# Patient Record
Sex: FEMALE | Race: BLACK OR AFRICAN AMERICAN | Employment: FULL TIME | URBAN - METROPOLITAN AREA
[De-identification: names, ages, dates, MRNs, and addresses within clinical notes are randomized per-mention and may not be internally consistent; named-entity substitution may affect disease eponyms.]

---

## 2017-03-14 ENCOUNTER — DOCUMENTATION ONLY (OUTPATIENT)
Dept: BEHAVIORAL/MENTAL HEALTH CLINIC | Age: 27
End: 2017-03-14

## 2017-08-12 ENCOUNTER — HOSPITAL ENCOUNTER (EMERGENCY)
Age: 27
Discharge: HOME | End: 2017-08-12
Attending: INTERNAL MEDICINE | Admitting: INTERNAL MEDICINE
Payer: MEDICAID

## 2017-08-12 ENCOUNTER — APPOINTMENT (OUTPATIENT)
Dept: GENERAL RADIOLOGY | Age: 27
End: 2017-08-12
Attending: INTERNAL MEDICINE
Payer: MEDICAID

## 2017-08-12 VITALS
BODY MASS INDEX: 43.19 KG/M2 | WEIGHT: 220 LBS | HEART RATE: 105 BPM | SYSTOLIC BLOOD PRESSURE: 203 MMHG | RESPIRATION RATE: 18 BRPM | TEMPERATURE: 97.8 F | HEIGHT: 60 IN | DIASTOLIC BLOOD PRESSURE: 110 MMHG | OXYGEN SATURATION: 99 %

## 2017-08-12 DIAGNOSIS — S99.922A FOOT INJURY, LEFT, INITIAL ENCOUNTER: Primary | ICD-10-CM

## 2017-08-12 DIAGNOSIS — I10 ESSENTIAL HYPERTENSION: ICD-10-CM

## 2017-08-12 PROCEDURE — 99284 EMERGENCY DEPT VISIT MOD MDM: CPT

## 2017-08-12 PROCEDURE — 74011250637 HC RX REV CODE- 250/637: Performed by: INTERNAL MEDICINE

## 2017-08-12 PROCEDURE — 73630 X-RAY EXAM OF FOOT: CPT

## 2017-08-12 RX ORDER — TRAMADOL HYDROCHLORIDE 50 MG/1
50 TABLET ORAL
Qty: 8 TAB | Refills: 0 | Status: SHIPPED | OUTPATIENT
Start: 2017-08-12 | End: 2017-12-04

## 2017-08-12 RX ORDER — TRAMADOL HYDROCHLORIDE 50 MG/1
50 TABLET ORAL
Status: COMPLETED | OUTPATIENT
Start: 2017-08-12 | End: 2017-08-12

## 2017-08-12 RX ORDER — HYDROCHLOROTHIAZIDE 25 MG/1
25 TABLET ORAL DAILY
Qty: 10 TAB | Refills: 0 | Status: SHIPPED | OUTPATIENT
Start: 2017-08-12 | End: 2017-08-22

## 2017-08-12 RX ORDER — AMLODIPINE BESYLATE 10 MG/1
10 TABLET ORAL DAILY
Qty: 10 TAB | Refills: 0 | Status: SHIPPED | OUTPATIENT
Start: 2017-08-12 | End: 2017-08-22

## 2017-08-12 RX ADMIN — TRAMADOL HYDROCHLORIDE 50 MG: 50 TABLET, COATED ORAL at 02:49

## 2017-08-12 NOTE — ED NOTES
Patient given copy of dc instructions and three script(s). Patient verbalized understanding of instructions and script (s). Patient given a current medication reconciliation form and verbalized understanding of their medications. Patient verbalized understanding of the importance of discussing medications with  his or her physician or clinic when they follow up. Patient alert and oriented and in no acute distress. Pt verbalizes pain scale of 5 out of 10. Patient discharged home via wheelchair.

## 2017-08-12 NOTE — LETTER
[facility] EMERGENCY DEPT 
[address] [facility] [address] [id number] [phone] Work/School Note Date: 8/12/2017 To Whom It May concern: 
 
Kayla Gallardo was seen and treated today in the emergency room by the following provider(s): 
Attending Provider: Erick Campa MD. Kayla Gallardo may return to work on 08/14/2017. Sincerely, Kristina Magana RN

## 2017-08-12 NOTE — DISCHARGE INSTRUCTIONS

## 2017-08-12 NOTE — ED PROVIDER NOTES
HPI Comments: Samson Schaefer is a 32 y.o. female, pmhx significant for asthma, and anxiety, who presents ambulatory to the ED c/o sharp left lower leg pain with left knee pain x a couple of days. Pt states that she sustained injury to her leg and knee by falling down some stairs a few days ago. She reports that her pain is exacerbated with weight bearing, but she is able to ambulate independently. Pt denies hitting her head or LOC in the fall. Pt denies taking any analgesics for pain PTA. Pt denies nausea, vomiting, upper leg pain, fever, chills, and abdominal pain. PCP: Abhijit Carl MD    PSHx: Significant for cholecystectomy, orthopaedic (left foot)  Social Hx: - tobacco (former), - EtOH, - Illicit Drugs    There are no other complaints, changes, or physical findings at this time. The history is provided by the patient. No  was used. Past Medical History:   Diagnosis Date    Asthma     Asthma     has not had any problems no inhaler    Psychiatric disorder     ANXIETY    Vaginal delivery        Past Surgical History:   Procedure Laterality Date    HX ORTHOPAEDIC      Broke l foot as child    HX OTHER SURGICAL      left foot on third toe     HX OTHER SURGICAL      Laparoscopic cholecystectomy.  HX OTHER SURGICAL  04/19/2016    Incision and drainage of pilonidal abscess; Dr. Sue Berger. Family History:   Problem Relation Age of Onset    Hypertension Maternal Grandmother        Social History     Social History    Marital status: SINGLE     Spouse name: N/A    Number of children: N/A    Years of education: N/A     Occupational History    Not on file.      Social History Main Topics    Smoking status: Former Smoker     Packs/day: 0.25     Quit date: 3/1/2011    Smokeless tobacco: Never Used    Alcohol use No    Drug use: No    Sexual activity: Yes     Partners: Male     Birth control/ protection: Pill     Other Topics Concern    Not on file Social History Narrative         ALLERGIES: Aleve [naproxen sodium]; Bactrim [sulfamethoprim ds]; Darvocet a500 [propoxyphene n-acetaminophen]; Lortab [hydrocodone-acetaminophen]; Motrin [ibuprofen]; and Naprosyn [naproxen]    Review of Systems   Constitutional: Negative. Negative for activity change, appetite change, chills, diaphoresis, fever and unexpected weight change. HENT: Negative for congestion, hearing loss, rhinorrhea, sinus pressure, sneezing, sore throat and trouble swallowing. Eyes: Negative for pain, redness, itching and visual disturbance. Respiratory: Negative for cough, shortness of breath and wheezing. Cardiovascular: Negative for chest pain, palpitations and leg swelling. Gastrointestinal: Negative for abdominal pain, constipation, diarrhea, nausea and vomiting. Genitourinary: Negative for dysuria. Musculoskeletal: Positive for arthralgias and myalgias. Negative for gait problem. Skin: Negative for color change, pallor, rash and wound. Neurological: Negative for tremors, weakness, light-headedness, numbness and headaches. All other systems reviewed and are negative. Vitals:    08/12/17 0125   BP: (!) 203/110   Pulse: (!) 105   Resp: 18   Temp: 97.8 °F (36.6 °C)   SpO2: 99%   Weight: 99.8 kg (220 lb)   Height: 5' (1.524 m)            Physical Exam   Constitutional: She is oriented to person, place, and time. She appears well-developed and well-nourished. No distress. 32 y.o.  female in NAD  Communicates appropriately and in full sentences   HENT:   Head: Normocephalic and atraumatic. Right Ear: External ear normal.   Left Ear: External ear normal.   Mouth/Throat: Oropharynx is clear and moist. No oropharyngeal exudate. Eyes: Conjunctivae are normal. Pupils are equal, round, and reactive to light. Right eye exhibits no discharge. Left eye exhibits no discharge. Neck: Normal range of motion. Neck supple. No tracheal deviation present. Cardiovascular: Normal rate, regular rhythm, normal heart sounds and intact distal pulses. Pulmonary/Chest: Effort normal and breath sounds normal. No stridor. No respiratory distress. She has no wheezes. Abdominal: Soft. Bowel sounds are normal. She exhibits no distension. There is no tenderness. Musculoskeletal: Normal range of motion. She exhibits tenderness (tenderness to left foot). She exhibits no edema or deformity. Lymphadenopathy:     She has no cervical adenopathy. Neurological: She is alert and oriented to person, place, and time. No cranial nerve deficit. Coordination normal.   Skin: Skin is warm and dry. No rash noted. She is not diaphoretic. No erythema. No pallor. Psychiatric: She has a normal mood and affect. Nursing note and vitals reviewed. MDM  Number of Diagnoses or Management Options  Diagnosis management comments:   DDx: sprain, strain, contusion, fracture       Amount and/or Complexity of Data Reviewed  Tests in the radiology section of CPT®: ordered and reviewed  Review and summarize past medical records: yes    Patient Progress  Patient progress: stable    ED Course       Procedures    Progress Note:  2:54 AM  Discussed workup results/findings, and counseled pt regarding her diagnosis. Pt has been encouraged to follow-up as instructed. All questions have been answered, and pt conveyed understanding. Written by Luma Carbajal ED Scribjoe, as dictated by Ganesh Tejada MD.      IMAGING RESULTS:  XR FOOT LT MIN 3 V   Final Result     EXAM:  XR FOOT LT MIN 3 V     INDICATION:   fell. Foot pain     COMPARISON:  None.     FINDINGS:  Three views of the left foot demonstrate no fracture or other acute  osseous or articular abnormality. The soft tissues are within normal limits.     IMPRESSION  IMPRESSION:  No acute abnormality. MEDICATIONS GIVEN:  Medications   traMADol (ULTRAM) tablet 50 mg (50 mg Oral Given 8/12/17 0249)       IMPRESSION:  1.  Foot injury, left, initial encounter    2. Essential hypertension        PLAN:  1. Discharge home. Current Discharge Medication List      START taking these medications    Details   traMADol (ULTRAM) 50 mg tablet Take 1 Tab by mouth every eight (8) hours as needed for Pain for up to 8 doses. Max Daily Amount: 150 mg.  Qty: 8 Tab, Refills: 0      amLODIPine (NORVASC) 10 mg tablet Take 1 Tab by mouth daily for 10 doses. Qty: 10 Tab, Refills: 0         CONTINUE these medications which have CHANGED    Details   hydroCHLOROthiazide (HYDRODIURIL) 25 mg tablet Take 1 Tab by mouth daily for 10 days. Qty: 10 Tab, Refills: 0           2. Follow-up Information     Follow up With Details Comments Judith Vigil MD   [address] [address]  [phone]      Leena Bella DPM In 2 days follow up with podiatrist [address] [facility]  [phone]          3. Return to ED if worse       DISCHARGE NOTE:  2:54 AM  Patient's results have been reviewed with them. Patient and/or family have verbally conveyed their understanding and agreement of the patient's signs, symptoms, diagnosis, treatment and prognosis and additionally agree to follow up as recommended or return to the Emergency Room should their condition change prior to follow-up. Discharge instructions have also been provided to the patient with some educational information regarding their diagnosis as well a list of reasons why they would want to return to the ER prior to their follow-up appointment should their condition change. This note is prepared by Teresa Savage, acting as Scribe for MD Marcos Flynn MD: The scribe's documentation has been prepared under my direction and personally reviewed by me in its entirety. I confirm that the note above accurately reflects all work, treatment, procedures, and medical decision making performed by me.

## 2017-11-16 ENCOUNTER — OFFICE VISIT (OUTPATIENT)
Dept: SURGERY | Age: 27
End: 2017-11-16

## 2017-11-16 VITALS
HEIGHT: 60 IN | SYSTOLIC BLOOD PRESSURE: 156 MMHG | BODY MASS INDEX: 50.85 KG/M2 | HEART RATE: 87 BPM | OXYGEN SATURATION: 99 % | WEIGHT: 259 LBS | DIASTOLIC BLOOD PRESSURE: 90 MMHG | RESPIRATION RATE: 16 BRPM | TEMPERATURE: 98.7 F

## 2017-11-16 DIAGNOSIS — L72.3 SEBACEOUS CYST: Primary | ICD-10-CM

## 2017-11-16 PROBLEM — I10 ESSENTIAL HYPERTENSION: Status: ACTIVE | Noted: 2017-11-16

## 2017-11-16 RX ORDER — LOSARTAN POTASSIUM AND HYDROCHLOROTHIAZIDE 12.5; 5 MG/1; MG/1
TABLET ORAL
COMMUNITY
Start: 2017-11-06 | End: 2018-12-29

## 2017-11-16 NOTE — PROGRESS NOTES
1. Have you been to the ER, urgent care clinic since your last visit? Hospitalized since your last visit? No    2. Have you seen or consulted any other health care providers outside of the [address] [place] [address] since your last visit? Include any pap smears or colon screening.  No

## 2017-11-16 NOTE — PROGRESS NOTES
HISTORY OF PRESENT ILLNESS  Gardenia Maya is a 32 y.o. female who presents for evaluation of a subcutaneous mass in her right inframammary fold. HPI Comments: Ms. Maggie Hutchins tells me that she has had a subcutaneous mass in her right inframammary fold for approx. 1 year now. The mass is becoming larger and more bothersome to her. No associated drainage. She has otherwise been in her usual state of health. Past Medical History:  No date: Asthma  No date: Asthma      Comment: has not had any problems no inhaler  11/16/2017: Class 3 obesity in adult  11/16/2017: Essential hypertension  No date: Psychiatric disorder      Comment: ANXIETY  No date: Vaginal delivery    Past Surgical History:  No date: HX ORTHOPAEDIC      Comment: Broke l foot as child  No date: HX OTHER SURGICAL      Comment: left foot on third toe   No date: HX OTHER SURGICAL      Comment: Laparoscopic cholecystectomy. 04/19/2016: HX OTHER SURGICAL      Comment: Incision and drainage of pilonidal abscess;                Dr. Raina Ferraro. Review of patient's family history indicates:    Hypertension                   Maternal Grandmother      Social History: Employment - [employer]. Tobacco - Denies. EtOH - Denies. Review of systems negative except as noted. Review of Systems   Musculoskeletal: Positive for back pain and myalgias. Psychiatric/Behavioral: Positive for depression. Physical Exam   Constitutional: No distress. Obese female. HENT:   Head: Normocephalic and atraumatic. Eyes: No scleral icterus. Neck: Neck supple. Cardiovascular: Normal rate and regular rhythm. Pulmonary/Chest: Effort normal and breath sounds normal.       In the right inframammary fold, there is an approx. 2cm x 1cm, well circumscribed, freely movable subcutaneous mass. No associated drainage. Clinically, this is c/w a sebaceous cyst.   Abdominal: Soft. She exhibits no distension. There is no tenderness. There is no rebound and no guarding.

## 2017-11-21 RX ORDER — BUPIVACAINE HYDROCHLORIDE 2.5 MG/ML
30 INJECTION, SOLUTION EPIDURAL; INFILTRATION; INTRACAUDAL ONCE
Status: CANCELLED | OUTPATIENT
Start: 2017-11-21 | End: 2017-11-21

## 2017-12-04 ENCOUNTER — HOSPITAL ENCOUNTER (OUTPATIENT)
Dept: PREADMISSION TESTING | Age: 27
Discharge: HOME | End: 2017-12-04
Payer: MEDICAID

## 2017-12-04 VITALS
WEIGHT: 255.95 LBS | HEIGHT: 60 IN | BODY MASS INDEX: 50.25 KG/M2 | RESPIRATION RATE: 16 BRPM | TEMPERATURE: 97.9 F | DIASTOLIC BLOOD PRESSURE: 123 MMHG | SYSTOLIC BLOOD PRESSURE: 185 MMHG | HEART RATE: 100 BPM

## 2017-12-04 LAB
ANION GAP SERPL CALC-SCNC: 8 MMOL/L (ref 5–15)
BASOPHILS # BLD: 0 K/UL (ref 0–0.1)
BASOPHILS NFR BLD: 0 % (ref 0–1)
BUN SERPL-MCNC: 11 MG/DL (ref 6–20)
BUN/CREAT SERPL: 13 (ref 12–20)
CALCIUM SERPL-MCNC: 8.9 MG/DL (ref 8.5–10.1)
CHLORIDE SERPL-SCNC: 104 MMOL/L (ref 97–108)
CO2 SERPL-SCNC: 31 MMOL/L (ref 21–32)
CREAT SERPL-MCNC: 0.82 MG/DL (ref 0.55–1.02)
EOSINOPHIL # BLD: 0.1 K/UL (ref 0–0.4)
EOSINOPHIL NFR BLD: 2 % (ref 0–7)
ERYTHROCYTE [DISTWIDTH] IN BLOOD BY AUTOMATED COUNT: 17.4 % (ref 11.5–14.5)
GLUCOSE SERPL-MCNC: 98 MG/DL (ref 65–100)
HCT VFR BLD AUTO: 38.7 % (ref 35–47)
HGB BLD-MCNC: 12.2 G/DL (ref 11.5–16)
LYMPHOCYTES # BLD: 2.6 K/UL (ref 0.8–3.5)
LYMPHOCYTES NFR BLD: 33 % (ref 12–49)
MCH RBC QN AUTO: 23.3 PG (ref 26–34)
MCHC RBC AUTO-ENTMCNC: 31.5 G/DL (ref 30–36.5)
MCV RBC AUTO: 74 FL (ref 80–99)
MONOCYTES # BLD: 0.5 K/UL (ref 0–1)
MONOCYTES NFR BLD: 6 % (ref 5–13)
NEUTS SEG # BLD: 4.7 K/UL (ref 1.8–8)
NEUTS SEG NFR BLD: 59 % (ref 32–75)
PLATELET # BLD AUTO: 340 K/UL (ref 150–400)
POTASSIUM SERPL-SCNC: 3.8 MMOL/L (ref 3.5–5.1)
RBC # BLD AUTO: 5.23 M/UL (ref 3.8–5.2)
SODIUM SERPL-SCNC: 143 MMOL/L (ref 136–145)
WBC # BLD AUTO: 7.9 K/UL (ref 3.6–11)

## 2017-12-04 PROCEDURE — 80048 BASIC METABOLIC PNL TOTAL CA: CPT | Performed by: SURGERY

## 2017-12-04 PROCEDURE — 36415 COLL VENOUS BLD VENIPUNCTURE: CPT | Performed by: SURGERY

## 2017-12-04 PROCEDURE — 85025 COMPLETE CBC W/AUTO DIFF WBC: CPT | Performed by: SURGERY

## 2017-12-04 NOTE — PERIOP NOTES
[address] 83  Preoperative Instructions      Surgery Date 12/21/2017              Time of Arrival  8:00    1. On the day of your surgery, please report to the [facility] Entrance. If arriving prior to 7 AM please enter through the Emergency Room Entrance. 2. You must have a responsible adult to drive you to the hospital, stay at the hospital during your surgery and drive you home. You should have someone stay with you for the first 24 hours after your surgery. You should not drive a car for 24 hours following surgery. 3. Do not have anything to eat or drink ( including water, gum, mints, coffee, juice) after midnight . This may not apply to medications prescribed by your physician. Please note special instructions, if applicable. If you are currently taking Plavix, Coumadin, or other blood-thinning agents, contact your surgeon for instructions. 4. We recommend you do not drink any alcoholic beverages for 24 hours before and after your surgery. 5. Have a list of all current medications, including vitamins, herbal supplements and any other over the counter medications. Stop Asprin and non-steroidal anti-inflammatory drugs (I.e. Advil, Aleve) as directed by your surgeon's office. Stop all vitamins and herbal supplements seven days prior to your surgery. 6. Wear comfortable clothes. Wear glasses instead of contacts. Do not bring any money or jewelry. Do not wear make-up, particularly mascara, the morning of your surgery. Do not wear nail polish, particularly if you are having foot /hand surgery. Wear your hair loose or down, no ponytails, buns, patti pins or clips. All body piercings must be removed. Please shower before surgery with an antibacterial soap (Safeguard/Dial) and use a clean towel. Do not apply any lotions, powders, cologne, perfume or deodorants afterward.     Do not shave the area around your surgical incision for at least two to three days prior to your surgery. If you wear glasses, contacts, dentures and/or hearing aids, they will be removed prior to surgery. 7. You should understand that if you do not follow these instructions your surgery may be cancelled. If your physical condition changes (I.e. fever, cold or flu) please contact your surgeon as soon as possible. 8. It is important that you be on time. If a situation occurs where you may be late, please call [phone]    9. If you are feeling sick before surgery, call your surgeon. He or she will tell you what to do. If you are sick on the day of your surgery please call [phone].     10. If you have any questions and or problems, please call [phone] (Pre-admission Testing). 11. Your surgery time may be subject to change. You will receive a phone call the evening before your surgery if your time changes. Special Instructions: take blood pressure medication the morning of surgery with a sip of water. MEDICATIONS TO TAKE THE MORNING OF SURGERY WITH A SIP OF WATER:   I understand a pre-operative phone call will be made to verify my surgery time.   In the event that I am not available, I give permission for a message to be left on my answering service and/or with another person?              ___________________      ___________________  _________  (Signature of Patient)          (Witness)                              (Date and Time)

## 2017-12-04 NOTE — PERIOP NOTES
Patient educated on high blood pressure. Blood pressure retaken after PAT. 153/107. Patient instructed to see her Primary DrMamie For high blood pressure.

## 2017-12-21 ENCOUNTER — ANESTHESIA EVENT (OUTPATIENT)
Dept: SURGERY | Age: 27
End: 2017-12-21
Payer: MEDICAID

## 2017-12-21 ENCOUNTER — HOSPITAL ENCOUNTER (OUTPATIENT)
Age: 27
Setting detail: OUTPATIENT SURGERY
Discharge: HOME | End: 2017-12-21
Attending: SURGERY | Admitting: SURGERY
Payer: MEDICAID

## 2017-12-21 ENCOUNTER — ANESTHESIA (OUTPATIENT)
Dept: SURGERY | Age: 27
End: 2017-12-21
Payer: MEDICAID

## 2017-12-21 VITALS
SYSTOLIC BLOOD PRESSURE: 152 MMHG | TEMPERATURE: 97.8 F | BODY MASS INDEX: 50.25 KG/M2 | OXYGEN SATURATION: 96 % | HEIGHT: 60 IN | RESPIRATION RATE: 16 BRPM | DIASTOLIC BLOOD PRESSURE: 98 MMHG | WEIGHT: 255.95 LBS | HEART RATE: 88 BPM

## 2017-12-21 DIAGNOSIS — L72.3 SEBACEOUS CYST: Primary | ICD-10-CM

## 2017-12-21 LAB — HCG UR QL: NEGATIVE

## 2017-12-21 PROCEDURE — 77030010507 HC ADH SKN DERMBND J&J -B: Performed by: SURGERY

## 2017-12-21 PROCEDURE — 76010000138 HC OR TIME 0.5 TO 1 HR: Performed by: SURGERY

## 2017-12-21 PROCEDURE — 77030031139 HC SUT VCRL2 J&J -A: Performed by: SURGERY

## 2017-12-21 PROCEDURE — 76060000032 HC ANESTHESIA 0.5 TO 1 HR: Performed by: SURGERY

## 2017-12-21 PROCEDURE — 77030018836 HC SOL IRR NACL ICUM -A: Performed by: SURGERY

## 2017-12-21 PROCEDURE — 77030011640 HC PAD GRND REM COVD -A: Performed by: SURGERY

## 2017-12-21 PROCEDURE — 81025 URINE PREGNANCY TEST: CPT

## 2017-12-21 PROCEDURE — 76210000063 HC OR PH I REC FIRST 0.5 HR: Performed by: SURGERY

## 2017-12-21 PROCEDURE — 74011000250 HC RX REV CODE- 250

## 2017-12-21 PROCEDURE — 74011250636 HC RX REV CODE- 250/636

## 2017-12-21 PROCEDURE — 88304 TISSUE EXAM BY PATHOLOGIST: CPT | Performed by: SURGERY

## 2017-12-21 PROCEDURE — 74011250636 HC RX REV CODE- 250/636: Performed by: ANESTHESIOLOGY

## 2017-12-21 PROCEDURE — 74011000250 HC RX REV CODE- 250: Performed by: SURGERY

## 2017-12-21 PROCEDURE — 76210000020 HC REC RM PH II FIRST 0.5 HR: Performed by: SURGERY

## 2017-12-21 PROCEDURE — 77030002933 HC SUT MCRYL J&J -A: Performed by: SURGERY

## 2017-12-21 RX ORDER — CEFAZOLIN SODIUM IN 0.9 % NACL 2 G/100 ML
PLASTIC BAG, INJECTION (ML) INTRAVENOUS
Status: DISCONTINUED
Start: 2017-12-21 | End: 2017-12-21 | Stop reason: HOSPADM

## 2017-12-21 RX ORDER — SODIUM CHLORIDE 0.9 % (FLUSH) 0.9 %
5-10 SYRINGE (ML) INJECTION AS NEEDED
Status: DISCONTINUED | OUTPATIENT
Start: 2017-12-21 | End: 2017-12-21 | Stop reason: HOSPADM

## 2017-12-21 RX ORDER — SODIUM CHLORIDE, SODIUM LACTATE, POTASSIUM CHLORIDE, CALCIUM CHLORIDE 600; 310; 30; 20 MG/100ML; MG/100ML; MG/100ML; MG/100ML
50 INJECTION, SOLUTION INTRAVENOUS CONTINUOUS
Status: DISCONTINUED | OUTPATIENT
Start: 2017-12-21 | End: 2017-12-21 | Stop reason: HOSPADM

## 2017-12-21 RX ORDER — BUPIVACAINE HYDROCHLORIDE 2.5 MG/ML
30 INJECTION, SOLUTION EPIDURAL; INFILTRATION; INTRACAUDAL ONCE
Status: COMPLETED | OUTPATIENT
Start: 2017-12-21 | End: 2017-12-21

## 2017-12-21 RX ORDER — PROPOFOL 10 MG/ML
INJECTION, EMULSION INTRAVENOUS AS NEEDED
Status: DISCONTINUED | OUTPATIENT
Start: 2017-12-21 | End: 2017-12-21 | Stop reason: HOSPADM

## 2017-12-21 RX ORDER — HYDROMORPHONE HYDROCHLORIDE 1 MG/ML
0.5 INJECTION, SOLUTION INTRAMUSCULAR; INTRAVENOUS; SUBCUTANEOUS
Status: DISCONTINUED | OUTPATIENT
Start: 2017-12-21 | End: 2017-12-21 | Stop reason: HOSPADM

## 2017-12-21 RX ORDER — LIDOCAINE HYDROCHLORIDE 20 MG/ML
INJECTION, SOLUTION INFILTRATION; PERINEURAL AS NEEDED
Status: DISCONTINUED | OUTPATIENT
Start: 2017-12-21 | End: 2017-12-21 | Stop reason: HOSPADM

## 2017-12-21 RX ORDER — CEFAZOLIN SODIUM 1 G/3ML
INJECTION, POWDER, FOR SOLUTION INTRAMUSCULAR; INTRAVENOUS
Status: DISCONTINUED
Start: 2017-12-21 | End: 2017-12-21 | Stop reason: HOSPADM

## 2017-12-21 RX ORDER — FENTANYL CITRATE 50 UG/ML
INJECTION, SOLUTION INTRAMUSCULAR; INTRAVENOUS AS NEEDED
Status: DISCONTINUED | OUTPATIENT
Start: 2017-12-21 | End: 2017-12-21 | Stop reason: HOSPADM

## 2017-12-21 RX ORDER — HYDROCODONE BITARTRATE AND ACETAMINOPHEN 5; 325 MG/1; MG/1
1 TABLET ORAL
Qty: 4 TAB | Refills: 0 | Status: SHIPPED | OUTPATIENT
Start: 2017-12-21 | End: 2018-02-15

## 2017-12-21 RX ORDER — FENTANYL CITRATE 50 UG/ML
25 INJECTION, SOLUTION INTRAMUSCULAR; INTRAVENOUS
Status: DISCONTINUED | OUTPATIENT
Start: 2017-12-21 | End: 2017-12-21 | Stop reason: HOSPADM

## 2017-12-21 RX ORDER — SODIUM CHLORIDE 900 MG/100ML
INJECTION INTRAVENOUS
Status: DISCONTINUED
Start: 2017-12-21 | End: 2017-12-21 | Stop reason: HOSPADM

## 2017-12-21 RX ORDER — CEFAZOLIN SODIUM IN 0.9 % NACL 2 G/100 ML
2 PLASTIC BAG, INJECTION (ML) INTRAVENOUS
Status: DISCONTINUED | OUTPATIENT
Start: 2017-12-21 | End: 2017-12-21 | Stop reason: HOSPADM

## 2017-12-21 RX ORDER — LIDOCAINE HYDROCHLORIDE 10 MG/ML
0.1 INJECTION, SOLUTION EPIDURAL; INFILTRATION; INTRACAUDAL; PERINEURAL AS NEEDED
Status: DISCONTINUED | OUTPATIENT
Start: 2017-12-21 | End: 2017-12-21 | Stop reason: HOSPADM

## 2017-12-21 RX ORDER — SODIUM CHLORIDE 9 MG/ML
50 INJECTION, SOLUTION INTRAVENOUS CONTINUOUS
Status: DISCONTINUED | OUTPATIENT
Start: 2017-12-21 | End: 2017-12-21 | Stop reason: HOSPADM

## 2017-12-21 RX ORDER — MIDAZOLAM HYDROCHLORIDE 1 MG/ML
INJECTION, SOLUTION INTRAMUSCULAR; INTRAVENOUS AS NEEDED
Status: DISCONTINUED | OUTPATIENT
Start: 2017-12-21 | End: 2017-12-21 | Stop reason: HOSPADM

## 2017-12-21 RX ORDER — SODIUM CHLORIDE 0.9 % (FLUSH) 0.9 %
5-10 SYRINGE (ML) INJECTION EVERY 8 HOURS
Status: DISCONTINUED | OUTPATIENT
Start: 2017-12-21 | End: 2017-12-21 | Stop reason: HOSPADM

## 2017-12-21 RX ADMIN — PROPOFOL 10 MG: 10 INJECTION, EMULSION INTRAVENOUS at 11:56

## 2017-12-21 RX ADMIN — PROPOFOL 10 MG: 10 INJECTION, EMULSION INTRAVENOUS at 11:50

## 2017-12-21 RX ADMIN — PROPOFOL 40 MG: 10 INJECTION, EMULSION INTRAVENOUS at 11:47

## 2017-12-21 RX ADMIN — PROPOFOL 10 MG: 10 INJECTION, EMULSION INTRAVENOUS at 11:55

## 2017-12-21 RX ADMIN — PROPOFOL 10 MG: 10 INJECTION, EMULSION INTRAVENOUS at 12:00

## 2017-12-21 RX ADMIN — PROPOFOL 10 MG: 10 INJECTION, EMULSION INTRAVENOUS at 11:53

## 2017-12-21 RX ADMIN — PROPOFOL 10 MG: 10 INJECTION, EMULSION INTRAVENOUS at 11:48

## 2017-12-21 RX ADMIN — LIDOCAINE HYDROCHLORIDE 60 MG: 20 INJECTION, SOLUTION INFILTRATION; PERINEURAL at 11:47

## 2017-12-21 RX ADMIN — PROPOFOL 10 MG: 10 INJECTION, EMULSION INTRAVENOUS at 11:58

## 2017-12-21 RX ADMIN — MIDAZOLAM HYDROCHLORIDE 1 MG: 1 INJECTION, SOLUTION INTRAMUSCULAR; INTRAVENOUS at 11:45

## 2017-12-21 RX ADMIN — PROPOFOL 10 MG: 10 INJECTION, EMULSION INTRAVENOUS at 12:01

## 2017-12-21 RX ADMIN — PROPOFOL 10 MG: 10 INJECTION, EMULSION INTRAVENOUS at 11:52

## 2017-12-21 RX ADMIN — PROPOFOL 10 MG: 10 INJECTION, EMULSION INTRAVENOUS at 11:54

## 2017-12-21 RX ADMIN — SODIUM CHLORIDE, POTASSIUM CHLORIDE, SODIUM LACTATE AND CALCIUM CHLORIDE: 600; 310; 30; 20 INJECTION, SOLUTION INTRAVENOUS at 10:50

## 2017-12-21 RX ADMIN — PROPOFOL 10 MG: 10 INJECTION, EMULSION INTRAVENOUS at 11:51

## 2017-12-21 RX ADMIN — MIDAZOLAM HYDROCHLORIDE 2 MG: 1 INJECTION, SOLUTION INTRAMUSCULAR; INTRAVENOUS at 11:25

## 2017-12-21 RX ADMIN — PROPOFOL 10 MG: 10 INJECTION, EMULSION INTRAVENOUS at 11:57

## 2017-12-21 RX ADMIN — PROPOFOL 10 MG: 10 INJECTION, EMULSION INTRAVENOUS at 11:49

## 2017-12-21 RX ADMIN — FENTANYL CITRATE 50 MCG: 50 INJECTION, SOLUTION INTRAMUSCULAR; INTRAVENOUS at 11:45

## 2017-12-21 RX ADMIN — PROPOFOL 10 MG: 10 INJECTION, EMULSION INTRAVENOUS at 11:59

## 2017-12-21 NOTE — ANESTHESIA PREPROCEDURE EVALUATION
Anesthetic History   No history of anesthetic complications            Review of Systems / Medical History  Patient summary reviewed and pertinent labs reviewed    Pulmonary            Asthma        Neuro/Psych         Psychiatric history     Cardiovascular    Hypertension              Exercise tolerance: >4 METS     GI/Hepatic/Renal  Within defined limits              Endo/Other        Morbid obesity     Other Findings              Physical Exam    Airway  Mallampati: III  TM Distance: 4 - 6 cm  Neck ROM: normal range of motion   Mouth opening: Normal     Cardiovascular    Rhythm: regular  Rate: normal         Dental    Dentition: Upper dentition intact and Lower dentition intact     Pulmonary  Breath sounds clear to auscultation               Abdominal  GI exam deferred       Other Findings            Anesthetic Plan    ASA: 3  Anesthesia type: MAC            Anesthetic plan and risks discussed with: Patient

## 2017-12-21 NOTE — ANESTHESIA POSTPROCEDURE EVALUATION
Post-Anesthesia Evaluation and Assessment    Patient: Eyal Funk MRN: [id number]  SSN: [id number]    YOB: 1990  Age: 32 y.o. Sex: female       Cardiovascular Function/Vital Signs  Visit Vitals    BP (!) 152/98    Pulse 88    Temp 36.6 °C (97.8 °F)    Resp 16    Ht 5' (1.524 m)    Wt 116.1 kg (255 lb 15.3 oz)    SpO2 96%    BMI 49.99 kg/m2       Patient is status post MAC anesthesia for Procedure(s):  EXCISION OF SEBACEOUS CYST RIGHT INFRAMAMMARY FOLD. Nausea/Vomiting: None    Postoperative hydration reviewed and adequate. Pain:  Pain Scale 1: Numeric (0 - 10) (12/21/17 1225)  Pain Intensity 1: 0 (12/21/17 1225)   Managed    Neurological Status:   Neuro (WDL): Within Defined Limits (12/21/17 1229)  Neuro  Neurologic State: Alert; Appropriate for age (12/21/17 1229)  LUE Motor Response: Purposeful (12/21/17 1229)  LLE Motor Response: Purposeful (12/21/17 1229)  RUE Motor Response: Purposeful (12/21/17 1229)  RLE Motor Response: Purposeful (12/21/17 1229)   At baseline    Mental Status and Level of Consciousness: Arousable    Pulmonary Status:   O2 Device: Room air (12/21/17 1229)   Adequate oxygenation and airway patent    Complications related to anesthesia: None    Post-anesthesia assessment completed.  No concerns    Signed By: Marissa Monroy MD     December 21, 2017

## 2017-12-21 NOTE — DISCHARGE INSTRUCTIONS
Patient Discharge Instructions    Michi Murcia / [id number] : 1990    Admitted 2017 Discharged: 2017       · It is important that you take the medication exactly as they are prescribed. · Keep your medication in the bottles provided by the pharmacist and keep a list of the medication names, dosages, and times to be taken in your wallet. · Do not take other medications without consulting your doctor. What to do at Home    Recommended diet: Regular. Recommended activity: No Restrictions. No Driving While Taking [address]. May Take Shower or [employer]. If you experience any of the following symptoms Fevers, Chills, Nausea, Vomitting, Redness or Drainage at Surgical Site(s) or Any Other Questions or Concerns Please Call -  [phone]. Follow-up with Dr. Phyllis Diehl in 10-14 days. Information obtained by :  I understand that if any problems occur once I am at home I am to contact my physician. I understand and acknowledge receipt of the instructions indicated above.                                                                                                                                            Physician's or R.N.'s Signature                                                                  Date/Time                                                                                                                                              Patient or Representative Signature                                                          Date/Time

## 2017-12-21 NOTE — PERIOP NOTES
Pt educated on discharge instructions and follow up care by Dr. Marcene Primrose and RN staff. Pt also educated on high blood pressure. Pt states that she just started taking BP meds again and felt that is what was causing her high readings. Pt to start taking meds on a regular basis but educated to follow up with PCP. Pt given opportunity to ask questions and given written materials. Pt verbalized understanding.  To be discharged with cousin Brian Eliana

## 2017-12-21 NOTE — H&P
No c/o today. Afebrile VSS  No intake or output data in the 24 hours ending 12/21/17 1047   Exam: Cor: RRR. Lungs: Bilateral breath sounds. Clear to auscultation. Abd: Soft. Non distended. Non tender. Skin: No change in right inframammary fold sebaceous cyst.   Labs:   Recent Results (from the past 12 hour(s))   HCG URINE, QL. - POC    Collection Time: 12/21/17  9:31 AM   Result Value Ref Range    Pregnancy test,urine (POC) NEGATIVE  NEG     To OR for excision of sebaceous cyst.   Discussed procedure with Ms. Sloan including risks of bleeding, infection, recurrence. She understands and wishes to proceed. Consent on chart. Operative site marked.

## 2017-12-21 NOTE — PERIOP NOTES
Handoff Report from Operating Room to PACU    Report received from Dr. Chele Kenyon and Nydia Mendes RN regarding [place]. Surgeon(s):  Benita Gates MD  And Procedure(s) (LRB):  EXCISION OF SEBACEOUS CYST RIGHT INFRAMAMMARY FOLD (Right)  confirmed   with allergies and dressings discussed. Anesthesia type, drugs, patient history, complications, estimated blood loss, vital signs, intake and output, and last pain medication, lines and temperature were reviewed.

## 2017-12-21 NOTE — BRIEF OP NOTE
BRIEF OPERATIVE NOTE    Date of Procedure: 12/21/2017   Preoperative Diagnosis:  Sebaceous Cyst Right Inframammary Fold. Postoperative Diagnosis:  Same. Procedure(s):   Excise Sebaceous Cyst Right Inframammary Fold. Surgeon(s) and Role:   Amanda Rhodes MD - Primary  Surgical Staff:  Circ-1: Alisha Cervantes  Scrub Tech-1: Ester Baca  Event Time In   Incision Start 1147   Incision Close 1204     Anesthesia: MAC   Estimated Blood Loss: Minimal.  Specimens:   ID Type Source Tests Collected by Time Destination   1 : Sebaceous Cyst Right Chest Preservative Chest  Amanda Rhodes MD 12/21/2017 1153 Pathology      Findings: Approx. 1cm x 1cm sebaceous cyst.   Complications: None.   Implants: * No implants in log *

## 2017-12-21 NOTE — IP AVS SNAPSHOT
Kenny Barragan 
 
 
 [address] Patient: Benita Mena MRN: [id number] TRH:2/60/1720 My Medications TAKE these medications as instructed Instructions Each Dose to Equal  
 Morning Noon Evening Bedtime  
 busPIRone 7.5 mg tablet Commonly known as:  BUSPAR Your last dose was: Your next dose is: Take 1 Tab by mouth three (3) times daily (with meals). 7.5 mg  
    
   
   
   
  
 HYDROcodone-acetaminophen 5-325 mg per tablet Commonly known as:  March Castles Your last dose was: Your next dose is: Take 1 Tab by mouth every four (4) hours as needed for Pain. Max Daily Amount: 6 Tabs. 1 Tab  
    
   
   
   
  
 losartan-hydroCHLOROthiazide 50-12.5 mg per tablet Commonly known as:  HYZAAR Your last dose was: Your next dose is:    
   
   
      
   
   
   
  
 Soft Lens Rinse-Store Solution Soln Commonly known as:  SALINE SOLUTION Your last dose was: Your next dose is:    
   
   
 Use as needed for wound care. Where to Get Your Medications Information on where to get these meds will be given to you by the nurse or doctor. ! Ask your nurse or doctor about these medications HYDROcodone-acetaminophen 5-325 mg per tablet

## 2017-12-21 NOTE — IP AVS SNAPSHOT
Tonye Elton 
 
 
 [address] Patient: Kathryn Church MRN: [id number] About your hospitalization You were admitted on:  December 21, 2017 You last received care in the:  [facility] PACU/[facility] You were discharged on:  December 21, 2017 Why you were hospitalized Your primary diagnosis was:  Not on File Things You Need To Do (next 8 weeks) Follow up with Mega Khan MD  
  
Phone:  [phone] Where:  [address], [address], [address] [place] [address] Drive Thursday Jan 04, 2018 POST OP with Miller Mitchell MD at  2:00 PM  
Where:  [address] [facility] ([facility]) Discharge Orders None A check tito indicates which time of day the medication should be taken. My Medications TAKE these medications as instructed Instructions Each Dose to Equal  
 Morning Noon Evening Bedtime  
 busPIRone 7.5 mg tablet Commonly known as:  BUSPAR Your last dose was: Your next dose is: Take 1 Tab by mouth three (3) times daily (with meals). 7.5 mg  
    
   
   
   
  
 HYDROcodone-acetaminophen 5-325 mg per tablet Commonly known as:  Fabiola Edwin Your last dose was: Your next dose is: Take 1 Tab by mouth every four (4) hours as needed for Pain. Max Daily Amount: 6 Tabs. 1 Tab  
    
   
   
   
  
 losartan-hydroCHLOROthiazide 50-12.5 mg per tablet Commonly known as:  HYZAAR Your last dose was: Your next dose is:    
   
   
      
   
   
   
  
 Soft Lens Rinse-Store Solution Soln Commonly known as:  SALINE SOLUTION Your last dose was: Your next dose is:    
   
   
 Use as needed for wound care. Where to Get Your Medications Information on where to get these meds will be given to you by the nurse or doctor. ! Ask your nurse or doctor about these medications HYDROcodone-acetaminophen 5-325 mg per tablet Discharge Instructions Patient Discharge Instructions Phan Marin / [id number] : 1990 Admitted 2017 Discharged: 2017 · It is important that you take the medication exactly as they are prescribed. · Keep your medication in the bottles provided by the pharmacist and keep a list of the medication names, dosages, and times to be taken in your wallet. · Do not take other medications without consulting your doctor. What to do at [facility] Recommended diet: Regular. Recommended activity: No Restrictions. No Driving While Taking [address] May Take Shower or Eakly Roxo. If you experience any of the following symptoms Fevers, Chills, Nausea, Vomitting, Redness or Drainage at Surgical Site(s) or Any Other Questions or Concerns Please Call -  [phone]. Follow-up with Dr. Halima Jansen in 10-14 days. Information obtained by : 
I understand that if any problems occur once I am at home I am to contact my physician. I understand and acknowledge receipt of the instructions indicated above. Physician's or R.N.'s Signature                                                                  Date/Time Patient or Representative Signature                                                          Date/Time Introducing [facility]! Nell Ospina introduces Eveo patient portal. Now you can access parts of your medical record, email your doctor's office, and request medication refills online. 1. In your internet browser, go to [email or web address] 2. Click on the First Time User? Click Here link in the Sign In box. You will see the New Member Sign Up page. 3. Enter your Fantex Access Code exactly as it appears below. You will not need to use this code after youve completed the sign-up process. If you do not sign up before the expiration date, you must request a new code. · Fantex Access Code: [email or web address] Expires: 3/21/2018 12:23 PM 
 
4. Enter the last four digits of your Social Security Number (xxxx) and Date of Birth (mm/dd/yyyy) as indicated and click Submit. You will be taken to the next sign-up page. 5. Create a Fantex ID. This will be your Fantex login ID and cannot be changed, so think of one that is secure and easy to remember. 6. Create a Fantex password. You can change your password at any time. 7. Enter your Password Reset Question and Answer. This can be used at a later time if you forget your password. 8. Enter your e-mail address. You will receive e-mail notification when new information is available in [address] 9. Click Sign Up. You can now view and download portions of your medical record. 10. Click the Download Summary menu link to download a portable copy of your medical information. If you have questions, please visit the Frequently Asked Questions section of the Fantex website. Remember, Fantex is NOT to be used for urgent needs. For medical emergencies, dial 911. Now available from your iPhone and Android! Providers Seen During Your Hospitalization Provider Specialty Primary office phone Maggie Thorpe MD General Surgery [phone] Your Primary Care Physician (PCP) Primary Care Physician Office Phone Office Fax Samaria Murcia [phone] You are allergic to the following Allergen Reactions Aleve (Naproxen Sodium) Hives Bactrim (Sulfamethoprim Ds) Other (comments) Abdominal pain. Darvocet A500 (Propoxyphene N-Acetaminophen) Hives Lortab (Hydrocodone-Acetaminophen) Nausea and Vomiting Motrin (Ibuprofen) Hives Naprosyn (Naproxen) Hives Recent Documentation Height Weight BMI OB Status Smoking Status 1.524 m 116.1 kg 49.99 kg/m2 Having regular periods Former Smoker Emergency Contacts Name Discharge Info Relation Home Work Mobile Via GoPlaceIt CAREGIVER [3] Other Relative [6] [phone] Patient Belongings The following personal items are in your possession at time of discharge: 
  Dental Appliances: None  Visual Aid: None      Home Medications: None   Jewelry: None  Clothing: At bedside    Other Valuables: None Please provide this summary of care documentation to your next provider. Signatures-by signing, you are acknowledging that this After Visit Summary has been reviewed with you and you have received a copy. Patient Signature:  ____________________________________________________________ Date:  ____________________________________________________________  
  
Jim Bealve Provider Signature:  ____________________________________________________________ Date:  ____________________________________________________________

## 2017-12-21 NOTE — OP NOTES
[facility]  OPERATIVE REPORT    Carmen Conteh  MR#: [id number]  : 1990  ACCOUNT #: [de-identified]   DATE OF SERVICE: 2017    SURGEON: Ritchie Biggs MD    PREOPERATIVE DIAGNOSIS:  Sebaceous cyst, right inframammary fold. POSTOPERATIVE DIAGNOSIS: Sebaceous cyst, right inframammary fold. PROCEDURE  PERFORMED: Excise sebaceous cyst, right inframammary fold. ANESTHESIA:  Local with sedation. ESTIMATED BLOOD LOSS:  Minimal; crystalloid 700 mL. SPECIMEN REMOVED:  Sebaceous cyst of the right inframammary fold to Pathology. COMPLICATIONS: None     DRAINS:  None    INDICATIONS FOR SURGERY:  The patient is a 15-year-old female with a subcutaneous mass in the right inframammary fold. Clinically, this is consistent with a sebaceous cyst.    The patient was brought to the operating room at this time for excision of the sebaceous cyst.  The risks of the procedure including but not limited to bleeding, infection, recurrence were discussed in detail with the patient. The patient understood and wished to proceed. PROCEDURE IN DETAIL:  After consent was obtained, the patient was brought to the operating room where she was placed in supine position on the operating room table. Following the induction of an adequate level of intravenous sedation, compression devices were placed on both lower extremities. The right side of the abdomen and inframammary fold were prepped with ChloraPrep and draped as a sterile field. Local anesthetic was infiltrated and an incision over the sebaceous cyst was opened sharply. Subcutaneous bleeders were carefully cauterized. Incision was carried down to the sebaceous cyst, which was readily identified, dissected free circumferentially and excised. The specimen, which measured approximately 1 cm x 1 cm, was passed off the field and submitted for histopathologic evaluation. The wound was inspected and several bleeders cauterized.   The wound was irrigated copiously with saline, inspected and found to be hemostatic. The surgical incision was closed with running 3-0 Vicryl suture followed by 4-0 Monocryl subcuticular suture to the skin. Additional local anesthetic was infiltrated and the surgical incision was dressed with Dermabond. The patient was transferred from the operating room table to the stretcher and brought to recovery in stable condition having tolerated the procedure well. At the conclusion of the procedure, all sponge counts, instrument counts and needle counts reported as correct x2.       Marcelo Carr MD       [facility] /   D: 12/21/2017 12:19     T: 12/21/2017 14:46  JOB #: [id number]  CC: Elliot Spain MD

## 2018-02-01 ENCOUNTER — OFFICE VISIT (OUTPATIENT)
Dept: SURGERY | Age: 28
End: 2018-02-01

## 2018-02-01 DIAGNOSIS — L73.2 HIDRADENITIS: Primary | ICD-10-CM

## 2018-02-01 NOTE — MR AVS SNAPSHOT
[address] [facility] [address] [address] 
[phone] Patient: Padmini Kimbrough MRN: [id number] QHV:[id number] Visit Information Date & Time Provider Department Dept. Phone Encounter #  
 2/1/2018  2:30 PM [facility] [address] [facility] [place] [id number] Your Appointments 3/1/2018  1:10 PM  
POST OP with Elias Mcnamara MD  
[address] [facility] ([facility]) Appt Note: 02/15/18: DC: EXCISE HIDRADENITIS LEFT AXILLA, APPLY ACELLULAR XENOGRAFT,   PO  
 [address] [facility] [address] [address]  
[place] Upcoming Health Maintenance Date Due DTaP/Tdap/Td series (1 - Tdap) 2/12/2011 PAP AKA CERVICAL CYTOLOGY 2/12/2011 Influenza Age 5 to Adult 8/1/2017 Allergies as of 2/1/2018  Review Complete On: 2/1/2018 By: Elias Mcnamara MD  
  
 Severity Noted Reaction Type Reactions Aleve [Naproxen Sodium]  07/27/2011    Hives Bactrim [Sulfamethoprim Ds]  08/22/2016    Other (comments) Abdominal pain. Darvocet A500 [Propoxyphene N-acetaminophen]  05/28/2011    Hives Lortab [Hydrocodone-acetaminophen]  05/28/2011    Nausea and Vomiting Motrin [Ibuprofen]  05/28/2011    Hives Naprosyn [Naproxen]  05/28/2011    Hives Current Immunizations  Never Reviewed No immunizations on file. Not reviewed this visit You Were Diagnosed With   
  
 Codes Comments Hidradenitis    -  Primary ICD-10-CM: L73.2 ICD-9-CM: 705.83 Vitals OB Status Smoking Status Having regular periods Former Smoker Your Updated Medication List  
  
   
This list is accurate as of: 2/1/18  4:13 PM.  Always use your most recent med list.  
  
  
  
  
 busPIRone 7.5 mg tablet Commonly known as:  BUSPAR Take 1 Tab by mouth three (3) times daily (with meals). HYDROcodone-acetaminophen 5-325 mg per tablet Commonly known as:  Radonna Cass Take 1 Tab by mouth every four (4) hours as needed for Pain. Max Daily Amount: 6 Tabs. losartan-hydroCHLOROthiazide 50-12.5 mg per tablet Commonly known as:  HYZAAR Soft Lens Rinse-Store Solution Soln Commonly known as:  SALINE SOLUTION Use as needed for wound care. To-Do List   
 02/02/2018 11:00 AM  
  Appointment with [facility] ROOM 01 at [address] [place] [address] ([phone]) Introducing [facility]! Jay Kenney introduces [employer] patient portal. Now you can access parts of your medical record, email your doctor's office, and request medication refills online. 1. In your internet browser, go to [email or web address] 2. Click on the First Time User? Click Here link in the Sign In box. You will see the New Member Sign Up page. 3. Enter your Alt[employer] Access Code exactly as it appears below. You will not need to use this code after youve completed the sign-up process. If you do not sign up before the expiration date, you must request a new code. · [employer] Access Code: [email or web address] Expires: 3/21/2018 12:23 PM 
 
4. Enter the last four digits of your Social Security Number (xxxx) and Date of Birth (mm/dd/yyyy) as indicated and click Submit. You will be taken to the next sign-up page. 5. Create a CRAM [employer]widet ID. This will be your [employer] login ID and cannot be changed, so think of one that is secure and easy to remember. 6. Create a [employer] password. You can change your password at any time. 7. Enter your Password Reset Question and Answer. This can be used at a later time if you forget your password. 8. Enter your e-mail address. You will receive e-mail notification when new information is available in [address] 9. Click Sign Up. You can now view and download portions of your medical record. 10. Click the Download Summary menu link to download a portable copy of your medical information. If you have questions, please visit the Frequently Asked Questions section of the Carma website. Remember, Carma is NOT to be used for urgent needs. For medical emergencies, dial 911. Now available from your iPhone and Android! Please provide this summary of care documentation to your next provider. Your primary care clinician is listed as Masoud Zeng. If you have any questions after today's visit, please call [phone].

## 2018-02-01 NOTE — LETTER
2/1/2018 3:58 PM 
 
Ms. Kailey Monsalve [address] [facility] [address] [address] [address] [place] [address], Peggy Barry, Selvin Zamora Phone: [phone] or [phone] Dear MsMamie Arnaud Ferraro, Your Surgical Schedule is as follows: 
 
              Pre-Admission Testing:       Date: 02/02/2018             Time: 11:00 am 
 
You must meet with the Nurses at the [address] [place] [address] your Surgery Date for LABS, MEDICATION REVIEW and PAPERWORK. PLEASE CHECK INTO THE ADMITTING DEPARTMENT FOR TESTING. The Information Desk at the [address] [place] [address] can direct you to the Admitting Department. This appointment will take approximately 1 1/2 hours to complete. Please bring a list of all current medications with you to include your dosage, and how often you take the medication. _____________________________________________________________________________ You will check into the ADMITTING DEPARTMENT of [facility] on your Surgery Date. SURGERY DATE: 02/15/2018                     ARRIVAL TIME: 6:30 am              
 
                                          YOUR SURGERY WILL BEGIN AT 8:45 am. 
 
               IMPORTANT: DO NOT EAT OR DRINK ANYTHING AFTER 12:00 MIDNIGHT THE                                  NIGHT BEFORE YOUR SCHEDULED SURGERY DATE  
_____________________________________________________________________________ Reminder: FIRST POST-OP VISIT ON: Thursday,  03/01/2018 at 1:10 pm with Dr. Evangelina Bautista [facility] OFFICE, [address] Sincerely, Trevor Bush Surgical Scheduler

## 2018-02-01 NOTE — PROGRESS NOTES
HISTORY OF PRESENT ILLNESS  Juan Ramon Holm is a 32 y.o. female who returns for a wound check. HPI Comments: Ms. Neo Kraft is s/p excision of a right inframammary fold sebaceous cyst on 12/21/2017. Doing well until recently when she noted drainage from the incision. The drainage was clear and neither purulent nor foul smelling. No redness at surgical site. She is also concerned about recurrent left axillary abscesses. Past Medical History:  No date: Asthma  No date: Asthma      Comment: has not had any problems no inhaler  11/16/2017: Class 3 obesity in adult  11/16/2017: Essential hypertension  2/1/2018: Hidradenitis  No date: Psychiatric disorder      Comment: ANXIETY  No date: Vaginal delivery    Past Surgical History:  No date: HX APPENDECTOMY      Comment: 2017  No date: HX ORTHOPAEDIC      Comment: Broke l foot as child  No date: HX OTHER SURGICAL      Comment: left foot on third toe   No date: HX OTHER SURGICAL      Comment: Laparoscopic cholecystectomy. 04/19/2016: HX OTHER SURGICAL      Comment: Incision and drainage of pilonidal abscess;                Dr. Hilda Amaral. Review of patient's family history indicates:    Hypertension                   Maternal Grandmother      Smoking status: Former Smoker                                                              Packs/day: 0.25      Years: 0.00         Quit date: 3/1/2011  Smokeless status: Never Used                      Alcohol use: No              Review of systems negative except as noted. Post OP Follow Up   The history is provided by the patient. Review of Systems   Constitutional: Negative for chills and fever. Gastrointestinal: Negative for nausea and vomiting. Musculoskeletal:        Pain in left axilla. Physical Exam   Constitutional: No distress. Obese female. HENT:   Head: Normocephalic and atraumatic. Cardiovascular: Normal rate and regular rhythm.     Pulmonary/Chest: Effort normal and breath sounds normal. Abdominal: Soft. She exhibits no distension. There is no tenderness. There is no rebound and no guarding. Musculoskeletal: Normal range of motion. Skin:   RUQ abdominal wall incision is clean and well healed. No induration, cellulitis or purulent drainage. In the left axilla, there are changes which are c/w hidradenitis. Vitals reviewed. ASSESSMENT and PLAN  I reviewed the operative findings and pathology with Ms. Chayo Naylor today and reassured her that she is doing well thus far and that there is no evidence of a post operative wound infection. No need for abx therapy at this time. In view of the findings on H and P, Ms. Chayo Naylor should benefit from excision of the left axillary hidradenitis and application of acellular xenograft. Discussed procedure with her including risks of bleeding, infection, recurrent disease. Also discussed the role of the acellular xenograft. She understands and wishes to proceed. I have tentatively scheduled Ms. Chayo Naylor for surgery on February 15, 2018 at [facility] and will see her back in the office postoperatively. She is agreeable to this plan of action and is most certainly free to contact the office should any questions or concerns arise.          CC: Mono Collier MD

## 2018-02-02 RX ORDER — BUPIVACAINE HYDROCHLORIDE 2.5 MG/ML
30 INJECTION, SOLUTION EPIDURAL; INFILTRATION; INTRACAUDAL ONCE
Status: CANCELLED | OUTPATIENT
Start: 2018-02-02 | End: 2018-02-02

## 2018-02-05 ENCOUNTER — HOSPITAL ENCOUNTER (OUTPATIENT)
Dept: PREADMISSION TESTING | Age: 28
Discharge: HOME | End: 2018-02-05

## 2018-02-05 NOTE — PERIOP NOTES
Paulina 83  Preoperative Instructions      Surgery Date 2/15/18              Time of Arrival  0700    1. On the day of your surgery, please report to the [facility] Entrance. If arriving prior to 7 AM please enter through the Emergency Room Entrance. 2. You must have a responsible adult to drive you to the hospital, stay at the hospital during your surgery and drive you home. You should have someone stay with you for the first 24 hours after your surgery. You should not drive a car for 24 hours following surgery. 3. Do not have anything to eat or drink ( including water, gum, mints, coffee, juice) after midnight none. This may not apply to medications prescribed by your physician. Please note special instructions, if applicable. If you are currently taking Plavix, Coumadin, or other blood-thinning agents, contact your surgeon for instructions. 4. We recommend you do not drink any alcoholic beverages for 24 hours before and after your surgery. 5. Have a list of all current medications, including vitamins, herbal supplements and any other over the counter medications. Stop Asprin and non-steroidal anti-inflammatory drugs (I.e. Advil, Aleve) as directed by your surgeon's office. Stop all vitamins and herbal supplements seven days prior to your surgery. 6. Wear comfortable clothes. Wear glasses instead of contacts. Do not bring any money or jewelry. Do not wear make-up, particularly mascara, the morning of your surgery. Do not wear nail polish, particularly if you are having foot /hand surgery. Wear your hair loose or down, no ponytails, buns, patti pins or clips. All body piercings must be removed. Please shower before surgery with an antibacterial soap (Safeguard/Dial) and use a clean towel. Do not apply any lotions, powders, cologne, perfume or deodorants afterward.     Do not shave the area around your surgical incision for at least two to three days prior to your surgery. If you wear glasses, contacts, dentures and/or hearing aids, they will be removed prior to surgery. 7. You should understand that if you do not follow these instructions your surgery may be cancelled. If your physical condition changes (I.e. fever, cold or flu) please contact your surgeon as soon as possible. 8. It is important that you be on time. If a situation occurs where you may be late, please call [phone]    9. If you are feeling sick before surgery, call your surgeon. He or she will tell you what to do. If you are sick on the day of your surgery please call [phone].     10. If you have any questions and or problems, please call [phone] (Pre-admission Testing). 11. Your surgery time may be subject to change. You will receive a phone call the evening before your surgery if your time changes. Special Instructions: wear comfortable clothes    MEDICATIONS TO TAKE THE MORNING OF SURGERY WITH A SIP OF WATER: hyzaar     I understand a pre-operative phone call will be made to verify my surgery time. In the event that I am not available, I give permission for a message to be left on my answering service and/or with another person? yes           ___________________      ___________________  _________  (Signature of Patient)          (Witness)                              (Date and Time)                  Paulina 83  Preoperative Instructions      Surgery Date 02/15/2018              Time of Arrival  0700    1. On the day of your surgery, please report to the [facility] Entrance. If arriving prior to 7 AM please enter through the Emergency Room Entrance. 2. You must have a responsible adult to drive you to the hospital, stay at the hospital during your surgery and drive you home. You should have someone stay with you for the first 24 hours after your surgery. You should not drive a car for 24 hours following surgery.     3. Do not have anything to eat or drink ( including water, gum, mints, coffee, juice) after midnight . This may not apply to medications prescribed by your physician. Please note special instructions, if applicable. If you are currently taking Plavix, Coumadin, or other blood-thinning agents, contact your surgeon for instructions. 4. We recommend you do not drink any alcoholic beverages for 24 hours before and after your surgery. 5. Have a list of all current medications, including vitamins, herbal supplements and any other over the counter medications. Stop Asprin and non-steroidal anti-inflammatory drugs (I.e. Advil, Aleve) as directed by your surgeon's office. Stop all vitamins and herbal supplements seven days prior to your surgery. 6. Wear comfortable clothes. Wear glasses instead of contacts. Do not bring any money or jewelry. Do not wear make-up, particularly mascara, the morning of your surgery. Do not wear nail polish, particularly if you are having foot /hand surgery. Wear your hair loose or down, no ponytails, buns, patti pins or clips. All body piercings must be removed. Please shower before surgery with an antibacterial soap (Safeguard/Dial) and use a clean towel. Do not apply any lotions, powders, cologne, perfume or deodorants afterward. Do not shave the area around your surgical incision for at least two to three days prior to your surgery. If you wear glasses, contacts, dentures and/or hearing aids, they will be removed prior to surgery. 7. You should understand that if you do not follow these instructions your surgery may be cancelled. If your physical condition changes (I.e. fever, cold or flu) please contact your surgeon as soon as possible. 8. It is important that you be on time. If a situation occurs where you may be late, please call [phone]    9. If you are feeling sick before surgery, call your surgeon. He or she will tell you what to do.  If you are sick on the day of your surgery please call [phone].     10. If you have any questions and or problems, please call [phone] (Pre-admission Testing). 11. Your surgery time may be subject to change. You will receive a phone call the evening before your surgery if your time changes. Special Instructions: wear comfortable clothes    MEDICATIONS TO TAKE THE MORNING OF SURGERY WITH A SIP OF WATER: hyzaar     I understand a pre-operative phone call will be made to verify my surgery time. In the event that I am not available, I give permission for a message to be left on my answering service and/or with another person?    yes           ___________________      ___________________  _________  (Signature of Patient)          (Witness)                              (Date and Time)

## 2018-02-12 ENCOUNTER — TELEPHONE (OUTPATIENT)
Dept: SURGERY | Age: 28
End: 2018-02-12

## 2018-02-12 DIAGNOSIS — L73.2 HIDRADENITIS: Primary | ICD-10-CM

## 2018-02-12 NOTE — TELEPHONE ENCOUNTER
Patient scheduled Excise hidradentitis left axilla - the acell component was denied by insurance. .    Pt would like to know what the aftercare on her procedure is if left as open excison area - will she have home care to come by or is she responsible for cleaning/packing/maintaining on her own. Please call patient to advise       Thanks!

## 2018-02-13 NOTE — TELEPHONE ENCOUNTER
Patient identified with two patient identifiers. Patient informed wound will be open and need daily wound care. Area will need to be cover with wound gel, 4X4 and tape. Patient states she will not be able to perform wound care independently and she does not have anyone who can do it for her at home. Patient informed order will be sent to home health if not approved she will have to be set up for [facility].

## 2018-02-14 ENCOUNTER — TELEPHONE (OUTPATIENT)
Dept: SURGERY | Age: 28
End: 2018-02-14

## 2018-02-14 ENCOUNTER — ANESTHESIA EVENT (OUTPATIENT)
Dept: SURGERY | Age: 28
End: 2018-02-14
Payer: MEDICAID

## 2018-02-15 ENCOUNTER — TELEPHONE (OUTPATIENT)
Dept: SURGERY | Age: 28
End: 2018-02-15

## 2018-02-15 ENCOUNTER — HOSPITAL ENCOUNTER (OUTPATIENT)
Age: 28
Setting detail: OUTPATIENT SURGERY
Discharge: HOME | End: 2018-02-15
Attending: SURGERY | Admitting: SURGERY
Payer: MEDICAID

## 2018-02-15 ENCOUNTER — ANESTHESIA (OUTPATIENT)
Dept: SURGERY | Age: 28
End: 2018-02-15
Payer: MEDICAID

## 2018-02-15 VITALS
HEIGHT: 60 IN | DIASTOLIC BLOOD PRESSURE: 82 MMHG | WEIGHT: 255 LBS | BODY MASS INDEX: 50.06 KG/M2 | TEMPERATURE: 97.8 F | OXYGEN SATURATION: 100 % | SYSTOLIC BLOOD PRESSURE: 138 MMHG | HEART RATE: 84 BPM | RESPIRATION RATE: 16 BRPM

## 2018-02-15 DIAGNOSIS — L73.2 HIDRADENITIS: Primary | ICD-10-CM

## 2018-02-15 PROCEDURE — 76210000020 HC REC RM PH II FIRST 0.5 HR: Performed by: SURGERY

## 2018-02-15 PROCEDURE — 74011250636 HC RX REV CODE- 250/636: Performed by: ANESTHESIOLOGY

## 2018-02-15 PROCEDURE — 74011250636 HC RX REV CODE- 250/636

## 2018-02-15 PROCEDURE — 74011000250 HC RX REV CODE- 250: Performed by: SURGERY

## 2018-02-15 PROCEDURE — 74011250636 HC RX REV CODE- 250/636: Performed by: SURGERY

## 2018-02-15 PROCEDURE — 77030011640 HC PAD GRND REM COVD -A: Performed by: SURGERY

## 2018-02-15 PROCEDURE — 76010000149 HC OR TIME 1 TO 1.5 HR: Performed by: SURGERY

## 2018-02-15 PROCEDURE — 77030018836 HC SOL IRR NACL ICUM -A: Performed by: SURGERY

## 2018-02-15 PROCEDURE — 77030002916 HC SUT ETHLN J&J -A: Performed by: SURGERY

## 2018-02-15 PROCEDURE — 76210000063 HC OR PH I REC FIRST 0.5 HR: Performed by: SURGERY

## 2018-02-15 PROCEDURE — 77030020143 HC AIRWY LARYN INTUB CGAS -A: Performed by: ANESTHESIOLOGY

## 2018-02-15 PROCEDURE — 77030031139 HC SUT VCRL2 J&J -A: Performed by: SURGERY

## 2018-02-15 PROCEDURE — 88305 TISSUE EXAM BY PATHOLOGIST: CPT | Performed by: SURGERY

## 2018-02-15 PROCEDURE — 76060000033 HC ANESTHESIA 1 TO 1.5 HR: Performed by: SURGERY

## 2018-02-15 RX ORDER — DEXAMETHASONE SODIUM PHOSPHATE 4 MG/ML
INJECTION, SOLUTION INTRA-ARTICULAR; INTRALESIONAL; INTRAMUSCULAR; INTRAVENOUS; SOFT TISSUE AS NEEDED
Status: DISCONTINUED | OUTPATIENT
Start: 2018-02-15 | End: 2018-02-15 | Stop reason: HOSPADM

## 2018-02-15 RX ORDER — ONDANSETRON 2 MG/ML
INJECTION INTRAMUSCULAR; INTRAVENOUS AS NEEDED
Status: DISCONTINUED | OUTPATIENT
Start: 2018-02-15 | End: 2018-02-15 | Stop reason: HOSPADM

## 2018-02-15 RX ORDER — MIDAZOLAM HYDROCHLORIDE 1 MG/ML
2 INJECTION, SOLUTION INTRAMUSCULAR; INTRAVENOUS
Status: DISCONTINUED | OUTPATIENT
Start: 2018-02-15 | End: 2018-02-15 | Stop reason: HOSPADM

## 2018-02-15 RX ORDER — OXYCODONE AND ACETAMINOPHEN 5; 325 MG/1; MG/1
1 TABLET ORAL
Qty: 10 TAB | Refills: 0 | Status: SHIPPED | OUTPATIENT
Start: 2018-02-15 | End: 2018-04-17

## 2018-02-15 RX ORDER — FENTANYL CITRATE 50 UG/ML
INJECTION, SOLUTION INTRAMUSCULAR; INTRAVENOUS AS NEEDED
Status: DISCONTINUED | OUTPATIENT
Start: 2018-02-15 | End: 2018-02-15 | Stop reason: HOSPADM

## 2018-02-15 RX ORDER — MIDAZOLAM HYDROCHLORIDE 1 MG/ML
INJECTION, SOLUTION INTRAMUSCULAR; INTRAVENOUS AS NEEDED
Status: DISCONTINUED | OUTPATIENT
Start: 2018-02-15 | End: 2018-02-15 | Stop reason: HOSPADM

## 2018-02-15 RX ORDER — HYDROMORPHONE HYDROCHLORIDE 1 MG/ML
.25-1 INJECTION, SOLUTION INTRAMUSCULAR; INTRAVENOUS; SUBCUTANEOUS
Status: DISCONTINUED | OUTPATIENT
Start: 2018-02-15 | End: 2018-02-15 | Stop reason: HOSPADM

## 2018-02-15 RX ORDER — PROPOFOL 10 MG/ML
INJECTION, EMULSION INTRAVENOUS AS NEEDED
Status: DISCONTINUED | OUTPATIENT
Start: 2018-02-15 | End: 2018-02-15 | Stop reason: HOSPADM

## 2018-02-15 RX ORDER — SODIUM CHLORIDE, SODIUM LACTATE, POTASSIUM CHLORIDE, CALCIUM CHLORIDE 600; 310; 30; 20 MG/100ML; MG/100ML; MG/100ML; MG/100ML
125 INJECTION, SOLUTION INTRAVENOUS CONTINUOUS
Status: DISCONTINUED | OUTPATIENT
Start: 2018-02-15 | End: 2018-02-15 | Stop reason: HOSPADM

## 2018-02-15 RX ORDER — CEFAZOLIN SODIUM IN 0.9 % NACL 2 G/100 ML
PLASTIC BAG, INJECTION (ML) INTRAVENOUS
Status: COMPLETED
Start: 2018-02-15 | End: 2018-02-15

## 2018-02-15 RX ORDER — CEFAZOLIN SODIUM IN 0.9 % NACL 2 G/100 ML
2 PLASTIC BAG, INJECTION (ML) INTRAVENOUS
Status: COMPLETED | OUTPATIENT
Start: 2018-02-15 | End: 2018-02-15

## 2018-02-15 RX ORDER — BUPIVACAINE HYDROCHLORIDE 2.5 MG/ML
30 INJECTION, SOLUTION EPIDURAL; INFILTRATION; INTRACAUDAL ONCE
Status: COMPLETED | OUTPATIENT
Start: 2018-02-15 | End: 2018-02-15

## 2018-02-15 RX ORDER — DIPHENHYDRAMINE HYDROCHLORIDE 50 MG/ML
12.5 INJECTION, SOLUTION INTRAMUSCULAR; INTRAVENOUS AS NEEDED
Status: DISCONTINUED | OUTPATIENT
Start: 2018-02-15 | End: 2018-02-15 | Stop reason: HOSPADM

## 2018-02-15 RX ADMIN — PROPOFOL 200 MG: 10 INJECTION, EMULSION INTRAVENOUS at 08:28

## 2018-02-15 RX ADMIN — MIDAZOLAM HYDROCHLORIDE 2 MG: 1 INJECTION, SOLUTION INTRAMUSCULAR; INTRAVENOUS at 08:25

## 2018-02-15 RX ADMIN — FENTANYL CITRATE 50 MCG: 50 INJECTION, SOLUTION INTRAMUSCULAR; INTRAVENOUS at 08:25

## 2018-02-15 RX ADMIN — FENTANYL CITRATE 50 MCG: 50 INJECTION, SOLUTION INTRAMUSCULAR; INTRAVENOUS at 08:56

## 2018-02-15 RX ADMIN — CEFAZOLIN 2 G: 10 INJECTION, POWDER, FOR SOLUTION INTRAVENOUS; PARENTERAL at 08:38

## 2018-02-15 RX ADMIN — FENTANYL CITRATE 50 MCG: 50 INJECTION, SOLUTION INTRAMUSCULAR; INTRAVENOUS at 08:30

## 2018-02-15 RX ADMIN — ONDANSETRON 4 MG: 2 INJECTION INTRAMUSCULAR; INTRAVENOUS at 09:19

## 2018-02-15 RX ADMIN — SODIUM CHLORIDE, POTASSIUM CHLORIDE, SODIUM LACTATE AND CALCIUM CHLORIDE: 600; 310; 30; 20 INJECTION, SOLUTION INTRAVENOUS at 08:25

## 2018-02-15 RX ADMIN — DEXAMETHASONE SODIUM PHOSPHATE 4 MG: 4 INJECTION, SOLUTION INTRA-ARTICULAR; INTRALESIONAL; INTRAMUSCULAR; INTRAVENOUS; SOFT TISSUE at 09:00

## 2018-02-15 RX ADMIN — FENTANYL CITRATE 50 MCG: 50 INJECTION, SOLUTION INTRAMUSCULAR; INTRAVENOUS at 09:13

## 2018-02-15 NOTE — IP AVS SNAPSHOT
[facility] 
 
 
 [address] Patient: Jesenia Gary MRN: [id number] BKD:[id number] About your hospitalization You were admitted on:  February 15, 2018 You last received care in the:  [address] [facility] [address] You were discharged on:  February 15, 2018 Why you were hospitalized Your primary diagnosis was:  Not on File Follow-up Information Follow up With Details Comments Contact Info Yobani Malin MD   [id number] . [place] [address] [place] [address] 
[phone] Your Scheduled Appointments Thursday March 01, 2018  1:10 PM EST  
POST OP with Dolores Leo MD  
[address] [facility] ([address]) [facility] [address] [phone]  
[phone] Discharge Orders None A check tito indicates which time of day the medication should be taken. My Medications START taking these medications Instructions Each Dose to Equal  
 Morning Noon Evening Bedtime  
 oxyCODONE-acetaminophen 5-325 mg per tablet Commonly known as:  PERCOCET Your last dose was: Your next dose is: Take 1 Tab by mouth every four (4) hours as needed for Pain. Max Daily Amount: 6 Tabs. 1 Tab CONTINUE taking these medications Instructions Each Dose to Equal  
 Morning Noon Evening Bedtime  
 busPIRone 7.5 mg tablet Commonly known as:  BUSPAR Your last dose was: Your next dose is: Take 1 Tab by mouth three (3) times daily (with meals). 7.5 mg  
    
   
   
   
  
 losartan-hydroCHLOROthiazide 50-12.5 mg per tablet Commonly known as:  HYZAAR Your last dose was: Your next dose is:    
   
   
      
   
   
   
  
 Soft Lens Rinse-Store Solution Soln Commonly known as:  SALINE SOLUTION Your last dose was: Your next dose is: Use as needed for wound care. STOP taking these medications HYDROcodone-acetaminophen 5-325 mg per tablet Commonly known as:  Venu Lama Where to Get Your Medications Information on where to get these meds will be given to you by the nurse or doctor. ! Ask your nurse or doctor about these medications  
  oxyCODONE-acetaminophen 5-325 mg per tablet Discharge Instructions Patient Discharge Instructions Charlette Muñoz / [id number] : 1990 Admitted 2/15/2018 Discharged: 2/15/2018 · It is important that you take the medication exactly as they are prescribed. · Keep your medication in the bottles provided by the pharmacist and keep a list of the medication names, dosages, and times to be taken in your wallet. · Do not take other medications without consulting your doctor. What to do at [facility] Recommended diet: Regular. Recommended activity: No Restrictions. No Driving While Taking Percocet 5/325. May Take Shower or Marcelina Day and get open wound wet - Starting on 2018. Wound Care per Visiting Nurses. If you experience any of the following symptoms Fevers, Chills, Nausea, Vomitting, Redness or Drainage at Surgical Site(s) or Any Other Questions or Concerns Please Call -  [phone]. Follow-up with Dr. Jerzy Rosales in 10-14 days. Information obtained by : 
I understand that if any problems occur once I am at home I am to contact my physician. I understand and acknowledge receipt of the instructions indicated above. Physician's or R.N.'s Signature                                                                  Date/Time Patient or Representative Signature                                                          Date/Time Introducing [facility]! [employer] introduces Conne[employer]ient patient portal. Now you can access parts of your medical record, email your doctor's office, and request medication refills online. 1. In your internet browser, go to [email or web address] 2. Click on the First Time User? Click Here link in the Sign In box. You will see the New Member Sign Up page. 3. Enter your Connexient Access Code exactly as it appears below. You will not need to use this code after youve completed the sign-up process. If you do not sign up before the expiration date, you must request a new code. · Connexient Access Code: [email or web address] Expires: 3/21/2018 12:23 PM 
 
4. Enter the last four digits of your Social Security Number (xxxx) and Date of Birth (mm/dd/yyyy) as indicated and click Submit. You will be taken to the next sign-up page. 5. Create a Connexient ID. This will be your Connexient login ID and cannot be changed, so think of one that is secure and easy to remember. 6. Create a Connexient password. You can change your password at any time. 7. Enter your Password Reset Question and Answer. This can be used at a later time if you forget your password. 8. Enter your e-mail address. You will receive e-mail notification when new information is available in [address] 9. Click Sign Up. You can now view and download portions of your medical record. 10. Click the Download Summary menu link to download a portable copy of your medical information. If you have questions, please visit the Frequently Asked Questions section of the Connexient website. Remember, Connexient is NOT to be used for urgent needs. For medical emergencies, dial 911. Now available from your iPhone and Android! Providers Seen During Your Hospitalization Provider Specialty Primary office phone Alexandre Diez MD General Surgery [phone] Your Primary Care Physician (PCP) Primary Care Physician Office Phone Office Fax Sissy Melendez [phone] You are allergic to the following Allergen Reactions Aleve (Naproxen Sodium) Hives Bactrim (Sulfamethoprim Ds) Other (comments) Abdominal pain. Darvocet A500 (Propoxyphene N-Acetaminophen) Hives Lortab (Hydrocodone-Acetaminophen) Nausea and Vomiting Motrin (Ibuprofen) Hives Naprosyn (Naproxen) Hives Recent Documentation Height Weight BMI OB Status Smoking Status 1.524 m 115.7 kg 49.8 kg/m2 Having regular periods Former Smoker Emergency Contacts Name Discharge Info Relation Home Work Mobile Via [employer] CAREGIVER [3] Other Relative [6] [phone] Debora Johnson DISCHARGE CAREGIVER [3] Other Relative [6] [phone] Patient Belongings The following personal items are in your possession at time of discharge: 
  Dental Appliances: None  Visual Aid: None      Home Medications: None   Jewelry: None  Clothing:  (street clothes)    Other Valuables: Cell Phone Please provide this summary of care documentation to your next provider. Signatures-by signing, you are acknowledging that this After Visit Summary has been reviewed with you and you have received a copy. Patient Signature:  ____________________________________________________________ Date:  ____________________________________________________________  
  
Anabela Mealing Provider Signature:  ____________________________________________________________ Date:  ____________________________________________________________

## 2018-02-15 NOTE — TELEPHONE ENCOUNTER
Spoke with [address] with [facility] they will not be able to send a nurse out until Monday

## 2018-02-15 NOTE — BRIEF OP NOTE
BRIEF OPERATIVE NOTE    Date of Procedure: 2/15/2018   Preoperative Diagnosis:  Hidradenitis Left Axilla. Postoperative Diagnosis:  Same. Procedure(s):   Excise Hidradenitis Left Axilla. Surgeon(s) and Role:   Cecily Harris MD - Primary  Surgical Staff:  Circ-1: Brie Myers  Scrub Tech-1: Pablo Jasmine  Event Time In   Incision Start 8697   Incision Close 7669     Anesthesia: General   Estimated Blood Loss: Approx. 20cc. Specimens:   ID Type Source Tests Collected by Time Destination   1 : Left Axillary Hidradenitis Preservative Axilla  Cecily Harris MD 2/15/2018 1438 Pathology      Findings: Hidradenitis left axilla. Excised area - approx. 10cm x 3cm. Complications: None.   Implants: * No implants in log *

## 2018-02-15 NOTE — IP AVS SNAPSHOT
[facility] 
 
 
 [address] Patient: Juan Manuel Rayo MRN: [id number] IHC:6/96/0081 A check tito indicates which time of day the medication should be taken. My Medications START taking these medications Instructions Each Dose to Equal  
 Morning Noon Evening Bedtime  
 oxyCODONE-acetaminophen 5-325 mg per tablet Commonly known as:  PERCOCET Your last dose was: Your next dose is: Take 1 Tab by mouth every four (4) hours as needed for Pain. Max Daily Amount: 6 Tabs. 1 Tab CONTINUE taking these medications Instructions Each Dose to Equal  
 Morning Noon Evening Bedtime  
 busPIRone 7.5 mg tablet Commonly known as:  BUSPAR Your last dose was: Your next dose is: Take 1 Tab by mouth three (3) times daily (with meals). 7.5 mg  
    
   
   
   
  
 losartan-hydroCHLOROthiazide 50-12.5 mg per tablet Commonly known as:  HYZAAR Your last dose was: Your next dose is:    
   
   
      
   
   
   
  
 Soft Lens Rinse-Store Solution Soln Commonly known as:  SALINE SOLUTION Your last dose was: Your next dose is:    
   
   
 Use as needed for wound care. STOP taking these medications HYDROcodone-acetaminophen 5-325 mg per tablet Commonly known as:  Marzena Garcia Where to Get Your Medications Information on where to get these meds will be given to you by the nurse or doctor. ! Ask your nurse or doctor about these medications  
  oxyCODONE-acetaminophen 5-325 mg per tablet

## 2018-02-15 NOTE — ANESTHESIA POSTPROCEDURE EVALUATION
Post-Anesthesia Evaluation and Assessment    Patient: Serafin Hsieh MRN: [id number]  SSN: [id number]    YOB: 1990  Age: 29 y.o. Sex: female       Cardiovascular Function/Vital Signs  Visit Vitals    /88 (BP 1 Location: Right arm, BP Patient Position: At rest)    Pulse 82    Temp 36.6 °C (97.8 °F)    Resp 16    Ht 5' (1.524 m)    Wt 115.7 kg (255 lb)    SpO2 100%    BMI 49.8 kg/m2       Patient is status post general anesthesia for Procedure(s):  EXCISION LEFT AXILLARY HIDRADENITIS. Nausea/Vomiting: None    Postoperative hydration reviewed and adequate. Pain:  Pain Scale 1: Numeric (0 - 10) (02/15/18 1000)  Pain Intensity 1: 0 (02/15/18 1000)   Managed    Neurological Status:   Neuro (WDL): Exceptions to WDL (02/15/18 0940)  Neuro  Neurologic State: Alert; Appropriate for age;Drowsy; Eyes open to stimulus (02/15/18 0940)  LUE Motor Response: Purposeful (02/15/18 0940)  LLE Motor Response: Purposeful (02/15/18 0940)  RUE Motor Response: Purposeful (02/15/18 0940)  RLE Motor Response: Purposeful (02/15/18 0940)   At baseline    Mental Status and Level of Consciousness: Arousable    Pulmonary Status:   O2 Device: Room air (02/15/18 1000)   Adequate oxygenation and airway patent    Complications related to anesthesia: None    Post-anesthesia assessment completed.  No concerns    Signed By: Ines Esparza MD     February 15, 2018

## 2018-02-15 NOTE — PERIOP NOTES
Patient and grandmother educated on discharge instructions and follow up care by Dr. Swathi Penaloza and RN staff. Pt given written material and opportunity to ask questions.  Verbalized understanding

## 2018-02-15 NOTE — OP NOTES
[facility]  OPERATIVE REPORT    Richard Diaz  MR#: [id number]  : 1990  ACCOUNT #: [de-identified]   DATE OF SERVICE: 02/15/2018    PREOPERATIVE DIAGNOSIS:  Hidradenitis, left axilla. POSTOPERATIVE DIAGNOSIS:  Hidradenitis, left axilla. PROCEDURE PERFORMED:  Excise hidradenitis, left axilla. SURGEON:  Melissa March. Luigi Javier MD     ANESTHESIA:  General via laryngeal mask airway. ESTIMATED BLOOD LOSS:  Approximately 20 mL. CRYSTALLOIDS:  600 mL. SPECIMENS REMOVED:  Hidradenitis of the left axilla to Pathology. DRAINS:  None. COMPLICATIONS:  None. INDICATIONS FOR SURGERY:  The patient is a 70-year-old female with hidradenitis of the left axilla. The patient is brought to the operating room at this time for excision of the hidradenitis. The risks of the procedure including, but not limited to, infection, bleeding, and recurrent disease were discussed in detail with the patient. Ms. Caleb Hanna understood and wished to proceed. PROCEDURE IN DETAIL:  After consent was obtained, the patient was brought to the operating room where she was placed in the supine position on the operating room table. Following the induction of an adequate level of general anesthesia via laryngeal mask airway, compression devices were placed on both lower extremities. The left arm was extended on an arm board and the left axilla prepped with Betadine and draped as a sterile field. Local anesthetic was infiltrated and an incision encompassing the hidradenitis was opened sharply. Subcutaneous bleeders were carefully cauterized. The skin and subcutaneous tissues were excised, passed off the field, and submitted for histopathologic evaluation. Chronic inflammatory tissue at the base of the wound was excised sharply or curetted. The wound was irrigated copiously with saline, inspected, and found to be hemostatic.   The superior most and inferior most aspects of the wound were closed with two interrupted 3-0 nylon vertical mattress sutures. Additional local anesthetic was infiltrated and the wound packed with some saline-soaked Janak. Dry dressings were applied. The patient was awakened from her general anesthetic and the laryngeal mask airway removed. She was transferred from the operating table to the stretcher and brought to the recovery room in stable condition having tolerated the procedure well. At the conclusion of the procedure, all sponge counts, instrument counts, and needle counts reported as correct x2.       Rohan Lopez MD       [facility] [place]  D: 02/15/2018 09:36     T: 02/15/2018 11:35  JOB #: [id number]  CC: Mikie Shelley MD  CC: Cabrera Agustin MD

## 2018-02-15 NOTE — ANESTHESIA PREPROCEDURE EVALUATION
Anesthetic History   No history of anesthetic complications            Review of Systems / Medical History  Patient summary reviewed, nursing notes reviewed and pertinent labs reviewed    Pulmonary  Within defined limits          Asthma        Neuro/Psych   Within defined limits           Cardiovascular  Within defined limits  Hypertension              Exercise tolerance: >4 METS     GI/Hepatic/Renal  Within defined limits              Endo/Other  Within defined limits      Morbid obesity     Other Findings   Comments: hydradenitis           Physical Exam    Airway  Mallampati: II    Neck ROM: normal range of motion   Mouth opening: Normal     Cardiovascular  Regular rate and rhythm,  S1 and S2 normal,  no murmur, click, rub, or gallop  Rhythm: regular  Rate: normal         Dental  No notable dental hx       Pulmonary  Breath sounds clear to auscultation               Abdominal  GI exam deferred       Other Findings            Anesthetic Plan    ASA: 3  Anesthesia type: general          Induction: Intravenous  Anesthetic plan and risks discussed with: Patient

## 2018-02-15 NOTE — PERIOP NOTES
Handoff Report from Operating Room to PACU    Report received from Dr. Angel Cartagena and Javon Antunez RN regarding Carmella Kendall. Surgeon(s):  Chari Godinez MD  And Procedure(s) (LRB):  EXCISION LEFT AXILLARY HIDRADENITIS (Left)  confirmed   with allergies and dressings discussed. Anesthesia type, drugs, patient history, complications, estimated blood loss, vital signs, intake and output, and last pain medication, lines and temperature were reviewed.

## 2018-02-15 NOTE — TELEPHONE ENCOUNTER
Spoke with Donnell Beard with at home care they are unable to start new wound care visits for patient due to staffing.
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
## 2018-02-15 NOTE — DISCHARGE INSTRUCTIONS
Patient Discharge Instructions    Suzanne Hernandez / [id number] : 1990    Admitted 2/15/2018 Discharged: 2/15/2018       · It is important that you take the medication exactly as they are prescribed. · Keep your medication in the bottles provided by the pharmacist and keep a list of the medication names, dosages, and times to be taken in your wallet. · Do not take other medications without consulting your doctor. What to do at Home    Recommended diet: Regular. Recommended activity: No Restrictions. No Driving While Taking Percocet . May Take Shower or Ceres Roxo and get open wound wet - Starting on 2018. Wound Care per Visiting Nurses. If you experience any of the following symptoms Fevers, Chills, Nausea, Vomitting, Redness or Drainage at Surgical Site(s) or Any Other Questions or Concerns Please Call -  [phone]. Follow-up with Dr. Pat Marr in 10-14 days. Information obtained by :  I understand that if any problems occur once I am at home I am to contact my physician. I understand and acknowledge receipt of the instructions indicated above.                                                                                                                                            Physician's or R.N.'s Signature                                                                  Date/Time                                                                                                                                              Patient or Representative Signature                                                          Date/Time

## 2018-02-15 NOTE — TELEPHONE ENCOUNTER
Discussed with Dr. Michael Odell patient informed home health is not available at this time to start daily wound care tomorrow as ordered. Patient states she does not have any one at home who can change dressing and [facility] is to far. Patient willing to come in to [address] [place] [address] daily for dressing changes if she can be seen at [facility]. Patient informed we will place order and fax. She will receive call to schedule. Patient expressed understanding will call if any other questions or concerns. Order faxed confirmation received.

## 2018-02-16 ENCOUNTER — HOSPITAL ENCOUNTER (OUTPATIENT)
Dept: INFUSION THERAPY | Age: 28
Discharge: HOME | End: 2018-02-16
Payer: MEDICAID

## 2018-02-16 VITALS
RESPIRATION RATE: 20 BRPM | SYSTOLIC BLOOD PRESSURE: 168 MMHG | TEMPERATURE: 98.4 F | DIASTOLIC BLOOD PRESSURE: 108 MMHG | HEART RATE: 92 BPM

## 2018-02-16 PROCEDURE — 97602 WOUND(S) CARE NON-SELECTIVE: CPT

## 2018-02-16 NOTE — PROGRESS NOTES
[facility] Progress Note    Date: 2018    Name: Nirav Frazier    MRN: [id number]         : 1990      1410 Pt arrived ambulatory and in no distress for wound care. Assessment complete. Patient states she will not be able to make it to the infusion center over the weekend due to work schedule. Patient instructed on how to change the dressing at home, supplies provided. Signs and symptoms of infection explained to patient. Visit Vitals    BP (!) 168/108 (BP 1 Location: Right arm, BP Patient Position: Sitting)    Pulse 92    Temp 98.4 °F (36.9 °C)    Resp 20    Breastfeeding No      18 1410   WOUND   Dressing Status Saturated;Removed; Changed per order   Dressing Type 4 x 4;Packing;Special tape (comment)   Incision site well approximated? Yes   Non-Pressure Injury  Partial thickness (epider/derm)   Tissue Type Red   Drainage Amount  Moderate   Drainage Color Serosanguinous   Wound Odor None   Periwound Skin Condition Erythema, blanchable; Intact   Cleansing and Cleansing Agents  Normal saline   Dressing Type Applied Packing;4 x 4;ABD pad;Special tape (comment)   Procedure Tolerated Well       1435 Discharged home ambulatory and in no distress, accompanied by self. Next appointment 3/19/18, patient aware.         Madelaine Mendez RN  2018

## 2018-02-16 NOTE — PROGRESS NOTES
Problem: Infection - Risk of, Central Venous Catheter-Associated Bloodstream Infection  Goal: *Absence of infection signs and symptoms  Outcome: Progressing Towards Goal  Patient started in the infusion center for daily wound care of the left axillary. Patient states she will not be able to make it to the infusion center over the weekend due to work schedule. Patient instructed on how to change the dressing at home, supplies provided. Signs and symptoms of infection explained to patient.

## 2018-02-17 ENCOUNTER — HOSPITAL ENCOUNTER (OUTPATIENT)
Dept: INFUSION THERAPY | Age: 28
End: 2018-02-17
Payer: MEDICAID

## 2018-02-20 ENCOUNTER — TELEPHONE (OUTPATIENT)
Dept: SURGERY | Age: 28
End: 2018-02-20

## 2018-02-21 ENCOUNTER — HOSPITAL ENCOUNTER (OUTPATIENT)
Dept: INFUSION THERAPY | Age: 28
Discharge: HOME | End: 2018-02-21
Payer: MEDICAID

## 2018-02-21 VITALS
HEART RATE: 105 BPM | OXYGEN SATURATION: 98 % | DIASTOLIC BLOOD PRESSURE: 112 MMHG | TEMPERATURE: 98.2 F | SYSTOLIC BLOOD PRESSURE: 194 MMHG | RESPIRATION RATE: 20 BRPM

## 2018-02-21 PROCEDURE — C1768 GRAFT, VASCULAR: HCPCS

## 2018-02-21 PROCEDURE — 97602 WOUND(S) CARE NON-SELECTIVE: CPT

## 2018-02-21 NOTE — TELEPHONE ENCOUNTER
Patient identified with two patient identifiers. Patient 5 days post excision of hidradenitis left axilla requesting refill on pain medication. Patient informed office visit is needed. She has no C/O fever, incisional drainage, night sweats, or swelling. Patient is getting wound care with [facility]. Patient transferred to [facility] to schedule follow up.

## 2018-02-21 NOTE — PROGRESS NOTES
[facility] Progress Note    Date: 2018    Name: Abraham Pereyra    MRN: [id number]         : 1990      1515 Pt arrived ambulatory and in no distress for wound care. Assessment complete. Patient states her grandmother has been doing her wound care at home. Patient states she ran out of pain meds and is in a lot of pain, she notified referring MDs office and has a appointment to see him tomorrow. See flowsheet for wound care details. BP elevated this visit. Patient has not taken her BP meds today, however she did take them after seeing her current BP, nurse observed patient taking meds. Visit Vitals    BP (!) 194/112    Pulse (!) 105    Temp 98.2 °F (36.8 °C)    Resp 20    SpO2 98%       1545 Discharged home ambulatory and in no distress, accompanied by self. Next appointment 3/23/18, patient aware.         Gianluca Lane RN  2018

## 2018-02-22 ENCOUNTER — OFFICE VISIT (OUTPATIENT)
Dept: SURGERY | Age: 28
End: 2018-02-22

## 2018-02-22 VITALS
RESPIRATION RATE: 16 BRPM | DIASTOLIC BLOOD PRESSURE: 84 MMHG | OXYGEN SATURATION: 98 % | HEIGHT: 60 IN | HEART RATE: 88 BPM | WEIGHT: 255 LBS | SYSTOLIC BLOOD PRESSURE: 126 MMHG | TEMPERATURE: 98.8 F | BODY MASS INDEX: 50.06 KG/M2

## 2018-02-22 DIAGNOSIS — L73.2 HIDRADENITIS: Primary | ICD-10-CM

## 2018-02-22 NOTE — PROGRESS NOTES
HISTORY OF PRESENT ILLNESS  Karen Adrian is a 29 y.o. female who returns for a wound check. HPI Comments: Ms. Timmy Parra is s/p excision of left axillary hidradenitis on 2/15/2018. She reports pain in her left axilla. Wound care at [facility] going well. Review of systems negative except as noted. Post OP Follow Up   The history is provided by the patient. Wound Check   The history is provided by the patient. Review of Systems   Constitutional: Negative for chills and fever. Gastrointestinal: Negative for nausea and vomiting. Musculoskeletal:        Pain in left axilla. Physical Exam   Constitutional: No distress. Obese female. Appears uncomfortable. Musculoskeletal: Normal range of motion. Neurological: She is alert. Skin:   Wound in left axilla is clean and granulating. Vitals reviewed. ASSESSMENT and PLAN  Wound redressed with gel, 4x4's and tape. I reviewed the operative findings and pathology with Ms. Timmy Parra today and reassured her that she is doing well thus far and that the wound is healing nicely. Will change daily wound care to gel, 4x4's and tape. Tylenol or Motrin for pain. Also told Ms. Timmy Parra that she can get the open wound wet. Will see in one more week or earlier if need be.     CC: Owen May MD

## 2018-02-22 NOTE — MR AVS SNAPSHOT
[address] [place] [address] [address] [facility] [address] 
[phone] Patient: Mainor Dove MRN: [id number] [id number] Visit Information Date & Time Provider Department Dept. Phone Encounter #  
 2/22/2018  3:10 PM Lola Mcallister MD [facility] [address] [facility] [id number] Follow-up Instructions Return in about 1 week (around 3/1/2018). Follow-up and Disposition History Your Appointments 3/1/2018  1:10 PM  
POST OP with Lola Mcallister MD  
[address] [place] [address] ([facility]) Appt Note: 02/15/18: DC: EXCISE HIDRADENITIS LEFT AXILLA, APPLY ACELLULAR XENOGRAFT,   PO  
 [address] [place] [address] [facility] [address]  
[address] [address] [place] [address]  
  
    
 3/1/2018  2:00 PM  
POST OP 10 MIN with Lola Mcallister MD  
[address] [place] [address] ([facility]) Appt Note: EP/1 week wound check/$0Cp/$0PB/02/15/18: DC: EXCISE HIDRADENITIS LEFT AXILLA, APPLY ACELLULAR XENOGRAFT  
 [address] [facility] [address] [address]  
[place] Upcoming Health Maintenance Date Due DTaP/Tdap/Td series (1 - Tdap) 2/12/2011 PAP AKA CERVICAL CYTOLOGY 2/12/2011 Influenza Age 5 to Adult 8/1/2017 Allergies as of 2/22/2018  Review Complete On: 2/22/2018 By: Lola Mcallister MD  
  
 Severity Noted Reaction Type Reactions Aleve [Naproxen Sodium]  07/27/2011    Hives Bactrim [Sulfamethoprim Ds]  08/22/2016    Other (comments) Abdominal pain. Darvocet A500 [Propoxyphene N-acetaminophen]  05/28/2011    Hives Lortab [Hydrocodone-acetaminophen]  05/28/2011    Nausea and Vomiting Motrin [Ibuprofen]  05/28/2011    Hives Naprosyn [Naproxen]  05/28/2011    Hives Current Immunizations  Reviewed on 2/21/2018 No immunizations on file. Not reviewed this visit You Were Diagnosed With   
  
 Codes Comments Hidradenitis    -  Primary ICD-10-CM: L73.2 ICD-9-CM: 705.83 Vitals BP Pulse Temp Resp Height(growth percentile) Weight(growth percentile) 126/84 (BP 1 Location: Right arm, BP Patient Position: Sitting) 88 98.8 °F (37.1 °C) (Oral) 16 5' (1.524 m) 255 lb (115.7 kg) LMP SpO2 BMI OB Status Smoking Status 01/25/2018 98% 49.8 kg/m2 Having regular periods Former Smoker BMI and BSA Data Body Mass Index Body Surface Area  
 49.8 kg/m 2 2.21 m 2 Your Updated Medication List  
  
   
This list is accurate as of 2/22/18  3:56 PM.  Always use your most recent med list.  
  
  
  
  
 busPIRone 7.5 mg tablet Commonly known as:  BUSPAR Take 1 Tab by mouth three (3) times daily (with meals). losartan-hydroCHLOROthiazide 50-12.5 mg per tablet Commonly known as:  HYZAAR  
  
 oxyCODONE-acetaminophen 5-325 mg per tablet Commonly known as:  PERCOCET Take 1 Tab by mouth every four (4) hours as needed for Pain. Max Daily Amount: 6 Tabs. Soft Lens Rinse-Store Solution Soln Commonly known as:  SALINE SOLUTION Use as needed for wound care. We Performed the Following REFERRAL TO WOUND CARE [DNP202 Custom] Comments:  
 Please evaluate patient for daily wound care to start 2/23/18 Order change Remove dressing daily Apply wound gel to wound Cover with 4X4 and tape Patient will follow up in office in 1 week Follow-up Instructions Return in about 1 week (around 3/1/2018). To-Do List   
 02/23/2018  3:00 PM  
  Appointment with [address] at [facility] ([phone]) Referral Information Referral ID Referred By Referred To  
  
 [id number] Bennie Caballero [address] Phone: [phone] Visits Status Start Date End Date 1 New Request 2/22/18 2/22/19 If your referral has a status of pending review or denied, additional information will be sent to support the outcome of this decision. Introducing [facility]! Leilani Diaz introduces [employer] patient portal. Now you can access parts of your medical record, email your doctor's office, and request medication refills online. 1. In your internet browser, go to [email or web address] 2. Click on the First Time User? Click Here link in the Sign In box. You will see the New Member Sign Up page. 3. Enter your [employer] Access Code exactly as it appears below. You will not need to use this code after youve completed the sign-up process. If you do not sign up before the expiration date, you must request a new code. · [employer] Access Code: [email or web address] Expires: 3/21/2018 12:23 PM 
 
4. Enter the last four digits of your Social Security Number (xxxx) and Date of Birth (mm/dd/yyyy) as indicated and click Submit. You will be taken to the next sign-up page. 5. Create a [employer] ID. This will be your [employer] login ID and cannot be changed, so think of one that is secure and easy to remember. 6. Create a [employer] password. You can change your password at any time. 7. Enter your Password Reset Question and Answer. This can be used at a later time if you forget your password. 8. Enter your e-mail address. You will receive e-mail notification when new information is available in [address] 9. Click Sign Up. You can now view and download portions of your medical record. 10. Click the Download Summary menu link to download a portable copy of your medical information. If you have questions, please visit the Frequently Asked Questions section of the [employer] website. Remember, [employer] is NOT to be used for urgent needs. For medical emergencies, dial 911. Now available from your iPhone and Android! Please provide this summary of care documentation to your next provider. Your primary care clinician is listed as Bibi Fletcher. If you have any questions after today's visit, please call [phone].

## 2018-02-22 NOTE — PROGRESS NOTES
1. Have you been to the ER, urgent care clinic since your last visit? Hospitalized since your last visit? No    2. Have you seen or consulted any other health care providers outside of the [address] [place] [address] since your last visit? Include any pap smears or colon screening.  No

## 2018-02-23 ENCOUNTER — HOSPITAL ENCOUNTER (OUTPATIENT)
Dept: INFUSION THERAPY | Age: 28
Discharge: HOME | End: 2018-02-23
Payer: MEDICAID

## 2018-02-23 ENCOUNTER — TELEPHONE (OUTPATIENT)
Dept: SURGERY | Age: 28
End: 2018-02-23

## 2018-02-23 NOTE — TELEPHONE ENCOUNTER
Patient identified with two patient identifiers. Patient informed order sent to [facility] I did speak with them and they have received faxed but with it being Friday evening order may not be processed until Monday. Patient instructed in this case she can use over the counter ky gel or generic brand. If she is unable to get this due to finances she can go back to saline soaked guaze. Patient expressed understanding will call if any other questions or concerns.

## 2018-02-23 NOTE — TELEPHONE ENCOUNTER
Spoke with Inez Vizcarra nurse with [facility] patient will do wound care independently and will need wound gel for home use. They do not carry wound gel in [facility] patient's would need to have their own prescription/supply when the come in. Informed her that I will fax order to [facility] [employer] [facility] now to have them contact patient regarding having supplies delivered to her home hopefully by tomorrow. Order faxed confirmation received.

## 2018-02-26 ENCOUNTER — TELEPHONE (OUTPATIENT)
Dept: SURGERY | Age: 28
End: 2018-02-26

## 2018-02-26 ENCOUNTER — HOSPITAL ENCOUNTER (OUTPATIENT)
Dept: INFUSION THERAPY | Age: 28
Discharge: HOME | End: 2018-02-26
Payer: MEDICAID

## 2018-02-26 ENCOUNTER — APPOINTMENT (OUTPATIENT)
Dept: INFUSION THERAPY | Age: 28
End: 2018-02-26
Payer: MEDICAID

## 2018-02-26 VITALS
TEMPERATURE: 98.3 F | OXYGEN SATURATION: 98 % | SYSTOLIC BLOOD PRESSURE: 161 MMHG | DIASTOLIC BLOOD PRESSURE: 69 MMHG | HEART RATE: 95 BPM | RESPIRATION RATE: 18 BRPM

## 2018-02-26 PROCEDURE — 97602 WOUND(S) CARE NON-SELECTIVE: CPT

## 2018-02-26 NOTE — TELEPHONE ENCOUNTER
Patient identified with two patient identifiers. Patient informed I spoke with [employer] and her supplies have been proceed and she is due for fedex delivery by tomorrow to her home address on file. Patient expressed understanding will call if any other questions or concerns.

## 2018-02-26 NOTE — PROGRESS NOTES
[facility] Progress Note    Date: 2018    Name: Eric Hair    MRN: [id number]         : 1990      1525 Pt arrived ambulatory and in no distress for wound care. Assessment complete. Wound care complete as ordered. See flowsheet for wound care details. Visit Vitals    /69 (BP 1 Location: Right arm, BP Patient Position: Sitting)    Pulse 95    Temp 98.3 °F (36.8 °C)    Resp 18    SpO2 98%       1535 Discharged home ambulatory and in no distress, accompanied by self. Next appointment 3/24/18, patient aware.         Travon Calderon RN  2018

## 2018-02-27 ENCOUNTER — HOSPITAL ENCOUNTER (OUTPATIENT)
Dept: INFUSION THERAPY | Age: 28
Discharge: HOME | End: 2018-02-27
Payer: MEDICAID

## 2018-02-27 ENCOUNTER — TELEPHONE (OUTPATIENT)
Dept: SURGERY | Age: 28
End: 2018-02-27

## 2018-02-27 VITALS
RESPIRATION RATE: 18 BRPM | HEART RATE: 101 BPM | SYSTOLIC BLOOD PRESSURE: 161 MMHG | DIASTOLIC BLOOD PRESSURE: 93 MMHG | TEMPERATURE: 98 F

## 2018-02-27 PROCEDURE — 97602 WOUND(S) CARE NON-SELECTIVE: CPT

## 2018-03-01 ENCOUNTER — OFFICE VISIT (OUTPATIENT)
Dept: SURGERY | Age: 28
End: 2018-03-01

## 2018-03-01 VITALS
BODY MASS INDEX: 50.06 KG/M2 | DIASTOLIC BLOOD PRESSURE: 90 MMHG | HEIGHT: 60 IN | SYSTOLIC BLOOD PRESSURE: 140 MMHG | OXYGEN SATURATION: 98 % | TEMPERATURE: 98.1 F | WEIGHT: 255 LBS | RESPIRATION RATE: 16 BRPM | HEART RATE: 115 BPM

## 2018-03-01 DIAGNOSIS — L73.2 HIDRADENITIS: Primary | ICD-10-CM

## 2018-03-01 PROBLEM — E66.01 OBESITY, MORBID (HCC): Status: ACTIVE | Noted: 2018-03-01

## 2018-03-01 NOTE — PROGRESS NOTES
1. Have you been to the ER, urgent care clinic since your last visit? Hospitalized since your last visit? No    2. Have you seen or consulted any other health care providers outside of the [address] [place] [address] since your last visit? Include any pap smears or colon screening.  Yes NO

## 2018-03-01 NOTE — MR AVS SNAPSHOT
[address] [facility] [address] 
[phone] Patient: Robin Durham MRN: [id number] DIV:[id number] Visit Information Date & Time Provider Department Dept. Phone Encounter #  
 3/1/2018  1:10 PM Ryan Curry MD [facility] [id number] [facility] [id number] Upcoming Health Maintenance Date Due DTaP/Tdap/Td series (1 - Tdap) 2/12/2011 PAP AKA CERVICAL CYTOLOGY 2/12/2011 Influenza Age 5 to Adult 8/1/2017 Allergies as of 3/1/2018  Review Complete On: 3/1/2018 By: Miguel Lee LPN Severity Noted Reaction Type Reactions Aleve [Naproxen Sodium]  07/27/2011    Hives Bactrim [Sulfamethoprim Ds]  08/22/2016    Other (comments) Abdominal pain. Darvocet A500 [Propoxyphene N-acetaminophen]  05/28/2011    Hives Lortab [Hydrocodone-acetaminophen]  05/28/2011    Nausea and Vomiting Motrin [Ibuprofen]  05/28/2011    Hives Naprosyn [Naproxen]  05/28/2011    Hives Current Immunizations  Reviewed on 2/27/2018 No immunizations on file. Not reviewed this visit Vitals BP Pulse Temp Resp Height(growth percentile) Weight(growth percentile) 140/90 (BP 1 Location: Right arm, BP Patient Position: Sitting) (!) 115 98.1 °F (36.7 °C) (Oral) 16 5' (1.524 m) 255 lb (115.7 kg) SpO2 BMI OB Status Smoking Status 98% 49.8 kg/m2 Having regular periods Former Smoker BMI and BSA Data Body Mass Index Body Surface Area  
 49.8 kg/m 2 2.21 m 2 Your Updated Medication List  
  
   
This list is accurate as of 3/1/18  1:41 PM.  Always use your most recent med list.  
  
  
  
  
 busPIRone 7.5 mg tablet Commonly known as:  BUSPAR Take 1 Tab by mouth three (3) times daily (with meals). losartan-hydroCHLOROthiazide 50-12.5 mg per tablet Commonly known as:  HYZAAR  
  
 oxyCODONE-acetaminophen 5-325 mg per tablet Commonly known as:  PERCOCET  
 Take 1 Tab by mouth every four (4) hours as needed for Pain. Max Daily Amount: 6 Tabs. Soft Lens Rinse-Store Solution Soln Commonly known as:  SALINE SOLUTION Use as needed for wound care. To-Do List   
 03/02/2018 9:30 AM  
  Appointment with [address] at [facility] ([phone]) Introducing [facility]! Charlette Pena introduces [employer] patient portal. Now you can access parts of your medical record, email your doctor's office, and request medication refills online. 1. In your internet browser, go to [email or web address] 2. Click on the First Time User? Click Here link in the Sign In box. You will see the New Member Sign Up page. 3. Enter your [employer] Access Code exactly as it appears below. You will not need to use this code after youve completed the sign-up process. If you do not sign up before the expiration date, you must request a new code. · [employer] Access Code: [email or web address] Expires: 3/21/2018 12:23 PM 
 
4. Enter the last four digits of your Social Security Number (xxxx) and Date of Birth (mm/dd/yyyy) as indicated and click Submit. You will be taken to the next sign-up page. 5. Create a [employer] ID. This will be your [employer] login ID and cannot be changed, so think of one that is secure and easy to remember. 6. Create a [employer] password. You can change your password at any time. 7. Enter your Password Reset Question and Answer. This can be used at a later time if you forget your password. 8. Enter your e-mail address. You will receive e-mail notification when new information is available in [address] 9. Click Sign Up. You can now view and download portions of your medical record. 10. Click the Download Summary menu link to download a portable copy of your medical information. If you have questions, please visit the Frequently Asked Questions section of the [employer] website.  Remember, [employer] is NOT to be used for urgent needs. For medical emergencies, dial 911. Now available from your iPhone and Android! Please provide this summary of care documentation to your next provider. Your primary care clinician is listed as Nina Velez. If you have any questions after today's visit, please call [phone].

## 2018-03-01 NOTE — PROGRESS NOTES
HISTORY OF PRESENT ILLNESS  Sandy Membreno is a 29 y.o. female who returns for a wound check. HPI Comments: Ms. Rolanda Cain is s/p excision of left axillary hidradenitis on 2/15/2018. Doing well since her last visit. No complaints today. She is receiving wound care at [facility] and this is going well. Review of systems negative except as noted. Wound Check   The history is provided by the patient. Review of Systems   Constitutional: Negative for chills and fever. Gastrointestinal: Negative for nausea and vomiting. Musculoskeletal:        Denies pain at surgical site. Physical Exam   Constitutional: No distress. Obese female. Musculoskeletal: Normal range of motion. Neurological: She is alert. Skin:   Wound in left axilla is clean and granulating. Vitals reviewed. ASSESSMENT and PLAN  Skin sutures removed without incident. Wound redressed with gel, 4x4's and tape. Reassured Ms. Rolanda Cain that she is doing well and that the wound is healing nicely. Continue wound care at [facility]. Will see in 2 more weeks or earlier if need be.     CC: Darvin Farrar MD

## 2018-04-17 ENCOUNTER — HOSPITAL ENCOUNTER (OUTPATIENT)
Age: 28
Discharge: HOME HEALTH | End: 2018-04-17
Attending: COLON & RECTAL SURGERY | Admitting: COLON & RECTAL SURGERY
Payer: MEDICAID

## 2018-04-17 ENCOUNTER — ANESTHESIA (OUTPATIENT)
Dept: SURGERY | Age: 28
End: 2018-04-17
Payer: MEDICAID

## 2018-04-17 ENCOUNTER — ANESTHESIA EVENT (OUTPATIENT)
Dept: SURGERY | Age: 28
End: 2018-04-17
Payer: MEDICAID

## 2018-04-17 VITALS
DIASTOLIC BLOOD PRESSURE: 88 MMHG | WEIGHT: 220 LBS | RESPIRATION RATE: 17 BRPM | BODY MASS INDEX: 43.19 KG/M2 | HEART RATE: 96 BPM | HEIGHT: 60 IN | OXYGEN SATURATION: 96 % | TEMPERATURE: 98.7 F | SYSTOLIC BLOOD PRESSURE: 156 MMHG

## 2018-04-17 DIAGNOSIS — G89.18 ACUTE POST-OPERATIVE PAIN: Primary | ICD-10-CM

## 2018-04-17 LAB — HCG UR QL: NEGATIVE

## 2018-04-17 PROCEDURE — 77030008684 HC TU ET CUF COVD -B: Performed by: NURSE ANESTHETIST, CERTIFIED REGISTERED

## 2018-04-17 PROCEDURE — 87205 SMEAR GRAM STAIN: CPT | Performed by: COLON & RECTAL SURGERY

## 2018-04-17 PROCEDURE — 74011250636 HC RX REV CODE- 250/636

## 2018-04-17 PROCEDURE — 74011250636 HC RX REV CODE- 250/636: Performed by: COLON & RECTAL SURGERY

## 2018-04-17 PROCEDURE — 77030018836 HC SOL IRR NACL ICUM -A: Performed by: COLON & RECTAL SURGERY

## 2018-04-17 PROCEDURE — 76210000020 HC REC RM PH II FIRST 0.5 HR: Performed by: COLON & RECTAL SURGERY

## 2018-04-17 PROCEDURE — 74011000250 HC RX REV CODE- 250: Performed by: COLON & RECTAL SURGERY

## 2018-04-17 PROCEDURE — 76210000016 HC OR PH I REC 1 TO 1.5 HR: Performed by: COLON & RECTAL SURGERY

## 2018-04-17 PROCEDURE — 77030032490 HC SLV COMPR SCD KNE COVD -B: Performed by: COLON & RECTAL SURGERY

## 2018-04-17 PROCEDURE — 81025 URINE PREGNANCY TEST: CPT

## 2018-04-17 PROCEDURE — 77030020782 HC GWN BAIR PAWS FLX 3M -B

## 2018-04-17 PROCEDURE — 74011250636 HC RX REV CODE- 250/636: Performed by: ANESTHESIOLOGY

## 2018-04-17 PROCEDURE — 87075 CULTR BACTERIA EXCEPT BLOOD: CPT | Performed by: COLON & RECTAL SURGERY

## 2018-04-17 PROCEDURE — 77030011841 HC SUT PLN COVD -A: Performed by: COLON & RECTAL SURGERY

## 2018-04-17 PROCEDURE — 76060000032 HC ANESTHESIA 0.5 TO 1 HR: Performed by: COLON & RECTAL SURGERY

## 2018-04-17 PROCEDURE — 77030026438 HC STYL ET INTUB CARD -A: Performed by: NURSE ANESTHETIST, CERTIFIED REGISTERED

## 2018-04-17 PROCEDURE — 74011000250 HC RX REV CODE- 250

## 2018-04-17 PROCEDURE — 77030011640 HC PAD GRND REM COVD -A: Performed by: COLON & RECTAL SURGERY

## 2018-04-17 PROCEDURE — 76010000138 HC OR TIME 0.5 TO 1 HR: Performed by: COLON & RECTAL SURGERY

## 2018-04-17 PROCEDURE — 77030018836 HC SOL IRR NACL ICUM -A

## 2018-04-17 RX ORDER — OXYCODONE HYDROCHLORIDE 5 MG/1
5-10 TABLET ORAL
Qty: 40 TAB | Refills: 0 | Status: ON HOLD | OUTPATIENT
Start: 2018-04-17 | End: 2018-06-14

## 2018-04-17 RX ORDER — ROPIVACAINE HYDROCHLORIDE 5 MG/ML
30 INJECTION, SOLUTION EPIDURAL; INFILTRATION; PERINEURAL ONCE
Status: DISCONTINUED | OUTPATIENT
Start: 2018-04-17 | End: 2018-04-17 | Stop reason: HOSPADM

## 2018-04-17 RX ORDER — FENTANYL CITRATE 50 UG/ML
50 INJECTION, SOLUTION INTRAMUSCULAR; INTRAVENOUS AS NEEDED
Status: DISCONTINUED | OUTPATIENT
Start: 2018-04-17 | End: 2018-04-17 | Stop reason: HOSPADM

## 2018-04-17 RX ORDER — MIDAZOLAM HYDROCHLORIDE 1 MG/ML
0.5 INJECTION, SOLUTION INTRAMUSCULAR; INTRAVENOUS
Status: DISCONTINUED | OUTPATIENT
Start: 2018-04-17 | End: 2018-04-18 | Stop reason: HOSPADM

## 2018-04-17 RX ORDER — SODIUM CHLORIDE 9 MG/ML
25 INJECTION, SOLUTION INTRAVENOUS CONTINUOUS
Status: DISCONTINUED | OUTPATIENT
Start: 2018-04-17 | End: 2018-04-18 | Stop reason: HOSPADM

## 2018-04-17 RX ORDER — SODIUM CHLORIDE 9 MG/ML
25 INJECTION, SOLUTION INTRAVENOUS CONTINUOUS
Status: DISCONTINUED | OUTPATIENT
Start: 2018-04-17 | End: 2018-04-17 | Stop reason: HOSPADM

## 2018-04-17 RX ORDER — DEXAMETHASONE SODIUM PHOSPHATE 4 MG/ML
INJECTION, SOLUTION INTRA-ARTICULAR; INTRALESIONAL; INTRAMUSCULAR; INTRAVENOUS; SOFT TISSUE AS NEEDED
Status: DISCONTINUED | OUTPATIENT
Start: 2018-04-17 | End: 2018-04-17 | Stop reason: HOSPADM

## 2018-04-17 RX ORDER — MIDAZOLAM HYDROCHLORIDE 1 MG/ML
1 INJECTION, SOLUTION INTRAMUSCULAR; INTRAVENOUS AS NEEDED
Status: DISCONTINUED | OUTPATIENT
Start: 2018-04-17 | End: 2018-04-17 | Stop reason: HOSPADM

## 2018-04-17 RX ORDER — LEVOFLOXACIN 5 MG/ML
500 INJECTION, SOLUTION INTRAVENOUS
Status: COMPLETED | OUTPATIENT
Start: 2018-04-17 | End: 2018-04-17

## 2018-04-17 RX ORDER — SODIUM CHLORIDE 0.9 % (FLUSH) 0.9 %
5-10 SYRINGE (ML) INJECTION EVERY 8 HOURS
Status: DISCONTINUED | OUTPATIENT
Start: 2018-04-17 | End: 2018-04-17 | Stop reason: HOSPADM

## 2018-04-17 RX ORDER — BUPIVACAINE HYDROCHLORIDE AND EPINEPHRINE 2.5; 5 MG/ML; UG/ML
30 INJECTION, SOLUTION EPIDURAL; INFILTRATION; INTRACAUDAL; PERINEURAL ONCE
Status: COMPLETED | OUTPATIENT
Start: 2018-04-17 | End: 2018-04-17

## 2018-04-17 RX ORDER — AMOXICILLIN AND CLAVULANATE POTASSIUM 562.5; 437.5; 62.5 MG/1; MG/1; MG/1
1 TABLET, FILM COATED, EXTENDED RELEASE ORAL 2 TIMES DAILY
Qty: 14 TAB | Refills: 0 | Status: SHIPPED | OUTPATIENT
Start: 2018-04-17 | End: 2018-04-24

## 2018-04-17 RX ORDER — SODIUM CHLORIDE 0.9 % (FLUSH) 0.9 %
5-10 SYRINGE (ML) INJECTION AS NEEDED
Status: DISCONTINUED | OUTPATIENT
Start: 2018-04-17 | End: 2018-04-18 | Stop reason: HOSPADM

## 2018-04-17 RX ORDER — SODIUM CHLORIDE 0.9 % (FLUSH) 0.9 %
5-10 SYRINGE (ML) INJECTION EVERY 8 HOURS
Status: DISCONTINUED | OUTPATIENT
Start: 2018-04-17 | End: 2018-04-18 | Stop reason: HOSPADM

## 2018-04-17 RX ORDER — LIDOCAINE HYDROCHLORIDE 10 MG/ML
0.1 INJECTION, SOLUTION EPIDURAL; INFILTRATION; INTRACAUDAL; PERINEURAL AS NEEDED
Status: DISCONTINUED | OUTPATIENT
Start: 2018-04-17 | End: 2018-04-17 | Stop reason: HOSPADM

## 2018-04-17 RX ORDER — MIDAZOLAM HYDROCHLORIDE 1 MG/ML
INJECTION, SOLUTION INTRAMUSCULAR; INTRAVENOUS AS NEEDED
Status: DISCONTINUED | OUTPATIENT
Start: 2018-04-17 | End: 2018-04-17 | Stop reason: HOSPADM

## 2018-04-17 RX ORDER — PROPOFOL 10 MG/ML
INJECTION, EMULSION INTRAVENOUS AS NEEDED
Status: DISCONTINUED | OUTPATIENT
Start: 2018-04-17 | End: 2018-04-17 | Stop reason: HOSPADM

## 2018-04-17 RX ORDER — SODIUM CHLORIDE, SODIUM LACTATE, POTASSIUM CHLORIDE, CALCIUM CHLORIDE 600; 310; 30; 20 MG/100ML; MG/100ML; MG/100ML; MG/100ML
125 INJECTION, SOLUTION INTRAVENOUS CONTINUOUS
Status: DISCONTINUED | OUTPATIENT
Start: 2018-04-17 | End: 2018-04-17 | Stop reason: HOSPADM

## 2018-04-17 RX ORDER — ONDANSETRON 2 MG/ML
4 INJECTION INTRAMUSCULAR; INTRAVENOUS AS NEEDED
Status: DISCONTINUED | OUTPATIENT
Start: 2018-04-17 | End: 2018-04-18 | Stop reason: HOSPADM

## 2018-04-17 RX ORDER — SUCCINYLCHOLINE CHLORIDE 20 MG/ML
INJECTION INTRAMUSCULAR; INTRAVENOUS AS NEEDED
Status: DISCONTINUED | OUTPATIENT
Start: 2018-04-17 | End: 2018-04-17 | Stop reason: HOSPADM

## 2018-04-17 RX ORDER — MORPHINE SULFATE 10 MG/ML
2 INJECTION, SOLUTION INTRAMUSCULAR; INTRAVENOUS
Status: DISCONTINUED | OUTPATIENT
Start: 2018-04-17 | End: 2018-04-18 | Stop reason: HOSPADM

## 2018-04-17 RX ORDER — SODIUM CHLORIDE, SODIUM LACTATE, POTASSIUM CHLORIDE, CALCIUM CHLORIDE 600; 310; 30; 20 MG/100ML; MG/100ML; MG/100ML; MG/100ML
75 INJECTION, SOLUTION INTRAVENOUS CONTINUOUS
Status: DISCONTINUED | OUTPATIENT
Start: 2018-04-17 | End: 2018-04-18 | Stop reason: HOSPADM

## 2018-04-17 RX ORDER — ONDANSETRON 2 MG/ML
INJECTION INTRAMUSCULAR; INTRAVENOUS AS NEEDED
Status: DISCONTINUED | OUTPATIENT
Start: 2018-04-17 | End: 2018-04-17 | Stop reason: HOSPADM

## 2018-04-17 RX ORDER — FENTANYL CITRATE 50 UG/ML
INJECTION, SOLUTION INTRAMUSCULAR; INTRAVENOUS AS NEEDED
Status: DISCONTINUED | OUTPATIENT
Start: 2018-04-17 | End: 2018-04-17 | Stop reason: HOSPADM

## 2018-04-17 RX ORDER — SODIUM CHLORIDE 0.9 % (FLUSH) 0.9 %
5-10 SYRINGE (ML) INJECTION AS NEEDED
Status: DISCONTINUED | OUTPATIENT
Start: 2018-04-17 | End: 2018-04-17 | Stop reason: HOSPADM

## 2018-04-17 RX ORDER — LIDOCAINE HYDROCHLORIDE 20 MG/ML
INJECTION, SOLUTION EPIDURAL; INFILTRATION; INTRACAUDAL; PERINEURAL AS NEEDED
Status: DISCONTINUED | OUTPATIENT
Start: 2018-04-17 | End: 2018-04-17 | Stop reason: HOSPADM

## 2018-04-17 RX ORDER — FENTANYL CITRATE 50 UG/ML
25 INJECTION, SOLUTION INTRAMUSCULAR; INTRAVENOUS
Status: COMPLETED | OUTPATIENT
Start: 2018-04-17 | End: 2018-04-17

## 2018-04-17 RX ORDER — MORPHINE SULFATE 4 MG/ML
INJECTION, SOLUTION INTRAMUSCULAR; INTRAVENOUS
Status: COMPLETED
Start: 2018-04-17 | End: 2018-04-17

## 2018-04-17 RX ORDER — DIPHENHYDRAMINE HYDROCHLORIDE 50 MG/ML
12.5 INJECTION, SOLUTION INTRAMUSCULAR; INTRAVENOUS AS NEEDED
Status: DISCONTINUED | OUTPATIENT
Start: 2018-04-17 | End: 2018-04-18 | Stop reason: HOSPADM

## 2018-04-17 RX ADMIN — LIDOCAINE HYDROCHLORIDE 80 MG: 20 INJECTION, SOLUTION EPIDURAL; INFILTRATION; INTRACAUDAL; PERINEURAL at 18:34

## 2018-04-17 RX ADMIN — PROPOFOL 50 MG: 10 INJECTION, EMULSION INTRAVENOUS at 18:41

## 2018-04-17 RX ADMIN — FENTANYL CITRATE 25 MCG: 50 INJECTION, SOLUTION INTRAMUSCULAR; INTRAVENOUS at 20:00

## 2018-04-17 RX ADMIN — MIDAZOLAM HYDROCHLORIDE 2 MG: 1 INJECTION, SOLUTION INTRAMUSCULAR; INTRAVENOUS at 18:25

## 2018-04-17 RX ADMIN — MORPHINE SULFATE 2 MG: 4 INJECTION, SOLUTION INTRAMUSCULAR; INTRAVENOUS at 19:50

## 2018-04-17 RX ADMIN — ONDANSETRON 4 MG: 2 INJECTION INTRAMUSCULAR; INTRAVENOUS at 20:34

## 2018-04-17 RX ADMIN — FENTANYL CITRATE 50 MCG: 50 INJECTION, SOLUTION INTRAMUSCULAR; INTRAVENOUS at 18:48

## 2018-04-17 RX ADMIN — FENTANYL CITRATE 50 MCG: 50 INJECTION, SOLUTION INTRAMUSCULAR; INTRAVENOUS at 18:34

## 2018-04-17 RX ADMIN — PROPOFOL 150 MG: 10 INJECTION, EMULSION INTRAVENOUS at 18:34

## 2018-04-17 RX ADMIN — DEXAMETHASONE SODIUM PHOSPHATE 4 MG: 4 INJECTION, SOLUTION INTRA-ARTICULAR; INTRALESIONAL; INTRAMUSCULAR; INTRAVENOUS; SOFT TISSUE at 18:48

## 2018-04-17 RX ADMIN — FENTANYL CITRATE 25 MCG: 50 INJECTION, SOLUTION INTRAMUSCULAR; INTRAVENOUS at 20:27

## 2018-04-17 RX ADMIN — MORPHINE SULFATE 2 MG: 4 INJECTION, SOLUTION INTRAMUSCULAR; INTRAVENOUS at 20:00

## 2018-04-17 RX ADMIN — SUCCINYLCHOLINE CHLORIDE 120 MG: 20 INJECTION INTRAMUSCULAR; INTRAVENOUS at 18:34

## 2018-04-17 RX ADMIN — FENTANYL CITRATE 25 MCG: 50 INJECTION, SOLUTION INTRAMUSCULAR; INTRAVENOUS at 19:50

## 2018-04-17 RX ADMIN — LEVOFLOXACIN 500 MG: 5 INJECTION, SOLUTION INTRAVENOUS at 18:48

## 2018-04-17 RX ADMIN — FENTANYL CITRATE 25 MCG: 50 INJECTION, SOLUTION INTRAMUSCULAR; INTRAVENOUS at 20:15

## 2018-04-17 RX ADMIN — ONDANSETRON 4 MG: 2 INJECTION INTRAMUSCULAR; INTRAVENOUS at 18:48

## 2018-04-17 RX ADMIN — SODIUM CHLORIDE, SODIUM LACTATE, POTASSIUM CHLORIDE, AND CALCIUM CHLORIDE 125 ML/HR: 600; 310; 30; 20 INJECTION, SOLUTION INTRAVENOUS at 17:31

## 2018-04-17 NOTE — ANESTHESIA PREPROCEDURE EVALUATION
Anesthetic History   No history of anesthetic complications            Review of Systems / Medical History  Patient summary reviewed, nursing notes reviewed and pertinent labs reviewed    Pulmonary            Asthma : well controlled       Neuro/Psych   Within defined limits           Cardiovascular    Hypertension: well controlled                   GI/Hepatic/Renal  Within defined limits              Endo/Other  Within defined limits           Other Findings              Physical Exam    Airway  Mallampati: II  TM Distance: > 6 cm  Neck ROM: normal range of motion   Mouth opening: Normal     Cardiovascular  Regular rate and rhythm,  S1 and S2 normal,  no murmur, click, rub, or gallop             Dental  No notable dental hx       Pulmonary  Breath sounds clear to auscultation               Abdominal  GI exam deferred       Other Findings            Anesthetic Plan    ASA: 2  Anesthesia type: general          Induction: Intravenous  Anesthetic plan and risks discussed with: Patient

## 2018-04-17 NOTE — ROUTINE PROCESS
Patient: Jose Armando  MRN: [id number]  SSN: [id number]   YOB: 1990  Age: 29 y.o. Sex: female     Patient is status post Procedure(s):  I & D PILONIDAL ABCESS  . Surgeon(s) and Role:     * Nomi Segura MD - Primary    Local/Dose/Irrigation:  .25% MARCAINE WITH EPI                    Peripheral IV 04/17/18 Right Antecubital (Active)   Site Assessment Clean, dry, & intact 4/17/2018  5:30 PM   Phlebitis Assessment 0 4/17/2018  5:30 PM   Infiltration Assessment 0 4/17/2018  5:30 PM   Dressing Status Clean, dry, & intact; New 4/17/2018  5:30 PM   Dressing Type Tape;Transparent 4/17/2018  5:30 PM   Hub Color/Line Status Pink 4/17/2018  5:30 PM                           Dressing/Packing:  Wound Buttocks-DRESSING TYPE: 4 x 4;ABD pad;Special tape (comment) (PLAIN PACKING) (04/17/18 1915)  Splint/Cast:  ]    Other:

## 2018-04-17 NOTE — PERIOP NOTES
TOM Dowell [facility] [phone] PLEASE CALL AN HOUR BEFORE DISCHARGE

## 2018-04-17 NOTE — IP AVS SNAPSHOT
[address] [facility] [address] 
[phone] Patient: Genie Reece MRN: [id number] [facility] [place]: About your hospitalization You were admitted on:  2018 You last received care in the:  [facility] PACU You were discharged on:  2018 Why you were hospitalized Your primary diagnosis was:  Not on File Follow-up Information Follow up With Details Comments Contact Info Carter Reyez MD   [address] [place] [address] [address] 
[phone] Discharge Orders None A check tito indicates which time of day the medication should be taken. My Medications START taking these medications Instructions Each Dose to Equal  
 Morning Noon Evening Bedtime  
 amoxicillin-clavulanate 1,000-62.5 mg per tablet Commonly known as:  AUGMENTIN Your last dose was: Your next dose is: Take 1 Tab by mouth two (2) times a day for 7 days. 1 Tab  
    
   
   
   
  
 oxyCODONE IR 5 mg immediate release tablet Commonly known as:  Santo Bers Your last dose was: Your next dose is: Take 1-2 Tabs by mouth every four (4) hours as needed for Pain. Max Daily Amount: 60 mg.  
 5-10 mg CONTINUE taking these medications Instructions Each Dose to Equal  
 Morning Noon Evening Bedtime  
 busPIRone 7.5 mg tablet Commonly known as:  BUSPAR Your last dose was: Your next dose is: Take 1 Tab by mouth three (3) times daily (with meals). 7.5 mg  
    
   
   
   
  
 losartan-hydroCHLOROthiazide 50-12.5 mg per tablet Commonly known as:  HYZAAR Your last dose was: Your next dose is:    
   
   
      
   
   
   
  
 Soft Lens Rinse-Store Solution Soln Commonly known as:  SALINE SOLUTION Your last dose was: Your next dose is:    
   
   
 Use as needed for wound care. STOP taking these medications   
 oxyCODONE-acetaminophen 5-325 mg per tablet Commonly known as:  PERCOCET Where to Get Your Medications Information on where to get these meds will be given to you by the nurse or doctor. ! Ask your nurse or doctor about these medications  
  amoxicillin-clavulanate 1,000-62.5 mg per tablet  
 oxyCODONE IR 5 mg immediate release tablet Soft Lens Rinse-Store Solution Soln Opioid Education Prescription Opioids: What You Need to Know: 
 
Prescription opioids can be used to help relieve moderate-to-severe pain and are often prescribed following a surgery or injury, or for certain health conditions. These medications can be an important part of treatment but also come with serious risks. Opioids are strong pain medicines. Examples include hydrocodone, oxycodone, fentanyl, and morphine. Heroin is an example of an illegal opioid. It is important to work with your health care provider to make sure you are getting the safest, most effective care. WHAT ARE THE RISKS AND SIDE EFFECTS OF OPIOID USE? Prescription opioids carry serious risks of addiction and overdose, especially with prolonged use. An opioid overdose, often marked by slow breathing, can cause sudden death. The use of prescription opioids can have a number of side effects as well, even when taken as directed. · Tolerance-meaning you might need to take more of a medication for the same pain relief · Physical dependence-meaning you have symptoms of withdrawal when the medication is stopped. Withdrawal symptoms can include nausea, sweating, chills, diarrhea, stomach cramps, and muscle aches. Withdrawal can last up to several weeks, depending on which drug you took and how long you took it. · Increased sensitivity to pain · Constipation · Nausea, vomiting, and dry mouth · Sleepiness and dizziness · Confusion · Depression · Low levels of testosterone that can result in lower sex drive, energy, and strength · Itching and sweating RISKS ARE GREATER WITH:      
· History of drug misuse, substance use disorder, or overdose · Mental health conditions (such as depression or anxiety) · Sleep apnea · Older age (72 years or older) · Pregnancy Avoid alcohol while taking prescription opioids. Also, unless specifically advised by your health care provider, medications to avoid include: · Benzodiazepines (such as Xanax or Valium) · Muscle relaxants (such as Soma or Flexeril) · Hypnotics (such as Ambien or Lunesta) · Other prescription opioids KNOW YOUR OPTIONS Talk to your health care provider about ways to manage your pain that don't involve prescription opioids. Some of these options may actually work better and have fewer risks and side effects. Options may include: 
· Pain relievers such as acetaminophen, ibuprofen, and naproxen · Some medications that are also used for depression or seizures · Physical therapy and exercise · Counseling to help patients learn how to cope better with triggers of pain and stress. · Application of heat or cold compress · Massage therapy · Relaxation techniques Be Informed Make sure you know the name of your medication, how much and how often to take it, and its potential risks & side effects. IF YOU ARE PRESCRIBED OPIOIDS FOR PAIN: 
· Never take opioids in greater amounts or more often than prescribed. Remember the goal is not to be pain-free but to manage your pain at a tolerable level. · Follow up with your primary care provider to: · Work together to create a plan on how to manage your pain. · Talk about ways to help manage your pain that don't involve prescription opioids. · Talk about any and all concerns and side effects. · Help prevent misuse and abuse. · Never sell or share prescription opioids · Help prevent misuse and abuse. · Store prescription opioids in a secure place and out of reach of others (this may include visitors, children, friends, and family). · Safely dispose of unused/unwanted prescription opioids: Find your community drug take-back program or your pharmacy mail-back program, or flush them down the toilet, following guidance from the Food and Drug Administration (www.fda.gov/Drugs/ResourcesForYou). · Visit www.cdc.gov/drugoverdose to learn about the risks of opioid abuse and overdose. · If you believe you may be struggling with addiction, tell your health care provider and ask for guidance or call [employer] at 0-024-383-QGXZ. Discharge Instructions Post-Operative Instructions 1. Diet:  Resume your usual diet and drink plenty of fluids. 2. Medications: Take pain medication (oxycodone) and the antibiotic (Augmentin) as prescribed. Do not drive, drink alcohol, or operate machinery while taking prescription pain medication. Take docusate (stool softener) twice per day as directed while taking the oxycodone. You may take up to 1000 mg of Tylenol every 6 hours to help you use less of the oxycodone. You may take ibuprofen as directed in addition to or instead of the prescription medication if there has been no significant bleeding for at least two days. Do not take pain medication on an empty stomach. Do not take aspirin for 10 days following the procedure. 3. Activity:  Do not engage in any heavy lifting or other strenuous activity until you have been seen for follow-up. You may walk as much as you want, and you may climb stairs. Frequent walking will help prevent constipation. 4. Hygiene and Wound Care:  Remove the external dressing tomorrow morning. Moisten the packing in each of the wounds with saline, then remove it.   One piece is approximately 13 feet long and the other is approcimately 2 feet long. The wounds should then be gently re-packed with saline-moistened gauze and covered with dry dressings. It is not necessary to insert as much gauze as was used during the operation, but as you know the most important thing is to try to keep the skin wounds form closing until the cavity underneath has healed in from the bottom and the sides. If the skin wounds close too soon, there will be a greater chance of recurrent problems. The packing change procedure should be performed twice per day. I have contacted Ej Mcgee to try to arrange for some assistance with wound care, but you will need friends or family to do most of it. You may shower with the wounds covered and then change the dressings aferwards, but you should not bathe or otherwise submerge the area. 5. Constipation:  If more than one day passes without a bowel movement, take 1 Tablespoon of Milk of Magnesia. Repeat in 6 hours if you have no results. If you still have not moved your bowels, take two to four 5 mg bisacodyl tablets. If you still have not moved your bowels by the day after taking the bisacodyl, call my office. 6. Bleeding:  A small amount of bright red bleeding can be expected for the next several days. If bleeding appears be continuous, call my office. 7. Other Problems:  If you experience intolerable pain, fever (temperature of 101 or more), or inability to urinate, call my office. 8. Questions: If you have any questions about your post-operative condition or care, do not hesitate to call my office. 9. Work:  You may return to work in one week if feeling able. 10. Follow-up:  Please return to my office at 9:30 AM on Tuesday 5/1/2018 for a brief check-up. If you need to change the appointment, please call [phone] on a weekday between 9:00 AM and noon. For anything other than scheduling, please call [phone]. Jana Mcmullen M.D. 
[phone] Introducing [facility] HEALTH SERVICES! Sariah Tobar introduces [employer] patient portal. Now you can access parts of your medical record, email your doctor's office, and request medication refills online. 1. In your internet browser, go to [email or web address] 2. Click on the First Time User? Click Here link in the Sign In box. You will see the New Member Sign Up page. 3. Enter your [employer] Access Code exactly as it appears below. You will not need to use this code after youve completed the sign-up process. If you do not sign up before the expiration date, you must request a new code. · [employer] Access Code: [email or web address] Expires: 7/16/2018  8:05 PM 
 
4. Enter the last four digits of your Social Security Number (xxxx) and Date of Birth (mm/dd/yyyy) as indicated and click Submit. You will be taken to the next sign-up page. 5. Create a [employer] ID. This will be your [employer] login ID and cannot be changed, so think of one that is secure and easy to remember. 6. Create a [employer] password. You can change your password at any time. 7. Enter your Password Reset Question and Answer. This can be used at a later time if you forget your password. 8. Enter your e-mail address. You will receive e-mail notification when new information is available in [address] 9. Click Sign Up. You can now view and download portions of your medical record. 10. Click the Download Summary menu link to download a portable copy of your medical information. If you have questions, please visit the Frequently Asked Questions section of the [employer] website. Remember, [employer] is NOT to be used for urgent needs. For medical emergencies, dial 911. Now available from your iPhone and Android! Introducing Oskar Burt As a Sariah Tobar patient, I wanted to make you aware of our electronic visit tool called Oskar Burt. [facility]ariah Tobar 24/7 allows you to connect within minutes with a medical provider 24 hours a day, seven days a week via a mobile device or tablet or logging into a secure website from your computer. You can access Naiku from anywhere in the [place]. A virtual visit might be right for you when you have a simple condition and feel like you just dont want to get out of bed, or cant get away from work for an appointment, when your regular [employer] provider is not available (evenings, weekends or holidays), or when youre out of town and need minor care. Electronic visits cost only $49 and if the [employer] 24/7 provider determines a prescription is needed to treat your condition, one can be electronically transmitted to a nearby pharmacy*. Please take a moment to enroll today if you have not already done so. The enrollment process is free and takes just a few minutes. To enroll, please download the [employer] 24/7 richard to your tablet or phone, or visit [email or web address] to enroll on your computer. And, as an [address] [place] [address] patient with a [employer] account, the results of your visits will be scanned into your electronic medical record and your primary care provider will be able to view the scanned results. We urge you to continue to see your regular [employer] provider for your ongoing medical care. And while your primary care provider may not be the one available when you seek a Oskar [employer] virtual visit, the peace of mind you get from getting a real diagnosis real time can be priceless. For more information on [employer], view our Frequently Asked Questions (FAQs) at [email or web address]. Sincerely, 
 
Irene Reyna MD 
Chief Medical Officer [employer] *:  certain medications cannot be prescribed via [employer] Unresulted Labs-Please follow up with your PCP about these lab tests Order Current Status CULTURE, ANAEROBIC In process CULTURE, WOUND W GRAM STAIN In process Providers Seen During Your Hospitalization Provider Specialty Primary office phone Ayleen Cabrera MD Colon and Rectal Surgery [phone] Your Primary Care Physician (PCP) Primary Care Physician Office Phone Office Fax Osvaldo Kenyon [phone] You are allergic to the following Allergen Reactions Aleve (Naproxen Sodium) Hives Bactrim (Sulfamethoprim Ds) Other (comments) Abdominal pain. Darvocet A500 (Propoxyphene N-Acetaminophen) Hives Lortab (Hydrocodone-Acetaminophen) Nausea and Vomiting Motrin (Ibuprofen) Hives Naprosyn (Naproxen) Hives Recent Documentation Height Weight BMI OB Status Smoking Status 1.524 m 99.8 kg 42.97 kg/m2 Having regular periods Former Smoker Emergency Contacts Name Discharge Info Relation Home Work Mobile [employer] CAREGIVER [3] Other Relative [6] [phone] Patient Belongings The following personal items are in your possession at time of discharge: 
  Dental Appliances: None         Home Medications: None   Jewelry: None  Clothing: Other (comment) (CLOTHING, SHOES & PURSE TO PACU)    Other Valuables: Purse (PURSE IN CLOTHING BAG TO PACU; PERSONAL BELONGINGS TO SECURITY) Discharge Instructions Attachments/References MEFS - OXYCODONE, RAPID RELEASE (ETH-OXYDOSE, OXY IR, ROXICODONE) - (BY MOUTH) (ENGLISH) MEFS - AMOXICILLIN/CLAVULANATE POTASSIUM (AUGMENTIN, AUGMENTIN ES-600, AUGMENTIN XR) - (BY MOUTH) (ENGLISH) Patient Handouts Oxycodone, Rapid Release (ETH-Oxydose, Oxy IR, Roxicodone) - (By mouth) Why this medicine is used:  
Treats moderate to severe pain. This medicine is a narcotic pain reliever. Contact a nurse or doctor right away if you have: 
· Fast or slow heart beat, shallow breathing, blue lips, skin or fingernails · Anxiety, restlessness, fever, sweating, muscle spasms, twitching, seeing or hearing things that are not there · Extreme weakness, shallow breathing, slow heartbeat · Severe confusion, lightheadedness, dizziness, fainting · Sweating or cold, clammy skin, seizures · Severe constipation, stomach pain, nausea, vomiting Common side effects: · Mild constipation · Sleepiness, tiredness © 2017 [facility] Information is for End User's use only and may not be sold, redistributed or otherwise used for commercial purposes. Amoxicillin/Clavulanate Potassium (Augmentin, Augmentin ES-600, Augmentin XR) - (By mouth) Why this medicine is used:  
Treats infections. This medicine is a penicillin antibiotic. Contact a nurse or doctor right away if you have: · Blistering, peeling, red skin rash · Dark urine or pale stools, nausea, vomiting, loss of appetite, stomach pain, yellow eyes or skin · Severe diarrhea, especially if bloody or ongoing Common side effects: 
· Diarrhea, nausea, vomiting · Diaper rash · Tooth discoloration (in children) © 2017 [facility] Information is for End User's use only and may not be sold, redistributed or otherwise used for commercial purposes. Please provide this summary of care documentation to your next provider. Signatures-by signing, you are acknowledging that this After Visit Summary has been reviewed with you and you have received a copy. Patient Signature:  ____________________________________________________________ Date:  ____________________________________________________________  
  
Haylee Penaloza Provider Signature:  ____________________________________________________________ Date:  ____________________________________________________________
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
## 2018-04-17 NOTE — BRIEF OP NOTE
BRIEF OPERATIVE NOTE    Date of Procedure:  4/17/2018  Preoperative Diagnosis:  Pilonidal abscess. Postoperative Diagnosis:   Pilonidal abscess. Procedure: Incision and drainage of pilonidal abscess  Surgeon:  Adam Felder MD  Assistant:  PAVAN Pina  Anesthesia:  General enodtracheal  Estimated Blood Loss:    Crystalloid:  700 mL    Specimens:   ID Type Source Tests Collected by Time Destination   1 : CULTURE PILONIDAL ABCESS Wound Abscess CULTURE, WOUND W GRAM STAIN, CULTURE, ANAEROBIC Adam Felder MD 4/17/2018 1900 Microbiology     Tubes and Drains:  None. Findings: Deep pilonidal abscess extending bilaterally. Single slightly enlarged post-sacral midline pore. Complications:  None apparent. Implants:  None.

## 2018-04-17 NOTE — ANESTHESIA POSTPROCEDURE EVALUATION
Post-Anesthesia Evaluation and Assessment    Patient: Edvin Lemos MRN: [id number]  SSN: [id number]    YOB: 1990  Age: 29 y.o. Sex: female       Cardiovascular Function/Vital Signs  Visit Vitals    /87    Pulse 100    Temp 37.1 °C (98.7 °F)    Resp (!) 7    Ht 5' (1.524 m)    Wt 99.8 kg (220 lb)    SpO2 100%    BMI 42.97 kg/m2       Patient is status post general anesthesia for Procedure(s):  I & D PILONIDAL ABCESS  . Nausea/Vomiting: None    Postoperative hydration reviewed and adequate. Pain:  Pain Scale 1: Numeric (0 - 10) (04/17/18 1922)  Pain Intensity 1: 0 (04/17/18 1922)   Managed    Neurological Status:   Neuro (WDL): Within Defined Limits (04/17/18 1922)  Neuro  LUE Motor Response: Purposeful (04/17/18 1922)  LLE Motor Response: Purposeful (04/17/18 1922)  RUE Motor Response: Purposeful (04/17/18 1922)  RLE Motor Response: Purposeful (04/17/18 1922)   At baseline    Mental Status and Level of Consciousness: Arousable    Pulmonary Status:   O2 Device: CO2 nasal cannula (04/17/18 1925)   Adequate oxygenation and airway patent    Complications related to anesthesia: None    Post-anesthesia assessment completed.  No concerns    Signed By: Eloise Moscoso MD     April 17, 2018

## 2018-04-17 NOTE — DISCHARGE INSTRUCTIONS
Post-Operative Instructions      1. Diet:  Resume your usual diet and drink plenty of fluids. 2. Medications: Take pain medication (oxycodone) and the antibiotic (Augmentin) as prescribed. Do not drive, drink alcohol, or operate machinery while taking prescription pain medication. Take docusate (stool softener) twice per day as directed while taking the oxycodone. You may take up to 1000 mg of Tylenol every 6 hours to help you use less of the oxycodone. You may take ibuprofen as directed in addition to or instead of the prescription medication if there has been no significant bleeding for at least two days. Do not take pain medication on an empty stomach. Do not take aspirin for 10 days following the procedure. 3. Activity:  Do not engage in any heavy lifting or other strenuous activity until you have been seen for follow-up. You may walk as much as you want, and you may climb stairs. Frequent walking will help prevent constipation. 4. Hygiene and Wound Care:  Remove the external dressing tomorrow morning. Moisten the packing in each of the wounds with saline, then remove it. One piece is approximately 13 feet long and the other is approcimately 2 feet long. The wounds should then be gently re-packed with saline-moistened gauze and covered with dry dressings. It is not necessary to insert as much gauze as was used during the operation, but as you know the most important thing is to try to keep the skin wounds form closing until the cavity underneath has healed in from the bottom and the sides. If the skin wounds close too soon, there will be a greater chance of recurrent problems. The packing change procedure should be performed twice per day. I have contacted [facility] [place] [facility] to try to arrange for some assistance with wound care, but you will need friends or family to do most of it.   You may shower with the wounds covered and then change the dressings aferwards, but you should not bathe or otherwise submerge the area. 5. Constipation:  If more than one day passes without a bowel movement, take 1 Tablespoon of Milk of Magnesia. Repeat in 6 hours if you have no results. If you still have not moved your bowels, take two to four 5 mg bisacodyl tablets. If you still have not moved your bowels by the day after taking the bisacodyl, call my office. 6. Bleeding:  A small amount of bright red bleeding can be expected for the next several days. If bleeding appears be continuous, call my office. 7. Other Problems:  If you experience intolerable pain, fever (temperature of 101 or more), or inability to urinate, call my office. 8. Questions: If you have any questions about your post-operative condition or care, do not hesitate to call my office. 9. Work:  You may return to work in one week if feeling able. 10. Follow-up:  Please return to my office at 9:30 AM on Tuesday 5/1/2018 for a brief check-up. If you need to change the appointment, please call [phone] on a weekday between 9:00 AM and noon. For anything other than scheduling, please call [phone].               Danish Huitron M.D.  [phone]

## 2018-04-18 NOTE — OP NOTES
[address] [place] [address] REPORT    Rica Moss  MR#: [id number]  : 1990  ACCOUNT #: [de-identified]   DATE OF SERVICE: 2018    PREOPERATIVE DIAGNOSIS:  Pilonidal abscess. POSTOPERATIVE DIAGNOSIS:  Pilonidal abscess. PROCEDURE PERFORMED:  Incision and drainage of pilonidal abscess. SURGEON:  Jana Mcmullen MD    ASSISTANT:  PAVAN Nogueira     ANESTHESIA:  General endotracheal.    SPECIMENS REMOVED:  Pus for Gram stain and culture. TUBES AND DRAINS:  None. ESTIMATED BLOOD LOSS:  Less than 25 mL. FLUIDS:  Crystalloid 700 mL. COMPLICATIONS:  None apparent. IMPLANTS:  None. INDICATION FOR PROCEDURE:  The patient is a 26-year-old female with a history of pilonidal infections and hidradenitis, who experienced the acute onset of post-sacral pain 2-3 days ago. The pain has been worsening, and examination in the office on 2018 revealed bilateral post-sacral tenderness without erythema, drainage, a visible open wound, or definite induration or fluctuance. The diagnosis of a recurrent pilonidal abscess was made, and the patient was added on for surgery to be performed that same day. Between when she was seen in the office and when she was supposed to arrive for the operation, she ate; so the operation was postponed until 2018. The risks of the procedure had been reviewed and she had agreed to proceed. OPERATIVE FINDINGS:  There was a deep pilonidal abscess extending bilaterally. There was a single slightly enlarged post-sacral midline pore from which 1 or 2 miniscule drops of purulent drainage could be expressed with firm compression. DESCRIPTION OF PROCEDURE:  After informed consent was obtained, the patient was taken to the operating room, where standard monitoring devices were attached and adequate general endotracheal anesthesia was induced.   She was then placed in the prone jackknife position on the operating table, with all pressure points padded appropriately and with the lower extremities fitted with pneumatic compression stockings. The pos-tsacral area and buttocks were cleared of hair using clippers. The operative field was then prepped and draped in the usual sterile fashion. Five hundred miligrams of Levaquin were administered parenterally. 0.25% bupivacaine with epinephrine was infiltrated subcutaneously in the areas of the scarring from the previous operations. These were located in the left and right paramedian post-sacral areas. These also corresponded to the areas of greatest induration and tenderness. Each of these wounds was incised sharply with a scalpel. The skin and scar were thick, and the abscess was deep. A tonsil was used to penetrate into the abscess cavity. Electrocautery was used to remove an ellipsoid piece of skin and scar tissue from each side. Specimens were collected for Gram stain and culture. The pus and blood were evacuated using suction. Electrocautery was used to control bleeding and to destroy some of the lining of the cavity. No retained hair could be identified. The cavity was copiously irrigated with saline. Both wounds communicated with the midline cavity. With hemostasis achieved and the wounds thoroughly irrigated, additional 0.25% bupivacaine with epinephrine was infiltrated to provide some postoperative analgesia. Then, the wounds were packed. The left-sided wound was packed with approximately 13 feet of half inch plain Nu Gauze. The right-sided wound was packed with approximately 2 feet of that same material.  An external dressing consisting of 4 x 4's and an ABD was put in place. There were no apparent complications, and the patient appeared to have tolerated the procedure well. At its conclusion, she was extubated and transported in stable condition to the recovery room.         Samaria Martinez MD       PG / DN  D: 04/18/2018 08:35     T: 04/18/2018 12:24  JOB #: [id number]  CC: Ranjit Colon MD

## 2018-04-19 LAB
BACTERIA SPEC CULT: ABNORMAL
GRAM STN SPEC: ABNORMAL
GRAM STN SPEC: ABNORMAL
SERVICE CMNT-IMP: ABNORMAL
SERVICE CMNT-IMP: ABNORMAL

## 2018-06-14 ENCOUNTER — HOSPITAL ENCOUNTER (OUTPATIENT)
Age: 28
Setting detail: OUTPATIENT SURGERY
Discharge: HOME | End: 2018-06-14
Attending: COLON & RECTAL SURGERY | Admitting: COLON & RECTAL SURGERY
Payer: MEDICAID

## 2018-06-14 ENCOUNTER — ANESTHESIA (OUTPATIENT)
Dept: SURGERY | Age: 28
End: 2018-06-14
Payer: MEDICAID

## 2018-06-14 ENCOUNTER — ANESTHESIA EVENT (OUTPATIENT)
Dept: SURGERY | Age: 28
End: 2018-06-14
Payer: MEDICAID

## 2018-06-14 VITALS
WEIGHT: 253 LBS | TEMPERATURE: 98.8 F | OXYGEN SATURATION: 95 % | DIASTOLIC BLOOD PRESSURE: 70 MMHG | HEIGHT: 60 IN | BODY MASS INDEX: 49.67 KG/M2 | RESPIRATION RATE: 19 BRPM | SYSTOLIC BLOOD PRESSURE: 125 MMHG | HEART RATE: 88 BPM

## 2018-06-14 DIAGNOSIS — G89.18 ACUTE POST-OPERATIVE PAIN: ICD-10-CM

## 2018-06-14 PROCEDURE — 87185 SC STD ENZYME DETCJ PER NZM: CPT | Performed by: COLON & RECTAL SURGERY

## 2018-06-14 PROCEDURE — 77030018836 HC SOL IRR NACL ICUM -A

## 2018-06-14 PROCEDURE — 77030019908 HC STETH ESOPH SIMS -A: Performed by: ANESTHESIOLOGY

## 2018-06-14 PROCEDURE — 74011000250 HC RX REV CODE- 250: Performed by: COLON & RECTAL SURGERY

## 2018-06-14 PROCEDURE — 76210000016 HC OR PH I REC 1 TO 1.5 HR: Performed by: COLON & RECTAL SURGERY

## 2018-06-14 PROCEDURE — 74011250636 HC RX REV CODE- 250/636

## 2018-06-14 PROCEDURE — 87077 CULTURE AEROBIC IDENTIFY: CPT | Performed by: COLON & RECTAL SURGERY

## 2018-06-14 PROCEDURE — 87186 SC STD MICRODIL/AGAR DIL: CPT | Performed by: COLON & RECTAL SURGERY

## 2018-06-14 PROCEDURE — 87075 CULTR BACTERIA EXCEPT BLOOD: CPT | Performed by: COLON & RECTAL SURGERY

## 2018-06-14 PROCEDURE — 87205 SMEAR GRAM STAIN: CPT | Performed by: COLON & RECTAL SURGERY

## 2018-06-14 PROCEDURE — 77030026438 HC STYL ET INTUB CARD -A: Performed by: ANESTHESIOLOGY

## 2018-06-14 PROCEDURE — 74011250636 HC RX REV CODE- 250/636: Performed by: ANESTHESIOLOGY

## 2018-06-14 PROCEDURE — 76210000021 HC REC RM PH II 0.5 TO 1 HR: Performed by: COLON & RECTAL SURGERY

## 2018-06-14 PROCEDURE — 77030011640 HC PAD GRND REM COVD -A: Performed by: COLON & RECTAL SURGERY

## 2018-06-14 PROCEDURE — 74011250636 HC RX REV CODE- 250/636: Performed by: COLON & RECTAL SURGERY

## 2018-06-14 PROCEDURE — 77030008684 HC TU ET CUF COVD -B: Performed by: ANESTHESIOLOGY

## 2018-06-14 PROCEDURE — 76060000033 HC ANESTHESIA 1 TO 1.5 HR: Performed by: COLON & RECTAL SURGERY

## 2018-06-14 PROCEDURE — 77030020782 HC GWN BAIR PAWS FLX 3M -B

## 2018-06-14 PROCEDURE — 76010000138 HC OR TIME 0.5 TO 1 HR: Performed by: COLON & RECTAL SURGERY

## 2018-06-14 PROCEDURE — 74011000250 HC RX REV CODE- 250

## 2018-06-14 RX ORDER — AMOXICILLIN AND CLAVULANATE POTASSIUM 875; 125 MG/1; MG/1
1 TABLET, FILM COATED ORAL 2 TIMES DAILY
Qty: 14 TAB | Refills: 0 | Status: SHIPPED | OUTPATIENT
Start: 2018-06-14 | End: 2018-06-21

## 2018-06-14 RX ORDER — SODIUM CHLORIDE 0.9 % (FLUSH) 0.9 %
5-10 SYRINGE (ML) INJECTION EVERY 8 HOURS
Status: DISCONTINUED | OUTPATIENT
Start: 2018-06-14 | End: 2018-06-14 | Stop reason: HOSPADM

## 2018-06-14 RX ORDER — SODIUM CHLORIDE 0.9 % (FLUSH) 0.9 %
5-10 SYRINGE (ML) INJECTION AS NEEDED
Status: DISCONTINUED | OUTPATIENT
Start: 2018-06-14 | End: 2018-06-14 | Stop reason: HOSPADM

## 2018-06-14 RX ORDER — ONDANSETRON 2 MG/ML
INJECTION INTRAMUSCULAR; INTRAVENOUS AS NEEDED
Status: DISCONTINUED | OUTPATIENT
Start: 2018-06-14 | End: 2018-06-14 | Stop reason: HOSPADM

## 2018-06-14 RX ORDER — MIDAZOLAM HYDROCHLORIDE 1 MG/ML
0.5 INJECTION, SOLUTION INTRAMUSCULAR; INTRAVENOUS
Status: DISCONTINUED | OUTPATIENT
Start: 2018-06-14 | End: 2018-06-14 | Stop reason: HOSPADM

## 2018-06-14 RX ORDER — GLYCOPYRROLATE 0.2 MG/ML
INJECTION INTRAMUSCULAR; INTRAVENOUS AS NEEDED
Status: DISCONTINUED | OUTPATIENT
Start: 2018-06-14 | End: 2018-06-14 | Stop reason: HOSPADM

## 2018-06-14 RX ORDER — OXYCODONE HYDROCHLORIDE 5 MG/1
5-10 TABLET ORAL
Qty: 50 TAB | Refills: 0 | OUTPATIENT
Start: 2018-06-14 | End: 2018-06-14

## 2018-06-14 RX ORDER — FENTANYL CITRATE 50 UG/ML
25 INJECTION, SOLUTION INTRAMUSCULAR; INTRAVENOUS
Status: DISCONTINUED | OUTPATIENT
Start: 2018-06-14 | End: 2018-06-14 | Stop reason: HOSPADM

## 2018-06-14 RX ORDER — LIDOCAINE HYDROCHLORIDE 10 MG/ML
0.1 INJECTION, SOLUTION EPIDURAL; INFILTRATION; INTRACAUDAL; PERINEURAL AS NEEDED
Status: DISCONTINUED | OUTPATIENT
Start: 2018-06-14 | End: 2018-06-14 | Stop reason: HOSPADM

## 2018-06-14 RX ORDER — MIDAZOLAM HYDROCHLORIDE 1 MG/ML
INJECTION, SOLUTION INTRAMUSCULAR; INTRAVENOUS AS NEEDED
Status: DISCONTINUED | OUTPATIENT
Start: 2018-06-14 | End: 2018-06-14 | Stop reason: HOSPADM

## 2018-06-14 RX ORDER — LIDOCAINE HYDROCHLORIDE 20 MG/ML
INJECTION, SOLUTION EPIDURAL; INFILTRATION; INTRACAUDAL; PERINEURAL AS NEEDED
Status: DISCONTINUED | OUTPATIENT
Start: 2018-06-14 | End: 2018-06-14 | Stop reason: HOSPADM

## 2018-06-14 RX ORDER — AMOXICILLIN AND CLAVULANATE POTASSIUM 875; 125 MG/1; MG/1
1 TABLET, FILM COATED ORAL 2 TIMES DAILY
Qty: 14 TAB | Refills: 0 | OUTPATIENT
Start: 2018-06-14 | End: 2018-06-14

## 2018-06-14 RX ORDER — DIPHENHYDRAMINE HYDROCHLORIDE 50 MG/ML
12.5 INJECTION, SOLUTION INTRAMUSCULAR; INTRAVENOUS AS NEEDED
Status: DISCONTINUED | OUTPATIENT
Start: 2018-06-14 | End: 2018-06-14 | Stop reason: HOSPADM

## 2018-06-14 RX ORDER — FENTANYL CITRATE 50 UG/ML
INJECTION, SOLUTION INTRAMUSCULAR; INTRAVENOUS AS NEEDED
Status: DISCONTINUED | OUTPATIENT
Start: 2018-06-14 | End: 2018-06-14 | Stop reason: HOSPADM

## 2018-06-14 RX ORDER — ONDANSETRON 2 MG/ML
4 INJECTION INTRAMUSCULAR; INTRAVENOUS AS NEEDED
Status: DISCONTINUED | OUTPATIENT
Start: 2018-06-14 | End: 2018-06-14 | Stop reason: HOSPADM

## 2018-06-14 RX ORDER — MIDAZOLAM HYDROCHLORIDE 1 MG/ML
1 INJECTION, SOLUTION INTRAMUSCULAR; INTRAVENOUS AS NEEDED
Status: DISCONTINUED | OUTPATIENT
Start: 2018-06-14 | End: 2018-06-14 | Stop reason: HOSPADM

## 2018-06-14 RX ORDER — BUPIVACAINE HYDROCHLORIDE AND EPINEPHRINE 5; 5 MG/ML; UG/ML
INJECTION, SOLUTION EPIDURAL; INTRACAUDAL; PERINEURAL AS NEEDED
Status: DISCONTINUED | OUTPATIENT
Start: 2018-06-14 | End: 2018-06-14 | Stop reason: HOSPADM

## 2018-06-14 RX ORDER — OXYCODONE HYDROCHLORIDE 5 MG/1
5 TABLET ORAL AS NEEDED
Status: DISCONTINUED | OUTPATIENT
Start: 2018-06-14 | End: 2018-06-14 | Stop reason: HOSPADM

## 2018-06-14 RX ORDER — SUCCINYLCHOLINE CHLORIDE 20 MG/ML
INJECTION INTRAMUSCULAR; INTRAVENOUS AS NEEDED
Status: DISCONTINUED | OUTPATIENT
Start: 2018-06-14 | End: 2018-06-14 | Stop reason: HOSPADM

## 2018-06-14 RX ORDER — MORPHINE SULFATE 10 MG/ML
2 INJECTION, SOLUTION INTRAMUSCULAR; INTRAVENOUS
Status: DISCONTINUED | OUTPATIENT
Start: 2018-06-14 | End: 2018-06-14 | Stop reason: HOSPADM

## 2018-06-14 RX ORDER — FENTANYL CITRATE 50 UG/ML
50 INJECTION, SOLUTION INTRAMUSCULAR; INTRAVENOUS AS NEEDED
Status: DISCONTINUED | OUTPATIENT
Start: 2018-06-14 | End: 2018-06-14 | Stop reason: HOSPADM

## 2018-06-14 RX ORDER — SODIUM CHLORIDE, SODIUM LACTATE, POTASSIUM CHLORIDE, CALCIUM CHLORIDE 600; 310; 30; 20 MG/100ML; MG/100ML; MG/100ML; MG/100ML
25 INJECTION, SOLUTION INTRAVENOUS CONTINUOUS
Status: DISCONTINUED | OUTPATIENT
Start: 2018-06-14 | End: 2018-06-14 | Stop reason: HOSPADM

## 2018-06-14 RX ORDER — SODIUM CHLORIDE 9 MG/ML
25 INJECTION, SOLUTION INTRAVENOUS CONTINUOUS
Status: DISCONTINUED | OUTPATIENT
Start: 2018-06-14 | End: 2018-06-14 | Stop reason: HOSPADM

## 2018-06-14 RX ORDER — PHENYLEPHRINE HCL IN 0.9% NACL 0.4MG/10ML
SYRINGE (ML) INTRAVENOUS AS NEEDED
Status: DISCONTINUED | OUTPATIENT
Start: 2018-06-14 | End: 2018-06-14 | Stop reason: HOSPADM

## 2018-06-14 RX ORDER — PROPOFOL 10 MG/ML
INJECTION, EMULSION INTRAVENOUS AS NEEDED
Status: DISCONTINUED | OUTPATIENT
Start: 2018-06-14 | End: 2018-06-14 | Stop reason: HOSPADM

## 2018-06-14 RX ORDER — OXYCODONE HYDROCHLORIDE 5 MG/1
5-10 TABLET ORAL
Qty: 50 TAB | Refills: 0 | Status: ON HOLD | OUTPATIENT
Start: 2018-06-14 | End: 2019-01-04

## 2018-06-14 RX ORDER — LEVOFLOXACIN 5 MG/ML
500 INJECTION, SOLUTION INTRAVENOUS ONCE
Status: COMPLETED | OUTPATIENT
Start: 2018-06-14 | End: 2018-06-14

## 2018-06-14 RX ORDER — BUPIVACAINE HYDROCHLORIDE AND EPINEPHRINE 2.5; 5 MG/ML; UG/ML
30 INJECTION, SOLUTION EPIDURAL; INFILTRATION; INTRACAUDAL; PERINEURAL ONCE
Status: CANCELLED | OUTPATIENT
Start: 2018-06-14 | End: 2018-06-14

## 2018-06-14 RX ORDER — SODIUM CHLORIDE, SODIUM LACTATE, POTASSIUM CHLORIDE, CALCIUM CHLORIDE 600; 310; 30; 20 MG/100ML; MG/100ML; MG/100ML; MG/100ML
125 INJECTION, SOLUTION INTRAVENOUS CONTINUOUS
Status: DISCONTINUED | OUTPATIENT
Start: 2018-06-14 | End: 2018-06-14 | Stop reason: HOSPADM

## 2018-06-14 RX ORDER — LEVOFLOXACIN 500 MG/1
500 TABLET, FILM COATED ORAL DAILY
Qty: 7 TAB | Refills: 0 | OUTPATIENT
Start: 2018-06-14 | End: 2018-06-14

## 2018-06-14 RX ORDER — ROCURONIUM BROMIDE 10 MG/ML
INJECTION, SOLUTION INTRAVENOUS AS NEEDED
Status: DISCONTINUED | OUTPATIENT
Start: 2018-06-14 | End: 2018-06-14 | Stop reason: HOSPADM

## 2018-06-14 RX ADMIN — MIDAZOLAM HYDROCHLORIDE 2 MG: 1 INJECTION, SOLUTION INTRAMUSCULAR; INTRAVENOUS at 17:57

## 2018-06-14 RX ADMIN — GLYCOPYRROLATE 0.1 MG: 0.2 INJECTION INTRAMUSCULAR; INTRAVENOUS at 18:17

## 2018-06-14 RX ADMIN — SUCCINYLCHOLINE CHLORIDE 160 MG: 20 INJECTION INTRAMUSCULAR; INTRAVENOUS at 17:59

## 2018-06-14 RX ADMIN — Medication 120 MCG: at 18:09

## 2018-06-14 RX ADMIN — LIDOCAINE HYDROCHLORIDE 60 MG: 20 INJECTION, SOLUTION EPIDURAL; INFILTRATION; INTRACAUDAL; PERINEURAL at 17:59

## 2018-06-14 RX ADMIN — ONDANSETRON 4 MG: 2 INJECTION INTRAMUSCULAR; INTRAVENOUS at 18:14

## 2018-06-14 RX ADMIN — Medication 120 MCG: at 18:29

## 2018-06-14 RX ADMIN — Medication 80 MCG: at 18:34

## 2018-06-14 RX ADMIN — Medication 80 MCG: at 18:32

## 2018-06-14 RX ADMIN — FENTANYL CITRATE 50 MCG: 50 INJECTION, SOLUTION INTRAMUSCULAR; INTRAVENOUS at 17:59

## 2018-06-14 RX ADMIN — LEVOFLOXACIN 500 MG: 5 INJECTION, SOLUTION INTRAVENOUS at 17:55

## 2018-06-14 RX ADMIN — ONDANSETRON 4 MG: 2 INJECTION INTRAMUSCULAR; INTRAVENOUS at 20:02

## 2018-06-14 RX ADMIN — Medication 120 MCG: at 18:13

## 2018-06-14 RX ADMIN — Medication 120 MCG: at 18:17

## 2018-06-14 RX ADMIN — ROCURONIUM BROMIDE 10 MG: 10 INJECTION, SOLUTION INTRAVENOUS at 17:59

## 2018-06-14 RX ADMIN — MIDAZOLAM HYDROCHLORIDE 3 MG: 1 INJECTION, SOLUTION INTRAMUSCULAR; INTRAVENOUS at 17:49

## 2018-06-14 RX ADMIN — Medication 80 MCG: at 18:37

## 2018-06-14 RX ADMIN — PROPOFOL 200 MG: 10 INJECTION, EMULSION INTRAVENOUS at 17:59

## 2018-06-14 RX ADMIN — FENTANYL CITRATE 50 MCG: 50 INJECTION, SOLUTION INTRAMUSCULAR; INTRAVENOUS at 17:49

## 2018-06-14 RX ADMIN — SODIUM CHLORIDE, POTASSIUM CHLORIDE, SODIUM LACTATE AND CALCIUM CHLORIDE: 600; 310; 30; 20 INJECTION, SOLUTION INTRAVENOUS at 17:49

## 2018-06-14 RX ADMIN — Medication 120 MCG: at 18:22

## 2018-06-14 NOTE — ROUTINE PROCESS
Patient: Gatito Garnett MRN: [id number]  SSN: [id number]   YOB: 1990  Age: 29 y.o. Sex: female     Patient is status post Procedure(s):  INCISION AND DRAINAGE BUTTOCKS. Surgeon(s) and Role:     * Catherine Little MD - Primary    Local/Dose/Irrigation: MARCAINE 0.5% WITH EPI-- 30 ML                  Peripheral IV 18 Left Antecubital (Active)   Site Assessment Clean, dry, & intact 2018  5:14 PM   Phlebitis Assessment 0 2018  5:14 PM   Infiltration Assessment 0 2018  5:14 PM   Dressing Status Clean, dry, & intact; New 2018  5:14 PM   Dressing Type Tape;Transparent 2018  5:14 PM   Hub Color/Line Status Pink 2018  5:14 PM            Airway - Endotracheal Tube 18 Oral (Active)                   Dressing/Packin/2 GAUZE PACKING, 4X4, ABD, SILK TAPE  Splint/Cast:  ]    Other:  SCDS IN PLACE

## 2018-06-14 NOTE — IP AVS SNAPSHOT
[address] [facility] [address] 
[phone] Patient: Eligio Ireland MRN: [id number] YDO:[birth year]24/[birth year] About your hospitalization You were admitted on:  June 14, 2018 You last received care in the:  [facility] PACU You were discharged on:  June 14, 2018 Why you were hospitalized Your primary diagnosis was:  Not on File Follow-up Information Follow up With Details Comments Contact Info Nir Villanueva MD   [address] 
[phone] Lima Fuentes MD Follow up in 2 week(s)  [address] [facility] [address] 
[phone] Discharge Orders None A check tito indicates which time of day the medication should be taken. My Medications START taking these medications Instructions Each Dose to Equal  
 Morning Noon Evening Bedtime  
 amoxicillin-clavulanate 875-125 mg per tablet Commonly known as:  AUGMENTIN Your last dose was: Your next dose is: Take 1 Tab by mouth two (2) times a day for 7 days. 1 Tab CONTINUE taking these medications Instructions Each Dose to Equal  
 Morning Noon Evening Bedtime  
 busPIRone 7.5 mg tablet Commonly known as:  BUSPAR Your last dose was: Your next dose is: Take 1 Tab by mouth three (3) times daily (with meals). 7.5 mg  
    
   
   
   
  
 losartan-hydroCHLOROthiazide 50-12.5 mg per tablet Commonly known as:  HYZAAR Your last dose was: Your next dose is:    
   
   
      
   
   
   
  
 oxyCODONE IR 5 mg immediate release tablet Commonly known as:  Kathie Balk Your last dose was: Your next dose is: Take 1-2 Tabs by mouth every four (4) hours as needed for Pain. Max Daily Amount: 60 mg.  
 5-10 mg POTASSIUM CHLORIDE PO Your last dose was: Your next dose is: Take 50 mEq by mouth. 50 mEq Soft Lens Rinse-Store Solution Soln Commonly known as:  SALINE SOLUTION Your last dose was: Your next dose is:    
   
   
 Use as needed for wound care. Where to Get Your Medications Information on where to get these meds will be given to you by the nurse or doctor. ! Ask your nurse or doctor about these medications  
  amoxicillin-clavulanate 875-125 mg per tablet  
 oxyCODONE IR 5 mg immediate release tablet Opioid Education Prescription Opioids: What You Need to Know: 
 
  
  
1. Diet:  Resume your usual diet and drink plenty of fluids. 2. Medications: Take pain medication (oxycodone) and the antibiotic (Augmentin) as prescribed. Do not drive, drink alcohol, or operate machinery while taking prescription pain medication. Take docusate (stool softener) twice per day as directed while taking the oxycodone. You may take up to 1000 mg of Tylenol every 6 hours to help you use less of the oxycodone. You may take ibuprofen as directed in addition to or instead of the prescription medication if there has been no significant bleeding for at least two days. Do not take pain medication on an empty stomach. Do not take aspirin for 10 days following the procedure. 3. Activity:  Do not engage in any heavy lifting or other strenuous activity until you have been seen for follow-up. You may walk as much as you want, and you may climb stairs. Frequent walking will help prevent constipation. 1.   
4. Hygiene and Wound Care:  Remove the external dressing tomorrow morning. Moisten the packing in each of the wounds with saline, then remove it. One piece is approximately 13 feet long and the other is approcimately 2 feet long. The wounds should then be gently re-packed with saline-moistened gauze and covered with dry dressings. It is not necessary to insert as much gauze as was used during the operation, but as you know the most important thing is to try to keep the skin wounds form closing until the cavity underneath has healed in from the bottom and the sides. If the skin wounds close too soon, there will be a greater chance of recurrent problems. The packing change procedure should be performed twice per day. You may shower with the wounds covered and then change the dressings aferwards, but you should not bathe or otherwise submerge the area. 5. Constipation:  If more than one day passes without a bowel movement, take 1 Tablespoon of Milk of Magnesia. Repeat in 6 hours if you have no results. If you still have not moved your bowels, take two to four 5 mg bisacodyl tablets. If you still have not moved your bowels by the day after taking the bisacodyl, call my office. 6. Bleeding:  A small amount of bright red bleeding can be expected for the next several days. If bleeding appears be continuous, call my office. 7. Other Problems:  If you experience intolerable pain, fever (temperature of 101 or more), or inability to urinate, call my office. 
  
8. Questions: If you have any questions about your post-operative condition or care, do not hesitate to call my office. 9. Work:  You may return to work in one week if feeling able. 10. Follow-up:  Please call [phone] tomorrow between 9:00 AM and 11:00 AM to schedule an appointment for approximately 2 weeks from now. For anything other than scheduling, please call [phone]. 
Mervat Rodriguez M.D. 
[phone] 
 
______________________________________________________________________ Anesthesia Discharge Instructions After general anesthesia or intervenous sedation, for 24 hours or while taking prescription Narcotics: · Limit your activities · Do not drive or operate hazardous machinery · If you have not urinated within 8 hours after discharge, please contact your surgeon on call. · Do not make important personal or business decisions · Do not drink alcoholic beverages Report the following to your surgeon: 
· Excessive pain, swelling, redness or odor of or around the surgical area · Temperature over 100.5 degrees · Nausea and vomiting lasting longer than 4 hours or if unable to take medication · Any signs of decreased circulation or nerve impairment to extremity:  Change in color, persistent numbness, tingling, coldness or increased pain. · Any questions Introducing \[facility] [place] [facility]\"" & HEALTH SERVICES! [address] introduces [employer] patient portal. Now you can access parts of your medical record, email your doctor's office, and request medication refills online. 1. In your internet browser, go to [email or web address] 2. Click on the First Time User? Click Here link in the Sign In box. You will see the New Member Sign Up page. 3. Enter your [employer] Access Code exactly as it appears below. You will not need to use this code after youve completed the sign-up process. If you do not sign up before the expiration date, you must request a new code. · [employer] Access Code: [email or web address] Expires: 7/16/2018  8:05 PM 
 
4. Enter the last four digits of your Social Security Number (xxxx) and Date of Birth (mm/dd/yyyy) as indicated and click Submit. You will be taken to the next sign-up page. 5. Create a [facility] ID. This will be your [employer] login ID and cannot be changed, so think of one that is secure and easy to remember. 6. Create a [employer] password. You can change your password at any time. 7. Enter your Password Reset Question and Answer.  This can be used at a later time if you forget your password. 8. Enter your e-mail address. You will receive e-mail notification when new information is available in [address] 9. Click Sign Up. You can now view and download portions of your medical record. 10. Click the Download Summary menu link to download a portable copy of your medical information. If you have questions, please visit the Frequently Asked Questions section of the [employer] website. Remember, THEMA is NOT to be used for urgent needs. For medical emergencies, dial 911. Now available from your iPhone and Android! Introducing Oskar Burt As a [employer] patient, I wanted to make you aware of our electronic visit tool called Oskar Burt. [employer] 24/7 allows you to connect within minutes with a medical provider 24 hours a day, seven days a week via a mobile device or tablet or logging into a secure website from your computer. You can access Oskar Burt from anywhere in the [place]. A virtual visit might be right for you when you have a simple condition and feel like you just dont want to get out of bed, or cant get away from work for an appointment, when your regular [employer] provider is not available (evenings, weekends or holidays), or when youre out of town and need minor care. Electronic visits cost only $49 and if the [employer] 24/7 provider determines a prescription is needed to treat your condition, one can be electronically transmitted to a nearby pharmacy*. Please take a moment to enroll today if you have not already done so. The enrollment process is free and takes just a few minutes. To enroll, please download the [employer] 24/7 richard to your tablet or phone, or visit [email or web address] to enroll on your computer.    
And, as an [address] [place] [address] patient with a [employer] account, the results of your visits will be scanned into your electronic medical record and your primary care provider will be able to view the scanned results. We urge you to continue to see your regular [facility] provider for your ongoing medical care. And while your primary care provider may not be the one available when you seek a Oskar Beltránfin virtual visit, the peace of mind you get from getting a real diagnosis real time can be priceless. For more information on [employer], view our Frequently Asked Questions (FAQs) at [email or web address]. Sincerely, 
 
Ana Chisholm MD 
Chief Medical Officer Kalyn Fitzgerald *:  certain medications cannot be prescribed via [employer] Unresulted tests-please follow up with your PCP on these results Procedure/Test Authorizing Provider MD MICHAEL Garcia, MD Allegra Rivera MD CULTURE, Usama Collins MD  
  
Providers Seen During Your Hospitalization Provider Specialty Primary office phone Danish Huitron MD Colon and Rectal Surgery [phone] Your Primary Care Physician (PCP) Primary Care Physician Office Phone Office Fax Kathyabailey Claude [phone] You are allergic to the following Allergen Reactions Aleve (Naproxen Sodium) Hives Bactrim (Sulfamethoprim Ds) Other (comments) Abdominal pain. Darvocet A500 (Propoxyphene N-Acetaminophen) Hives Lortab (Hydrocodone-Acetaminophen) Nausea and Vomiting Motrin (Ibuprofen) Hives Naprosyn (Naproxen) Hives Recent Documentation Height Weight BMI OB Status Smoking Status 1.524 m 114.8 kg 49.41 kg/m2 Having regular periods Former Smoker Emergency Contacts Name Discharge Info Relation Home Work Mobile Via [employer] [3] Other Relative [6] [phone] Patient Belongings The following personal items are in your possession at time of discharge: 
  Dental Appliances: None         Home Medications: None   Jewelry: Bracelet, Earrings, Ring (TO SECURITY)  Clothing: Other (comment) (CLOTHING & SHOES TO PACU)    Other Valuables: Cell Phone (TO SECURITY) Discharge Instructions Attachments/References MEFS - AMOXICILLIN/CLAVULANATE POTASSIUM (AUGMENTIN, AUGMENTIN ES-600, AUGMENTIN XR) - (BY MOUTH) (ENGLISH) MEFS - OXYCODONE, RAPID RELEASE (ETH-OXYDOSE, OXY IR, ROXICODONE) - (BY MOUTH) (ENGLISH) Patient Handouts Amoxicillin/Clavulanate Potassium (Augmentin, Augmentin ES-600, Augmentin XR) - (By mouth) Why this medicine is used:  
Treats infections. This medicine is a penicillin antibiotic. Contact a nurse or doctor right away if you have: · Blistering, peeling, red skin rash · Dark urine or pale stools, nausea, vomiting, loss of appetite, stomach pain, yellow eyes or skin · Severe diarrhea, especially if bloody or ongoing Common side effects: 
· Diarrhea, nausea, vomiting · Diaper rash · Tooth discoloration (in children) © 2017 [facility] Information is for End User's use only and may not be sold, redistributed or otherwise used for commercial purposes. Oxycodone, Rapid Release (ETH-Oxydose, Oxy IR, Roxicodone) - (By mouth) Why this medicine is used:  
Treats moderate to severe pain. This medicine is a narcotic pain reliever. Contact a nurse or doctor right away if you have: 
· Fast or slow heart beat, shallow breathing, blue lips, skin or fingernails · Anxiety, restlessness, fever, sweating, muscle spasms, twitching, seeing or hearing things that are not there · Extreme weakness, shallow breathing, slow heartbeat · Severe confusion, lightheadedness, dizziness, fainting · Sweating or cold, clammy skin, seizures · Severe constipation, stomach pain, nausea, vomiting Common side effects: · Mild constipation · Sleepiness, tiredness © 2017 [facility] Information is for End User's use only and may not be sold, redistributed or otherwise used for commercial purposes. Please provide this summary of care documentation to your next provider. Signatures-by signing, you are acknowledging that this After Visit Summary has been reviewed with you and you have received a copy. Patient Signature:  ____________________________________________________________ Date:  ____________________________________________________________  
  
Haylee Piety Provider Signature:  ____________________________________________________________ Date:  ____________________________________________________________
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
## 2018-06-14 NOTE — ANESTHESIA PREPROCEDURE EVALUATION
Anesthetic History   No history of anesthetic complications            Review of Systems / Medical History  Patient summary reviewed, nursing notes reviewed and pertinent labs reviewed    Pulmonary            Asthma : well controlled       Neuro/Psych   Within defined limits           Cardiovascular    Hypertension: well controlled              Exercise tolerance: >4 METS     GI/Hepatic/Renal  Within defined limits              Endo/Other        Morbid obesity     Other Findings              Physical Exam    Airway  Mallampati: II  TM Distance: > 6 cm  Neck ROM: normal range of motion   Mouth opening: Normal     Cardiovascular  Regular rate and rhythm,  S1 and S2 normal,  no murmur, click, rub, or gallop             Dental  No notable dental hx       Pulmonary  Breath sounds clear to auscultation               Abdominal  GI exam deferred       Other Findings            Anesthetic Plan    ASA: 3  Anesthesia type: general          Induction: Intravenous  Anesthetic plan and risks discussed with: Patient

## 2018-06-14 NOTE — BRIEF OP NOTE
BRIEF OPERATIVE NOTE    Date of Procedure:  6/14/2018   Preoperative Diagnosis:  Pilonidal abscess. Postoperative Diagnosis:  Pilonidal abscess. Procedure: Incision and drainage of pilonidal abscess. Surgeon:  Raffi Belcher MD  Assistant:  None. Anesthesia:  General endotracheal.  Estimated Blood Loss:  < 25 mL  Crystalloid:  500 mL    Specimens:   ID Type Source Tests Collected by Time Destination   1 : Pilondial cyst Tissue Abscess CULTURE, ANAEROBIC, CULTURE, TISSUE W GRAM STAIN Raffi Belcher MD 6/14/2018 1823 Microbiology   2 : PILONIDOL CYST Tissue Abscess CULTURE, ANAEROBIC, CULTURE, TISSUE W GRAM STAIN Raffi Belcher MD 6/14/2018 1833 Microbiology       Tubes and Drains:  None. Findings:  Bilaterally extensive pilonidal abscess. Complications:  None apparent. Implants:  None.

## 2018-06-14 NOTE — DISCHARGE INSTRUCTIONS
Post-Operative Instructions        1. Diet:  Resume your usual diet and drink plenty of fluids. 2. Medications: Take pain medication (oxycodone) and the antibiotic (Augmentin) as prescribed. Do not drive, drink alcohol, or operate machinery while taking prescription pain medication. Take docusate (stool softener) twice per day as directed while taking the oxycodone. You may take up to 1000 mg of Tylenol every 6 hours to help you use less of the oxycodone. You may take ibuprofen as directed in addition to or instead of the prescription medication if there has been no significant bleeding for at least two days. Do not take pain medication on an empty stomach. Do not take aspirin for 10 days following the procedure. 3. Activity:  Do not engage in any heavy lifting or other strenuous activity until you have been seen for follow-up. You may walk as much as you want, and you may climb stairs. Frequent walking will help prevent constipation. 1.    4. Hygiene and Wound Care:  Remove the external dressing tomorrow morning. Moisten the packing in each of the wounds with saline, then remove it. One piece is approximately 13 feet long and the other is approcimately 2 feet long. The wounds should then be gently re-packed with saline-moistened gauze and covered with dry dressings. It is not necessary to insert as much gauze as was used during the operation, but as you know the most important thing is to try to keep the skin wounds form closing until the cavity underneath has healed in from the bottom and the sides. If the skin wounds close too soon, there will be a greater chance of recurrent problems. The packing change procedure should be performed twice per day. You may shower with the wounds covered and then change the dressings aferwards, but you should not bathe or otherwise submerge the area.     5. Constipation:  If more than one day passes without a bowel movement, take 1 Tablespoon of Milk of Magnesia. Repeat in 6 hours if you have no results. If you still have not moved your bowels, take two to four 5 mg bisacodyl tablets. If you still have not moved your bowels by the day after taking the bisacodyl, call my office. 6. Bleeding:  A small amount of bright red bleeding can be expected for the next several days. If bleeding appears be continuous, call my office. 7. Other Problems:  If you experience intolerable pain, fever (temperature of 101 or more), or inability to urinate, call my office.     8. Questions: If you have any questions about your post-operative condition or care, do not hesitate to call my office. 9. Work:  You may return to work in one week if feeling able. 10. Follow-up:  Please call [phone] tomorrow between 9:00 AM and 11:00 AM to schedule an appointment for approximately 2 weeks from now. For anything other than scheduling, please call [phone].  Ovidio Mclaughiln M.D.  [phone]    ______________________________________________________________________    Anesthesia Discharge Instructions    After general anesthesia or intervenous sedation, for 24 hours or while taking prescription Narcotics:  · Limit your activities  · Do not drive or operate hazardous machinery  · If you have not urinated within 8 hours after discharge, please contact your surgeon on call. · Do not make important personal or business decisions  · Do not drink alcoholic beverages    Report the following to your surgeon:  · Excessive pain, swelling, redness or odor of or around the surgical area  · Temperature over 100.5 degrees  · Nausea and vomiting lasting longer than 4 hours or if unable to take medication  · Any signs of decreased circulation or nerve impairment to extremity:  Change in color, persistent numbness, tingling, coldness or increased pain.   · Any questions

## 2018-06-15 NOTE — ANESTHESIA POSTPROCEDURE EVALUATION
Post-Anesthesia Evaluation and Assessment    Patient: Adelaide Melgar MRN: [id number]  SSN: [id number]    YOB: 1990  Age: 29 y.o. Sex: female       Cardiovascular Function/Vital Signs  Visit Vitals    /70    Pulse 88    Temp 37.1 °C (98.8 °F)    Resp 19    Ht 5' (1.524 m)    Wt 114.8 kg (253 lb)    SpO2 95%    BMI 49.41 kg/m2       Patient is status post general anesthesia for Procedure(s):  INCISION AND DRAINAGE BUTTOCKS. Nausea/Vomiting: None    Postoperative hydration reviewed and adequate. Pain:  Pain Scale 1: Numeric (0 - 10) (06/14/18 1935)  Pain Intensity 1: 0 (06/14/18 1935)   Managed    Neurological Status:   Neuro (WDL): Within Defined Limits (06/14/18 1935)  Neuro  Neurologic State: Appropriate for age; Alert (06/14/18 1935)  Orientation Level: Oriented X4 (06/14/18 1935)  Cognition: Follows commands; Appropriate decision making; Appropriate for age attention/concentration; Appropriate safety awareness (06/14/18 1935)  LUE Motor Response: Purposeful;Spontaneous  (06/14/18 1935)  LLE Motor Response: Purposeful;Spontaneous  (06/14/18 1935)  RUE Motor Response: Purposeful;Spontaneous  (06/14/18 1935)  RLE Motor Response: Purposeful;Spontaneous  (06/14/18 1935)   At baseline    Mental Status and Level of Consciousness: Arousable    Pulmonary Status:   O2 Device: Room air (06/14/18 1935)   Adequate oxygenation and airway patent    Complications related to anesthesia: None    Post-anesthesia assessment completed.  No concerns    Signed By: René Morales MD     June 14, 2018

## 2018-06-16 NOTE — OP NOTES
[address] [place] [address] REPORT    Rica Moss  MR#: [id number]  : 1990  ACCOUNT #: [de-identified]     DATE OF SERVICE: 2018    PREOPERATIVE DIAGNOSIS:  Pilonidal abscess. POSTOPERATIVE DIAGNOSIS:  Pilonidal abscess. PROCEDURE:  Incision and drainage of pilonidal abscess. SURGEON:  Jana Mcmullen MD    ASSISTANT:  None. ANESTHESIA:  General endotracheal.    SPECIMENS:  Pus for Gram stain and culture (2 specimens). TUBES AND DRAINS:  None. ESTIMATED BLOOD LOSS:  Less than 25 mL. CRYSTALLOID:  500 mL. COMPLICATIONS:  None. IMPLANTS:  None. INDICATION FOR PROCEDURE:  The patient is a 27-year-old female with a history of recurrent pilonidal abscesses. Most recently, she underwent incision and drainage of one of these on 2018. She presented to my office on short notice reporting progressively worsening postsacral pain and tenderness that had been going on for two or three days. She had not had any definite fever or chills, and there had not been any drainage or bleeding. Examination of the postsacral area revealed the scars from the previous operations in both the left and right paramedian positions. Associated with those scars, there was tenderness and some induration without erythema or definite fluctuance. Enlarged posterior midline postsacral pores were visible, but there was no drainage. The patient appeared to have a recurrence of her pilonidal abscess with extension bilaterally. Incision and drainage was indicated, and the patient was well aware of the risks and agreed to proceed. OPERATIVE FINDINGS:  There was a bilaterally extensive pilonidal abscess. DESCRIPTION OF PROCEDURE:  After informed consent was obtained, the patient was taken to the operating room where standard monitoring devices were attached, and adequate general endotracheal anesthesia was induced.   She was then placed in the prone jackknife position on the operating table with all pressure points padded appropriately and with the lower extremities fitted with pneumatic compression stockings. The buttock retraction was not required. Hair was removed from the operative field using clippers and adhesive tape. The postsacral area and buttocks were prepped and draped in the usual sterile fashion. Five hundred milligrams of Levaquin were administered parenterally. Beginning on the patient's right side, in the area of one in the right paramedian scars, which had also been the area of greatest tenderness, a skin incision was made using a scalpel. The skin here was quite thick, and once it was penetrated then it was possible to use a tonsil clamp to penetrate into the pilonidal cavity. Some pus came forth and a specimen was collected for Gram stain and culture. The cavity was probed and evacuated with the suction catheter. The incision was then enlarged by removing a portion of the skin to create an ellipsoid shape. The removal of this skin was performed using a combination of sharp technique and electrocautery. The same thing was performed on the left side in almost a mirror image. Once again, this was at the location of a scar from a previous operation. Once again, the cavity was evacuated using suction and both cavities, left and right were copiously irrigated with saline. As the areas were inspected further, it was determined that there was still a pocket of retained pus that seemed to be in the midline. This was penetrated and a large quantity of pus poured forth. Specimens were once again collected for Gram stain and culture. The area was copiously irrigated with saline. Electrocautery was applied sparingly to achieve hemostasis. Bupivacaine 0.5% with epinephrine was infiltrated to provide some postoperative analgesia. The left and right-sided wounds were each packed separately with half inch plain Nu Gauze.   The one on the right required approximately 13 feet of the gauze. The one on the left required 15 feet. An external dressing of gauze and an ABD was put in place. There were no apparent complications, and the patient appeared to have tolerated the procedure well. At the conclusion, she was extubated and transported in stable condition to the recovery room.         Luma Dover MD       PG / [place]  D: 06/15/2018 17:33     T: 06/16/2018 02:35  JOB #: [id number]  CC: Alyse Barboza MD

## 2018-06-18 LAB
BACTERIA SPEC CULT: ABNORMAL
GRAM STN SPEC: ABNORMAL
SERVICE CMNT-IMP: ABNORMAL

## 2018-06-19 LAB
BACTERIA SPEC CULT: ABNORMAL
BACTERIA SPEC CULT: NORMAL
GRAM STN SPEC: ABNORMAL
GRAM STN SPEC: ABNORMAL
SERVICE CMNT-IMP: ABNORMAL
SERVICE CMNT-IMP: ABNORMAL
SERVICE CMNT-IMP: NORMAL

## 2018-11-11 ENCOUNTER — HOSPITAL ENCOUNTER (EMERGENCY)
Age: 28
Discharge: HOME | End: 2018-11-11
Attending: EMERGENCY MEDICINE
Payer: MEDICAID

## 2018-11-11 VITALS
WEIGHT: 265.43 LBS | HEIGHT: 61 IN | TEMPERATURE: 98.6 F | OXYGEN SATURATION: 98 % | RESPIRATION RATE: 16 BRPM | SYSTOLIC BLOOD PRESSURE: 169 MMHG | DIASTOLIC BLOOD PRESSURE: 119 MMHG | BODY MASS INDEX: 50.11 KG/M2 | HEART RATE: 98 BPM

## 2018-11-11 DIAGNOSIS — L02.31 CUTANEOUS ABSCESS OF BUTTOCK: Primary | ICD-10-CM

## 2018-11-11 PROCEDURE — 74011250637 HC RX REV CODE- 250/637: Performed by: EMERGENCY MEDICINE

## 2018-11-11 PROCEDURE — 99283 EMERGENCY DEPT VISIT LOW MDM: CPT

## 2018-11-11 RX ORDER — CLINDAMYCIN HYDROCHLORIDE 150 MG/1
600 CAPSULE ORAL
Status: COMPLETED | OUTPATIENT
Start: 2018-11-11 | End: 2018-11-11

## 2018-11-11 RX ORDER — CLINDAMYCIN HYDROCHLORIDE 300 MG/1
300 CAPSULE ORAL 4 TIMES DAILY
Qty: 28 CAP | Refills: 0 | Status: SHIPPED | OUTPATIENT
Start: 2018-11-11 | End: 2018-11-18

## 2018-11-11 RX ORDER — TRAMADOL HYDROCHLORIDE 50 MG/1
50 TABLET ORAL
Qty: 20 TAB | Refills: 0 | Status: ON HOLD | OUTPATIENT
Start: 2018-11-11 | End: 2019-01-04

## 2018-11-11 RX ADMIN — CLINDAMYCIN HYDROCHLORIDE 600 MG: 150 CAPSULE ORAL at 08:43

## 2018-11-11 NOTE — ED NOTES
Pt left ambulatory with discharge instructions and prescriptions out to the waiting room to wait on her ride.
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
## 2018-11-11 NOTE — DISCHARGE INSTRUCTIONS
Skin Abscess: Care Instructions  Your Care Instructions    A skin abscess is a bacterial infection that forms a pocket of pus. A boil is a kind of skin abscess. The doctor may have cut an opening in the abscess so that the pus can drain out. You may have gauze in the cut so that the abscess will stay open and keep draining. You may need antibiotics. You will need to follow up with your doctor to make sure the infection has gone away. The doctor has checked you carefully, but problems can develop later. If you notice any problems or new symptoms, get medical treatment right away. Follow-up care is a key part of your treatment and safety. Be sure to make and go to all appointments, and call your doctor if you are having problems. It's also a good idea to know your test results and keep a list of the medicines you take. How can you care for yourself at home? · Apply warm and dry compresses, a heating pad set on low, or a hot water bottle 3 or 4 times a day for pain. Keep a cloth between the heat source and your skin. · If your doctor prescribed antibiotics, take them as directed. Do not stop taking them just because you feel better. You need to take the full course of antibiotics. · Take pain medicines exactly as directed. ? If the doctor gave you a prescription medicine for pain, take it as prescribed. ? If you are not taking a prescription pain medicine, ask your doctor if you can take an over-the-counter medicine. · Keep your bandage clean and dry. Change the bandage whenever it gets wet or dirty, or at least one time a day. · If the abscess was packed with gauze:  ? Keep follow-up appointments to have the gauze changed or removed. If the doctor instructed you to remove the gauze, follow the instructions you were given for how to remove it. ? After the gauze is removed, soak the area in warm water for 15 to 20 minutes 2 times a day, until the wound closes. When should you call for help?   Call your doctor now or seek immediate medical care if:    · You have signs of worsening infection, such as:  ? Increased pain, swelling, warmth, or redness. ? Red streaks leading from the infected skin. ? Pus draining from the wound. ? A fever.    Watch closely for changes in your health, and be sure to contact your doctor if:    · You do not get better as expected. Where can you learn more? Go to http://randi-robert.info/. Enter A992 in the search box to learn more about \"Skin Abscess: Care Instructions. \"  Current as of: April 18, 2018  Content Version: 11.8  © 4343-0943 [employer]. Care instructions adapted under license by [employer] (which disclaims liability or warranty for this information). If you have questions about a medical condition or this instruction, always ask your healthcare professional. [address] any warranty or liability for your use of this information.

## 2018-11-11 NOTE — LETTER
NOTIFICATION RETURN TO WORK / SCHOOL 
 
11/11/2018 8:36 AM 
 
Ms. Doron Joyner [id number] [place] [phone] To Whom It May Concern: 
 
Doron Joyner is currently under the care of [place] [id number], [facility] DEP. She will return to work/school on: 11/14/18 If there are questions or concerns please have the patient contact our office. Sincerely, Vipul Montgomery NP

## 2018-11-11 NOTE — ED TRIAGE NOTES
Pt states that she has a rectal abscess which she has had drained on 5 different occasions Pt states that she currently has a foul smelling drainage.

## 2018-12-29 ENCOUNTER — HOSPITAL ENCOUNTER (EMERGENCY)
Age: 28
Discharge: HOME | End: 2018-12-29
Attending: EMERGENCY MEDICINE
Payer: MEDICAID

## 2018-12-29 VITALS
SYSTOLIC BLOOD PRESSURE: 163 MMHG | DIASTOLIC BLOOD PRESSURE: 107 MMHG | WEIGHT: 225 LBS | RESPIRATION RATE: 18 BRPM | TEMPERATURE: 98.8 F | HEART RATE: 89 BPM | HEIGHT: 61 IN | BODY MASS INDEX: 42.48 KG/M2 | OXYGEN SATURATION: 97 %

## 2018-12-29 DIAGNOSIS — N92.6 MISSED MENSES: Primary | ICD-10-CM

## 2018-12-29 DIAGNOSIS — I10 ESSENTIAL HYPERTENSION: ICD-10-CM

## 2018-12-29 LAB — HCG UR QL: NEGATIVE

## 2018-12-29 PROCEDURE — 74011250637 HC RX REV CODE- 250/637: Performed by: PHYSICIAN ASSISTANT

## 2018-12-29 PROCEDURE — 99283 EMERGENCY DEPT VISIT LOW MDM: CPT

## 2018-12-29 PROCEDURE — 81025 URINE PREGNANCY TEST: CPT

## 2018-12-29 RX ORDER — LOSARTAN POTASSIUM AND HYDROCHLOROTHIAZIDE 12.5; 5 MG/1; MG/1
1 TABLET ORAL DAILY
Qty: 10 TAB | Refills: 0 | Status: SHIPPED | OUTPATIENT
Start: 2018-12-29 | End: 2019-01-08

## 2018-12-29 RX ADMIN — HYDROCHLOROTHIAZIDE: 25 TABLET ORAL at 13:17

## 2018-12-29 NOTE — ED NOTES
Patient educated on discharge instructions and 1 prescriptions . Patient verbalized understanding of education. Patient given discharge instructions and 1 prescriptions. Patient ambulated out of ED with family. No acute distress noted.

## 2018-12-29 NOTE — DISCHARGE INSTRUCTIONS
DASH Diet: Care Instructions  Your Care Instructions    The DASH diet is an eating plan that can help lower your blood pressure. DASH stands for Dietary Approaches to Stop Hypertension. Hypertension is high blood pressure. The DASH diet focuses on eating foods that are high in calcium, potassium, and magnesium. These nutrients can lower blood pressure. The foods that are highest in these nutrients are fruits, vegetables, low-fat dairy products, nuts, seeds, and legumes. But taking calcium, potassium, and magnesium supplements instead of eating foods that are high in those nutrients does not have the same effect. The DASH diet also includes whole grains, fish, and poultry. The DASH diet is one of several lifestyle changes your doctor may recommend to lower your high blood pressure. Your doctor may also want you to decrease the amount of sodium in your diet. Lowering sodium while following the DASH diet can lower blood pressure even further than just the DASH diet alone. Follow-up care is a key part of your treatment and safety. Be sure to make and go to all appointments, and call your doctor if you are having problems. It's also a good idea to know your test results and keep a list of the medicines you take. How can you care for yourself at home? Following the DASH diet  · Eat 4 to 5 servings of fruit each day. A serving is 1 medium-sized piece of fruit, ½ cup chopped or canned fruit, 1/4 cup dried fruit, or 4 ounces (½ cup) of fruit juice. Choose fruit more often than fruit juice. · Eat 4 to 5 servings of vegetables each day. A serving is 1 cup of lettuce or raw leafy vegetables, ½ cup of chopped or cooked vegetables, or 4 ounces (½ cup) of vegetable juice. Choose vegetables more often than vegetable juice. · Get 2 to 3 servings of low-fat and fat-free dairy each day. A serving is 8 ounces of milk, 1 cup of yogurt, or 1 ½ ounces of cheese. · Eat 6 to 8 servings of grains each day.  A serving is 1 slice of bread, 1 ounce of dry cereal, or ½ cup of cooked rice, pasta, or cooked cereal. Try to choose whole-grain products as much as possible. · Limit lean meat, poultry, and fish to 2 servings each day. A serving is 3 ounces, about the size of a deck of cards. · Eat 4 to 5 servings of nuts, seeds, and legumes (cooked dried beans, lentils, and split peas) each week. A serving is 1/3 cup of nuts, 2 tablespoons of seeds, or ½ cup of cooked beans or peas. · Limit fats and oils to 2 to 3 servings each day. A serving is 1 teaspoon of vegetable oil or 2 tablespoons of salad dressing. · Limit sweets and added sugars to 5 servings or less a week. A serving is 1 tablespoon jelly or jam, ½ cup sorbet, or 1 cup of lemonade. · Eat less than 2,300 milligrams (mg) of sodium a day. If you limit your sodium to 1,500 mg a day, you can lower your blood pressure even more. Tips for success  · Start small. Do not try to make dramatic changes to your diet all at once. You might feel that you are missing out on your favorite foods and then be more likely to not follow the plan. Make small changes, and stick with them. Once those changes become habit, add a few more changes. · Try some of the following:  ? Make it a goal to eat a fruit or vegetable at every meal and at snacks. This will make it easy to get the recommended amount of fruits and vegetables each day. ? Try yogurt topped with fruit and nuts for a snack or healthy dessert. ? Add lettuce, tomato, cucumber, and onion to sandwiches. ? Combine a ready-made pizza crust with low-fat mozzarella cheese and lots of vegetable toppings. Try using tomatoes, squash, spinach, broccoli, carrots, cauliflower, and onions. ? Have a variety of cut-up vegetables with a low-fat dip as an appetizer instead of chips and dip. ? Sprinkle sunflower seeds or chopped almonds over salads. Or try adding chopped walnuts or almonds to cooked vegetables.   ? Try some vegetarian meals using beans and peas. Add garbanzo or kidney beans to salads. Make burritos and tacos with mashed oconnor beans or black beans. Where can you learn more? Go to http://randi-robert.info/. Enter F738 in the search box to learn more about \"DASH Diet: Care Instructions. \"  Current as of: December 6, 2017  Content Version: 11.8  © 0293-3586 [employer]. Care instructions adapted under license by [employer] (which disclaims liability or warranty for this information). If you have questions about a medical condition or this instruction, always ask your healthcare professional. [address] any warranty or liability for your use of this information.

## 2018-12-29 NOTE — ED TRIAGE NOTES
Pt requesting pregnancy test: per pt she missed her period two weeks ago and took a negative test at home

## 2018-12-29 NOTE — ED NOTES
Emergency Department Nursing Plan of Care The Nursing Plan of Care is developed from the Nursing assessment and Emergency Department Attending provider initial evaluation. The plan of care may be reviewed in the ED Provider note. The Plan of Care was developed with the following considerations:  
Patient / Family readiness to learn indicated by:verbalized understanding and successful return demonstration Persons(s) to be included in education: patient Barriers to Learning/Limitations:No 
 
Signed 15[address] Padmini Collier RN   
12/29/2018   1:00 PM

## 2018-12-29 NOTE — ED PROVIDER NOTES
EMERGENCY DEPARTMENT HISTORY AND PHYSICAL EXAM 
 
 
Date: 12/29/2018 Patient Name: Shu Cline History of Presenting Illness Chief Complaint Patient presents with  Pregnancy Test  
 
 
History Provided By: Patient HPI: Shu Cline, 29 y.o. female with PMHx significant for asthma, htn, anxiety, hidradenitis suppurativa presents ambulatory to the ED with cc of missed menses. LMP:11/2. Took two preg test at home, one \"inconclusive\" and the second negative. She wanted to confirm. Denies hx irregular cycles. Gyn appt on 1/28. Denies abd pain, N/V, change in vaginal discharge. Pt also reports she has stopped taking BP meds because she no longer wanted to take medication. Denies current CP, SOB, dizziness, headache. There are no other complaints, changes, or physical findings at this time. PCP: Joleen Merchant MD 
 
No current facility-administered medications on file prior to encounter. Current Outpatient Medications on File Prior to Encounter Medication Sig Dispense Refill  traMADol (ULTRAM) 50 mg tablet Take 1 Tab by mouth every six (6) hours as needed for Pain. Max Daily Amount: 200 mg. 20 Tab 0  
 POTASSIUM CHLORIDE PO Take 50 mEq by mouth.  oxyCODONE IR (ROXICODONE) 5 mg immediate release tablet Take 1-2 Tabs by mouth every four (4) hours as needed for Pain. Max Daily Amount: 60 mg. 50 Tab 0  Soft Lens Rinse-Store Solution (SALINE SOLUTION) soln Use as needed for wound care. 1 Bottle as needed  busPIRone (BUSPAR) 7.5 mg tablet Take 1 Tab by mouth three (3) times daily (with meals). 90 Tab 2 Past History Past Medical History: 
Past Medical History:  
Diagnosis Date  Asthma  Asthma   
 has not had any problems no inhaler  Class 3 obesity in adult 11/16/2017  Essential hypertension 11/16/2017  H/O pilonidal cyst   
 Hidradenitis 2/1/2018  Psychiatric disorder ANXIETY  Vaginal delivery Past Surgical History: Past Surgical History:  
Procedure Laterality Date  HX APPENDECTOMY    HX ORTHOPAEDIC Broke l foot as child  HX OTHER SURGICAL    
 left foot on third toe  HX OTHER SURGICAL Laparoscopic cholecystectomy.  HX OTHER SURGICAL  2016 Incision and drainage of pilonidal abscess; Dr. Marissa Delvalle.  HX OTHER SURGICAL  2016 Incision, drainage, and excision of pilonidal cyst; Dr. Marissa Delvalle.  HX OTHER SURGICAL  2018 Incision and drainage of pilonidal abscess; Dr. Marissa Delvalle.  HX OTHER SURGICAL  02/15/2018 Excision of left axilla hidradenitis; Dr. César Tapia.  HX OTHER SURGICAL  2017 Excision of sebaceous cyst, right inframammary fold; Dr. César Tapia. Family History: 
Family History Problem Relation Age of Onset  Hypertension Maternal Grandmother Social History: 
Social History Tobacco Use  Smoking status: Former Smoker Packs/day: 0.25 Last attempt to quit: 3/1/2011 Years since quittin.8  Smokeless tobacco: Never Used Substance Use Topics  Alcohol use: No  
 Drug use: No  
 
 
Allergies: Allergies Allergen Reactions  Aleve [Naproxen Sodium] Hives  Bactrim [Sulfamethoprim Ds] Other (comments) Abdominal pain.  Darvocet A500 [Propoxyphene N-Acetaminophen] Hives  Lortab [Hydrocodone-Acetaminophen] Nausea and Vomiting  Motrin [Ibuprofen] Hives  Naprosyn [Naproxen] Hives Review of Systems Review of Systems Constitutional: Negative for chills and fever. Respiratory: Negative for shortness of breath. Cardiovascular: Negative for chest pain. Gastrointestinal: Negative for abdominal pain, nausea and vomiting. Genitourinary: Negative for flank pain, pelvic pain, vaginal bleeding and vaginal discharge. Irregular menses Musculoskeletal: Negative for back pain and myalgias. Skin: Negative for color change, pallor, rash and wound. Neurological: Negative for dizziness, weakness and light-headedness. All other systems reviewed and are negative. Physical Exam  
Physical Exam  
Constitutional: She is oriented to person, place, and time. She appears well-developed and well-nourished. No distress. HENT:  
Head: Normocephalic and atraumatic. Eyes: Conjunctivae are normal.  
Cardiovascular: Normal rate, regular rhythm and normal heart sounds. Pulmonary/Chest: Effort normal and breath sounds normal. No respiratory distress. Abdominal: Soft. Bowel sounds are normal. She exhibits no distension. Musculoskeletal: Normal range of motion. Neurological: She is alert and oriented to person, place, and time. Skin: Skin is warm. No rash noted. Psychiatric: She has a normal mood and affect. Her behavior is normal.  
Nursing note and vitals reviewed. Diagnostic Study Results Labs - No results found for this or any previous visit (from the past 12 hour(s)). Radiologic Studies - No orders to display CT Results  (Last 48 hours) None CXR Results  (Last 48 hours) None Medical Decision Making I am the first provider for this patient. I reviewed the vital signs, available nursing notes, past medical history, past surgical history, family history and social history. Vital Signs-Reviewed the patient's vital signs. Patient Vitals for the past 12 hrs: 
 Temp Pulse Resp BP SpO2  
12/29/18 1258 98.8 °F (37.1 °C) 89 18 (!) 163/107 97 % Records Reviewed: Nursing Notes and Old Medical Records Provider Notes (Medical Decision Making): DDx: Pregnancy, Irregular menses, PCOS, Essential htn ED Course:  
Initial assessment performed. The patients presenting problems have been discussed, and they are in agreement with the care plan formulated and outlined with them. I have encouraged them to ask questions as they arise throughout their visit.  
 
  
 
Disposition: 
1:14 PM 
 Discussed lab  results with pt along with dx and treatment plan. Discussed importance of PCP and gyn follow up. All questions answered. Pt voiced they understood. Return if sx worsen. PLAN: 
1. Current Discharge Medication List  
  
CONTINUE these medications which have CHANGED Details  
losartan-hydroCHLOROthiazide (HYZAAR) 50-12.5 mg per tablet Take 1 Tab by mouth daily for 10 days. Qty: 10 Tab, Refills: 0  
  
  
 
2. Follow-up Information Follow up With Specialties Details Why Contact Info Keep appointment with GYN Schedule an appointment as soon as possible for a visit in 1 day Return to ED if worse Diagnosis Clinical Impression: 1. Missed menses 2. Essential hypertension

## 2019-01-04 ENCOUNTER — ANESTHESIA (OUTPATIENT)
Dept: SURGERY | Age: 29
End: 2019-01-04
Payer: COMMERCIAL

## 2019-01-04 ENCOUNTER — HOSPITAL ENCOUNTER (OUTPATIENT)
Age: 29
Setting detail: OUTPATIENT SURGERY
Discharge: HOME | End: 2019-01-04
Attending: COLON & RECTAL SURGERY | Admitting: COLON & RECTAL SURGERY
Payer: COMMERCIAL

## 2019-01-04 ENCOUNTER — ANESTHESIA EVENT (OUTPATIENT)
Dept: SURGERY | Age: 29
End: 2019-01-04
Payer: COMMERCIAL

## 2019-01-04 VITALS
WEIGHT: 225 LBS | SYSTOLIC BLOOD PRESSURE: 158 MMHG | HEART RATE: 103 BPM | TEMPERATURE: 98.5 F | DIASTOLIC BLOOD PRESSURE: 94 MMHG | RESPIRATION RATE: 22 BRPM | HEIGHT: 61 IN | OXYGEN SATURATION: 98 % | BODY MASS INDEX: 42.48 KG/M2

## 2019-01-04 DIAGNOSIS — L05.01 PILONIDAL ABSCESS: ICD-10-CM

## 2019-01-04 DIAGNOSIS — G89.18 ACUTE POST-OPERATIVE PAIN: Primary | ICD-10-CM

## 2019-01-04 LAB — HCG UR QL: NEGATIVE

## 2019-01-04 PROCEDURE — 77030026438 HC STYL ET INTUB CARD -A: Performed by: ANESTHESIOLOGY

## 2019-01-04 PROCEDURE — 76210000016 HC OR PH I REC 1 TO 1.5 HR: Performed by: COLON & RECTAL SURGERY

## 2019-01-04 PROCEDURE — 77030002888 HC SUT CHRMC J&J -A: Performed by: COLON & RECTAL SURGERY

## 2019-01-04 PROCEDURE — 74011250636 HC RX REV CODE- 250/636: Performed by: COLON & RECTAL SURGERY

## 2019-01-04 PROCEDURE — 74011250637 HC RX REV CODE- 250/637: Performed by: COLON & RECTAL SURGERY

## 2019-01-04 PROCEDURE — 74011250636 HC RX REV CODE- 250/636

## 2019-01-04 PROCEDURE — 77030018836 HC SOL IRR NACL ICUM -A: Performed by: COLON & RECTAL SURGERY

## 2019-01-04 PROCEDURE — 77030011640 HC PAD GRND REM COVD -A: Performed by: COLON & RECTAL SURGERY

## 2019-01-04 PROCEDURE — 74011000250 HC RX REV CODE- 250: Performed by: COLON & RECTAL SURGERY

## 2019-01-04 PROCEDURE — 77030018846 HC SOL IRR STRL H20 ICUM -A: Performed by: COLON & RECTAL SURGERY

## 2019-01-04 PROCEDURE — 77030013079 HC BLNKT BAIR HGGR 3M -A: Performed by: ANESTHESIOLOGY

## 2019-01-04 PROCEDURE — 87205 SMEAR GRAM STAIN: CPT

## 2019-01-04 PROCEDURE — 77030008684 HC TU ET CUF COVD -B: Performed by: ANESTHESIOLOGY

## 2019-01-04 PROCEDURE — 51798 US URINE CAPACITY MEASURE: CPT

## 2019-01-04 PROCEDURE — 76060000032 HC ANESTHESIA 0.5 TO 1 HR: Performed by: COLON & RECTAL SURGERY

## 2019-01-04 PROCEDURE — 76210000021 HC REC RM PH II 0.5 TO 1 HR: Performed by: COLON & RECTAL SURGERY

## 2019-01-04 PROCEDURE — 81025 URINE PREGNANCY TEST: CPT

## 2019-01-04 PROCEDURE — 74011000250 HC RX REV CODE- 250

## 2019-01-04 PROCEDURE — 77030032490 HC SLV COMPR SCD KNE COVD -B: Performed by: COLON & RECTAL SURGERY

## 2019-01-04 PROCEDURE — 87185 SC STD ENZYME DETCJ PER NZM: CPT

## 2019-01-04 PROCEDURE — 87075 CULTR BACTERIA EXCEPT BLOOD: CPT

## 2019-01-04 PROCEDURE — 77030002916 HC SUT ETHLN J&J -A: Performed by: COLON & RECTAL SURGERY

## 2019-01-04 PROCEDURE — 76010000138 HC OR TIME 0.5 TO 1 HR: Performed by: COLON & RECTAL SURGERY

## 2019-01-04 PROCEDURE — 77030031139 HC SUT VCRL2 J&J -A: Performed by: COLON & RECTAL SURGERY

## 2019-01-04 PROCEDURE — 74011250636 HC RX REV CODE- 250/636: Performed by: ANESTHESIOLOGY

## 2019-01-04 RX ORDER — SODIUM CHLORIDE 0.9 % (FLUSH) 0.9 %
5-10 SYRINGE (ML) INJECTION EVERY 8 HOURS
Status: DISCONTINUED | OUTPATIENT
Start: 2019-01-04 | End: 2019-01-04 | Stop reason: HOSPADM

## 2019-01-04 RX ORDER — SODIUM CHLORIDE 0.9 % (FLUSH) 0.9 %
5-10 SYRINGE (ML) INJECTION AS NEEDED
Status: DISCONTINUED | OUTPATIENT
Start: 2019-01-04 | End: 2019-01-04 | Stop reason: HOSPADM

## 2019-01-04 RX ORDER — SUCCINYLCHOLINE CHLORIDE 20 MG/ML
INJECTION INTRAMUSCULAR; INTRAVENOUS AS NEEDED
Status: DISCONTINUED | OUTPATIENT
Start: 2019-01-04 | End: 2019-01-04 | Stop reason: HOSPADM

## 2019-01-04 RX ORDER — MORPHINE SULFATE 10 MG/ML
2 INJECTION, SOLUTION INTRAMUSCULAR; INTRAVENOUS
Status: DISCONTINUED | OUTPATIENT
Start: 2019-01-04 | End: 2019-01-04 | Stop reason: HOSPADM

## 2019-01-04 RX ORDER — ROCURONIUM BROMIDE 10 MG/ML
INJECTION, SOLUTION INTRAVENOUS AS NEEDED
Status: DISCONTINUED | OUTPATIENT
Start: 2019-01-04 | End: 2019-01-04 | Stop reason: HOSPADM

## 2019-01-04 RX ORDER — OXYCODONE HYDROCHLORIDE 5 MG/1
5 TABLET ORAL ONCE
Status: COMPLETED | OUTPATIENT
Start: 2019-01-04 | End: 2019-01-04

## 2019-01-04 RX ORDER — BUPIVACAINE HYDROCHLORIDE AND EPINEPHRINE 5; 5 MG/ML; UG/ML
INJECTION, SOLUTION EPIDURAL; INTRACAUDAL; PERINEURAL AS NEEDED
Status: DISCONTINUED | OUTPATIENT
Start: 2019-01-04 | End: 2019-01-04 | Stop reason: HOSPADM

## 2019-01-04 RX ORDER — FENTANYL CITRATE 50 UG/ML
25 INJECTION, SOLUTION INTRAMUSCULAR; INTRAVENOUS
Status: COMPLETED | OUTPATIENT
Start: 2019-01-04 | End: 2019-01-04

## 2019-01-04 RX ORDER — MIDAZOLAM HYDROCHLORIDE 1 MG/ML
INJECTION, SOLUTION INTRAMUSCULAR; INTRAVENOUS AS NEEDED
Status: DISCONTINUED | OUTPATIENT
Start: 2019-01-04 | End: 2019-01-04 | Stop reason: HOSPADM

## 2019-01-04 RX ORDER — MIDAZOLAM HYDROCHLORIDE 1 MG/ML
1 INJECTION, SOLUTION INTRAMUSCULAR; INTRAVENOUS AS NEEDED
Status: DISCONTINUED | OUTPATIENT
Start: 2019-01-04 | End: 2019-01-04 | Stop reason: HOSPADM

## 2019-01-04 RX ORDER — SODIUM CHLORIDE, SODIUM LACTATE, POTASSIUM CHLORIDE, CALCIUM CHLORIDE 600; 310; 30; 20 MG/100ML; MG/100ML; MG/100ML; MG/100ML
100 INJECTION, SOLUTION INTRAVENOUS CONTINUOUS
Status: DISCONTINUED | OUTPATIENT
Start: 2019-01-04 | End: 2019-01-04 | Stop reason: HOSPADM

## 2019-01-04 RX ORDER — SODIUM CHLORIDE, SODIUM LACTATE, POTASSIUM CHLORIDE, CALCIUM CHLORIDE 600; 310; 30; 20 MG/100ML; MG/100ML; MG/100ML; MG/100ML
INJECTION, SOLUTION INTRAVENOUS
Status: DISCONTINUED | OUTPATIENT
Start: 2019-01-04 | End: 2019-01-04 | Stop reason: HOSPADM

## 2019-01-04 RX ORDER — SODIUM CHLORIDE 0.9 % (FLUSH) 0.9 %
5-10 SYRINGE (ML) INJECTION EVERY 8 HOURS
Status: CANCELLED | OUTPATIENT
Start: 2019-01-04

## 2019-01-04 RX ORDER — SODIUM CHLORIDE 9 MG/ML
25 INJECTION, SOLUTION INTRAVENOUS CONTINUOUS
Status: DISCONTINUED | OUTPATIENT
Start: 2019-01-04 | End: 2019-01-04 | Stop reason: HOSPADM

## 2019-01-04 RX ORDER — MIDAZOLAM HYDROCHLORIDE 1 MG/ML
0.5 INJECTION, SOLUTION INTRAMUSCULAR; INTRAVENOUS
Status: DISCONTINUED | OUTPATIENT
Start: 2019-01-04 | End: 2019-01-04 | Stop reason: HOSPADM

## 2019-01-04 RX ORDER — SODIUM CHLORIDE 0.9 % (FLUSH) 0.9 %
5-10 SYRINGE (ML) INJECTION AS NEEDED
Status: CANCELLED | OUTPATIENT
Start: 2019-01-04

## 2019-01-04 RX ORDER — BUPIVACAINE HYDROCHLORIDE AND EPINEPHRINE 2.5; 5 MG/ML; UG/ML
30 INJECTION, SOLUTION EPIDURAL; INFILTRATION; INTRACAUDAL; PERINEURAL ONCE
Status: CANCELLED | OUTPATIENT
Start: 2019-01-04 | End: 2019-01-04

## 2019-01-04 RX ORDER — LEVOFLOXACIN 5 MG/ML
500 INJECTION, SOLUTION INTRAVENOUS ONCE
Status: COMPLETED | OUTPATIENT
Start: 2019-01-04 | End: 2019-01-04

## 2019-01-04 RX ORDER — DEXAMETHASONE SODIUM PHOSPHATE 4 MG/ML
INJECTION, SOLUTION INTRA-ARTICULAR; INTRALESIONAL; INTRAMUSCULAR; INTRAVENOUS; SOFT TISSUE AS NEEDED
Status: DISCONTINUED | OUTPATIENT
Start: 2019-01-04 | End: 2019-01-04 | Stop reason: HOSPADM

## 2019-01-04 RX ORDER — LEVOFLOXACIN 250 MG/1
500 TABLET ORAL DAILY
Qty: 7 TAB | Refills: 0 | Status: SHIPPED | OUTPATIENT
Start: 2019-01-05 | End: 2019-07-19

## 2019-01-04 RX ORDER — LIDOCAINE HYDROCHLORIDE 20 MG/ML
INJECTION, SOLUTION EPIDURAL; INFILTRATION; INTRACAUDAL; PERINEURAL AS NEEDED
Status: DISCONTINUED | OUTPATIENT
Start: 2019-01-04 | End: 2019-01-04 | Stop reason: HOSPADM

## 2019-01-04 RX ORDER — ROPIVACAINE HYDROCHLORIDE 5 MG/ML
30 INJECTION, SOLUTION EPIDURAL; INFILTRATION; PERINEURAL AS NEEDED
Status: DISCONTINUED | OUTPATIENT
Start: 2019-01-04 | End: 2019-01-04 | Stop reason: HOSPADM

## 2019-01-04 RX ORDER — ONDANSETRON 2 MG/ML
INJECTION INTRAMUSCULAR; INTRAVENOUS AS NEEDED
Status: DISCONTINUED | OUTPATIENT
Start: 2019-01-04 | End: 2019-01-04 | Stop reason: HOSPADM

## 2019-01-04 RX ORDER — METRONIDAZOLE 500 MG/1
500 TABLET ORAL 3 TIMES DAILY
Qty: 24 TAB | Refills: 0 | Status: SHIPPED | OUTPATIENT
Start: 2019-01-04 | End: 2019-01-12

## 2019-01-04 RX ORDER — ACETAMINOPHEN 10 MG/ML
INJECTION, SOLUTION INTRAVENOUS AS NEEDED
Status: DISCONTINUED | OUTPATIENT
Start: 2019-01-04 | End: 2019-01-04 | Stop reason: HOSPADM

## 2019-01-04 RX ORDER — ONDANSETRON 2 MG/ML
4 INJECTION INTRAMUSCULAR; INTRAVENOUS AS NEEDED
Status: DISCONTINUED | OUTPATIENT
Start: 2019-01-04 | End: 2019-01-04 | Stop reason: HOSPADM

## 2019-01-04 RX ORDER — OXYCODONE HYDROCHLORIDE 5 MG/1
5-10 TABLET ORAL
Qty: 40 TAB | Refills: 0 | Status: SHIPPED | OUTPATIENT
Start: 2019-01-04 | End: 2019-07-19

## 2019-01-04 RX ORDER — FENTANYL CITRATE 50 UG/ML
50 INJECTION, SOLUTION INTRAMUSCULAR; INTRAVENOUS AS NEEDED
Status: DISCONTINUED | OUTPATIENT
Start: 2019-01-04 | End: 2019-01-04 | Stop reason: HOSPADM

## 2019-01-04 RX ORDER — KETAMINE HYDROCHLORIDE 10 MG/ML
INJECTION, SOLUTION INTRAMUSCULAR; INTRAVENOUS AS NEEDED
Status: DISCONTINUED | OUTPATIENT
Start: 2019-01-04 | End: 2019-01-04 | Stop reason: HOSPADM

## 2019-01-04 RX ORDER — LIDOCAINE HYDROCHLORIDE 10 MG/ML
0.1 INJECTION, SOLUTION EPIDURAL; INFILTRATION; INTRACAUDAL; PERINEURAL AS NEEDED
Status: DISCONTINUED | OUTPATIENT
Start: 2019-01-04 | End: 2019-01-04 | Stop reason: HOSPADM

## 2019-01-04 RX ORDER — FENTANYL CITRATE 50 UG/ML
INJECTION, SOLUTION INTRAMUSCULAR; INTRAVENOUS AS NEEDED
Status: DISCONTINUED | OUTPATIENT
Start: 2019-01-04 | End: 2019-01-04 | Stop reason: HOSPADM

## 2019-01-04 RX ORDER — PROPOFOL 10 MG/ML
INJECTION, EMULSION INTRAVENOUS AS NEEDED
Status: DISCONTINUED | OUTPATIENT
Start: 2019-01-04 | End: 2019-01-04 | Stop reason: HOSPADM

## 2019-01-04 RX ADMIN — MIDAZOLAM HYDROCHLORIDE 2 MG: 1 INJECTION, SOLUTION INTRAMUSCULAR; INTRAVENOUS at 17:45

## 2019-01-04 RX ADMIN — LIDOCAINE HYDROCHLORIDE 100 MG: 20 INJECTION, SOLUTION EPIDURAL; INFILTRATION; INTRACAUDAL; PERINEURAL at 17:55

## 2019-01-04 RX ADMIN — PROPOFOL 50 MG: 10 INJECTION, EMULSION INTRAVENOUS at 18:12

## 2019-01-04 RX ADMIN — SODIUM CHLORIDE, SODIUM LACTATE, POTASSIUM CHLORIDE, CALCIUM CHLORIDE: 600; 310; 30; 20 INJECTION, SOLUTION INTRAVENOUS at 17:45

## 2019-01-04 RX ADMIN — FENTANYL CITRATE 25 MCG: 50 INJECTION INTRAMUSCULAR; INTRAVENOUS at 18:55

## 2019-01-04 RX ADMIN — KETAMINE HYDROCHLORIDE 30 MG: 10 INJECTION, SOLUTION INTRAMUSCULAR; INTRAVENOUS at 17:55

## 2019-01-04 RX ADMIN — FENTANYL CITRATE 25 MCG: 50 INJECTION INTRAMUSCULAR; INTRAVENOUS at 19:00

## 2019-01-04 RX ADMIN — ROCURONIUM BROMIDE 5 MG: 10 INJECTION, SOLUTION INTRAVENOUS at 17:55

## 2019-01-04 RX ADMIN — PROPOFOL 50 MG: 10 INJECTION, EMULSION INTRAVENOUS at 18:11

## 2019-01-04 RX ADMIN — LEVOFLOXACIN 500 MG: 5 INJECTION, SOLUTION INTRAVENOUS at 18:02

## 2019-01-04 RX ADMIN — DEXAMETHASONE SODIUM PHOSPHATE 10 MG: 4 INJECTION, SOLUTION INTRA-ARTICULAR; INTRALESIONAL; INTRAMUSCULAR; INTRAVENOUS; SOFT TISSUE at 18:15

## 2019-01-04 RX ADMIN — SODIUM CHLORIDE, SODIUM LACTATE, POTASSIUM CHLORIDE, AND CALCIUM CHLORIDE 100 ML/HR: 600; 310; 30; 20 INJECTION, SOLUTION INTRAVENOUS at 17:29

## 2019-01-04 RX ADMIN — FENTANYL CITRATE 25 MCG: 50 INJECTION INTRAMUSCULAR; INTRAVENOUS at 19:20

## 2019-01-04 RX ADMIN — OXYCODONE HYDROCHLORIDE 5 MG: 5 TABLET ORAL at 19:21

## 2019-01-04 RX ADMIN — ONDANSETRON 4 MG: 2 INJECTION INTRAMUSCULAR; INTRAVENOUS at 18:15

## 2019-01-04 RX ADMIN — FENTANYL CITRATE 100 MCG: 50 INJECTION, SOLUTION INTRAMUSCULAR; INTRAVENOUS at 17:55

## 2019-01-04 RX ADMIN — FENTANYL CITRATE 25 MCG: 50 INJECTION INTRAMUSCULAR; INTRAVENOUS at 19:15

## 2019-01-04 RX ADMIN — PROPOFOL 200 MG: 10 INJECTION, EMULSION INTRAVENOUS at 17:55

## 2019-01-04 RX ADMIN — KETAMINE HYDROCHLORIDE 20 MG: 10 INJECTION, SOLUTION INTRAMUSCULAR; INTRAVENOUS at 17:57

## 2019-01-04 RX ADMIN — SUCCINYLCHOLINE CHLORIDE 180 MG: 20 INJECTION INTRAMUSCULAR; INTRAVENOUS at 17:55

## 2019-01-04 RX ADMIN — ACETAMINOPHEN 1000 MG: 10 INJECTION, SOLUTION INTRAVENOUS at 18:12

## 2019-01-04 NOTE — H&P
History and Physical (outpatient) Patient: Marija Flores 29 y.o. female Referring Physician:  No ref. provider found Chief Complaint:  Recurrent pilonidal abscess. History of Present Illness: The patient is a 75-year-old female with a history of multiple pilonidal and other soft tissue abscesses. Her most recent surgical procedure was the incision and drainage of a recurrent pilonidal abscess that I performed at [facility] on 6/14/2018. Today, she reports that for the last one to two days she has been having increasing pain. She has also experienced malaise and she is certain that she has a recurrence of the abscess. Upon presentation in my office earlier today, she was found to have a low-grade fever (temperature 99.6°F) and the physical examination revealed an obvious pilonidal abscess. History: 
Past Medical History:  
Diagnosis Date  Asthma  Asthma   
 has not had any problems no inhaler  Class 3 obesity in adult 11/16/2017  Essential hypertension 11/16/2017  H/O pilonidal cyst   
 Hidradenitis 2/1/2018  Psychiatric disorder ANXIETY  Vaginal delivery Past Surgical History:  
Procedure Laterality Date  HX APPENDECTOMY  2017  HX ORTHOPAEDIC Broke l foot as child  HX OTHER SURGICAL    
 left foot on third toe  HX OTHER SURGICAL Laparoscopic cholecystectomy.  HX OTHER SURGICAL  04/19/2016 Incision and drainage of pilonidal abscess; Dr. Dereck Osgood.  HX OTHER SURGICAL  08/22/2016 Incision, drainage, and excision of pilonidal cyst; Dr. Dereck Osgood.  HX OTHER SURGICAL  04/17/2018 Incision and drainage of pilonidal abscess; Dr. Dereck Osgood.  HX OTHER SURGICAL  02/15/2018 Excision of left axilla hidradenitis; Dr. Asiya Garza.  HX OTHER SURGICAL  12/21/2017 Excision of sebaceous cyst, right inframammary fold; Dr. Asiya Garza.  HX OTHER SURGICAL  06/14/2018 Incision and drainage of pilonidal abscess; Dr. Jeremi Mansfield. Family History Problem Relation Age of Onset  Hypertension Maternal Grandmother Social History Socioeconomic History  Marital status: SINGLE Spouse name: Not on file  Number of children: Not on file  Years of education: Not on file  Highest education level: Not on file Social Needs  Financial resource strain: Not on file  Food insecurity - worry: Not on file  Food insecurity - inability: Not on file  Transportation needs - medical: Not on file  Transportation needs - non-medical: Not on file Occupational History  Not on file Tobacco Use  Smoking status: Former Smoker Packs/day: 0.25 Last attempt to quit: 3/1/2011 Years since quittin.8  Smokeless tobacco: Never Used Substance and Sexual Activity  Alcohol use: No  
 Drug use: No  
 Sexual activity: Yes  
  Partners: Male Birth control/protection: Pill Other Topics Concern  Not on file Social History Narrative  Not on file Allergies: Allergies Allergen Reactions  Aleve [Naproxen Sodium] Hives  Bactrim [Sulfamethoprim Ds] Other (comments) Abdominal pain.  Darvocet A500 [Propoxyphene N-Acetaminophen] Hives  Lortab [Hydrocodone-Acetaminophen] Nausea and Vomiting  Motrin [Ibuprofen] Hives  Naprosyn [Naproxen] Hives Current Medications: 
Prior to Admission Medications Prescriptions Last Dose Informant Patient Reported? Taking? POTASSIUM CHLORIDE PO   Yes No  
Sig: Take 50 mEq by mouth. Soft Lens Rinse-Store Solution (SALINE SOLUTION) soln   No No  
Sig: Use as needed for wound care. busPIRone (BUSPAR) 7.5 mg tablet   No No  
Sig: Take 1 Tab by mouth three (3) times daily (with meals). losartan-hydroCHLOROthiazide (HYZAAR) 50-12.5 mg per tablet   No No  
Sig: Take 1 Tab by mouth daily for 10 days.   
oxyCODONE IR (ROXICODONE) 5 mg immediate release tablet   No No  
 Sig: Take 1-2 Tabs by mouth every four (4) hours as needed for Pain. Max Daily Amount: 60 mg.  
traMADol (ULTRAM) 50 mg tablet   No No  
Sig: Take 1 Tab by mouth every six (6) hours as needed for Pain. Max Daily Amount: 200 mg. Facility-Administered Medications: None Current Facility-Administered Medications Medication Dose Route Frequency  lactated Ringers infusion  100 mL/hr IntraVENous CONTINUOUS  
 0.9% sodium chloride infusion  25 mL/hr IntraVENous CONTINUOUS  
 sodium chloride (NS) flush 5-10 mL  5-10 mL IntraVENous Q8H  
 sodium chloride (NS) flush 5-10 mL  5-10 mL IntraVENous PRN  
 lidocaine (PF) (XYLOCAINE) 10 mg/mL (1 %) injection 0.1 mL  0.1 mL SubCUTAneous PRN  
 fentaNYL citrate (PF) injection 50 mcg  50 mcg IntraVENous PRN  
 midazolam (VERSED) injection 1 mg  1 mg IntraVENous PRN  
 midazolam (VERSED) injection 1 mg  1 mg IntraVENous PRN  
 ropivacaine (PF) (NAROPIN) 5 mg/mL (0.5 %) injection 150 mg  30 mL Intra artICUlar PRN  
 sodium chloride (NS) flush 5-10 mL  5-10 mL IntraVENous Q8H  
 sodium chloride (NS) flush 5-10 mL  5-10 mL IntraVENous PRN Physical Exam: 
There were no vitals taken for this visit. GENERAL:  Uncomfortable. LUNGS:  Clear to auscultation bilaterally. HEART:  Regular rate and rhythm with no murmurs, gallops, or rubs. Post-sacral area:  Bilateral scars. Induration, fluctuance and tenderness in the left paramedian area. Induration and tenderness without fluctuance in the right paramedian area. NEUROLOGIC: Alert and oriented. No gross deficits. Alerts:   
Hospital Problems  Date Reviewed: 6/14/2018 None Laboratory:    No results for input(s): HGB, HCT, WBC, PLT, INR, BUN, CREA, K, CRCLT, HGBEXT, HCTEXT, PLTEXT in the last 72 hours. No lab exists for component: PTT, PT, INREXT Assessment and Plan: The patient has another pilonidal abscess.   Urgent incision and drainage is indicated. She understands the risks and she has agreed to proceed.

## 2019-01-04 NOTE — ANESTHESIA PREPROCEDURE EVALUATION
Anesthetic History No history of anesthetic complications Review of Systems / Medical History Patient summary reviewed, nursing notes reviewed and pertinent labs reviewed Pulmonary Within defined limits Asthma Neuro/Psych Within defined limits Psychiatric history Cardiovascular Within defined limits Hypertension Exercise tolerance: >4 METS 
  
GI/Hepatic/Renal 
Within defined limits Endo/Other Within defined limits Obesity Other Findings Physical Exam 
 
Airway Mallampati: II 
TM Distance: > 6 cm Neck ROM: normal range of motion Mouth opening: Normal 
 
 Cardiovascular Regular rate and rhythm,  S1 and S2 normal,  no murmur, click, rub, or gallop Dental 
No notable dental hx Pulmonary Breath sounds clear to auscultation Abdominal 
GI exam deferred Other Findings Anesthetic Plan ASA: 2 Anesthesia type: general 
 
 
 
 
Induction: Intravenous Anesthetic plan and risks discussed with: Patient

## 2019-01-04 NOTE — BRIEF OP NOTE
BRIEF OPERATIVE NOTE Date of Procedure:  1/4/2019 Preoperative Diagnosis:  Recurrent pilonidal abscess. Postoperative Diagnosis:  Recurrent pilonidal abscess. Procedure: Incision and drainage of pilonidal abscess. Surgeon:  Alyse Rainey MD 
Assistant:  Rosemarie Oconnor SA Anesthesia:  Generalendotracheal 
Estimated Blood Loss:   < 20 mL Crystalloid:  700 mL Specimens:  
ID Type Source Tests Collected by Time Destination Pus Wound Pilonidal Cyst CULTURE, WOUND W GRAM STAIN, CULTURE, ANAEROBIC AND AEROBIC Nikita Lee MD 1/4/2019 1805 Microbiology Tubes and Drains:  None. Findings:  Large pilonidal abscess extending bilaterally. Complications:  None apparent. Implants:  None.

## 2019-01-05 NOTE — DISCHARGE INSTRUCTIONS
YOU HAD A PAIN PILL (OXYCODONE) AT 7:21PM      Post-Operative Instructions        1. Diet:  Resume your usual diet and drink plenty of fluids. 2. Medications: Take pain medication (oxycodone) and the antibiotics (levofloxacin and metronidazole) as prescribed. Do not drive, drink alcohol, or operate machinery while taking prescription pain medication. Take docusate (stool softener) twice per day as directed while taking the oxycodone. You may take up to 1000 mg of Tylenol every 6 hours to help you use less of the oxycodone. You may take ibuprofen as directed in addition to or instead of the prescription medication if there has been no significant bleeding for at least two days. Do not take pain medication on an empty stomach. Do not take aspirin for 10 days following the procedure. 3. Activity:  Do not engage in any heavy lifting or other strenuous activity until you have been seen for follow-up. You may walk as much as you want, and you may climb stairs. Frequent walking will help prevent constipation.     4. Hygiene and Wound Care:  Remove the external dressing tomorrow morning. Moisten the packing with saline, then remove it. It is approximately 1.5 feet long. The wound should then be gently re-packed with saline-moistened gauze and covered with a dry dressing. It is not necessary to insert as much gauze as was used during the operation, but as you know the most important thing is to try to keep the skin wound from closing until the cavity underneath has healed in from the bottom and the sides. If the skin wound closes too soon, there will be a greater chance of recurrent problems. The packing change procedure should be performed twice per day. You may shower with the wound covered and then change the dressing afterwards, but you should not bathe or otherwise submerge the area. 5. Constipation:  If more than one day passes without a bowel movement, take 1 Tablespoon of Milk of Magnesia. Repeat in 6 hours if you have no results. If you still have not moved your bowels, take two to four 5 mg bisacodyl tablets. If you still have not moved your bowels by the day after taking the bisacodyl, call my office. 6. Bleeding:  A small amount of bright red bleeding can be expected for the next several days. If bleeding appears be continuous, call my office. 7. Other Problems:  If you experience intolerable pain, fever (temperature of 101 or more), or inability to urinate, call my office.     8. Questions: If you have any questions about your post-operative condition or care, do not hesitate to call my office. 9. Work:  You may return to work in one week if feeling able. 10. Follow-up:  Please return to my office at 11:00 AM on Friday 1/18/2019. If you need to change the appointment, please call [phone] on a weekday tomorrow between 9:00 AM and noon. For anything other than scheduling, please call [phone].  Lalit Feliciano M.D.  [phone]    ______________________________________________________________________    Anesthesia Discharge Instructions    After general anesthesia or intervenous sedation, for 24 hours or while taking prescription Narcotics:  · Limit your activities  · Do not drive or operate hazardous machinery  · If you have not urinated within 8 hours after discharge, please contact your surgeon on call. · Do not make important personal or business decisions  · Do not drink alcoholic beverages    Report the following to your surgeon:  · Excessive pain, swelling, redness or odor of or around the surgical area  · Temperature over 100.5 degrees  · Nausea and vomiting lasting longer than 4 hours or if unable to take medication  · Any signs of decreased circulation or nerve impairment to extremity:  Change in color, persistent numbness, tingling, coldness or increased pain.   · Any questions                                                        1/4/2019      RE: Jovi Foster      To Whom it May Concern: This is to certify that Jovi Foster may return to work Friday, January 11      Please feel free to contact my office if you have any questions or concerns. Thank you for your assistance in this matter.     Sincerely,    Keshav Gordillo MD.  [phone]    Joaquin Drew RN

## 2019-01-05 NOTE — ANESTHESIA POSTPROCEDURE EVALUATION
Procedure(s): 
INCISION AND DRAINAGE OF PILONIDAL ABSCESS. Anesthesia Post Evaluation Patient location during evaluation: PACU Note status: Adequate. Level of consciousness: responsive to verbal stimuli and sleepy but conscious Pain management: satisfactory to patient Airway patency: patent Anesthetic complications: no 
Cardiovascular status: acceptable Respiratory status: acceptable Hydration status: acceptable Comments: +Post-Anesthesia Evaluation and Assessment Patient: Sincere Templeton MRN: [id number]  SSN: [id number] YOB: 1990  Age: 29 y.o. Sex: female I have evaluated the patient and the patient is stable and ready to be discharged from PACU . Cardiovascular Function/Vital Signs BP (!) 145/95   Pulse 98   Temp 36.9 °C (98.5 °F)   Resp 17   Ht 5' 1\" (1.549 m)   Wt 102.1 kg (225 lb)   SpO2 98%   BMI 42.51 kg/m² Patient is status post Procedure(s): 
INCISION AND DRAINAGE OF PILONIDAL ABSCESS. Nausea/Vomiting: Controlled. Postoperative hydration reviewed and adequate. Pain: 
Pain Scale 1: Numeric (0 - 10) (01/04/19 1926) Pain Intensity 1: 5 (01/04/19 1926) Managed. Neurological Status:  
Neuro (WDL): Within Defined Limits (01/04/19 1900) At baseline. Mental Status and Level of Consciousness: Arousable. Pulmonary Status:  
O2 Device: Room air (01/04/19 1930) Adequate oxygenation and airway patent. Complications related to anesthesia: None Post-anesthesia assessment completed. No concerns. Signed By: Daiana Clark MD  
 1/4/2019 Visit Vitals BP (!) 145/95 Pulse 98 Temp 36.9 °C (98.5 °F) Resp 17 Ht 5' 1\" (1.549 m) Wt 102.1 kg (225 lb) SpO2 98% BMI 42.51 kg/m²

## 2019-01-05 NOTE — OP NOTES
[address] [place] [address] REPORT    Car Morgan  MR#: [id number]  : 1990  ACCOUNT #: [de-identified]     DATE OF SERVICE: 2019    PREOPERATIVE DIAGNOSIS:  Recurrent pilonidal abscess. POSTOPERATIVE DIAGNOSIS:  Recurrent pilonidal abscess. PROCEDURE PERFORMED:  Incision and drainage of pilonidal abscess. SURGEON:  Aquilino Collins MD    ASSISTANT:  Lucas Patton SA    ANESTHESIA:  General endotracheal.    SPECIMENS REMOVED:  Pus for Gram stain and culture. TUBES AND DRAINS:  None. ESTIMATED BLOOD LOSS:  Less than 20 mL. CRYSTALLOID:  700 mL. COMPLICATIONS:  None apparent. IMPLANTS:  None. INDICATION FOR PROCEDURE:  The patient is a 63-year-old female with a history of multiple pilonidal and other soft tissue abscesses. Her most recent surgical procedure was the incision and drainage of a recurrent pilonidal abscess that I performed on 2018. She presented to my office on short notice on 2019 with a history of 1-2 days of increasing pain in the postsacral area. She had a low-grade fever (temperature 99.6 degrees Fahrenheit), and the physical examination revealed an obvious pilonidal abscess. The need for urgent incision and drainage was discussed and understood. The risks were also understood. OPERATIVE FINDINGS:  There was a large pilonidal abscess that was most prominent on the left side of the midline but that also extended across to the right side. The area of greatest fluctuance was in the scar in the left paramedian postsacral skin. There was no visible opening or drainage. There was some associated cellulitis. DESCRIPTION OF PROCEDURE:  After informed consent was obtained, the patient was taken to the operating room where standard monitoring devices were attached and adequate general endotracheal anesthesia was induced.   She was then placed in the prone position on the operating table with all pressure points padded appropriately and with the lower extremities fitted with pneumatic compression stockings. The operative field was cleared of hair using clippers then prepped and draped in usual sterile fashion. Five hundred milligrams of Levaquin were administered parenterally. The skin and subcutaneous tissue around the area of greatest fluctuance in the region of the left paramedian scar was infiltrated with 0.5% bupivacaine with epinephrine. A #15 scalpel was then used to incise the skin in an ellipsoid fashion unroofing the abscess. A large quantity of pus poured forth, and specimens were collected for Gram stain and culture. The skin was removed and the cavity was probed. The suction was used to evacuate all identifiable pus. The probe extended across the midline to the right side without difficulty. The cavity was copiously irrigated with saline, and the irrigant was repeatedly evacuated. Electrocautery was applied selectively to improve hemostasis. Additional 0.5% bupivacaine with epinephrine was infiltrated to provide some postoperative analgesia. The wound was packed with approximately 12.5 feet of half-inch plain Nu Gauze and then covered with 4 x 4's and an ABD. There were no apparent complications, and the patient appeared to have tolerated the procedure well. At its conclusion, she was extubated and transported in stable condition to the recovery room.           Sunny Saab MD       PG / PK  D: 01/05/2019 11:46     T: 01/05/2019 12:15  JOB #: [id number]  CC: Sunny Saab MD  CC: Alice Reardon MD

## 2019-01-06 LAB
BACTERIA SPEC CULT: ABNORMAL
GRAM STN SPEC: ABNORMAL
SERVICE CMNT-IMP: ABNORMAL
SERVICE CMNT-IMP: ABNORMAL

## 2019-01-27 ENCOUNTER — HOSPITAL ENCOUNTER (EMERGENCY)
Age: 29
Discharge: LEFT BEFORE DISCHARGE | End: 2019-01-27
Attending: EMERGENCY MEDICINE
Payer: MEDICAID

## 2019-01-27 VITALS — OXYGEN SATURATION: 100 % | HEART RATE: 110 BPM

## 2019-01-27 PROCEDURE — 75810000275 HC EMERGENCY DEPT VISIT NO LEVEL OF CARE

## 2019-01-29 NOTE — ED PROVIDER NOTES
HPI  
 
Past Medical History:  
Diagnosis Date  Asthma  Asthma   
 has not had any problems no inhaler  Class 3 obesity in adult 2017  Essential hypertension 2017  H/O pilonidal cyst   
 Hidradenitis 2018  Psychiatric disorder ANXIETY  Vaginal delivery Past Surgical History:  
Procedure Laterality Date  HX APPENDECTOMY  2017  HX ORTHOPAEDIC Broke l foot as child  HX OTHER SURGICAL    
 left foot on third toe  HX OTHER SURGICAL Laparoscopic cholecystectomy.  HX OTHER SURGICAL  2016 Incision and drainage of pilonidal abscess; Dr. Jeevan Bennett.  HX OTHER SURGICAL  2016 Incision, drainage, and excision of pilonidal cyst; Dr. Jeevan Bennett.  HX OTHER SURGICAL  2018 Incision and drainage of pilonidal abscess; Dr. Jeevan Bennett.  HX OTHER SURGICAL  02/15/2018 Excision of left axilla hidradenitis; Dr. Padma Carney.  HX OTHER SURGICAL  2017 Excision of sebaceous cyst, right inframammary fold; Dr. Padma Carney.  HX OTHER SURGICAL  2018 Incision and drainage of pilonidal abscess; Dr. Jeevan Bennett. Family History:  
Problem Relation Age of Onset  Hypertension Maternal Grandmother Social History Socioeconomic History  Marital status: SINGLE Spouse name: Not on file  Number of children: Not on file  Years of education: Not on file  Highest education level: Not on file Social Needs  Financial resource strain: Not on file  Food insecurity - worry: Not on file  Food insecurity - inability: Not on file  Transportation needs - medical: Not on file  Transportation needs - non-medical: Not on file Occupational History  Not on file Tobacco Use  Smoking status: Former Smoker Packs/day: 0.25 Last attempt to quit: 3/1/2011 Years since quittin.9  Smokeless tobacco: Never Used Substance and Sexual Activity  Alcohol use:  No  
  Drug use: No  
 Sexual activity: Yes  
  Partners: Male Birth control/protection: Pill Other Topics Concern  Not on file Social History Narrative  Not on file ALLERGIES: Aleve [naproxen sodium]; Bactrim [sulfamethoprim ds]; Darvocet a500 [propoxyphene n-acetaminophen]; Lortab [hydrocodone-acetaminophen]; Motrin [ibuprofen]; and Naprosyn [naproxen] Review of Systems Vitals:  
 01/27/19 1533 Pulse: (!) 110 SpO2: 100% Physical Exam  
 
MDM Procedures

## 2019-07-19 ENCOUNTER — OFFICE VISIT (OUTPATIENT)
Dept: INTERNAL MEDICINE CLINIC | Age: 29
End: 2019-07-19

## 2019-07-19 VITALS
RESPIRATION RATE: 18 BRPM | HEIGHT: 61 IN | BODY MASS INDEX: 49.29 KG/M2 | DIASTOLIC BLOOD PRESSURE: 122 MMHG | HEART RATE: 88 BPM | OXYGEN SATURATION: 99 % | WEIGHT: 261.1 LBS | TEMPERATURE: 98.5 F | SYSTOLIC BLOOD PRESSURE: 191 MMHG

## 2019-07-19 DIAGNOSIS — E55.9 VITAMIN D DEFICIENCY: ICD-10-CM

## 2019-07-19 DIAGNOSIS — R63.5 WEIGHT GAIN: ICD-10-CM

## 2019-07-19 DIAGNOSIS — I10 ACCELERATED HYPERTENSION: Primary | ICD-10-CM

## 2019-07-19 DIAGNOSIS — Z11.3 SCREENING EXAMINATION FOR STD (SEXUALLY TRANSMITTED DISEASE): ICD-10-CM

## 2019-07-19 DIAGNOSIS — Z11.1 ENCOUNTER FOR PPD TEST: ICD-10-CM

## 2019-07-19 RX ORDER — AMLODIPINE BESYLATE 10 MG/1
10 TABLET ORAL DAILY
Qty: 90 TAB | Refills: 1 | Status: SHIPPED | OUTPATIENT
Start: 2019-07-19 | End: 2020-06-18 | Stop reason: SDUPTHER

## 2019-07-19 RX ORDER — CHLORTHALIDONE 25 MG/1
25 TABLET ORAL DAILY
Qty: 90 TAB | Refills: 1 | Status: SHIPPED | OUTPATIENT
Start: 2019-07-19 | End: 2020-06-18 | Stop reason: SDUPTHER

## 2019-07-19 NOTE — PATIENT INSTRUCTIONS
IF YOU HAVE ANY SEVERE HEADACHES, SHORTNESS OF BREATH, CHEST PAIN/PRESSURE, BLURRED VISION, DIZZINESS, CHANGES IN YOUR SPEECH = CALL 911    YOUR GOAL BLOOD PRESSURE IS LESS THAN 140/90. DASH Diet: Care Instructions  Your Care Instructions    The DASH diet is an eating plan that can help lower your blood pressure. DASH stands for Dietary Approaches to Stop Hypertension. Hypertension is high blood pressure. The DASH diet focuses on eating foods that are high in calcium, potassium, and magnesium. These nutrients can lower blood pressure. The foods that are highest in these nutrients are fruits, vegetables, low-fat dairy products, nuts, seeds, and legumes. But taking calcium, potassium, and magnesium supplements instead of eating foods that are high in those nutrients does not have the same effect. The DASH diet also includes whole grains, fish, and poultry. The DASH diet is one of several lifestyle changes your doctor may recommend to lower your high blood pressure. Your doctor may also want you to decrease the amount of sodium in your diet. Lowering sodium while following the DASH diet can lower blood pressure even further than just the DASH diet alone. Follow-up care is a key part of your treatment and safety. Be sure to make and go to all appointments, and call your doctor if you are having problems. It's also a good idea to know your test results and keep a list of the medicines you take. How can you care for yourself at home? Following the DASH diet  · Eat 4 to 5 servings of fruit each day. A serving is 1 medium-sized piece of fruit, ½ cup chopped or canned fruit, 1/4 cup dried fruit, or 4 ounces (½ cup) of fruit juice. Choose fruit more often than fruit juice. · Eat 4 to 5 servings of vegetables each day. A serving is 1 cup of lettuce or raw leafy vegetables, ½ cup of chopped or cooked vegetables, or 4 ounces (½ cup) of vegetable juice. Choose vegetables more often than vegetable juice.   · Get 2 to 3 servings of low-fat and fat-free dairy each day. A serving is 8 ounces of milk, 1 cup of yogurt, or 1 ½ ounces of cheese. · Eat 6 to 8 servings of grains each day. A serving is 1 slice of bread, 1 ounce of dry cereal, or ½ cup of cooked rice, pasta, or cooked cereal. Try to choose whole-grain products as much as possible. · Limit lean meat, poultry, and fish to 2 servings each day. A serving is 3 ounces, about the size of a deck of cards. · Eat 4 to 5 servings of nuts, seeds, and legumes (cooked dried beans, lentils, and split peas) each week. A serving is 1/3 cup of nuts, 2 tablespoons of seeds, or ½ cup of cooked beans or peas. · Limit fats and oils to 2 to 3 servings each day. A serving is 1 teaspoon of vegetable oil or 2 tablespoons of salad dressing. · Limit sweets and added sugars to 5 servings or less a week. A serving is 1 tablespoon jelly or jam, ½ cup sorbet, or 1 cup of lemonade. · Eat less than 2,300 milligrams (mg) of sodium a day. If you limit your sodium to 1,500 mg a day, you can lower your blood pressure even more. Tips for success  · Start small. Do not try to make dramatic changes to your diet all at once. You might feel that you are missing out on your favorite foods and then be more likely to not follow the plan. Make small changes, and stick with them. Once those changes become habit, add a few more changes. · Try some of the following:  ? Make it a goal to eat a fruit or vegetable at every meal and at snacks. This will make it easy to get the recommended amount of fruits and vegetables each day. ? Try yogurt topped with fruit and nuts for a snack or healthy dessert. ? Add lettuce, tomato, cucumber, and onion to sandwiches. ? Combine a ready-made pizza crust with low-fat mozzarella cheese and lots of vegetable toppings. Try using tomatoes, squash, spinach, broccoli, carrots, cauliflower, and onions. ?  Have a variety of cut-up vegetables with a low-fat dip as an appetizer instead of chips and dip. ? Sprinkle sunflower seeds or chopped almonds over salads. Or try adding chopped walnuts or almonds to cooked vegetables. ? Try some vegetarian meals using beans and peas. Add garbanzo or kidney beans to salads. Make burritos and tacos with mashed oconnor beans or black beans. Where can you learn more? Go to http://randi-robert.info/. Enter L972 in the search box to learn more about \"DASH Diet: Care Instructions. \"  Current as of: July 22, 2018  Content Version: 11.9  © 4469-3376 [employer]. Care instructions adapted under license by [employer] (which disclaims liability or warranty for this information). If you have questions about a medical condition or this instruction, always ask your healthcare professional. [address] any warranty or liability for your use of this information.

## 2019-07-19 NOTE — PROGRESS NOTES
Subjective: (As above and below)     Chief Complaint   Patient presents with    Other     PPD placement    Elevated Blood Pressure     Katherine Severance is a 34y.o. year old female who presents to [facility]. She is 30 min late for appt, understands that she will need to return for full physical.    BP is extremely elevated. She reports history of HTN dx around age 32. Was on hctz and amlodipine, self stopped partially because \"they weren't working\" but her PCP also moved. She did not have any a/e w/ the meds before. .. She denies any chest pain, pressure, exertional symptoms, dizziness, blurred vision or changes in loc. 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 She requests ppd placement       BP Readings from Last 3 Encounters:   19 (!) 191/122   19 (!) 158/94   18 (!) 163/107         Reviewed PmHx, RxHx, FmHx, SocHx, AllgHx and updated in chart.   Family History   Problem Relation Age of Onset    Hypertension Maternal Grandmother        Past Medical History:   Diagnosis Date    Asthma     Asthma     has not had any problems no inhaler    Class 3 obesity in adult 2017    Essential hypertension 2017    H/O pilonidal cyst     Hidradenitis 2018    Psychiatric disorder     ANXIETY    Vaginal delivery       Social History     Socioeconomic History    Marital status: SINGLE     Spouse name: Not on file    Number of children: Not on file    Years of education: Not on file    Highest education level: Not on file   Tobacco Use    Smoking status: Former Smoker     Packs/day: 0.25     Types: Cigars     Last attempt to quit: 3/1/2011     Years since quittin.3    Smokeless tobacco: Never Used   Substance and Sexual Activity    Alcohol use: No    Drug use: No    Sexual activity: Yes     Partners: Male     Birth control/protection: Pill          Current Outpatient Medications   Medication Sig    chlorthalidone (HYGROTEN) 25 mg tablet Take 1 Tab by mouth daily. FOR BLOOD PRESSURE    amLODIPine (NORVASC) 10 mg tablet Take 1 Tab by mouth daily. FOR BLOOD PRESSURE     No current facility-administered medications for this visit. Review of Systems:   Constitutional:    Negative for fever and chills, negative diaphoresis. HEENT:              Negative for neck pain and stiffness. Eyes:                  Negative for visual disturbance, itching, redness or discharge. Respiratory:        Negative for cough and shortness of breath. Cardiovascular:  Negative for chest pain and palpitations. Gastrointestinal: Negative for nausea, vomiting, abdominal pain, diarrhea or constipation. Genitourinary:     Negative for dysuria and frequency. Musculoskeletal: Negative for falls, tenderness and swelling. Skin:                    Negative for rash, masses or lesions. Neurological:       Negative for dizzyness, seizure, loss of consciousness, weakness and numbness.      Objective:     Vitals:    07/19/19 1039 07/19/19 1105 07/19/19 1107   BP: (!) 199/119 (!) 191/111 (!) 191/122   Pulse: 92 90 88   Resp: 18     Temp: 98.5 °F (36.9 °C)     TempSrc: Oral     SpO2: 99%     Weight: 261 lb 1.6 oz (118.4 kg)     Height: 5' 1\" (1.549 m)         Results for orders placed or performed during the hospital encounter of 01/04/19   CULTURE, WOUND W GRAM STAIN   Result Value Ref Range    Special Requests: PILONIDAL PUS     GRAM STAIN FEW WBCS SEEN      GRAM STAIN MODERATE GRAM NEGATIVE RODS      GRAM STAIN FEW GRAM POSITIVE COCCI IN PAIRS      GRAM STAIN FEW GRAM POSITIVE COCCI IN CLUSTERS      Culture result: HEAVY ANAEROBIC DIPHTHEROIDS (A)      Culture result: (A)       HEAVY ANAEROBIC GRAM NEGATIVE RODS BETA LACTAMASE POSITIVE   CULTURE, ANAEROBIC   Result Value Ref Range    Special Requests: PILONIDAL PUS     Culture result: HEAVY ANAEROBIC DIPHTHEROIDS (A)      Culture result: (A)       HEAVY ANAEROBIC GRAM NEGATIVE RODS BETA LACTAMASE POSITIVE HCG URINE, QL. - POC   Result Value Ref Range    Pregnancy test,urine (POC) NEGATIVE  NEG           Physical Examination: General appearance - alert, well appearing, and in no distress and overweight  Chest - clear to auscultation, no wheezes, rales or rhonchi, symmetric air entry  Heart - normal rate, regular rhythm, normal S1, S2, no murmurs, rubs, clicks or gallops  Extremities - no pedal edema noted      Assessment/ Plan:     Follow-up and Dispositions    · Return in about 2 days (around 7/21/2019) for physical.       start BP meds ASAP  ED if any acute s/s  Low sodium diet  Discussed risks of BP this high including stroke/death    1. Accelerated hypertension    - METABOLIC PANEL, COMPREHENSIVE  - CBC W/O DIFF  - LIPID PANEL  - TSH 3RD GENERATION  - chlorthalidone (HYGROTEN) 25 mg tablet; Take 1 Tab by mouth daily. FOR BLOOD PRESSURE  Dispense: 80 Tab; Refill: 1  - amLODIPine (NORVASC) 10 mg tablet; Take 1 Tab by mouth daily. FOR BLOOD PRESSURE  Dispense: 80 Tab; Refill: 1    2. Weight gain    - TSH 3RD GENERATION    3. Screening examination for STD (sexually transmitted disease)    - HIV 1/2 AG/AB, 4TH GENERATION,W RFLX CONFIRM  - T PALLIDUM AB    4. Vitamin D deficiency    - VITAMIN D, 25 HYDROXY; Future    5. Encounter for PPD test    - AMB POC TUBERCULOSIS, INTRADERMAL (SKIN TEST)        I have discussed the diagnosis with the patient and the intended plan as seen in the above orders. The patient has received an after-visit summary and questions were answered concerning future plans. Pt conveyed understanding of plan. Medication Side Effects and Warnings were discussed with patient: yes  Patient Labs were reviewed: yes  Patient Past Records were reviewed:  yes    Tony Huang.  Ruperto Nevarez NP

## 2019-07-19 NOTE — PROGRESS NOTES
Chief Complaint   Patient presents with    Other     PPD placement    Elevated Blood Pressure     1. Have you been to the ER, urgent care clinic since your last visit? Hospitalized since your last visit? No    2. Have you seen or consulted any other health care providers outside of the [address] [place] [address] since your last visit? Include any pap smears or colon screening.  No       Pt denies pain at this time      3 most recent PHQ Screens 7/19/2019   PHQ Not Done -   Little interest or pleasure in doing things Several days   Feeling down, depressed, irritable, or hopeless Several days   Total Score PHQ 2 2 TB test pended - please approve

## 2019-07-21 LAB
25(OH)D3+25(OH)D2 SERPL-MCNC: 17.4 NG/ML (ref 30–100)
ALBUMIN SERPL-MCNC: 4.4 G/DL (ref 3.5–5.5)
ALBUMIN/GLOB SERPL: 1.5 {RATIO} (ref 1.2–2.2)
ALP SERPL-CCNC: 89 IU/L (ref 39–117)
ALT SERPL-CCNC: 22 IU/L (ref 0–32)
AST SERPL-CCNC: 17 IU/L (ref 0–40)
BILIRUB SERPL-MCNC: 0.3 MG/DL (ref 0–1.2)
BUN SERPL-MCNC: 13 MG/DL (ref 6–20)
BUN/CREAT SERPL: 15 (ref 9–23)
CALCIUM SERPL-MCNC: 9.4 MG/DL (ref 8.7–10.2)
CHLORIDE SERPL-SCNC: 104 MMOL/L (ref 96–106)
CHOLEST SERPL-MCNC: 148 MG/DL (ref 100–199)
CO2 SERPL-SCNC: 22 MMOL/L (ref 20–29)
CREAT SERPL-MCNC: 0.88 MG/DL (ref 0.57–1)
ERYTHROCYTE [DISTWIDTH] IN BLOOD BY AUTOMATED COUNT: 16.6 % (ref 12.3–15.4)
GLOBULIN SER CALC-MCNC: 2.9 G/DL (ref 1.5–4.5)
GLUCOSE SERPL-MCNC: 113 MG/DL (ref 65–99)
HCT VFR BLD AUTO: 36.8 % (ref 34–46.6)
HDLC SERPL-MCNC: 41 MG/DL
HGB BLD-MCNC: 11 G/DL (ref 11.1–15.9)
HIV 1+2 AB+HIV1 P24 AG SERPL QL IA: NON REACTIVE
INTERPRETATION, 910389: NORMAL
LDLC SERPL CALC-MCNC: 79 MG/DL (ref 0–99)
MCH RBC QN AUTO: 23.3 PG (ref 26.6–33)
MCHC RBC AUTO-ENTMCNC: 29.9 G/DL (ref 31.5–35.7)
MCV RBC AUTO: 78 FL (ref 79–97)
PLATELET # BLD AUTO: 317 X10E3/UL (ref 150–450)
POTASSIUM SERPL-SCNC: 3.7 MMOL/L (ref 3.5–5.2)
PROT SERPL-MCNC: 7.3 G/DL (ref 6–8.5)
RBC # BLD AUTO: 4.72 X10E6/UL (ref 3.77–5.28)
SODIUM SERPL-SCNC: 143 MMOL/L (ref 134–144)
T PALLIDUM AB SER QL IA: NEGATIVE
TRIGL SERPL-MCNC: 139 MG/DL (ref 0–149)
TSH SERPL DL<=0.005 MIU/L-ACNC: 0.69 UIU/ML (ref 0.45–4.5)
VLDLC SERPL CALC-MCNC: 28 MG/DL (ref 5–40)
WBC # BLD AUTO: 9.5 X10E3/UL (ref 3.4–10.8)

## 2019-07-22 LAB
MM INDURATION POC: 0 MM (ref 0–5)
PPD POC: NEGATIVE NEGATIVE

## 2019-07-22 NOTE — PROGRESS NOTES
PPD Reading Note  PPD read and results entered in [address]. Result: 0 mm induration. Interpretation: Negative  If test not read within 48-72 hours of initial placement, patient advised to repeat in other arm 1-3 weeks after this test.  Allergic reaction: NO    NO reaction noted.  Pt denies SOB, Swelling, or tenderness to injection site

## 2019-07-25 ENCOUNTER — OFFICE VISIT (OUTPATIENT)
Dept: INTERNAL MEDICINE CLINIC | Age: 29
End: 2019-07-25

## 2019-07-25 VITALS
HEIGHT: 61 IN | TEMPERATURE: 97.9 F | SYSTOLIC BLOOD PRESSURE: 139 MMHG | RESPIRATION RATE: 20 BRPM | HEART RATE: 90 BPM | DIASTOLIC BLOOD PRESSURE: 91 MMHG | BODY MASS INDEX: 47.78 KG/M2 | OXYGEN SATURATION: 98 % | WEIGHT: 253.1 LBS

## 2019-07-25 DIAGNOSIS — Z00.00 WELL WOMAN EXAM (NO GYNECOLOGICAL EXAM): Primary | ICD-10-CM

## 2019-07-25 DIAGNOSIS — I10 ESSENTIAL HYPERTENSION: ICD-10-CM

## 2019-07-25 DIAGNOSIS — F41.9 ANXIETY AND DEPRESSION: ICD-10-CM

## 2019-07-25 DIAGNOSIS — R29.818 SUSPECTED SLEEP APNEA: ICD-10-CM

## 2019-07-25 DIAGNOSIS — E66.01 OBESITY, MORBID (HCC): ICD-10-CM

## 2019-07-25 DIAGNOSIS — F39 MOOD DISORDER (HCC): ICD-10-CM

## 2019-07-25 DIAGNOSIS — F32.A ANXIETY AND DEPRESSION: ICD-10-CM

## 2019-07-25 RX ORDER — ESCITALOPRAM OXALATE 10 MG/1
10 TABLET ORAL DAILY
Qty: 45 TAB | Refills: 0 | Status: SHIPPED | OUTPATIENT
Start: 2019-07-25 | End: 2019-10-29 | Stop reason: SDUPTHER

## 2019-07-25 NOTE — PROGRESS NOTES
Subjective:   34 y.o. female for Well Woman Check. Patient Active Problem List   Diagnosis Code    Sebaceous cyst L72.3    Class 3 obesity in adult [id number]    Essential hypertension I10    Hidradenitis L73.2    Obesity, morbid ([facility] [address] E66.01     Patient Active Problem List    Diagnosis Date Noted    Obesity, morbid ([facility] [address] 03/01/2018    Hidradenitis 02/01/2018    Sebaceous cyst 11/16/2017    Class 3 obesity in adult 11/16/2017    Essential hypertension 11/16/2017     Current Outpatient Medications   Medication Sig Dispense Refill    escitalopram oxalate (LEXAPRO) 10 mg tablet Take 1 Tab by mouth daily. Take 1/2 tab daily x 1 week then increase to 1 tab daily 45 Tab 0    chlorthalidone (HYGROTEN) 25 mg tablet Take 1 Tab by mouth daily. FOR BLOOD PRESSURE 90 Tab 1    amLODIPine (NORVASC) 10 mg tablet Take 1 Tab by mouth daily. FOR BLOOD PRESSURE 90 Tab 1     Allergies   Allergen Reactions    Aleve [Naproxen Sodium] Hives    Bactrim [Sulfamethoprim Ds] Other (comments)     Abdominal pain.  Darvocet A500 [Propoxyphene N-Acetaminophen] Hives    Lortab [Hydrocodone-Acetaminophen] Nausea and Vomiting    Motrin [Ibuprofen] Hives    Naprosyn [Naproxen] Hives     Past Medical History:   Diagnosis Date    Asthma     Asthma     has not had any problems no inhaler    Class 3 obesity in adult 11/16/2017    Essential hypertension 11/16/2017    H/O pilonidal cyst     Hidradenitis 2/1/2018    Psychiatric disorder     ANXIETY    Vaginal delivery      Past Surgical History:   Procedure Laterality Date    HX APPENDECTOMY  2017    HX ORTHOPAEDIC      Broke l foot as child    HX OTHER SURGICAL      left foot on third toe     HX OTHER SURGICAL      Laparoscopic cholecystectomy.  HX OTHER SURGICAL  04/19/2016    Incision and drainage of pilonidal abscess; Dr. Willie Brandt.  HX OTHER SURGICAL  08/22/2016    Incision, drainage, and excision of pilonidal cyst; Dr. Willie Brandt.     HX OTHER SURGICAL 2018    Incision and drainage of pilonidal abscess; Dr. Tiffanie Cameron.  HX OTHER SURGICAL  02/15/2018    Excision of left axilla hidradenitis; Dr. Sharon Umaña.  HX OTHER SURGICAL  2017    Excision of sebaceous cyst, right inframammary fold; Dr. Sharon Umaña.  HX OTHER SURGICAL  2018    Incision and drainage of pilonidal abscess; Dr. Tiffanie Cameron. Family History   Problem Relation Age of Onset    Hypertension Maternal Grandmother      Social History     Tobacco Use    Smoking status: Former Smoker     Packs/day: 0.25     Types: Cigars     Last attempt to quit: 3/1/2011     Years since quittin.4    Smokeless tobacco: Never Used   Substance Use Topics    Alcohol use: No        Specific concerns today:     Hypertension ROS:  BP meds restarted at last visit. taking medications as instructed, no medication side effects noted, no TIAs, no chest pain on exertion, no dyspnea on exertion, no swelling of ankles. Still some high salt foods in her diet    Suspected sleep apnea: +snoring, night waking, witnessed apnea and daytime fatigue    Depression & anxiety: previously on lexapro and xanax. Previous psychiatrist no longer practicing. She reports crying spells. Some traumatic experiences in her past - watched a family member get killed. This started her panic attacks. She has strained relationship w/ the father of her children. She does report feeling safe. Objective: The patient appears well, alert, oriented x 3, in no distress. Visit Vitals  BP (!) 139/91 (BP 1 Location: Left arm, BP Patient Position: Sitting)   Pulse 90   Temp 97.9 °F (36.6 °C) (Oral)   Resp 20   Ht 5' 1\" (1.549 m)   Wt 253 lb 1.6 oz (114.8 kg)   LMP 2019 (Approximate)   SpO2 98%   BMI 47.82 kg/m²     Gen: Oriented to person, place and time and well-developed, well-nourished and in no distress. HEENT:    Head: normocephalic and atraumatic. Eyes:  EOM are normal. Pupils equal and round.     Neck:  Normal range of motion. Neck supple. Cardiovascular: normal rate, regular rhythm and normal heart sounds. Pulmonary/Chest:  Effort normal and breath sounds normal.  No respiratory distress. No wheezes, no rales. Musculoskeletal:  No edema, no tenderness. No calf tenderness or edema. Neurological:  Alert, oriented to person, place and time. Skin: skin is warm and dry. Assessment/Plan:   Well Woman  increase physical activity  Follow-up and Dispositions    · Return in about 6 weeks (around 9/5/2019) for depression f/u  . Irregular menses. Pt denies any chance of pregnancy. Discussed current meds are not safe during pregnancy  Reviewed labs - [facility] [address] start daily women's one a day vitamin- she has this at home    1. Well woman exam (no gynecological exam)    - REFERRAL TO OBSTETRICS AND GYNECOLOGY    2. Anxiety and depression  Discussed that we will try to avoid benzo use    - escitalopram oxalate (LEXAPRO) 10 mg tablet; Take 1 Tab by mouth daily. Take 1/2 tab daily x 1 week then increase to 1 tab daily  Dispense: 45 Tab; Refill: 0  - REFERRAL TO PSYCHOLOGY    3. Essential hypertension  Cont same meds, reviewed low sodium diet    4. Suspected sleep apnea    - SLEEP MEDICINE REFERRAL    5. Mood disorder ([facility] [address]    - REFERRAL TO PSYCHOLOGY      6. Obesity, morbid ([facility] [address]  I have reviewed/discussed the above normal BMI with the patient. I have recommended the following interventions: dietary management education, guidance, and counseling and encourage exercise . Magali Biggs

## 2019-07-25 NOTE — PROGRESS NOTES
Pt here for   Chief Complaint   Patient presents with    Complete Physical     1. Have you been to the ER, urgent care clinic since your last visit? Hospitalized since your last visit? No    2. Have you seen or consulted any other health care providers outside of the [address] [place] [address] since your last visit? Include any pap smears or colon screening.  No       Pt denies pain at this time      3 most recent PHQ Screens 7/25/2019   PHQ Not Done -   Little interest or pleasure in doing things Several days   Feeling down, depressed, irritable, or hopeless Several days   Total Score PHQ 2 2

## 2019-07-25 NOTE — PATIENT INSTRUCTIONS
DASH Diet: Care Instructions  Your Care Instructions    The DASH diet is an eating plan that can help lower your blood pressure. DASH stands for Dietary Approaches to Stop Hypertension. Hypertension is high blood pressure. The DASH diet focuses on eating foods that are high in calcium, potassium, and magnesium. These nutrients can lower blood pressure. The foods that are highest in these nutrients are fruits, vegetables, low-fat dairy products, nuts, seeds, and legumes. But taking calcium, potassium, and magnesium supplements instead of eating foods that are high in those nutrients does not have the same effect. The DASH diet also includes whole grains, fish, and poultry. The DASH diet is one of several lifestyle changes your doctor may recommend to lower your high blood pressure. Your doctor may also want you to decrease the amount of sodium in your diet. Lowering sodium while following the DASH diet can lower blood pressure even further than just the DASH diet alone. Follow-up care is a key part of your treatment and safety. Be sure to make and go to all appointments, and call your doctor if you are having problems. It's also a good idea to know your test results and keep a list of the medicines you take. How can you care for yourself at home? Following the DASH diet  · Eat 4 to 5 servings of fruit each day. A serving is 1 medium-sized piece of fruit, ½ cup chopped or canned fruit, 1/4 cup dried fruit, or 4 ounces (½ cup) of fruit juice. Choose fruit more often than fruit juice. · Eat 4 to 5 servings of vegetables each day. A serving is 1 cup of lettuce or raw leafy vegetables, ½ cup of chopped or cooked vegetables, or 4 ounces (½ cup) of vegetable juice. Choose vegetables more often than vegetable juice. · Get 2 to 3 servings of low-fat and fat-free dairy each day. A serving is 8 ounces of milk, 1 cup of yogurt, or 1 ½ ounces of cheese. · Eat 6 to 8 servings of grains each day.  A serving is 1 slice of bread, 1 ounce of dry cereal, or ½ cup of cooked rice, pasta, or cooked cereal. Try to choose whole-grain products as much as possible. · Limit lean meat, poultry, and fish to 2 servings each day. A serving is 3 ounces, about the size of a deck of cards. · Eat 4 to 5 servings of nuts, seeds, and legumes (cooked dried beans, lentils, and split peas) each week. A serving is 1/3 cup of nuts, 2 tablespoons of seeds, or ½ cup of cooked beans or peas. · Limit fats and oils to 2 to 3 servings each day. A serving is 1 teaspoon of vegetable oil or 2 tablespoons of salad dressing. · Limit sweets and added sugars to 5 servings or less a week. A serving is 1 tablespoon jelly or jam, ½ cup sorbet, or 1 cup of lemonade. · Eat less than 2,300 milligrams (mg) of sodium a day. If you limit your sodium to 1,500 mg a day, you can lower your blood pressure even more. Tips for success  · Start small. Do not try to make dramatic changes to your diet all at once. You might feel that you are missing out on your favorite foods and then be more likely to not follow the plan. Make small changes, and stick with them. Once those changes become habit, add a few more changes. · Try some of the following:  ? Make it a goal to eat a fruit or vegetable at every meal and at snacks. This will make it easy to get the recommended amount of fruits and vegetables each day. ? Try yogurt topped with fruit and nuts for a snack or healthy dessert. ? Add lettuce, tomato, cucumber, and onion to sandwiches. ? Combine a ready-made pizza crust with low-fat mozzarella cheese and lots of vegetable toppings. Try using tomatoes, squash, spinach, broccoli, carrots, cauliflower, and onions. ? Have a variety of cut-up vegetables with a low-fat dip as an appetizer instead of chips and dip. ? Sprinkle sunflower seeds or chopped almonds over salads. Or try adding chopped walnuts or almonds to cooked vegetables.   ? Try some vegetarian meals using beans and peas. Add garbanzo or kidney beans to salads. Make burritos and tacos with mashed oconnor beans or black beans. Where can you learn more? Go to http://randi-robert.info/. Enter Z380 in the search box to learn more about \"DASH Diet: Care Instructions. \"  Current as of: July 22, 2018  Content Version: 12.1  © 0369-6353 [employer], [place]. Care instructions adapted under license by [employer] (which disclaims liability or warranty for this information). If you have questions about a medical condition or this instruction, always ask your healthcare professional. [address] any warranty or liability for your use of this information.

## 2019-07-29 ENCOUNTER — TELEPHONE (OUTPATIENT)
Dept: SURGERY | Age: 29
End: 2019-07-29

## 2019-07-29 NOTE — TELEPHONE ENCOUNTER
I called over to Dr. Radonna Runner office [phone] and spoke with Jerzy Cortez and told her, per [facility], Ms. Brown, we had received a call from [facility] patient was having problem following cyst removal. She said she would call Dr. Kishore Rowland and let him know.

## 2019-07-30 ENCOUNTER — ANESTHESIA EVENT (OUTPATIENT)
Dept: SURGERY | Age: 29
End: 2019-07-30
Payer: MEDICAID

## 2019-07-30 ENCOUNTER — HOSPITAL ENCOUNTER (OUTPATIENT)
Age: 29
Setting detail: OUTPATIENT SURGERY
Discharge: HOME | End: 2019-07-30
Attending: COLON & RECTAL SURGERY | Admitting: COLON & RECTAL SURGERY
Payer: MEDICAID

## 2019-07-30 ENCOUNTER — ANESTHESIA (OUTPATIENT)
Dept: SURGERY | Age: 29
End: 2019-07-30
Payer: MEDICAID

## 2019-07-30 VITALS
DIASTOLIC BLOOD PRESSURE: 78 MMHG | BODY MASS INDEX: 47.77 KG/M2 | RESPIRATION RATE: 14 BRPM | OXYGEN SATURATION: 96 % | WEIGHT: 253 LBS | HEART RATE: 100 BPM | TEMPERATURE: 98.4 F | HEIGHT: 61 IN | SYSTOLIC BLOOD PRESSURE: 150 MMHG

## 2019-07-30 DIAGNOSIS — G89.18 ACUTE POST-OPERATIVE PAIN: Primary | ICD-10-CM

## 2019-07-30 LAB — HCG UR QL: NEGATIVE

## 2019-07-30 PROCEDURE — 76210000001 HC OR PH I REC 2.5 TO 3 HR: Performed by: COLON & RECTAL SURGERY

## 2019-07-30 PROCEDURE — 74011250636 HC RX REV CODE- 250/636: Performed by: COLON & RECTAL SURGERY

## 2019-07-30 PROCEDURE — 74011250636 HC RX REV CODE- 250/636

## 2019-07-30 PROCEDURE — 77030008684 HC TU ET CUF COVD -B: Performed by: ANESTHESIOLOGY

## 2019-07-30 PROCEDURE — 51798 US URINE CAPACITY MEASURE: CPT

## 2019-07-30 PROCEDURE — 76010000138 HC OR TIME 0.5 TO 1 HR: Performed by: COLON & RECTAL SURGERY

## 2019-07-30 PROCEDURE — 74011000250 HC RX REV CODE- 250: Performed by: COLON & RECTAL SURGERY

## 2019-07-30 PROCEDURE — 77030018836 HC SOL IRR NACL ICUM -A

## 2019-07-30 PROCEDURE — 77030011640 HC PAD GRND REM COVD -A: Performed by: COLON & RECTAL SURGERY

## 2019-07-30 PROCEDURE — 87205 SMEAR GRAM STAIN: CPT

## 2019-07-30 PROCEDURE — 74011250637 HC RX REV CODE- 250/637: Performed by: ANESTHESIOLOGY

## 2019-07-30 PROCEDURE — 74011000250 HC RX REV CODE- 250

## 2019-07-30 PROCEDURE — 74011250636 HC RX REV CODE- 250/636: Performed by: ANESTHESIOLOGY

## 2019-07-30 PROCEDURE — 76210000020 HC REC RM PH II FIRST 0.5 HR: Performed by: COLON & RECTAL SURGERY

## 2019-07-30 PROCEDURE — 76060000032 HC ANESTHESIA 0.5 TO 1 HR: Performed by: COLON & RECTAL SURGERY

## 2019-07-30 PROCEDURE — 77030008477 HC STYL SATN SLP COVD -A: Performed by: ANESTHESIOLOGY

## 2019-07-30 PROCEDURE — 81025 URINE PREGNANCY TEST: CPT

## 2019-07-30 PROCEDURE — 87075 CULTR BACTERIA EXCEPT BLOOD: CPT

## 2019-07-30 RX ORDER — AMOXICILLIN AND CLAVULANATE POTASSIUM 875; 125 MG/1; MG/1
1 TABLET, FILM COATED ORAL 2 TIMES DAILY
Qty: 10 TAB | Refills: 0 | Status: SHIPPED | OUTPATIENT
Start: 2019-07-30 | End: 2019-08-04

## 2019-07-30 RX ORDER — ONDANSETRON 2 MG/ML
INJECTION INTRAMUSCULAR; INTRAVENOUS AS NEEDED
Status: DISCONTINUED | OUTPATIENT
Start: 2019-07-30 | End: 2019-07-30 | Stop reason: HOSPADM

## 2019-07-30 RX ORDER — LEVOFLOXACIN 5 MG/ML
500 INJECTION, SOLUTION INTRAVENOUS ONCE
Status: COMPLETED | OUTPATIENT
Start: 2019-07-30 | End: 2019-07-30

## 2019-07-30 RX ORDER — BUPIVACAINE HYDROCHLORIDE AND EPINEPHRINE 2.5; 5 MG/ML; UG/ML
30 INJECTION, SOLUTION EPIDURAL; INFILTRATION; INTRACAUDAL; PERINEURAL ONCE
Status: COMPLETED | OUTPATIENT
Start: 2019-07-30 | End: 2019-07-30

## 2019-07-30 RX ORDER — SODIUM CHLORIDE 9 MG/ML
25 INJECTION, SOLUTION INTRAVENOUS CONTINUOUS
Status: DISCONTINUED | OUTPATIENT
Start: 2019-07-30 | End: 2019-07-31 | Stop reason: HOSPADM

## 2019-07-30 RX ORDER — DEXAMETHASONE SODIUM PHOSPHATE 4 MG/ML
INJECTION, SOLUTION INTRA-ARTICULAR; INTRALESIONAL; INTRAMUSCULAR; INTRAVENOUS; SOFT TISSUE AS NEEDED
Status: DISCONTINUED | OUTPATIENT
Start: 2019-07-30 | End: 2019-07-30 | Stop reason: HOSPADM

## 2019-07-30 RX ORDER — SODIUM CHLORIDE 0.9 % (FLUSH) 0.9 %
5-40 SYRINGE (ML) INJECTION AS NEEDED
Status: DISCONTINUED | OUTPATIENT
Start: 2019-07-30 | End: 2019-07-31 | Stop reason: HOSPADM

## 2019-07-30 RX ORDER — HYDROMORPHONE HYDROCHLORIDE 1 MG/ML
0.2 INJECTION, SOLUTION INTRAMUSCULAR; INTRAVENOUS; SUBCUTANEOUS
Status: ACTIVE | OUTPATIENT
Start: 2019-07-30 | End: 2019-07-30

## 2019-07-30 RX ORDER — ONDANSETRON 2 MG/ML
4 INJECTION INTRAMUSCULAR; INTRAVENOUS AS NEEDED
Status: DISCONTINUED | OUTPATIENT
Start: 2019-07-30 | End: 2019-07-31 | Stop reason: HOSPADM

## 2019-07-30 RX ORDER — SODIUM CHLORIDE 0.9 % (FLUSH) 0.9 %
5-40 SYRINGE (ML) INJECTION EVERY 8 HOURS
Status: DISCONTINUED | OUTPATIENT
Start: 2019-07-30 | End: 2019-07-31 | Stop reason: HOSPADM

## 2019-07-30 RX ORDER — DIPHENHYDRAMINE HYDROCHLORIDE 50 MG/ML
12.5 INJECTION, SOLUTION INTRAMUSCULAR; INTRAVENOUS AS NEEDED
Status: DISCONTINUED | OUTPATIENT
Start: 2019-07-30 | End: 2019-07-30 | Stop reason: HOSPADM

## 2019-07-30 RX ORDER — MIDAZOLAM HYDROCHLORIDE 1 MG/ML
1 INJECTION, SOLUTION INTRAMUSCULAR; INTRAVENOUS AS NEEDED
Status: DISCONTINUED | OUTPATIENT
Start: 2019-07-30 | End: 2019-07-30 | Stop reason: HOSPADM

## 2019-07-30 RX ORDER — OXYCODONE HYDROCHLORIDE 5 MG/1
5 TABLET ORAL AS NEEDED
Status: DISCONTINUED | OUTPATIENT
Start: 2019-07-30 | End: 2019-07-31 | Stop reason: HOSPADM

## 2019-07-30 RX ORDER — MORPHINE SULFATE 10 MG/ML
2 INJECTION, SOLUTION INTRAMUSCULAR; INTRAVENOUS
Status: DISCONTINUED | OUTPATIENT
Start: 2019-07-30 | End: 2019-07-31 | Stop reason: HOSPADM

## 2019-07-30 RX ORDER — FENTANYL CITRATE 50 UG/ML
25 INJECTION, SOLUTION INTRAMUSCULAR; INTRAVENOUS
Status: DISCONTINUED | OUTPATIENT
Start: 2019-07-30 | End: 2019-07-31 | Stop reason: HOSPADM

## 2019-07-30 RX ORDER — FENTANYL CITRATE 50 UG/ML
INJECTION, SOLUTION INTRAMUSCULAR; INTRAVENOUS AS NEEDED
Status: DISCONTINUED | OUTPATIENT
Start: 2019-07-30 | End: 2019-07-30 | Stop reason: HOSPADM

## 2019-07-30 RX ORDER — SODIUM CHLORIDE 9 MG/ML
25 INJECTION, SOLUTION INTRAVENOUS CONTINUOUS
Status: DISCONTINUED | OUTPATIENT
Start: 2019-07-30 | End: 2019-07-30 | Stop reason: HOSPADM

## 2019-07-30 RX ORDER — PHENYLEPHRINE HCL IN 0.9% NACL 0.4MG/10ML
SYRINGE (ML) INTRAVENOUS AS NEEDED
Status: DISCONTINUED | OUTPATIENT
Start: 2019-07-30 | End: 2019-07-30 | Stop reason: HOSPADM

## 2019-07-30 RX ORDER — FENTANYL CITRATE 50 UG/ML
50 INJECTION, SOLUTION INTRAMUSCULAR; INTRAVENOUS AS NEEDED
Status: DISCONTINUED | OUTPATIENT
Start: 2019-07-30 | End: 2019-07-30 | Stop reason: HOSPADM

## 2019-07-30 RX ORDER — EPHEDRINE SULFATE/0.9% NACL/PF 50 MG/5 ML
5 SYRINGE (ML) INTRAVENOUS AS NEEDED
Status: DISCONTINUED | OUTPATIENT
Start: 2019-07-30 | End: 2019-07-31 | Stop reason: HOSPADM

## 2019-07-30 RX ORDER — ACETAMINOPHEN 325 MG/1
650 TABLET ORAL ONCE
Status: COMPLETED | OUTPATIENT
Start: 2019-07-30 | End: 2019-07-30

## 2019-07-30 RX ORDER — SUCCINYLCHOLINE CHLORIDE 20 MG/ML
INJECTION INTRAMUSCULAR; INTRAVENOUS AS NEEDED
Status: DISCONTINUED | OUTPATIENT
Start: 2019-07-30 | End: 2019-07-30 | Stop reason: HOSPADM

## 2019-07-30 RX ORDER — ROCURONIUM BROMIDE 10 MG/ML
INJECTION, SOLUTION INTRAVENOUS AS NEEDED
Status: DISCONTINUED | OUTPATIENT
Start: 2019-07-30 | End: 2019-07-30 | Stop reason: HOSPADM

## 2019-07-30 RX ORDER — HYDROMORPHONE HYDROCHLORIDE 2 MG/ML
INJECTION, SOLUTION INTRAMUSCULAR; INTRAVENOUS; SUBCUTANEOUS AS NEEDED
Status: DISCONTINUED | OUTPATIENT
Start: 2019-07-30 | End: 2019-07-30 | Stop reason: HOSPADM

## 2019-07-30 RX ORDER — SODIUM CHLORIDE, SODIUM LACTATE, POTASSIUM CHLORIDE, CALCIUM CHLORIDE 600; 310; 30; 20 MG/100ML; MG/100ML; MG/100ML; MG/100ML
1000 INJECTION, SOLUTION INTRAVENOUS CONTINUOUS
Status: DISCONTINUED | OUTPATIENT
Start: 2019-07-30 | End: 2019-07-30 | Stop reason: HOSPADM

## 2019-07-30 RX ORDER — OXYCODONE HYDROCHLORIDE 5 MG/1
5-10 TABLET ORAL
Qty: 50 TAB | Refills: 0 | Status: SHIPPED | OUTPATIENT
Start: 2019-07-30 | End: 2019-08-06

## 2019-07-30 RX ORDER — MIDAZOLAM HYDROCHLORIDE 1 MG/ML
INJECTION, SOLUTION INTRAMUSCULAR; INTRAVENOUS AS NEEDED
Status: DISCONTINUED | OUTPATIENT
Start: 2019-07-30 | End: 2019-07-30 | Stop reason: HOSPADM

## 2019-07-30 RX ORDER — LIDOCAINE HYDROCHLORIDE 10 MG/ML
0.1 INJECTION, SOLUTION EPIDURAL; INFILTRATION; INTRACAUDAL; PERINEURAL AS NEEDED
Status: DISCONTINUED | OUTPATIENT
Start: 2019-07-30 | End: 2019-07-30 | Stop reason: HOSPADM

## 2019-07-30 RX ORDER — MIDAZOLAM HYDROCHLORIDE 1 MG/ML
0.5 INJECTION, SOLUTION INTRAMUSCULAR; INTRAVENOUS
Status: DISCONTINUED | OUTPATIENT
Start: 2019-07-30 | End: 2019-07-31 | Stop reason: HOSPADM

## 2019-07-30 RX ORDER — AMOXICILLIN AND CLAVULANATE POTASSIUM 875; 125 MG/1; MG/1
1 TABLET, FILM COATED ORAL 2 TIMES DAILY
Qty: 10 TAB | Refills: 0 | Status: SHIPPED | OUTPATIENT
Start: 2019-07-30 | End: 2019-07-30 | Stop reason: SDUPTHER

## 2019-07-30 RX ORDER — PROPOFOL 10 MG/ML
INJECTION, EMULSION INTRAVENOUS AS NEEDED
Status: DISCONTINUED | OUTPATIENT
Start: 2019-07-30 | End: 2019-07-30 | Stop reason: HOSPADM

## 2019-07-30 RX ORDER — LIDOCAINE HYDROCHLORIDE 20 MG/ML
INJECTION, SOLUTION EPIDURAL; INFILTRATION; INTRACAUDAL; PERINEURAL AS NEEDED
Status: DISCONTINUED | OUTPATIENT
Start: 2019-07-30 | End: 2019-07-30 | Stop reason: HOSPADM

## 2019-07-30 RX ORDER — DEXMEDETOMIDINE HYDROCHLORIDE 4 UG/ML
INJECTION, SOLUTION INTRAVENOUS AS NEEDED
Status: DISCONTINUED | OUTPATIENT
Start: 2019-07-30 | End: 2019-07-30 | Stop reason: HOSPADM

## 2019-07-30 RX ORDER — SODIUM CHLORIDE, SODIUM LACTATE, POTASSIUM CHLORIDE, CALCIUM CHLORIDE 600; 310; 30; 20 MG/100ML; MG/100ML; MG/100ML; MG/100ML
100 INJECTION, SOLUTION INTRAVENOUS CONTINUOUS
Status: DISCONTINUED | OUTPATIENT
Start: 2019-07-30 | End: 2019-07-31 | Stop reason: HOSPADM

## 2019-07-30 RX ORDER — ROPIVACAINE HYDROCHLORIDE 5 MG/ML
150 INJECTION, SOLUTION EPIDURAL; INFILTRATION; PERINEURAL AS NEEDED
Status: DISCONTINUED | OUTPATIENT
Start: 2019-07-30 | End: 2019-07-30 | Stop reason: HOSPADM

## 2019-07-30 RX ORDER — SODIUM CHLORIDE, SODIUM LACTATE, POTASSIUM CHLORIDE, CALCIUM CHLORIDE 600; 310; 30; 20 MG/100ML; MG/100ML; MG/100ML; MG/100ML
INJECTION, SOLUTION INTRAVENOUS
Status: DISCONTINUED | OUTPATIENT
Start: 2019-07-30 | End: 2019-07-30 | Stop reason: HOSPADM

## 2019-07-30 RX ADMIN — Medication 120 MCG: at 17:50

## 2019-07-30 RX ADMIN — ACETAMINOPHEN 650 MG: 325 TABLET ORAL at 17:18

## 2019-07-30 RX ADMIN — SODIUM CHLORIDE, SODIUM LACTATE, POTASSIUM CHLORIDE, AND CALCIUM CHLORIDE 1000 ML: 600; 310; 30; 20 INJECTION, SOLUTION INTRAVENOUS at 17:17

## 2019-07-30 RX ADMIN — Medication 80 MCG: at 17:53

## 2019-07-30 RX ADMIN — ONDANSETRON 4 MG: 2 INJECTION INTRAMUSCULAR; INTRAVENOUS at 18:55

## 2019-07-30 RX ADMIN — ONDANSETRON 4 MG: 2 INJECTION INTRAMUSCULAR; INTRAVENOUS at 18:00

## 2019-07-30 RX ADMIN — SUCCINYLCHOLINE CHLORIDE 160 MG: 20 INJECTION INTRAMUSCULAR; INTRAVENOUS at 17:34

## 2019-07-30 RX ADMIN — FENTANYL CITRATE 25 MCG: 50 INJECTION INTRAMUSCULAR; INTRAVENOUS at 19:41

## 2019-07-30 RX ADMIN — DEXAMETHASONE SODIUM PHOSPHATE 8 MG: 4 INJECTION, SOLUTION INTRA-ARTICULAR; INTRALESIONAL; INTRAMUSCULAR; INTRAVENOUS; SOFT TISSUE at 17:50

## 2019-07-30 RX ADMIN — LEVOFLOXACIN 500 MG: 5 INJECTION, SOLUTION INTRAVENOUS at 17:50

## 2019-07-30 RX ADMIN — DEXMEDETOMIDINE HYDROCHLORIDE 4 MCG: 4 INJECTION, SOLUTION INTRAVENOUS at 18:03

## 2019-07-30 RX ADMIN — ROCURONIUM BROMIDE 5 MG: 10 INJECTION, SOLUTION INTRAVENOUS at 17:34

## 2019-07-30 RX ADMIN — Medication 80 MCG: at 17:47

## 2019-07-30 RX ADMIN — FENTANYL CITRATE 100 MCG: 50 INJECTION, SOLUTION INTRAMUSCULAR; INTRAVENOUS at 17:34

## 2019-07-30 RX ADMIN — OXYCODONE HYDROCHLORIDE 5 MG: 5 TABLET ORAL at 20:40

## 2019-07-30 RX ADMIN — FENTANYL CITRATE 25 MCG: 50 INJECTION INTRAMUSCULAR; INTRAVENOUS at 19:37

## 2019-07-30 RX ADMIN — SODIUM CHLORIDE, SODIUM LACTATE, POTASSIUM CHLORIDE, CALCIUM CHLORIDE: 600; 310; 30; 20 INJECTION, SOLUTION INTRAVENOUS at 17:34

## 2019-07-30 RX ADMIN — LIDOCAINE HYDROCHLORIDE 80 MG: 20 INJECTION, SOLUTION EPIDURAL; INFILTRATION; INTRACAUDAL; PERINEURAL at 17:34

## 2019-07-30 RX ADMIN — FENTANYL CITRATE 25 MCG: 50 INJECTION INTRAMUSCULAR; INTRAVENOUS at 19:46

## 2019-07-30 RX ADMIN — HYDROMORPHONE HYDROCHLORIDE 0.25 MG: 2 INJECTION, SOLUTION INTRAMUSCULAR; INTRAVENOUS; SUBCUTANEOUS at 18:06

## 2019-07-30 RX ADMIN — FENTANYL CITRATE 50 MCG: 50 INJECTION, SOLUTION INTRAMUSCULAR; INTRAVENOUS at 18:01

## 2019-07-30 RX ADMIN — PROPOFOL 250 MG: 10 INJECTION, EMULSION INTRAVENOUS at 17:34

## 2019-07-30 RX ADMIN — MIDAZOLAM HYDROCHLORIDE 2 MG: 1 INJECTION, SOLUTION INTRAMUSCULAR; INTRAVENOUS at 17:28

## 2019-07-30 RX ADMIN — Medication 80 MCG: at 17:45

## 2019-07-30 RX ADMIN — DEXMEDETOMIDINE HYDROCHLORIDE 4 MCG: 4 INJECTION, SOLUTION INTRAVENOUS at 17:58

## 2019-07-30 NOTE — ROUTINE PROCESS
Patient: Dennis Araujo MRN: [id number]  SSN: [id number]   YOB: 1990  Age: 34 y.o. Sex: female     Patient is status post Procedure(s):  INCISION AND DRAINAGE PILONIDAL ABCESS.     Surgeon(s) and Role:     * Wilton Chance MD - Primary    Local/Dose/Irrigation:  4ml marcaine 0.25% with epi                  Peripheral IV 07/30/19 Left Antecubital (Active)   Site Assessment Clean, dry, & intact 7/30/2019  5:16 PM   Phlebitis Assessment 0 7/30/2019  5:16 PM   Infiltration Assessment 0 7/30/2019  5:16 PM   Dressing Status Clean, dry, & intact 7/30/2019  5:16 PM   Dressing Type Transparent 7/30/2019  5:16 PM   Hub Color/Line Status Pink 7/30/2019  5:16 PM   Alcohol Cap Used Yes 7/30/2019  5:16 PM                           Dressing/Packing:  Wound Buttocks-Dressing Type: Packing;4 x 4;ABD pad(MEDIPORE) (07/30/19 1806)    Splint/Cast:  ]    Other:  NA

## 2019-07-30 NOTE — ANESTHESIA PREPROCEDURE EVALUATION
Relevant Problems   No relevant active problems       Anesthetic History   No history of anesthetic complications            Review of Systems / Medical History  Patient summary reviewed, nursing notes reviewed and pertinent labs reviewed    Pulmonary            Asthma        Neuro/Psych         Psychiatric history     Cardiovascular    Hypertension                   GI/Hepatic/Renal  Within defined limits              Endo/Other        Morbid obesity     Other Findings              Physical Exam    Airway  Mallampati: II  TM Distance: > 6 cm  Neck ROM: normal range of motion   Mouth opening: Normal     Cardiovascular  Regular rate and rhythm,  S1 and S2 normal,  no murmur, click, rub, or gallop             Dental  No notable dental hx       Pulmonary  Breath sounds clear to auscultation               Abdominal  GI exam deferred       Other Findings            Anesthetic Plan    ASA: 3  Anesthesia type: general          Induction: Intravenous  Anesthetic plan and risks discussed with: Patient

## 2019-07-30 NOTE — H&P
History and Physical (outpatient)    Patient: Ana Trevino 34 y.o. female     Chief Complaint:  Recurrent pilonidal abscess. History of Present Illness: The patient is a 79-year-old female with a history of multiple pilonidal and other soft tissue abscesses. Her most recent surgical procedure was the incision and drainage of a recurrent pilonidal abscess that I performed on 1/4/2019. She called yesterday (while I was on vacation) to report that she was having recurrent symptoms. I asked her to come to my office today for evaluation, and she did so. She has had worsening pain and swelling for the last 2 to 3 days, and the examination in the office revealed an obvious recurrence of the pilonidal abscess with left paramedian post-sacral tenderness, induration, erythema, fluctuance, and thick purulent drainage. The need for urgent incision and drainage was of course well understood as were the risks of the procedure. History:  Past Medical History:   Diagnosis Date    Asthma     Asthma     has not had any problems no inhaler    Class 3 obesity in adult 11/16/2017    Essential hypertension 11/16/2017    H/O pilonidal cyst     Hidradenitis 2/1/2018    Psychiatric disorder     ANXIETY    Vaginal delivery        Past Surgical History:   Procedure Laterality Date    HX APPENDECTOMY  2017    HX ORTHOPAEDIC      Broke l foot as child    HX OTHER SURGICAL      left foot on third toe     HX OTHER SURGICAL      Laparoscopic cholecystectomy.  HX OTHER SURGICAL  04/19/2016    Incision and drainage of pilonidal abscess; Dr. Vianney Floyd.  HX OTHER SURGICAL  08/22/2016    Incision, drainage, and excision of pilonidal cyst; Dr. Vianney Floyd.  HX OTHER SURGICAL  04/17/2018    Incision and drainage of pilonidal abscess; Dr. Vianney Floyd.  HX OTHER SURGICAL  02/15/2018    Excision of left axilla hidradenitis; Dr. Amanda Antony.     HX OTHER SURGICAL  12/21/2017    Excision of sebaceous cyst, right inframammary fold; Dr. Yung Arana.  HX OTHER SURGICAL  2018    Incision and drainage of pilonidal abscess; Dr. See Ralph.  HX OTHER SURGICAL  2019    Incision and drainage of pilonidal abscess; Dr. See Ralph. Family History   Problem Relation Age of Onset    Hypertension Maternal Grandmother      Social History     Socioeconomic History    Marital status: SINGLE     Spouse name: Not on file    Number of children: Not on file    Years of education: Not on file    Highest education level: Not on file   Occupational History    Not on file   Social Needs    Financial resource strain: Not on file    Food insecurity:     Worry: Not on file     Inability: Not on file    Transportation needs:     Medical: Not on file     Non-medical: Not on file   Tobacco Use    Smoking status: Former Smoker     Packs/day: 0.25     Types: Cigars     Last attempt to quit: 3/1/2011     Years since quittin.4    Smokeless tobacco: Never Used   Substance and Sexual Activity    Alcohol use: No    Drug use: No    Sexual activity: Yes     Partners: Male     Birth control/protection: Pill   Lifestyle    Physical activity:     Days per week: Not on file     Minutes per session: Not on file    Stress: Not on file   Relationships    Social connections:     Talks on phone: Not on file     Gets together: Not on file     Attends [employer] service: Not on file     Active member of club or organization: Not on file     Attends meetings of clubs or organizations: Not on file     Relationship status: Not on file    Intimate partner violence:     Fear of current or ex partner: Not on file     Emotionally abused: Not on file     Physically abused: Not on file     Forced sexual activity: Not on file   Other Topics Concern    Not on file   Social History Narrative    Not on file       Allergies:    Allergies   Allergen Reactions    Aleve [Naproxen Sodium] Hives    Bactrim [Sulfamethoprim Ds] Other (comments) Abdominal pain.  Darvocet A500 [Propoxyphene N-Acetaminophen] Hives    Lortab [Hydrocodone-Acetaminophen] Nausea and Vomiting    Motrin [Ibuprofen] Hives    Naprosyn [Naproxen] Hives       Current Medications:  Cannot display prior to admission medications because the patient has not been admitted in this contact. No current facility-administered medications for this encounter. Physical Exam:  There were no vitals taken for this visit. GENERAL:  Uncomfortable female. LUNGS:  Clear to auscultation bilaterally. HEART:  Regular rate and rhythm with no murmurs, gallops, or rubs. NEUROLOGIC: Alert and oriented. No gross deficits. Alerts:    Hospital Problems  Date Reviewed: 6/14/2018    None          Laboratory:    No results for input(s): HGB, HCT, WBC, PLT, INR, BUN, CREA, K, CRCLT, HGBEXT, HCTEXT, PLTEXT, HGBEXT, HCTEXT, PLTEXT in the last 72 hours. No lab exists for component: PTT, PT, INREXT, INREXT    Assessment and Plan:  The patient has yet another recurrent pilonidal abscess for which urgent incision and drainage is indicated. She understands the risks, she has agreed to proceed.

## 2019-07-30 NOTE — DISCHARGE INSTRUCTIONS
Post-Operative Instructions        1. Diet:  Resume your usual diet and drink plenty of fluids. 2. Medications: Take pain medication (oxycodone) and the antibiotic (Augmentin) as prescribed. Do not drive, drink alcohol, or operate machinery while taking prescription pain medication. Take docusate (stool softener) twice per day as directed while taking the oxycodone. You should take up to 1000 mg of Tylenol every 6 hours to help you use less of the oxycodone. You may take ibuprofen as directed in addition to or instead of the prescription medication if there has been no significant bleeding for at least two days. Do not take pain medication on an empty stomach. Do not take aspirin for 10 days following the procedure. 3. Activity:  Do not engage in any heavy lifting or other strenuous activity until you have been seen for follow-up. You may walk as much as you want, and you may climb stairs. Frequent walking will help prevent constipation.     4. Hygiene and Wound Care:  Remove the external dressing tomorrow morning. Moisten the packing with saline, then remove it. It is approximately 13 feet long this time. The wound should then be gently re-packed with saline-moistened gauze and covered with a dry dressing. It is not necessary to insert as much gauze as was used during the operation, but as you know the most important thing is to try to keep the skin wound from closing until the cavity underneath has healed in from the bottom and the sides. If the skin wound closes too soon, there will be a greater chance of recurrent problems. The packing change procedure should be performed twice per day. You may shower with the wound covered and then change the dressing afterwards, but you should not bathe or otherwise submerge the area. 5. Constipation:  If more than one day passes without a bowel movement, take 1 Tablespoon of Milk of Magnesia. Repeat in 6 hours if you have no results.   If you still have not moved your bowels, take two to four 5 mg bisacodyl tablets. If you still have not moved your bowels by the day after taking the bisacodyl, call my office. 6. Bleeding:  A small amount of bright red bleeding can be expected for the next several days. If bleeding appears be continuous, call my office. 7. Other Problems:  If you experience intolerable pain, fever (temperature of 101 or more), or inability to urinate, call my office.     8. Questions: If you have any questions about your post-operative condition or care, do not hesitate to call my office. 9. Work:  You may return to work in one week if feeling able. 10. Follow-up:  Please return to my office at 9:45 AM on Tuesday 8/13/2019. If you need to change or cancel the appointment, please call [phone] on a weekday tomorrow between 9:00 AM and noon. Whatever you do, do not miss your upcoming plastic surgery consultation appointment.  For anything other than scheduling, please call [phone].  Yola Millard M.D.  [phone]

## 2019-07-30 NOTE — BRIEF OP NOTE
BRIEF OPERATIVE NOTE    Date of Procedure:  7/30/2019   Preoperative Diagnosis:  Pilonidal abscess  Postoperative Diagnosis:  Pilonidal abscess  Procedure: Incision and drainage of pilonidal abscess. Surgeon:  Angie Grant MD  Assistant:  None. Anesthesia:  General endotracheal  Estimated Blood Loss:    Crystalloid:  350 mL  Specimens:   ID Type Source Tests Collected by Time Destination   Pus Wound Cyst CULTURE, ANAEROBIC AND AEROBIC Cindy Ellis MD 7/30/2019 1755 Microbiology     Tubes and Drains:  None. Findings:  Large pilonidal abscess draining spontaneously via a left paramedian post-sacral skin opening. Complications:  None apparent. Implants:  None.

## 2019-07-31 NOTE — OP NOTES
[facility]  OPERATIVE REPORT    Name:  Jamison Wilkes  MR#:  [id number]  :  1990  ACCOUNT #:  [de-identified]    DATE OF SERVICE:  2019    PREOPERATIVE DIAGNOSIS:  Pilonidal abscess. POSTOPERATIVE DIAGNOSIS:  Pilonidal abscess. PROCEDURE PERFORMED:  Incision and drainage of pilonidal abscess. SURGEON:  Rudy Nelson MD    ASSISTANT:  None. ANESTHESIA:  General endotracheal.    SPECIMENS REMOVED:  Pus for Gram stain and culture. TUBES AND DRAINS:  Done. ESTIMATED BLOOD LOSS:  Less than 20 mL. CRYSTALLOID:  350 mL. COMPLICATIONS:  None apparent. IMPLANTS:  None. INDICATION FOR PROCEDURE:  The patient is a 40-year-old female with history of multiple pilonidal and other soft tissue abscesses. Her most recent surgical procedure was the incision and drainage of a recurrent pilonidal abscess that I performed on 2019. She called yesterday to report that she was having recurrent symptoms. She presented to the office today for evaluation and was found to have an obvious recurrence of the pilonidal abscess with left paramedian post-sacral tenderness, induration, erythema, fluctuance, and thick purulent drainage. Symptoms have been worsening over the last 2 to 3 days, and the need for urgent incision and drainage was, of course, well understood as were the risks of the procedure. The patient, therefore, agreed to undergo surgery and was added on for the same day. OPERATIVE FINDINGS:  There was a large pilonidal abscess draining spontaneously via a left paramedian post-sacral skin opening. DESCRIPTION OF THE PROCEDURE:  After informed consent was obtained, the patient was taken to the operating room where standard monitoring devices were attached and adequate general endotracheal anesthesia was induced.   She was then placed in the prone jackknife position on the operating table with all pressure points padded appropriately and with the lower extremities fitted with pneumatic compression stockings. The buttocks were retracted bilaterally using Mastisol and tape. The post-sacral area and buttocks were prepped and draped in the usual sterile fashion, and 500 mg of Levaquin had already been administered parenterally. The existing left paramedian post-sacral skin opening was gently probed. The specimen was collected for Gram stain and culture. 0.25% bupivacaine with epinephrine was infiltrated to provide some post-operative analgesia and to improve hemostasis during the dissection. A scalpel was then used to make an ellipsoid skin incision that included the existing opening and left paramedian post-sacral area. The skin and subcutaneous tissue were then excised using the electrocautery. Some dissection into the abscess cavity itself was also performed, and portions of the lining of the cyst were removed. No attempt was made, however, to completely excise the cyst.  The pus was evacuated using the suction. The cavity was copiously irrigated with saline. The irrigant was evacuated. Hemostasis was achieved by the application of electrocautery. The cavity was then packed with approximately 13.5 feet of 1/2-inch plain Nu-Gauze and a dry dressing consisting of 4x4s and an ABD was put in place. There were no apparent complications, and the patient appeared to have tolerated the procedure well. At its conclusion, she was extubated and transported in stable condition to the recovery room.           Luis Angel Massey MD      [id number]  D:  07/30/2019 23:38  T:  07/31/2019 2:46  JOB #:  [id number]  CC:  DANNY Verdugo MD

## 2019-08-02 LAB
BACTERIA SPEC CULT: ABNORMAL
BACTERIA SPEC CULT: ABNORMAL
GRAM STN SPEC: ABNORMAL
GRAM STN SPEC: ABNORMAL
SERVICE CMNT-IMP: ABNORMAL
SERVICE CMNT-IMP: ABNORMAL

## 2019-09-18 ENCOUNTER — OFFICE VISIT (OUTPATIENT)
Dept: OBGYN CLINIC | Age: 29
End: 2019-09-18

## 2019-09-18 VITALS
OXYGEN SATURATION: 98 % | SYSTOLIC BLOOD PRESSURE: 137 MMHG | BODY MASS INDEX: 49.47 KG/M2 | TEMPERATURE: 98.6 F | WEIGHT: 262 LBS | DIASTOLIC BLOOD PRESSURE: 96 MMHG | HEIGHT: 61 IN | RESPIRATION RATE: 18 BRPM | HEART RATE: 94 BPM

## 2019-09-18 DIAGNOSIS — Z12.4 PAP SMEAR FOR CERVICAL CANCER SCREENING: ICD-10-CM

## 2019-09-18 DIAGNOSIS — N62 LARGE BREASTS: ICD-10-CM

## 2019-09-18 DIAGNOSIS — G89.29 CHRONIC THORACIC BACK PAIN, UNSPECIFIED BACK PAIN LATERALITY: ICD-10-CM

## 2019-09-18 DIAGNOSIS — Z01.419 ENCOUNTER FOR GYNECOLOGICAL EXAMINATION WITHOUT ABNORMAL FINDING: Primary | ICD-10-CM

## 2019-09-18 DIAGNOSIS — N92.1 MENORRHAGIA WITH IRREGULAR CYCLE: ICD-10-CM

## 2019-09-18 DIAGNOSIS — M54.6 CHRONIC THORACIC BACK PAIN, UNSPECIFIED BACK PAIN LATERALITY: ICD-10-CM

## 2019-09-18 RX ORDER — TRANEXAMIC ACID 650 1/1
1300 TABLET ORAL 3 TIMES DAILY
Qty: 30 TAB | Refills: 12 | Status: SHIPPED | OUTPATIENT
Start: 2019-09-18 | End: 2019-09-23

## 2019-09-18 NOTE — PATIENT INSTRUCTIONS
Breast Reduction: Before Your Surgery  What is breast reduction? Breast reduction surgery removes a lot of the breast tissue and skin from the breasts. This reshapes and lifts the breasts and reduces their size. It can also make the dark area around the nipple smaller. Your doctor makes a cut around the dark area and down to the crease under the breast. The cut is called an incision. To reduce the breast size, the doctor removes extra skin and breast tissue. The doctor sews the remaining skin together. This tightens and lifts the breasts. The surgery may pull the nipples and the area around them into a different spot on the breasts. Your doctor may need to move your nipples higher. You may lose some feeling in them because of this. The doctor closes the incisions with stitches. After surgery, your breasts will weigh less. But you may have lasting scars on your breasts. And you may have less feeling in your breasts and nipples. Breast reduction may make it hard to breastfeed. Breast reduction surgery is usually done in a hospital or surgical center. You will likely be asleep for your surgery. You will probably be able to go home the same day. Depending on the type of work you do, you should be able to go back to work or your normal routine in 2 to 3 weeks. The incisions leave scars that usually fade a lot with time. Follow-up care is a key part of your treatment and safety. Be sure to make and go to all appointments, and call your doctor if you are having problems. It's also a good idea to know your test results and keep a list of the medicines you take. What happens before surgery?   Surgery can be stressful. This information will help you understand what you can expect. And it will help you safely prepare for surgery.   Preparing for surgery    · Understand exactly what surgery is planned, along with the risks, benefits, and other options.     · Tell your doctors ALL the medicines, vitamins, supplements, and herbal remedies you take. Some of these can increase the risk of bleeding or interact with anesthesia.     · If you take blood thinners, such as warfarin (Coumadin), clopidogrel (Plavix), or aspirin, be sure to talk to your doctor. He or she will tell you if you should stop taking these medicines before your surgery. Make sure that you understand exactly what your doctor wants you to do.     · Your doctor will tell you which medicines to take or stop before your surgery. You may need to stop taking certain medicines a week or more before surgery. So talk to your doctor as soon as you can.     · If you have an advance directive, let your doctor know. It may include a living will and a durable power of  for health care. Bring a copy to the hospital. If you don't have one, you may want to prepare one. It lets your doctor and loved ones know your health care wishes. Doctors advise that everyone prepare these papers before any type of surgery or procedure. What happens on the day of surgery? · Follow the instructions exactly about when to stop eating and drinking. If you don't, your surgery may be canceled. If your doctor told you to take your medicines on the day of surgery, take them with only a sip of water.     · Take a bath or shower before you come in for your surgery. Do not apply lotions, perfumes, deodorants, or nail polish.     · Do not shave the surgical site yourself.     · Take off all jewelry and piercings. And take out contact lenses, if you wear them.    At the hospital or surgery center   · Bring a picture ID.     · Your doctor will use a marker to draw lines on your breasts. He or she will use these lines during surgery to reshape your breasts.     · You will be kept comfortable and safe by your anesthesia provider. The anesthesia may make you sleep.  Or it may just numb the area being worked on.     · The surgery will take about 2 to 4 hours.     · You may have drain tubes in your breasts. Going home   · Be sure you have someone to drive you home. Anesthesia and pain medicine make it unsafe for you to drive.     · You will be given more specific instructions about recovering from your surgery. They will cover things like diet, wound care, follow-up care, driving, and getting back to your normal routine. When should you call your doctor? · You have questions or concerns.     · You don't understand how to prepare for your surgery.     · You become ill before the surgery (such as fever, flu, or a cold).     · You need to reschedule or have changed your mind about having the surgery. Where can you learn more? Go to http://randiDine Marketrobert.info/. Enter E381 in the search box to learn more about \"Breast Reduction: Before Your Surgery. \"  Current as of: April 1, 2019  Content Version: 12.1  © 1250-8970 [employer]. Care instructions adapted under license by [employer] (which disclaims liability or warranty for this information). If you have questions about a medical condition or this instruction, always ask your healthcare professional. [address] any warranty or liability for your use of this information. Breast Reduction: What to Expect at Home  Your Recovery  Breast reduction surgery removes some of the breast tissue and skin from the breasts. This reshapes and lifts the breasts and reduces their size. It can also make the dark area around the nipple smaller. After surgery, you will probably feel weak. You may feel sore for 2 to 3 weeks. You also may feel pulling or stretching in your breast area. Although you may need pain medicine for a week or two, you can expect to feel better and stronger each day. For several weeks, you may get tired easily or have less energy than usual. You also may have the feeling that fluid is moving in your breasts. This feeling is normal and will go away over time.   Stitches usually are removed in 5 to 10 days. Your new breasts may feel firmer and look rounder. Breast reduction may change the normal feeling in your breast. But in time, some feeling may return. Keep in mind that it may take time to get used to your new breasts. You will have swelling at first, but the breasts will soften and develop better shape over time. This care sheet gives you a general idea about how long it will take for you to recover. But each person recovers at a different pace. Follow the steps below to get better as quickly as possible. How can you care for yourself at home? Activity    · Rest when you feel tired. Getting enough sleep will help you recover.     · For about 2 weeks after surgery, avoid lifting anything that would make you strain. This may include heavy grocery bags and milk containers, a heavy briefcase or backpack, cat litter or dog food bags, a vacuum , or a child. Do not lift anything over your head for 2 to 3 weeks.     · Ask your doctor when you can drive again.     · Ask your doctor when it is okay for you to have sex.     · You can take your first shower the day after your drain or bandage is removed. This is usually within about 1 week. Sometimes doctors say it is okay to shower the day after surgery. Do not take a bath or soak in a hot tub for about 4 weeks.     · You will probably be able to go back to work or your normal routine in 2 to 3 weeks. This depends on the type of work you do and any further treatment. Diet    · You can eat your normal diet. If your stomach is upset, try bland, low-fat foods like plain rice, broiled chicken, toast, and yogurt.     · Drink plenty of fluids (unless your doctor tells you not to).     · You may notice that your bowel movements are not regular right after your surgery. This is common. Try to avoid constipation and straining with bowel movements. Take a fiber supplement.  If you have not had a bowel movement after a couple of days, take a mild laxative. Medicines    · Your doctor will tell you if and when you can restart your medicines. He or she will also give you instructions about taking any new medicines.     · If you take blood thinners, such as warfarin (Coumadin), clopidogrel (Plavix), or aspirin, be sure to talk to your doctor. He or she will tell you if and when to start taking those medicines again. Make sure that you understand exactly what your doctor wants you to do.     · Take pain medicines exactly as directed. ? If the doctor gave you a prescription medicine for pain, take it as prescribed. ? If you are not taking a prescription pain medicine, ask your doctor if you can take an over-the-counter medicine.     · If you think your pain medicine is making you sick to your stomach:  ? Take your medicine after meals (unless your doctor has told you not to). ? Ask your doctor for a different pain medicine.     · If you were given medicine for nausea, take it as directed.     · If your doctor prescribed antibiotics, take them as directed. Do not stop taking them just because you feel better. You need to take the full course of antibiotics. Incision care    · If your doctor gave you specific instructions on how to care for your incision, follow those instructions.     · You may be wearing a special bra that holds your bandages in place after the surgery. Your doctor will tell you when you can stop wearing the bra. Your doctor may want you to wear the bra at night as well as during the day for several weeks. Do not wear an underwire bra for 1 month.     · If you have strips of tape on your incision, leave the tape on for a week or until it falls off. Or follow your doctor's instructions for removing the tape.     · Wash the area daily with warm, soapy water, and pat it dry. Don't use hydrogen peroxide or alcohol, which can slow healing.     · You may cover the area with a gauze bandage if it weeps or rubs against clothing.  Change the bandage every day. Consider having someone help you with this. Exercise    · Try to walk each day. Start by walking a little more than you did the day before. Bit by bit, increase the amount you walk. Walking boosts blood flow and helps prevent pneumonia and constipation.     · Avoid strenuous activities, such as bicycle riding, jogging, weight lifting, or aerobic exercise, until your doctor says it is okay.     · Your doctor will tell you when to begin stretching exercises and normal activities.    Ice    · Put ice or a cold pack over your breast for 10 to 20 minutes at a time. Try to do this every 1 to 2 hours for the next 3 days (when you are awake) or until the swelling goes down. Put a thin cloth between the ice and your skin. Other instructions    · You may have one or more drains near your incisions. Your doctor will tell you how to take care of them. Drains are usually removed in the first week after surgery. Follow-up care is a key part of your treatment and safety. Be sure to make and go to all appointments, and call your doctor if you are having problems. It's also a good idea to know your test results and keep a list of the medicines you take. When should you call for help? Call 911 anytime you think you may need emergency care. For example, call if:    · You passed out (lost consciousness).     · You have sudden chest pain and shortness of breath, or you cough up blood.    Call your doctor now or seek immediate medical care if:    · You have pain that does not get better after you take pain medicine.     · You have loose stitches, or your incision comes open.     · You are bleeding from the incision.     · You have signs of infection, such as:  ? Increased pain, swelling, warmth, or redness. ? Red streaks leading from the incision. ? Pus draining from the incision. ? A fever.     · You have signs of a blood clot in your leg, such as:  ? Pain in your calf, back of the knee, thigh, or groin. ?  Redness and swelling in your leg or groin.    Watch closely for any changes in your health, and be sure to contact your doctor if:    · You do not get better as expected. Where can you learn more? Go to http://randi-robert.info/. Enter T113 in the search box to learn more about \"Breast Reduction: What to Expect at Home. \"  Current as of: April 1, 2019  Content Version: 12.1  © 9931-8357 [employer]. Care instructions adapted under license by [employer] (which disclaims liability or warranty for this information). If you have questions about a medical condition or this instruction, always ask your healthcare professional. [place] [address] any warranty or liability for your use of this information. Learning About Cervical Cancer Screening  What is a cervical cancer screening test?    The cervix is the lower part of the uterus that opens into the vagina. Cervical cancer screening tests check the cells on the cervix for changes that could lead to cancer. Two tests can be used to screen for cervical cancer. They may be used alone or together. A Pap test.   This test looks for changes in the cells of the cervix. Some of these cell changes could lead to cancer. A human papillomavirus (HPV) test.   This test looks for the HPV virus. Some high-risk types of HPV can cause cell changes that could lead to cervical cancer. During either test, the doctor or nurse will insert a tool called a speculum into your vagina. The speculum gently opens the vaginal walls. It allows your doctor to see inside the vagina and the cervix. He or she uses a small swab or brush to collect cell samples from your cervix. Try to schedule the test when you're not having your period.  To get ready for the test, your doctor may ask you to use a condom if you have sex before the test. Your doctor may also say to avoid douches, tampons, vaginal medicines, sprays, and powders for at least a day before you have the test.  When should you have a screening test?  Talk with your doctor about cervical cancer screening. If you are a woman with an average risk for cervical cancer, your doctor will likely suggest starting screening at age 24. Women 21 to 34  If you are in this age group, your doctor will likely suggest that you get a Pap test. If your Pap test results are normal, you can wait 3 years to have another test.  Women 27 to 59  Screening options for women in this age group may include:  · A Pap test. If your results are normal, you can wait 3 years to have another test.  · An HPV test. If your results are normal, you can wait 5 years to have another test.  · A Pap test and an HPV test. Having both tests is called co-testing. If your results are normal, you can wait 5 years to be tested again. Women 72 and older  · If you are age 72 or older, talk with your doctor about what's right for you. Women ages 72 and older may no longer need to be screened for cervical cancer. When to stop having screening tests depends on your medical history, your overall health, and your risk of cervical cell changes or cervical cancer. What happens after the test?  The sample of cells taken during your test will be sent to a lab so that an expert can look at the cells. It usually takes a week or two to get the results back. Pap tests  · A normal result means that the test didn't find any abnormal cells in the sample. · An abnormal result can mean many things. Most of these aren't cancer. The results of your test may be abnormal because:  ? You have an infection of the vagina or cervix, such as a yeast infection. ? You have low estrogen levels after menopause that are causing the cells to change. ? You have cell changes that may be a sign of precancer or cancer. The results are ranked based on how serious the changes might be.   HPV tests  · A negative result means that the test didn't find any high-risk HPV in the sample. · A positive HPV test means that at least one type of high-risk HPV was found. It doesn't mean that you have cervical cancer, but it increases your chance of having cell changes that could lead to cancer. Follow-up care is a key part of your treatment and safety. Be sure to make and go to all appointments, and call your doctor if you are having problems. It's also a good idea to know your test results and keep a list of the medicines you take. Where can you learn more? Go to http://randi-robert.info/. Enter P919 in the search box to learn more about \"Learning About Cervical Cancer Screening. \"  Current as of: December 19, 2018  Content Version: 12.1  © 3783-7472 [employer]. Care instructions adapted under license by [employer] (which disclaims liability or warranty for this information). If you have questions about a medical condition or this instruction, always ask your healthcare professional. [place] [address] any warranty or liability for your use of this information. Well Visit, Ages 25 to 48: Care Instructions  Your Care Instructions    Physical exams can help you stay healthy. Your doctor has checked your overall health and may have suggested ways to take good care of yourself. He or she also may have recommended tests. At home, you can help prevent illness with healthy eating, regular exercise, and other steps. Follow-up care is a key part of your treatment and safety. Be sure to make and go to all appointments, and call your doctor if you are having problems. It's also a good idea to know your test results and keep a list of the medicines you take. How can you care for yourself at home? · Reach and stay at a healthy weight. This will lower your risk for many problems, such as obesity, diabetes, heart disease, and high blood pressure. · Get at least 30 minutes of physical activity on most days of the week.  Walking is a good choice. You also may want to do other activities, such as running, swimming, cycling, or playing tennis or team sports. Discuss any changes in your exercise program with your doctor. · Do not smoke or allow others to smoke around you. If you need help quitting, talk to your doctor about stop-smoking programs and medicines. These can increase your chances of quitting for good. · Talk to your doctor about whether you have any risk factors for sexually transmitted infections (STIs). Having one sex partner (who does not have STIs and does not have sex with anyone else) is a good way to avoid these infections. · Use birth control if you do not want to have children at this time. Talk with your doctor about the choices available and what might be best for you. · Protect your skin from too much sun. When you're outdoors from 10 a.m. to 4 p.m., stay in the shade or cover up with clothing and a hat with a wide brim. Wear sunglasses that block UV rays. Even when it's cloudy, put broad-spectrum sunscreen (SPF 30 or higher) on any exposed skin. · See a dentist one or two times a year for checkups and to have your teeth cleaned. · Wear a seat belt in the car. Follow your doctor's advice about when to have certain tests. These tests can spot problems early. For everyone  · Cholesterol. Have the fat (cholesterol) in your blood tested after age 21. Your doctor will tell you how often to have this done based on your age, family history, or other things that can increase your risk for heart disease. · Blood pressure. Have your blood pressure checked during a routine doctor visit. Your doctor will tell you how often to check your blood pressure based on your age, your blood pressure results, and other factors. · Vision. Talk with your doctor about how often to have a glaucoma test.  · Diabetes. Ask your doctor whether you should have tests for diabetes. · Colon cancer.  Your risk for colorectal cancer gets higher as you get older. Some experts say that adults should start regular screening at age 48 and stop at age 76. Others say to start before age 48 or continue after age 76. Talk with your doctor about your risk and when to start and stop screening. For women  · Breast exam and mammogram. Talk to your doctor about when you should have a clinical breast exam and a mammogram. Medical experts differ on whether and how often women under 50 should have these tests. Your doctor can help you decide what is right for you. · Cervical cancer screening test and pelvic exam. Begin with a Pap test at age 24. The test often is part of a pelvic exam. Starting at age 27, you may choose to have a Pap test, an HPV test, or both tests at the same time (called co-testing). Talk with your doctor about how often to have testing. · Tests for sexually transmitted infections (STIs). Ask whether you should have tests for STIs. You may be at risk if you have sex with more than one person, especially if your partners do not wear condoms. For men  · Tests for sexually transmitted infections (STIs). Ask whether you should have tests for STIs. You may be at risk if you have sex with more than one person, especially if you do not wear a condom. · Testicular cancer exam. Ask your doctor whether you should check your testicles regularly. · Prostate exam. Talk to your doctor about whether you should have a blood test (called a PSA test) for prostate cancer. Experts differ on whether and when men should have this test. Some experts suggest it if you are older than 39 and are -American or have a father or brother who got prostate cancer when he was younger than 72. When should you call for help? Watch closely for changes in your health, and be sure to contact your doctor if you have any problems or symptoms that concern you. Where can you learn more? Go to http://randi-robert.info/.   Enter 290 [id number] in the search box to learn more about \"Well Visit, Ages 25 to 48: Care Instructions. \"  Current as of: December 13, 2018  Content Version: 12.1  © 6629-6298 [employer]. Care instructions adapted under license by [employer] (which disclaims liability or warranty for this information). If you have questions about a medical condition or this instruction, always ask your healthcare professional. [address] any warranty or liability for your use of this information. Tranexamic acid (By mouth)   Tranexamic Acid ([id number] AS-id)  Treats heavy monthly periods (menstrual cycles) in women. Brand Name(s): Lysteda   There may be other brand names for this medicine. When This Medicine Should Not Be Used: This medicine is not right for everyone. Do not use if you had an allergic reaction to tranexamic acid, or if you have blood clot problems or a history of blood clots. How to Use This Medicine:   Tablet  · Your doctor will tell you how much medicine to use. Do not use more than directed. · Start taking this medicine when your monthly period starts. Do not take this medicine for more than 5 days during your period. Do not take more than 6 tablets in one day. · Swallow the tablet whole with liquids. Do not break, crush, or chew it. · Read and follow the patient instructions that come with this medicine. Talk to your doctor or pharmacist if you have any questions. · Missed dose: If you miss a dose or forget to use your medicine, use it as soon as you can. Wait at least 6 hours before you take another dose. Do not use extra medicine to make up for a missed dose. · Store the medicine in a closed container at room temperature, away from heat, moisture, and direct light. Drugs and Foods to Avoid:   Ask your doctor or pharmacist before using any other medicine, including over-the-counter medicines, vitamins, and herbal products.   · Do not use this medicine together with birth control that uses hormones, such as pills or a vaginal ring. · Some medicines and foods can affect how tranexamic acid works. Tell your doctor if you are using oral tretinoin or a medicine that changes how your blood clots, such as factor IX. Warnings While Using This Medicine:   · Tell your doctor if you are pregnant or breastfeeding, or if you have kidney disease, bleeding problems, or leukemia. · This medicine may cause the following problems:  ¨ Higher risk of blood clots (which can lead to heart attack or stroke) when used with hormone birth control, such as pills or a patch  ¨ Eye and vision problems  · If this medicine does not reduce your bleeding after 2 menstrual cycles or if it seems to stop working, check with your doctor. · Your doctor will check your progress and the effects of this medicine at regular visits. Keep all appointments. · Keep all medicine out of the reach of children. Never share your medicine with anyone. Possible Side Effects While Using This Medicine:   Call your doctor right away if you notice any of these side effects:  · Allergic reaction: Itching or hives, swelling in your face or hands, swelling or tingling in your mouth or throat, chest tightness, trouble breathing  · Chest pain, trouble breathing, or coughing up blood  · Numbness or weakness on one side of your body, sudden or severe headache, problems with vision, speech, or walking  · Pain in your lower leg  · Trouble seeing, vision changes  · Unusual bleeding, bruising, or weakness  If you notice these less serious side effects, talk with your doctor:   · Muscle, joint, or back pain  · Runny or stuffy nose  If you notice other side effects that you think are caused by this medicine, tell your doctor. Call your doctor for medical advice about side effects.  You may report side effects to FDA at 8-649-FDA-8635  © 2017 [employer] [facility] Information is for End User's use only and may not be sold, redistributed or otherwise used for commercial purposes. The above information is an  only. It is not intended as medical advice for individual conditions or treatments. Talk to your doctor, nurse or pharmacist before following any medical regimen to see if it is safe and effective for you.

## 2019-09-18 NOTE — PROGRESS NOTES
Annual exam ages 21-44    Yifan Cedeno is a ,  34 y.o. female [address] Patient's last menstrual period was 09/10/2019 (approximate). .    She presents for her annual checkup. She is having no significant problems. Patient does report having back pain and skin issues due to her large breasts. Would like referral to plastic surgery to discuss reduction. With regard to the Gardasil vaccine, she has not received it yet. Declines      Menstrual status:    Periods are irregular, but gets about once/month. No bleeding between periods. Last 7-10 days. Not painful, but are heavy with clots. Has been dealing with this for a long time. Gets sharp pain likes contractions prior to period. Feel like contractions. She denies dysmenorrhea. She reports no premenstrual symptoms. Contraception:    The current method of family planning is none. Declines. Failed Mirena in the past.    Sexual history:     She  reports that she currently engages in sexual activity and has had partner(s) who are Male. She reports using the following method of birth control/protection: Pill. .    Medical conditions:    Since her last annual GYN exam about two years ago, she has not the following changes in her health history: none. Pap and Mammogram History:    Her most recent Pap smear was 2-3 years ago at [facility]. No hx of abnormal paps. The patient has not had a recent mammogram.  No family hx of breast cancer.     Breast Cancer History/Substance Abuse: negative    Past Medical History:   Diagnosis Date    Asthma     Asthma     has not had any problems no inhaler    Class 3 obesity in adult 2017    Essential hypertension 2017    H/O pilonidal cyst     Hidradenitis 2018    Psychiatric disorder     ANXIETY    Vaginal delivery      Past Surgical History:   Procedure Laterality Date    HX APPENDECTOMY      HX ORTHOPAEDIC      Broke l foot as child    HX OTHER SURGICAL left foot on third toe     HX OTHER SURGICAL      Laparoscopic cholecystectomy.  HX OTHER SURGICAL  04/19/2016    Incision and drainage of pilonidal abscess; Dr. Macie Moody.  HX OTHER SURGICAL  08/22/2016    Incision, drainage, and excision of pilonidal cyst; Dr. Macie Moody.  HX OTHER SURGICAL  04/17/2018    Incision and drainage of pilonidal abscess; Dr. Macie Moody.  HX OTHER SURGICAL  02/15/2018    Excision of left axilla hidradenitis; Dr. Clarence Reyes.  HX OTHER SURGICAL  12/21/2017    Excision of sebaceous cyst, right inframammary fold; Dr. Clarence Reyes.  HX OTHER SURGICAL  06/14/2018    Incision and drainage of pilonidal abscess; Dr. Macie Moody.  HX OTHER SURGICAL  01/04/2019    Incision and drainage of pilonidal abscess; Dr. Macie Moody. Current Outpatient Medications   Medication Sig Dispense Refill    Soft Lens Rinse-Store Solution (SALINE SOLUTION) soln Use as needed for wound care. 1 Bottle as needed    chlorthalidone (HYGROTEN) 25 mg tablet Take 1 Tab by mouth daily. FOR BLOOD PRESSURE 90 Tab 1    amLODIPine (NORVASC) 10 mg tablet Take 1 Tab by mouth daily. FOR BLOOD PRESSURE 90 Tab 1    escitalopram oxalate (LEXAPRO) 10 mg tablet Take 1 Tab by mouth daily. Take 1/2 tab daily x 1 week then increase to 1 tab daily 45 Tab 0     Allergies: Aleve [naproxen sodium]; Bactrim [sulfamethoprim ds]; Darvocet a500 [propoxyphene n-acetaminophen]; Lortab [hydrocodone-acetaminophen]; Motrin [ibuprofen]; and Naprosyn [naproxen]     Tobacco History:  reports that she quit smoking about 8 years ago. Her smoking use included cigars. She smoked 0.25 packs per day. She has never used smokeless tobacco.  Alcohol Abuse:  reports that she does not drink alcohol. Drug Abuse:  reports that she does not use drugs. Patient feels safe at home.     Family Medical/Cancer History:   Family History   Problem Relation Age of Onset    Hypertension Maternal Grandmother         Review of Systems - History obtained from the patient  Constitutional: negative for weight loss, fever, night sweats  HEENT: negative for hearing loss, earache, congestion, snoring, sorethroat  CV: negative for chest pain, palpitations, edema  Resp: negative for cough, shortness of breath, wheezing  GI: negative for change in bowel habits, abdominal pain, black or bloody stools  : negative for frequency, dysuria, hematuria, vaginal discharge  MSK: negative for back pain, joint pain, muscle pain  Breast: negative for breast lumps, nipple discharge, galactorrhea  Skin :negative for itching, rash, hives  Neuro: negative for dizziness, headache, confusion, weakness  Psych: negative for anxiety, depression, change in mood  Heme/lymph: negative for bleeding, bruising, pallor    Physical Exam    Visit Vitals  BP (!) 137/96 (BP 1 Location: Left arm, BP Patient Position: Sitting)   Pulse 94   Temp 98.6 °F (37 °C) (Oral)   Resp 18   Ht 5' 1\" (1.549 m)   Wt 262 lb (118.8 kg)   LMP 09/10/2019 (Approximate)   SpO2 98%   BMI 49.50 kg/m²       Constitutional  · Appearance: well-nourished, well developed, alert, in no acute distress    HENT  · Head and Face: appears normal    Neck  · Inspection/Palpation: normal appearance, no masses or tenderness  · Lymph Nodes: no lymphadenopathy present  · Thyroid: gland size normal, nontender, no nodules or masses present on palpation    Chest  · Respiratory Effort: breathing unlabored  · Auscultation: normal breath sounds    Cardiovascular  · Heart:  · Auscultation: regular rate and rhythm without murmur    Breasts  · Inspection of Breasts: breasts symmetrical, no skin changes, no discharge present, nipple appearance normal, no skin retraction present  · Palpation of Breasts and Axillae: no masses present on palpation, no breast tenderness, fibrocystic change  · Axillary Lymph Nodes: no lymphadenopathy present    Gastrointestinal  · Abdominal Examination: abdomen non-tender to palpation, normal bowel sounds, no masses present  · Liver and spleen: no hepatomegaly present, spleen not palpable  · Hernias: no hernias identified    Genitourinary  · External Genitalia: normal appearance for age, no discharge present, no tenderness present, no inflammatory lesions present, no masses present, no atrophy present  · Vagina: normal vaginal vault without central or paravaginal defects, no discharge present, no inflammatory lesions present, no masses present  · Bladder: non-tender to palpation  · Urethra: appears normal  · Cervix: normal   · Uterus: normal size, shape and consistency  · Adnexa: no adnexal tenderness present, no adnexal masses present  · Perineum: perineum within normal limits, no evidence of trauma, no rashes or skin lesions present  · Anus: anus within normal limits, no hemorrhoids present  · Inguinal Lymph Nodes: no lymphadenopathy present    Skin  · General Inspection: no rash, no lesions identified    Neurologic/Psychiatric  · Mental Status:  · Orientation: grossly oriented to person, place and time  · Mood and Affect: mood normal, affect appropriate    . Assessment:  Routine gynecologic examination  Her current medical status is satisfactory with no evidence of significant gynecologic issues.     Plan:  - pap smear today  - mammogram not indicated  - lysteda for menstrual control  - will refer to plastic surgery for possible breast reduction    Counseled re: diet, exercise, healthy lifestyle  Return for yearly wellness visits  Gardisil counseling provided  Pt counseled regarding co-testing for high risk HPV with pap  Rec screening mammo at either 35 or 40

## 2019-09-24 LAB
CYTOLOGIST CVX/VAG CYTO: NORMAL
CYTOLOGY CVX/VAG DOC CYTO: NORMAL
CYTOLOGY CVX/VAG DOC THIN PREP: NORMAL
DX ICD CODE: NORMAL
HPV I/H RISK 1 DNA CVX QL PROBE+SIG AMP: NEGATIVE
Lab: NORMAL
Lab: NORMAL
OTHER STN SPEC: NORMAL
STAT OF ADQ CVX/VAG CYTO-IMP: NORMAL

## 2019-10-29 ENCOUNTER — OFFICE VISIT (OUTPATIENT)
Dept: INTERNAL MEDICINE CLINIC | Age: 29
End: 2019-10-29

## 2019-10-29 VITALS
WEIGHT: 263.1 LBS | OXYGEN SATURATION: 97 % | SYSTOLIC BLOOD PRESSURE: 126 MMHG | RESPIRATION RATE: 18 BRPM | DIASTOLIC BLOOD PRESSURE: 70 MMHG | TEMPERATURE: 97.4 F | BODY MASS INDEX: 49.67 KG/M2 | HEIGHT: 61 IN | HEART RATE: 96 BPM

## 2019-10-29 DIAGNOSIS — N62 LARGE BREASTS: Primary | ICD-10-CM

## 2019-10-29 DIAGNOSIS — M54.6 CHRONIC MIDLINE THORACIC BACK PAIN: ICD-10-CM

## 2019-10-29 DIAGNOSIS — F32.A ANXIETY AND DEPRESSION: ICD-10-CM

## 2019-10-29 DIAGNOSIS — R29.818 SUSPECTED SLEEP APNEA: ICD-10-CM

## 2019-10-29 DIAGNOSIS — F41.9 ANXIETY AND DEPRESSION: ICD-10-CM

## 2019-10-29 DIAGNOSIS — G89.29 CHRONIC MIDLINE THORACIC BACK PAIN: ICD-10-CM

## 2019-10-29 RX ORDER — ESCITALOPRAM OXALATE 10 MG/1
10 TABLET ORAL DAILY
Qty: 45 TAB | Refills: 1 | Status: SHIPPED | OUTPATIENT
Start: 2019-10-29 | End: 2020-08-11 | Stop reason: SDUPTHER

## 2019-10-29 RX ORDER — OXYCODONE HYDROCHLORIDE 5 MG/1
TABLET ORAL
Refills: 0 | COMMUNITY
Start: 2019-09-24 | End: 2019-10-29

## 2019-10-29 NOTE — PROGRESS NOTES
Subjective: (As above and below)     Chief Complaint   Patient presents with    Follow-up     Form last visit patient missed it    Back Pain     From breast and they sweat and causes rashes    Weight Management     Yue Byrne is a 34y.o. year old female who presents for f/u on HTN    Hypertension ROS:  taking medications as instructed, no medication side effects noted, no TIAs, no chest pain on exertion, no dyspnea on exertion, no swelling of ankles    BP Readings from Last 3 Encounters:   10/29/19 126/70   19 (!) 137/96   19 150/78     Wt Readings from Last 3 Encounters:   10/29/19 263 lb 1.6 oz (119.3 kg)   19 262 lb (118.8 kg)   19 253 lb (114.8 kg)     Anxiety: she states that she never got her lexapro but wishes to try this again, also never saw therapy but has the info and will consider    Back pain: she believes pain is r/t large breasts as pain is thoracic, shoulder/neck pain, straps dig into her shoulder she was r'd to cosmetics from OBGYN but she missed her appt and can't pay the fee    Suspected sleep apnea: did not hear back from sleep med. ... Reviewed PmHx, RxHx, FmHx, SocHx, AllgHx and updated in chart.   Family History   Problem Relation Age of Onset    Hypertension Maternal Grandmother        Past Medical History:   Diagnosis Date    Asthma     Asthma     has not had any problems no inhaler    Class 3 obesity in adult 2017    Essential hypertension 2017    H/O pilonidal cyst     Hidradenitis 2018    Psychiatric disorder     ANXIETY    Vaginal delivery       Social History     Socioeconomic History    Marital status: SINGLE     Spouse name: Not on file    Number of children: Not on file    Years of education: Not on file    Highest education level: Not on file   Tobacco Use    Smoking status: Former Smoker     Packs/day: 0.25     Types: Cigars     Last attempt to quit: 3/1/2011     Years since quittin.6    Smokeless tobacco: Never Used   Substance and Sexual Activity    Alcohol use: No    Drug use: No    Sexual activity: Yes     Partners: Male     Birth control/protection: Pill          Current Outpatient Medications   Medication Sig    escitalopram oxalate (LEXAPRO) 10 mg tablet Take 1 Tab by mouth daily. Take 1/2 tab daily x 1 week then increase to 1 tab daily    Soft Lens Rinse-Store Solution (SALINE SOLUTION) soln Use as needed for wound care.  chlorthalidone (HYGROTEN) 25 mg tablet Take 1 Tab by mouth daily. FOR BLOOD PRESSURE    amLODIPine (NORVASC) 10 mg tablet Take 1 Tab by mouth daily. FOR BLOOD PRESSURE     No current facility-administered medications for this visit. Review of Systems:   Constitutional:    Negative for fever and chills, negative diaphoresis. HEENT:              Negative for neck pain and stiffness. Eyes:                  Negative for visual disturbance, itching, redness or discharge. Respiratory:        Negative for cough and shortness of breath. Cardiovascular:  Negative for chest pain and palpitations. Gastrointestinal: Negative for nausea, vomiting, abdominal pain, diarrhea or constipation. Genitourinary:     Negative for dysuria and frequency. Musculoskeletal: back pain  Skin:                    Negative for rash, masses or lesions. Neurological:       Negative for dizzyness, seizure, loss of consciousness, weakness and numbness. Objective:     Vitals:    10/29/19 1140   BP: 126/70   Pulse: 96   Resp: 18   Temp: 97.4 °F (36.3 °C)   TempSrc: Oral   SpO2: 97%   Weight: 263 lb 1.6 oz (119.3 kg)   Height: 5' 1\" (1.549 m)       Results for orders placed or performed in visit on 09/18/19   PAP IG, HPV AND RFX HPV 45/45,18([id number])   Result Value Ref Range    Diagnosis Comment     Specimen adequacy Comment     Clinician provided ICD10 Comment     Performed by: Comment     QC reviewed by: Latanya     . Roberta Nicole      Note: Comment     Test methodology Comment     HPV DNA Probe, High Risk Negative Negative     Gen: Oriented to person, place and time and well-developed, well-nourished and in no distress. HEENT:    Head: normocephalic and atraumatic. Eyes:  EOM are normal. Pupils equal and round. Neck:  Normal range of motion. Neck supple. Cardiovascular: normal rate, regular rhythm and normal heart sounds. Pulmonary/Chest:  Effort normal and breath sounds normal.  No respiratory distress. No wheezes, no rales. Abdominal: soft, normal  bowel sounds. Musculoskeletal:  No edema, no tenderness. No calf tenderness or edema. Neurological:  Alert, oriented to person, place and time. Skin: skin is warm and dry. Assessment/ Plan:     Follow-up and Dispositions    · Return in about 6 weeks (around 12/10/2019) for anxiety. 1. Anxiety and depression    - escitalopram oxalate (LEXAPRO) 10 mg tablet; Take 1 Tab by mouth daily. Take 1/2 tab daily x 1 week then increase to 1 tab daily  Dispense: 45 Tab; Refill: 1    2. Large breasts    - REFERRAL TO PLASTIC SURGERY    3. Chronic midline thoracic back pain    - REFERRAL TO PLASTIC SURGERY    4. Suspected sleep apnea  - SLEEP MEDICINE REFERRAL        I have discussed the diagnosis with the patient and the intended plan as seen in the above orders. The patient has received an after-visit summary and questions were answered concerning future plans. Pt conveyed understanding of plan. Medication Side Effects and Warnings were discussed with patient: yes  Patient Labs were reviewed: yes  Patient Past Records were reviewed:  yes    Gila Gallo.  Radha Reynolds NP

## 2019-10-29 NOTE — PROGRESS NOTES
Pt here for   Chief Complaint   Patient presents with    Follow-up     Form last visit patient missed it    Back Pain     From breast and they sweat and causes rashes    Weight Management         1. Have you been to the ER, urgent care clinic since your last visit? Hospitalized since your last visit? No    2. Have you seen or consulted any other health care providers outside of the [address] [place] [address] since your last visit? Include any pap smears or colon screening.  No     Pt denies pain at this time    3 most recent PHQ Screens 10/29/2019   PHQ Not Done Active Diagnosis of Depression or Bipolar Disorder   Little interest or pleasure in doing things -   Feeling down, depressed, irritable, or hopeless -   Total Score PHQ 2 -

## 2020-01-23 ENCOUNTER — HOSPITAL ENCOUNTER (EMERGENCY)
Age: 30
Discharge: HOME | End: 2020-01-23
Attending: EMERGENCY MEDICINE
Payer: MEDICAID

## 2020-01-23 VITALS
BODY MASS INDEX: 49.52 KG/M2 | HEIGHT: 61 IN | DIASTOLIC BLOOD PRESSURE: 96 MMHG | HEART RATE: 103 BPM | WEIGHT: 262.31 LBS | SYSTOLIC BLOOD PRESSURE: 149 MMHG | RESPIRATION RATE: 16 BRPM | TEMPERATURE: 99.1 F | OXYGEN SATURATION: 97 %

## 2020-01-23 DIAGNOSIS — J00 ACUTE RHINITIS: ICD-10-CM

## 2020-01-23 DIAGNOSIS — J02.0 ACUTE STREPTOCOCCAL PHARYNGITIS: ICD-10-CM

## 2020-01-23 DIAGNOSIS — R03.0 ELEVATED BLOOD PRESSURE READING: Primary | ICD-10-CM

## 2020-01-23 PROCEDURE — 99284 EMERGENCY DEPT VISIT MOD MDM: CPT

## 2020-01-23 PROCEDURE — 99283 EMERGENCY DEPT VISIT LOW MDM: CPT

## 2020-01-23 RX ORDER — METHOCARBAMOL 750 MG/1
750 TABLET, FILM COATED ORAL 4 TIMES DAILY
Qty: 20 TAB | Refills: 0 | Status: SHIPPED | OUTPATIENT
Start: 2020-01-23 | End: 2020-08-11 | Stop reason: SDUPTHER

## 2020-01-23 RX ORDER — FLUTICASONE PROPIONATE 50 MCG
2 SPRAY, SUSPENSION (ML) NASAL DAILY
Qty: 1 BOTTLE | Refills: 0 | Status: SHIPPED | OUTPATIENT
Start: 2020-01-23 | End: 2021-07-06

## 2020-01-23 RX ORDER — AMOXICILLIN 500 MG/1
500 TABLET, FILM COATED ORAL 3 TIMES DAILY
Qty: 30 TAB | Refills: 0 | Status: SHIPPED | OUTPATIENT
Start: 2020-01-23 | End: 2020-02-02

## 2020-01-23 NOTE — LETTER
[address] 
[facility] EMERGENCY DEPT 
[address] [id number] 
[phone] Work/School Note Date: 1/23/2020 To Whom It May concern: 
 
Paradise Reyes was seen and treated today in the emergency room by the following provider(s): 
Attending Provider: Orlando Johnson MD 
Physician Assistant: NEGRITA Ballard. Paradise Reyes may return to work on 1/26/2020. Sincerely, 
 
 
 
 
Xander Giron  Anthon, [facility]

## 2020-01-24 NOTE — ED NOTES
Patient (s)  given copy of dc instructions and 0 written script(s)/ 3 electronic script(s). Patient(s)  verbalized understanding of instructions and script (s). Patient given a current medication reconciliation form and verbalized understanding of their medications. Patient (s) verbalized understanding of the importance of discussing medications with  his or her physician or clinic when they follow up. Patient alert and oriented and in no acute distress. Pt verbalizes pain scale of 10 out of 10 generalized paint that started today. Pt declined wheelchair at discharge. Patient discharged home ambulatory with family.

## 2020-01-24 NOTE — ED NOTES
Pt presents ambulatory to ED complaining of sore throat, nasal congestion and generalized body aches that began today. Pt is alert and oriented x 4, RR even and unlabored, skin is warm and dry. Assesment completed and pt updated on plan of care. Emergency Department Nursing Plan of Care       The Nursing Plan of Care is developed from the Nursing assessment and Emergency Department Attending provider initial evaluation. The plan of care may be reviewed in the ED Provider note.     The Plan of Care was developed with the following considerations:   Patient / Family readiness to learn indicated by:verbalized understanding  Persons(s) to be included in education: patient  Barriers to Learning/Limitations:No    Signed     Fitz Dhaliwal RN    1/23/2020   10:09 PM

## 2020-01-24 NOTE — ED NOTES
Pt does report sick contact with nephew who has strep throat. Pt is unsure is she has has sick contact at work.

## 2020-01-24 NOTE — DISCHARGE INSTRUCTIONS
Patient Education        Elevated Blood Pressure: Care Instructions  Your Care Instructions    Blood pressure is a measure of how hard the blood pushes against the walls of your arteries. It's normal for blood pressure to go up and down throughout the day. But if it stays up over time, you have high blood pressure. Two numbers tell you your blood pressure. The first number is the systolic pressure. It shows how hard the blood pushes when your heart is pumping. The second number is the diastolic pressure. It shows how hard the blood pushes between heartbeats, when your heart is relaxed and filling with blood. An ideal blood pressure in adults is less than 120/80 (say \"120 over 80\"). High blood pressure is 140/90 or higher. You have high blood pressure if your top number is 140 or higher or your bottom number is 90 or higher, or both. The main test for high blood pressure is simple, fast, and painless. To diagnose high blood pressure, your doctor will test your blood pressure at different times. After testing your blood pressure, your doctor may ask you to test it again when you are home. If you are diagnosed with high blood pressure, you can work with your doctor to make a long-term plan to manage it. Follow-up care is a key part of your treatment and safety. Be sure to make and go to all appointments, and call your doctor if you are having problems. It's also a good idea to know your test results and keep a list of the medicines you take. How can you care for yourself at home? · Do not smoke. Smoking increases your risk for heart attack and stroke. If you need help quitting, talk to your doctor about stop-smoking programs and medicines. These can increase your chances of quitting for good. · Stay at a healthy weight. · Try to limit how much sodium you eat to less than 2,300 milligrams (mg) a day. Your doctor may ask you to try to eat less than 1,500 mg a day. · Be physically active.  Get at least 30 minutes of exercise on most days of the week. Walking is a good choice. You also may want to do other activities, such as running, swimming, cycling, or playing tennis or team sports. · Avoid or limit alcohol. Talk to your doctor about whether you can drink any alcohol. · Eat plenty of fruits, vegetables, and low-fat dairy products. Eat less saturated and total fats. · Learn how to check your blood pressure at home. When should you call for help? Call your doctor now or seek immediate medical care if:  ? · Your blood pressure is much higher than normal (such as 180/110 or higher). ? · You think high blood pressure is causing symptoms such as:  ¨ Severe headache. ¨ Blurry vision. ? Watch closely for changes in your health, and be sure to contact your doctor if:  ? · You do not get better as expected. Where can you learn more? Go to http://randi-robert.info/. Enter C954 in the search box to learn more about \"Elevated Blood Pressure: Care Instructions. \"  Current as of: September 21, 2016  Content Version: 11.4  © 3144-3608 [employer]. Care instructions adapted under license by [employer] (which disclaims liability or warranty for this information). If you have questions about a medical condition or this instruction, always ask your healthcare professional. [place] [address] any warranty or liability for your use of this information. Patient Education        Strep Throat: Care Instructions  Your Care Instructions    Strep throat is a bacterial infection that causes sudden, severe sore throat and fever. Strep throat, which is caused by bacteria called streptococcus, is treated with antibiotics. Sometimes a strep test is necessary to tell if the sore throat is caused by strep bacteria. Treatment can help ease symptoms and may prevent future problems. Follow-up care is a key part of your treatment and safety.  Be sure to make and go to all appointments, and call your doctor if you are having problems. It's also a good idea to know your test results and keep a list of the medicines you take. How can you care for yourself at home? · Take your antibiotics as directed. Do not stop taking them just because you feel better. You need to take the full course of antibiotics. · Strep throat can spread to others until 24 hours after you begin taking antibiotics. During this time, you should avoid contact with other people at work or home, especially infants and children. Do not sneeze or cough on others, and wash your hands often. Keep your drinking glass and eating utensils separate from those of others, and wash these items well in hot, soapy water. · Gargle with warm salt water at least once each hour to help reduce swelling and make your throat feel better. Use 1 teaspoon of salt mixed in 8 fluid ounces of warm water. · Take an over-the-counter pain medication, such as acetaminophen (Tylenol), ibuprofen (Advil, Motrin), or naproxen (Aleve). Read and follow all instructions on the label. · Try an over-the-counter anesthetic throat spray or throat lozenges, which may help relieve throat pain. · Drink plenty of fluids. Fluids may help soothe an irritated throat. Hot fluids, such as tea or soup, may help your throat feel better. · Eat soft solids and drink plenty of clear liquids. Flavored ice pops, ice cream, scrambled eggs, sherbet, and gelatin dessert (such as Jell-O) may also soothe the throat. · Get lots of rest.  · Do not smoke, and avoid secondhand smoke. If you need help quitting, talk to your doctor about stop-smoking programs and medicines. These can increase your chances of quitting for good. · Use a vaporizer or humidifier to add moisture to the air in your bedroom. Follow the directions for cleaning the machine. When should you call for help?   Call your doctor now or seek immediate medical care if:    · You have a new or higher fever.     · You have a fever with a stiff neck or severe headache.     · You have new or worse trouble swallowing.     · Your sore throat gets much worse on one side.     · Your pain becomes much worse on one side of your throat.    Watch closely for changes in your health, and be sure to contact your doctor if:    · You are not getting better after 2 days (48 hours).     · You do not get better as expected. Where can you learn more? Go to http://randi-robert.info/. Enter K625 in the search box to learn more about \"Strep Throat: Care Instructions. \"  Current as of: October 21, 2018  Content Version: 12.2  © 7769-7191 [employer]. Care instructions adapted under license by [employer] (which disclaims liability or warranty for this information). If you have questions about a medical condition or this instruction, always ask your healthcare professional. [address] any warranty or liability for your use of this information.

## 2020-01-24 NOTE — ED PROVIDER NOTES
EMERGENCY DEPARTMENT HISTORY AND PHYSICAL EXAM      Date: 1/23/2020  Patient Name: Benita Mena    Please note that this dictation was completed with [email or web address], the computer voice recognition software. Quite often unanticipated grammatical, syntax, homophones, and other interpretive errors are inadvertently transcribed by the computer software. Please disregard these errors. Please excuse any errors that have escaped final proofreading. History of Presenting Illness     Chief Complaint   Patient presents with    Cold Symptoms       History Provided By: Patient    HPI: Benita Mena, 34 y.o. female with PMHx significant for asthma, anxiety, morbid obesity, presents ambulatory to the ED with cc of sore throat when swallowing, congestion, chills x 1 day. Pt states that her nephew has strep throat and she recently cared for him. She denies any cough, ear pain, HA, fever. PCP: Jeremie Donnelly., NP    There are no other complaints, changes, or physical findings at this time. Current Outpatient Medications   Medication Sig Dispense Refill    amoxicillin 500 mg tab Take 500 mg by mouth three (3) times daily for 10 days. 30 Tab 0    fluticasone propionate (FLONASE) 50 mcg/actuation nasal spray 2 Sprays by Both Nostrils route daily. 1 Bottle 0    methocarbamol (ROBAXIN-750) 750 mg tablet Take 1 Tab by mouth four (4) times daily. Indications: muscle spasm 20 Tab 0    escitalopram oxalate (LEXAPRO) 10 mg tablet Take 1 Tab by mouth daily. Take 1/2 tab daily x 1 week then increase to 1 tab daily 45 Tab 1    chlorthalidone (HYGROTEN) 25 mg tablet Take 1 Tab by mouth daily. FOR BLOOD PRESSURE 90 Tab 1    amLODIPine (NORVASC) 10 mg tablet Take 1 Tab by mouth daily. FOR BLOOD PRESSURE 90 Tab 1    Soft Lens Rinse-Store Solution (SALINE SOLUTION) soln Use as needed for wound care.  1 Bottle as needed       Past History     Past Medical History:  Past Medical History:   Diagnosis Date    Asthma     Asthma has not had any problems no inhaler    Class 3 obesity in adult 2017    Essential hypertension 2017    H/O pilonidal cyst     Hidradenitis 2018    Psychiatric disorder     ANXIETY    Vaginal delivery        Past Surgical History:  Past Surgical History:   Procedure Laterality Date    HX APPENDECTOMY      HX ORTHOPAEDIC      Broke l foot as child    HX OTHER SURGICAL      left foot on third toe     HX OTHER SURGICAL      Laparoscopic cholecystectomy.  HX OTHER SURGICAL  2016    Incision and drainage of pilonidal abscess; Dr. Raina Ferraro.  HX OTHER SURGICAL  2016    Incision, drainage, and excision of pilonidal cyst; Dr. Raina Ferraro.  HX OTHER SURGICAL  2018    Incision and drainage of pilonidal abscess; Dr. Raina Ferraro.  HX OTHER SURGICAL  02/15/2018    Excision of left axilla hidradenitis; Dr. Tisha Gauthier.  HX OTHER SURGICAL  2017    Excision of sebaceous cyst, right inframammary fold; Dr. Tisha Gauthier.  HX OTHER SURGICAL  2018    Incision and drainage of pilonidal abscess; Dr. Raina Ferraro.  HX OTHER SURGICAL  2019    Incision and drainage of pilonidal abscess; Dr. Raina Ferraro. Family History:  Family History   Problem Relation Age of Onset    Hypertension Maternal Grandmother        Social History:  Social History     Tobacco Use    Smoking status: Former Smoker     Packs/day: 0.25     Types: Cigars     Last attempt to quit: 3/1/2011     Years since quittin.9    Smokeless tobacco: Never Used   Substance Use Topics    Alcohol use: No    Drug use: No       Allergies: Allergies   Allergen Reactions    Aleve [Naproxen Sodium] Hives    Bactrim [Sulfamethoprim Ds] Other (comments)     Abdominal pain.     Darvocet A500 [Propoxyphene N-Acetaminophen] Hives    Lortab [Hydrocodone-Acetaminophen] Nausea and Vomiting    Motrin [Ibuprofen] Hives    Naprosyn [Naproxen] Hives         Review of Systems   Review of Systems Constitutional: Positive for chills. Negative for fever. HENT: Positive for congestion, rhinorrhea and sore throat. Negative for sinus pain, trouble swallowing and voice change. Eyes: Negative for pain and visual disturbance. Respiratory: Negative for cough and shortness of breath. Cardiovascular: Negative for chest pain and palpitations. Gastrointestinal: Negative for abdominal pain, nausea and vomiting. Genitourinary: Negative for dysuria and hematuria. Musculoskeletal: Negative for arthralgias and myalgias. Skin: Negative for color change, rash and wound. Neurological: Negative for numbness and headaches. All other systems reviewed and are negative. Physical Exam   Physical Exam  Vitals signs and nursing note reviewed. Constitutional:       General: She is not in acute distress. Appearance: She is well-developed. She is obese. She is not diaphoretic. Comments: 34 y.o. female in NAD  Communicates appropriately and in full sentences   HENT:      Head: Normocephalic and atraumatic. Right Ear: Tympanic membrane and ear canal normal.      Left Ear: Tympanic membrane and ear canal normal.      Nose: Congestion present. Mouth/Throat:      Mouth: Mucous membranes are moist.      Pharynx: Posterior oropharyngeal erythema present. No oropharyngeal exudate. Eyes:      General:         Right eye: No discharge. Left eye: No discharge. Conjunctiva/sclera: Conjunctivae normal.      Pupils: Pupils are equal, round, and reactive to light. Neck:      Musculoskeletal: Normal range of motion and neck supple. Comments: No nuchal rigidity  Cardiovascular:      Rate and Rhythm: Normal rate and regular rhythm. Pulmonary:      Effort: Pulmonary effort is normal. No respiratory distress. Breath sounds: Normal breath sounds. No wheezing. Abdominal:      General: There is no distension. Palpations: Abdomen is soft. Tenderness: There is no tenderness. Musculoskeletal: Normal range of motion. General: No tenderness or deformity. Comments: No neurologic or vascular compromise on exam.    Skin:     General: Skin is warm and dry. Coloration: Skin is not pale. Findings: No erythema or rash. Neurological:      Mental Status: She is alert and oriented to person, place, and time. Coordination: Coordination normal.           Diagnostic Study Results     No formal testing initiated. Medical Decision Making   I am the first provider for this patient. I reviewed the vital signs, available nursing notes, past medical history, past surgical history, family history and social history. Vital Signs-Reviewed the patient's vital signs. Patient Vitals for the past 12 hrs:   Temp Pulse Resp BP SpO2   01/23/20 2157  (!) 103 16 (!) 149/96    01/23/20 2147    (!) 174/105    01/23/20 2109 99.1 °F (37.3 °C) (!) 108 16 (!) 177/118 97 %         Records Reviewed: Nursing Notes and Old Medical Records    Provider Notes (Medical Decision Making):   DDx: bacterial vs. viral pharyngitis, viral syndrome, upper respiratory infection. No evidence of retropharyngeal abscess or peritonsillar abscess. Pt presents with sore throat without tonsillar exudates and fever. Given known exposure, will treat for strep pharyngitis. ED Course:   Initial assessment performed. The patients presenting problems have been discussed, and they are in agreement with the care plan formulated and outlined with them. I have encouraged them to ask questions as they arise throughout their visit. HYPERTENSION COUNSELING  Patient denies chest pain, headache, shortness of breath. Patient is made aware of their elevated blood pressure and is instructed to follow up this week with their PCP for a blood pressure recheck.  Patient is counseled regarding consequences of chronic, uncontrolled hypertension including kidney disease, vision disturbances, heart disease, stroke or even death. Patient states their understanding and agrees to follow up this week. DISCHARGE NOTE:  Kali León  results have been reviewed with her. She has been counseled regarding her diagnosis. She verbally conveys understanding and agreement of the signs, symptoms, diagnosis, treatment and prognosis and additionally agrees to follow up as recommended with Dr. Roscoe Mullins NP in 24 - 48 hours. She also agrees with the care-plan and conveys that all of her questions have been answered. I have also put together some discharge instructions for her that include: 1) educational information regarding their diagnosis, 2) how to care for their diagnosis at home, as well a 3) list of reasons why they would want to return to the ED prior to their follow-up appointment, should their condition change. She and/or family's questions have been answered. I have encouraged them to see the official results in [facility]\" or to retrieve the specifics of their results from medical records. PLAN:  1. Return precautions as discussed  2. Follow-up with providers as directed  3. Medications as prescribed    Return to ED if worse     Diagnosis     Clinical Impression:   1. Elevated blood pressure reading    2. Acute streptococcal pharyngitis    3. Acute rhinitis        Current Discharge Medication List      START taking these medications    Details   amoxicillin 500 mg tab Take 500 mg by mouth three (3) times daily for 10 days. Qty: 30 Tab, Refills: 0      fluticasone propionate (FLONASE) 50 mcg/actuation nasal spray 2 Sprays by Both Nostrils route daily. Qty: 1 Bottle, Refills: 0      methocarbamol (ROBAXIN-750) 750 mg tablet Take 1 Tab by mouth four (4) times daily.  Indications: muscle spasm  Qty: 20 Tab, Refills: 0             Follow-up Information     Follow up With Specialties Details Why Contact Info    Roscoe Mullins NP Nurse Practitioner Schedule an appointment as soon as possible for a visit in 2 days As needed, If symptoms worsen Anastasiia De Los Santos  [address] [facility] EMERGENCY DEPT Emergency Medicine Go to If symptoms worsen [place]  [phone]              This note will not be viewable in [place].

## 2020-06-18 ENCOUNTER — HOSPITAL ENCOUNTER (EMERGENCY)
Age: 30
Discharge: HOME | End: 2020-06-18
Attending: EMERGENCY MEDICINE | Admitting: EMERGENCY MEDICINE
Payer: MEDICAID

## 2020-06-18 VITALS
HEART RATE: 89 BPM | DIASTOLIC BLOOD PRESSURE: 102 MMHG | RESPIRATION RATE: 18 BRPM | SYSTOLIC BLOOD PRESSURE: 176 MMHG | OXYGEN SATURATION: 98 % | TEMPERATURE: 98.8 F

## 2020-06-18 DIAGNOSIS — L02.91 ABSCESS: Primary | ICD-10-CM

## 2020-06-18 PROCEDURE — 74011000250 HC RX REV CODE- 250: Performed by: PHYSICIAN ASSISTANT

## 2020-06-18 PROCEDURE — 87205 SMEAR GRAM STAIN: CPT

## 2020-06-18 PROCEDURE — 75810000289 HC I&D ABSCESS SIMP/COMP/MULT

## 2020-06-18 PROCEDURE — 87147 CULTURE TYPE IMMUNOLOGIC: CPT

## 2020-06-18 PROCEDURE — 99283 EMERGENCY DEPT VISIT LOW MDM: CPT

## 2020-06-18 PROCEDURE — 74011250637 HC RX REV CODE- 250/637: Performed by: PHYSICIAN ASSISTANT

## 2020-06-18 RX ORDER — DOXYCYCLINE HYCLATE 100 MG
100 TABLET ORAL 2 TIMES DAILY
Qty: 14 TAB | Refills: 0 | Status: SHIPPED | OUTPATIENT
Start: 2020-06-18 | End: 2020-06-25

## 2020-06-18 RX ORDER — OXYCODONE HYDROCHLORIDE 5 MG/1
5 TABLET ORAL
Status: COMPLETED | OUTPATIENT
Start: 2020-06-18 | End: 2020-06-18

## 2020-06-18 RX ORDER — LIDOCAINE HYDROCHLORIDE 10 MG/ML
10 INJECTION INFILTRATION; PERINEURAL
Status: COMPLETED | OUTPATIENT
Start: 2020-06-18 | End: 2020-06-18

## 2020-06-18 RX ORDER — BACITRACIN 500 UNIT/G
1 PACKET (EA) TOPICAL ONCE
Status: COMPLETED | OUTPATIENT
Start: 2020-06-18 | End: 2020-06-18

## 2020-06-18 RX ADMIN — LIDOCAINE HYDROCHLORIDE 10 ML: 10 INJECTION, SOLUTION INFILTRATION; PERINEURAL at 09:54

## 2020-06-18 RX ADMIN — OXYCODONE 5 MG: 5 TABLET ORAL at 11:05

## 2020-06-18 RX ADMIN — BACITRACIN 1 PACKET: 500 OINTMENT TOPICAL at 09:54

## 2020-06-18 NOTE — ED TRIAGE NOTES
Arrives ambulatory with mask for c/o boil under right breast x days. Denies drainage. Denies fevers.  Hx of similar

## 2020-06-18 NOTE — ED PROVIDER NOTES
Date of Service:  6/18/2020    Patient:  Carlyn Irene    Chief Complaint:  Skin Problem       HPI:  Carlyn Irene is a 27 y.o.  female who presents for evaluation of abscess under right breast x 2-3 days. Painful, swollen, no purulent discharge noted. No fever/chills. History of recurrent abscesses, pilonidal cysts, hidradenitis suppurativa, has had sweat glands removed. Denies any history of DM. History of HTN, depression, did not take blood pressure medication this morning. Has appt with PCP at 3 pm today for refill of medication. Denies headache, vision changes, chest pain, SOB, n/v/d, urinary symptoms, numbness/tingling/weakness. Past Medical History:   Diagnosis Date    Asthma     Asthma     has not had any problems no inhaler    Class 3 obesity in adult 11/16/2017    Essential hypertension 11/16/2017    H/O pilonidal cyst     Hidradenitis 2/1/2018    Psychiatric disorder     ANXIETY    Vaginal delivery        Past Surgical History:   Procedure Laterality Date    HX APPENDECTOMY  2017    HX ORTHOPAEDIC      Broke l foot as child    HX OTHER SURGICAL      left foot on third toe     HX OTHER SURGICAL      Laparoscopic cholecystectomy.  HX OTHER SURGICAL  04/19/2016    Incision and drainage of pilonidal abscess; Dr. Nia Lobo.  HX OTHER SURGICAL  08/22/2016    Incision, drainage, and excision of pilonidal cyst; Dr. Nia Lobo.  HX OTHER SURGICAL  04/17/2018    Incision and drainage of pilonidal abscess; Dr. Nia Lobo.  HX OTHER SURGICAL  02/15/2018    Excision of left axilla hidradenitis; Dr. Joselyn Ribeiro.  HX OTHER SURGICAL  12/21/2017    Excision of sebaceous cyst, right inframammary fold; Dr. Joselyn Ribeiro.  HX OTHER SURGICAL  06/14/2018    Incision and drainage of pilonidal abscess; Dr. Nia Lobo.  HX OTHER SURGICAL  01/04/2019    Incision and drainage of pilonidal abscess; Dr. Nia Lobo.          Family History:   Problem Relation Age of Onset    Hypertension Maternal Grandmother        Social History     Socioeconomic History    Marital status: SINGLE     Spouse name: Not on file    Number of children: Not on file    Years of education: Not on file    Highest education level: Not on file   Occupational History    Not on file   Social Needs    Financial resource strain: Not on file    Food insecurity     Worry: Not on file     Inability: Not on file    Transportation needs     Medical: Not on file     Non-medical: Not on file   Tobacco Use    Smoking status: Former Smoker     Packs/day: 0.25     Types: Cigars     Last attempt to quit: 3/1/2011     Years since quittin.3    Smokeless tobacco: Never Used   Substance and Sexual Activity    Alcohol use: No    Drug use: No    Sexual activity: Yes     Partners: Male     Birth control/protection: Pill   Lifestyle    Physical activity     Days per week: Not on file     Minutes per session: Not on file    Stress: Not on file   Relationships    Social connections     Talks on phone: Not on file     Gets together: Not on file     Attends Mandaen service: Not on file     Active member of club or organization: Not on file     Attends meetings of clubs or organizations: Not on file     Relationship status: Not on file    Intimate partner violence     Fear of current or ex partner: Not on file     Emotionally abused: Not on file     Physically abused: Not on file     Forced sexual activity: Not on file   Other Topics Concern    Not on file   Social History Narrative    Not on file         ALLERGIES: Aleve [naproxen sodium]; Bactrim [sulfamethoprim ds]; Darvocet a500 [propoxyphene n-acetaminophen]; Lortab [hydrocodone-acetaminophen]; Motrin [ibuprofen]; and Naprosyn [naproxen]    Review of Systems   Constitutional: Negative for chills and fever. HENT: Negative for congestion and sore throat. Eyes: Negative for pain. Respiratory: Negative for cough and shortness of breath.     Cardiovascular: Negative for chest pain and palpitations. Gastrointestinal: Negative for abdominal pain, diarrhea, nausea and vomiting. Genitourinary: Negative for dysuria, frequency and urgency. Musculoskeletal: Negative for back pain and neck pain. Skin: Positive for wound. Neurological: Negative for dizziness, light-headedness and headaches. Vitals:    06/18/20 0910   BP: (!) 157/101   Pulse: 97   Resp: 18   Temp: 98.8 °F (37.1 °C)   SpO2: 97%            Physical Exam  Vitals signs and nursing note reviewed. Constitutional:       Appearance: Normal appearance. HENT:      Head: Normocephalic and atraumatic. Nose: Nose normal.   Eyes:      Extraocular Movements: Extraocular movements intact. Conjunctiva/sclera: Conjunctivae normal.      Pupils: Pupils are equal, round, and reactive to light. Neck:      Musculoskeletal: Normal range of motion and neck supple. Cardiovascular:      Rate and Rhythm: Normal rate and regular rhythm. Pulses: Normal pulses. Heart sounds: Normal heart sounds. Pulmonary:      Effort: Pulmonary effort is normal.      Breath sounds: Normal breath sounds. Abdominal:      General: Abdomen is flat. Bowel sounds are normal.      Palpations: Abdomen is soft. Musculoskeletal: Normal range of motion. Skin:     General: Skin is warm and dry. Capillary Refill: Capillary refill takes less than 2 seconds. Neurological:      General: No focal deficit present. Mental Status: She is alert and oriented to person, place, and time. Mental status is at baseline. Psychiatric:         Mood and Affect: Mood normal.         Behavior: Behavior normal.         Thought Content:  Thought content normal.          MDM  Number of Diagnoses or Management Options  Abscess:   Diagnosis management comments: Medications During Visit:  Medications  lidocaine (XYLOCAINE) 10 mg/mL (1 %) injection 10 mL (10 mL IntraDERMal Given 6/18/20 0954)  bacitracin 500 unit/gram packet 1 Packet (1 Packet Topical Given 6/18/20 0358)  oxyCODONE IR (ROXICODONE) tablet 5 mg (5 mg Oral Given 6/18/20 9617)      DECISION MAKING:  Venessa Gonsales is a 27 y.o. female who comes in as above. Venessa Gonsales is a 27 y.o.  female who presents for evaluation of abscess under right breast x 2-3 days. No fever, chills, additional symptoms. 2 cm fluctuant mass noted, tender to palpation. Discussed incision and drainage with patient, patient consented. Procedure completed, moderate purulent bloody discharge obtained, wound culture obtained. Patient tolerated procedure well. Prescription for doxycycline given. Discussed with patient follow up with PCP within 1 week. Return to ED if any worsening symptoms or additional concerns. IMPRESSION:  Abscess  (primary encounter diagnosis)    DISPOSITION:  Discharged      Discharge Medication List as of 6/18/2020 11:26 AM    START taking these medications    doxycycline (VIBRA-TABS) 100 mg tablet  Take 1 Tab by mouth two (2) times a day for 7 days. , Print, Disp-14 Tab, R-0       CONTINUE these medications which have NOT CHANGED    fluticasone propionate (FLONASE) 50 mcg/actuation nasal spray  2 Sprays by Both Nostrils route daily. , Normal, Disp-1 Bottle, R-0    methocarbamol (ROBAXIN-750) 750 mg tablet  Take 1 Tab by mouth four (4) times daily. Indications: muscle spasm, Normal, Disp-20 Tab, R-0    escitalopram oxalate (LEXAPRO) 10 mg tablet  Take 1 Tab by mouth daily. Take 1/2 tab daily x 1 week then increase to 1 tab daily, Normal, Disp-45 Tab, R-1    Soft Lens Rinse-Store Solution (SALINE SOLUTION) soln  Use as needed for wound care., Print, Disp-1 Bottle, R-as needed    chlorthalidone (HYGROTEN) 25 mg tablet  Take 1 Tab by mouth daily. FOR BLOOD PRESSURE, Normal, Disp-90 Tab, R-1    amLODIPine (NORVASC) 10 mg tablet  Take 1 Tab by mouth daily.  FOR BLOOD PRESSURE, Normal, Disp-90 Tab, R-1           Follow-up Information     Follow up With Specialties Details Why Contact Info Morgan Payan., NP Nurse Practitioner Schedule an appointment as   soon as possible for a visit in 3 days  [address] [place] [address]      [place] [address] [facility] Emergency Medicine Go to  If symptoms worsen [place] [address]  [phone]          The patient is asked to follow-up with their primary care provider in the next several days. They are to call tomorrow for an appointment. The patient is asked to return promptly for any increased concerns or worsening of symptoms. They can return to this emergency department or any other emergency department. I&D Abcess Simple  Date/Time: 6/18/2020 8:39 PM  Performed by: Dionne Lares PA-C  Authorized by: Dionne Lares PA-C     Consent:     Consent obtained:  Verbal    Consent given by:  Patient    Risks discussed:  Bleeding, incomplete drainage, pain and infection    Alternatives discussed:  No treatment, delayed treatment and alternative treatment  Location:     Type:  Abscess    Size:  2 cm    Location:  Trunk    Trunk location:  Abdomen  Pre-procedure details:     Skin preparation:  Betadine  Anesthesia (see MAR for exact dosages): Anesthesia method:  Local infiltration    Local anesthetic:  Lidocaine 1% w/o epi  Procedure type:     Complexity:  Simple  Procedure details:     Needle aspiration: no      Incision types:  Stab incision    Incision depth:  Dermal    Scalpel blade:  11    Wound management:  Probed and deloculated    Drainage:  Bloody and purulent    Drainage amount: Moderate    Wound treatment:  Wound left open    Packing materials:  None  Post-procedure details:     Patient tolerance of procedure:   Tolerated well, no immediate complications

## 2020-06-18 NOTE — LETTER
Laurita. Jeremy 55 
[address] [facility] [address] [id number] [phone] Work/School Note Date: 6/18/2020 To Whom It May concern: 
 
Sushant Mas was seen and treated today in the emergency room by the following provider(s): 
Attending Provider: Preet Garcia MD 
Physician Assistant: Mariana Jack PA-C. Sushant Mas may return to work on 6/22/2020 Sincerely, Dallas Zabala PA-C

## 2020-06-20 LAB
BACTERIA SPEC CULT: ABNORMAL
BACTERIA SPEC CULT: ABNORMAL
GRAM STN SPEC: ABNORMAL
GRAM STN SPEC: ABNORMAL
SERVICE CMNT-IMP: ABNORMAL

## 2020-07-06 DIAGNOSIS — I10 ACCELERATED HYPERTENSION: ICD-10-CM

## 2020-07-06 RX ORDER — AMLODIPINE BESYLATE 10 MG/1
10 TABLET ORAL DAILY
Qty: 30 TAB | Refills: 1 | Status: SHIPPED | OUTPATIENT
Start: 2020-07-06 | End: 2020-08-11 | Stop reason: SDUPTHER

## 2020-07-08 ENCOUNTER — TELEPHONE (OUTPATIENT)
Dept: INTERNAL MEDICINE CLINIC | Age: 30
End: 2020-07-08

## 2020-07-08 RX ORDER — POTASSIUM CHLORIDE 20 MEQ/1
20 TABLET, EXTENDED RELEASE ORAL DAILY
Qty: 30 TAB | Refills: 0 | Status: SHIPPED | OUTPATIENT
Start: 2020-07-08 | End: 2020-12-03 | Stop reason: SDUPTHER

## 2020-07-08 NOTE — TELEPHONE ENCOUNTER
Sent Rx for supplement, but needs to f/u with [facility] for repeat K level before attempting to reschedule surgery again. about 2 wks out please.

## 2020-07-08 NOTE — TELEPHONE ENCOUNTER
Pt states she was due to have surgery but the specialist told her her potassium was too low and that she needed to call her pcp to get a potassium supplement. She states it was 2.9 .    Pt # [phone]

## 2020-07-29 ENCOUNTER — HOSPITAL ENCOUNTER (EMERGENCY)
Age: 30
Discharge: HOME | End: 2020-07-29
Attending: EMERGENCY MEDICINE | Admitting: EMERGENCY MEDICINE
Payer: MEDICAID

## 2020-07-29 VITALS
TEMPERATURE: 99.3 F | DIASTOLIC BLOOD PRESSURE: 83 MMHG | RESPIRATION RATE: 18 BRPM | SYSTOLIC BLOOD PRESSURE: 150 MMHG | HEART RATE: 98 BPM | OXYGEN SATURATION: 98 %

## 2020-07-29 DIAGNOSIS — N30.01 ACUTE CYSTITIS WITH HEMATURIA: Primary | ICD-10-CM

## 2020-07-29 LAB
ALBUMIN SERPL-MCNC: 3.8 G/DL (ref 3.5–5)
ALBUMIN/GLOB SERPL: 0.8 {RATIO} (ref 1.1–2.2)
ALP SERPL-CCNC: 81 U/L (ref 45–117)
ALT SERPL-CCNC: 42 U/L (ref 12–78)
ANION GAP SERPL CALC-SCNC: 10 MMOL/L (ref 5–15)
APPEARANCE UR: ABNORMAL
AST SERPL-CCNC: 23 U/L (ref 15–37)
ATRIAL RATE: 98 BPM
BACTERIA URNS QL MICRO: ABNORMAL /HPF
BASOPHILS # BLD: 0 K/UL (ref 0–0.1)
BASOPHILS NFR BLD: 0 % (ref 0–1)
BILIRUB SERPL-MCNC: 0.6 MG/DL (ref 0.2–1)
BILIRUB UR QL: NEGATIVE
BUN SERPL-MCNC: 11 MG/DL (ref 6–20)
BUN/CREAT SERPL: 10 (ref 12–20)
CALCIUM SERPL-MCNC: 8.5 MG/DL (ref 8.5–10.1)
CALCULATED P AXIS, ECG09: 50 DEGREES
CALCULATED R AXIS, ECG10: 74 DEGREES
CALCULATED T AXIS, ECG11: 5 DEGREES
CHLORIDE SERPL-SCNC: 103 MMOL/L (ref 97–108)
CO2 SERPL-SCNC: 25 MMOL/L (ref 21–32)
COLOR UR: ABNORMAL
COMMENT, HOLDF: NORMAL
CREAT SERPL-MCNC: 1.05 MG/DL (ref 0.55–1.02)
DIAGNOSIS, 93000: NORMAL
DIFFERENTIAL METHOD BLD: ABNORMAL
EOSINOPHIL # BLD: 0 K/UL (ref 0–0.4)
EOSINOPHIL NFR BLD: 0 % (ref 0–7)
EPITH CASTS URNS QL MICRO: ABNORMAL /LPF
ERYTHROCYTE [DISTWIDTH] IN BLOOD BY AUTOMATED COUNT: 19 % (ref 11.5–14.5)
GLOBULIN SER CALC-MCNC: 4.6 G/DL (ref 2–4)
GLUCOSE SERPL-MCNC: 98 MG/DL (ref 65–100)
GLUCOSE UR STRIP.AUTO-MCNC: NEGATIVE MG/DL
HCG UR QL: NEGATIVE
HCT VFR BLD AUTO: 39.3 % (ref 35–47)
HGB BLD-MCNC: 12.1 G/DL (ref 11.5–16)
HGB UR QL STRIP: ABNORMAL
HYALINE CASTS URNS QL MICRO: ABNORMAL /LPF (ref 0–5)
IMM GRANULOCYTES # BLD AUTO: 0 K/UL (ref 0–0.04)
IMM GRANULOCYTES NFR BLD AUTO: 0 % (ref 0–0.5)
KETONES UR QL STRIP.AUTO: NEGATIVE MG/DL
LEUKOCYTE ESTERASE UR QL STRIP.AUTO: ABNORMAL
LIPASE SERPL-CCNC: 91 U/L (ref 73–393)
LYMPHOCYTES # BLD: 1.4 K/UL (ref 0.8–3.5)
LYMPHOCYTES NFR BLD: 17 % (ref 12–49)
MCH RBC QN AUTO: 24.3 PG (ref 26–34)
MCHC RBC AUTO-ENTMCNC: 30.8 G/DL (ref 30–36.5)
MCV RBC AUTO: 78.9 FL (ref 80–99)
MONOCYTES # BLD: 0.4 K/UL (ref 0–1)
MONOCYTES NFR BLD: 5 % (ref 5–13)
NEUTS SEG # BLD: 6.5 K/UL (ref 1.8–8)
NEUTS SEG NFR BLD: 78 % (ref 32–75)
NITRITE UR QL STRIP.AUTO: NEGATIVE
NRBC # BLD: 0 K/UL (ref 0–0.01)
NRBC BLD-RTO: 0 PER 100 WBC
P-R INTERVAL, ECG05: 140 MS
PH UR STRIP: 6.5 [PH] (ref 5–8)
PLATELET # BLD AUTO: 276 K/UL (ref 150–400)
PMV BLD AUTO: 10.1 FL (ref 8.9–12.9)
POTASSIUM SERPL-SCNC: 3.1 MMOL/L (ref 3.5–5.1)
PROT SERPL-MCNC: 8.4 G/DL (ref 6.4–8.2)
PROT UR STRIP-MCNC: NEGATIVE MG/DL
Q-T INTERVAL, ECG07: 340 MS
QRS DURATION, ECG06: 86 MS
QTC CALCULATION (BEZET), ECG08: 434 MS
RBC # BLD AUTO: 4.98 M/UL (ref 3.8–5.2)
RBC #/AREA URNS HPF: ABNORMAL /HPF (ref 0–5)
SAMPLES BEING HELD,HOLD: NORMAL
SODIUM SERPL-SCNC: 138 MMOL/L (ref 136–145)
SP GR UR REFRACTOMETRY: 1.02 (ref 1–1.03)
TROPONIN I SERPL-MCNC: <0.05 NG/ML
UR CULT HOLD, URHOLD: NORMAL
UROBILINOGEN UR QL STRIP.AUTO: 1 EU/DL (ref 0.2–1)
VENTRICULAR RATE, ECG03: 98 BPM
WBC # BLD AUTO: 8.4 K/UL (ref 3.6–11)
WBC URNS QL MICRO: ABNORMAL /HPF (ref 0–4)

## 2020-07-29 PROCEDURE — 74011250637 HC RX REV CODE- 250/637: Performed by: EMERGENCY MEDICINE

## 2020-07-29 PROCEDURE — 36415 COLL VENOUS BLD VENIPUNCTURE: CPT

## 2020-07-29 PROCEDURE — 80053 COMPREHEN METABOLIC PANEL: CPT

## 2020-07-29 PROCEDURE — 93005 ELECTROCARDIOGRAM TRACING: CPT

## 2020-07-29 PROCEDURE — 85025 COMPLETE CBC W/AUTO DIFF WBC: CPT

## 2020-07-29 PROCEDURE — 99285 EMERGENCY DEPT VISIT HI MDM: CPT

## 2020-07-29 PROCEDURE — 96365 THER/PROPH/DIAG IV INF INIT: CPT

## 2020-07-29 PROCEDURE — 87086 URINE CULTURE/COLONY COUNT: CPT

## 2020-07-29 PROCEDURE — 83690 ASSAY OF LIPASE: CPT

## 2020-07-29 PROCEDURE — 84484 ASSAY OF TROPONIN QUANT: CPT

## 2020-07-29 PROCEDURE — 74011250636 HC RX REV CODE- 250/636: Performed by: EMERGENCY MEDICINE

## 2020-07-29 PROCEDURE — 81025 URINE PREGNANCY TEST: CPT

## 2020-07-29 PROCEDURE — 74011000258 HC RX REV CODE- 258: Performed by: EMERGENCY MEDICINE

## 2020-07-29 PROCEDURE — 81001 URINALYSIS AUTO W/SCOPE: CPT

## 2020-07-29 RX ORDER — CEPHALEXIN 500 MG/1
500 CAPSULE ORAL 4 TIMES DAILY
Qty: 28 CAP | Refills: 0 | Status: SHIPPED | OUTPATIENT
Start: 2020-07-29 | End: 2020-08-05

## 2020-07-29 RX ORDER — ACETAMINOPHEN 325 MG/1
650 TABLET ORAL
Status: COMPLETED | OUTPATIENT
Start: 2020-07-29 | End: 2020-07-29

## 2020-07-29 RX ORDER — ACETAMINOPHEN 325 MG/1
325 TABLET ORAL
Status: DISCONTINUED | OUTPATIENT
Start: 2020-07-29 | End: 2020-07-29

## 2020-07-29 RX ADMIN — ACETAMINOPHEN 650 MG: 325 TABLET ORAL at 07:31

## 2020-07-29 RX ADMIN — CEFTRIAXONE 1 G: 1 INJECTION, POWDER, FOR SOLUTION INTRAMUSCULAR; INTRAVENOUS at 10:10

## 2020-07-29 RX ADMIN — SODIUM CHLORIDE 1000 ML: 9 INJECTION, SOLUTION INTRAVENOUS at 07:27

## 2020-07-29 NOTE — ED PROVIDER NOTES
This is a 80-year-old female with a history of asthma, obesity and hypertension. She also suffers from an anxiety disorder. She is currently being managed for hypertension has not taken her medications today. She states that last evening she began with a right-sided headache, some intermittent midsternal chest pain and intermittent abdominal pain. She has had no nausea or vomiting. There is been no shortness of breath or cough and congestion. She has had no sore throat or nasal congestion. She denies any diarrhea, constipation or urinary symptoms. She also denies any sick contacts. She has not taken any thing for her fever. She states that her temperature last night was 100.7. Patient voices no other complaints presently. Past Medical History:   Diagnosis Date    Asthma     Asthma     has not had any problems no inhaler    Class 3 obesity in adult 11/16/2017    Essential hypertension 11/16/2017    H/O pilonidal cyst     Hidradenitis 2/1/2018    Psychiatric disorder     ANXIETY    Vaginal delivery        Past Surgical History:   Procedure Laterality Date    HX APPENDECTOMY  2017    HX ORTHOPAEDIC      Broke l foot as child    HX OTHER SURGICAL      left foot on third toe     HX OTHER SURGICAL      Laparoscopic cholecystectomy.  HX OTHER SURGICAL  04/19/2016    Incision and drainage of pilonidal abscess; Dr. Terry Kaminski.  HX OTHER SURGICAL  08/22/2016    Incision, drainage, and excision of pilonidal cyst; Dr. Terry GHOTRA OTHER SURGICAL  04/17/2018    Incision and drainage of pilonidal abscess; Dr. Terry Kaminski.  HX OTHER SURGICAL  02/15/2018    Excision of left axilla hidradenitis; Dr. Sivakumar Paniagua.  [facility]  12/21/2017    Excision of sebaceous cyst, right inframammary fold; Dr. Sivakumar Paniagua.  HX OTHER SURGICAL  06/14/2018    Incision and drainage of pilonidal abscess; Dr. Terry Kaminski.     [facility]  01/04/2019    Incision and drainage of pilonidal abscess; Dr. Kamila Lopez. Family History:   Problem Relation Age of Onset    Hypertension Maternal Grandmother        Social History     Socioeconomic History    Marital status: SINGLE     Spouse name: Not on file    Number of children: Not on file    Years of education: Not on file    Highest education level: Not on file   Occupational History    Not on file   Social Needs    Financial resource strain: Not on file    Food insecurity     Worry: Not on file     Inability: Not on file    Transportation needs     Medical: Not on file     Non-medical: Not on file   Tobacco Use    Smoking status: Current Some Day Smoker     Packs/day: 0.25     Types: Cigars     Last attempt to quit: 3/1/2011     Years since quittin.4    Smokeless tobacco: Never Used   Substance and Sexual Activity    Alcohol use: No    Drug use: No    Sexual activity: Yes     Partners: Male     Birth control/protection: Pill   Lifestyle    Physical activity     Days per week: Not on file     Minutes per session: Not on file    Stress: Not on file   Relationships    Social connections     Talks on phone: Not on file     Gets together: Not on file     Attends [employer] service: Not on file     Active member of club or organization: Not on file     Attends meetings of clubs or organizations: Not on file     Relationship status: Not on file    Intimate partner violence     Fear of current or ex partner: Not on file     Emotionally abused: Not on file     Physically abused: Not on file     Forced sexual activity: Not on file   Other Topics Concern    Not on file   Social History Narrative    Not on file         ALLERGIES: Aleve [naproxen sodium]; Darvocet a500 [propoxyphene n-acetaminophen]; Motrin [ibuprofen]; Bactrim [sulfamethoprim ds]; and Lortab [hydrocodone-acetaminophen]    Review of Systems   Constitutional: Positive for fever. Negative for activity change, appetite change, chills and fatigue.    HENT: Negative for ear pain, facial swelling, sore throat and trouble swallowing. Eyes: Negative for pain, discharge and visual disturbance. Respiratory: Negative for chest tightness, shortness of breath and wheezing. Cardiovascular: Positive for chest pain. Negative for palpitations. Gastrointestinal: Positive for abdominal pain. Negative for blood in stool, nausea and vomiting. Genitourinary: Negative for difficulty urinating, flank pain and hematuria. Musculoskeletal: Negative for arthralgias, joint swelling, myalgias and neck pain. Skin: Negative for color change and rash. Neurological: Positive for headaches. Negative for dizziness, weakness and numbness. Hematological: Negative for adenopathy. Does not bruise/bleed easily. Psychiatric/Behavioral: Negative for behavioral problems, confusion and sleep disturbance. All other systems reviewed and are negative. Vitals:    07/29/20 0627 07/29/20 0630   BP: 143/86 143/86   Pulse: 99    Resp: 18    Temp: 99.8 °F (37.7 °C)    SpO2: 98%             Physical Exam  Vitals signs and nursing note reviewed. Constitutional:       General: She is in acute distress (Mild distress with right sided HA, CP and Abdominal pain). Appearance: She is well-developed. She is obese. She is not ill-appearing, toxic-appearing or diaphoretic. HENT:      Head: Normocephalic and atraumatic. Nose: Nose normal.   Eyes:      General: No scleral icterus. Conjunctiva/sclera: Conjunctivae normal.      Pupils: Pupils are equal, round, and reactive to light. Neck:      Musculoskeletal: Normal range of motion and neck supple. Thyroid: No thyromegaly. Vascular: No JVD. Trachea: No tracheal deviation. Comments: No carotid bruits noted. Cardiovascular:      Rate and Rhythm: Normal rate and regular rhythm. Heart sounds: Normal heart sounds. No murmur. No friction rub. No gallop. Pulmonary:      Effort: Pulmonary effort is normal. No respiratory distress. Breath sounds: Normal breath sounds. No wheezing or rales. Chest:      Chest wall: No tenderness. Abdominal:      General: Bowel sounds are normal. There is no distension. Palpations: Abdomen is soft. There is no mass. Tenderness: There is no abdominal tenderness. There is no guarding or rebound. Musculoskeletal: Normal range of motion. General: No tenderness. Lymphadenopathy:      Cervical: No cervical adenopathy. Skin:     General: Skin is warm and dry. Findings: No erythema or rash. Neurological:      Mental Status: She is alert and oriented to person, place, and time. Cranial Nerves: No cranial nerve deficit. Coordination: Coordination normal.      Deep Tendon Reflexes: Reflexes are normal and symmetric. Psychiatric:         Behavior: Behavior normal.         Thought Content: Thought content normal.         Judgment: Judgment normal.          MDM  Number of Diagnoses or Management Options     Amount and/or Complexity of Data Reviewed  Clinical lab tests: ordered and reviewed  Decide to obtain previous medical records or to obtain history from someone other than the patient: yes  Review and summarize past medical records: yes    Risk of Complications, Morbidity, and/or Mortality  Presenting problems: moderate  Diagnostic procedures: moderate  Management options: moderate    Patient Progress  Patient progress: stable         Procedures    Patient will be treated for her fever will be given some IV fluids. Labs are pending. ED MD EKG interpretation: Normal sinus rhythm with a rate at 98. There is some T wave inversions in 2 3 aVF and in V4 through V6. No old tracing is available for comparison. No ectopy is noted. Intervals are normal. Miles Hale MD    It would appear that the patient has a urinary tract infection is likely etiology for her symptoms. She is feeling better at this time. Fluids are almost infused.   A gram of Rocephin will be given and the patient will be discharged on Keflex to use Tylenol for fever and discomfort. She will follow-up with her own physician if continued difficulty. A note is given for 2 days of work. Patient is in agreement with this plan.

## 2020-07-29 NOTE — ED NOTES
Gave bedside report regarding, SBAR, MAR, and plan of care to Chadwick Callejas, [address] [place] [address]. Transfered care of patient to RN.

## 2020-07-29 NOTE — LETTER
Joseluis Luna 55 
[address] [facility] [address] [id number] [phone] Work/School Note Date: 7/29/2020 To Whom It May concern: 
 
 
Ethel Thomas was seen and treated today in the emergency room by the following provider(s): 
Attending Provider: Clarke Elizabeth MD. Ethel Thomas is excused from work/school on 07/29/20. She is clear to return to work/school on 07/30/20. Sincerely, Janneth Jorgensen RN

## 2020-07-29 NOTE — ED TRIAGE NOTES
Patient arrives ambulatory from home with CC of headache, hot flashes, generalized weakness and chest pain and nausea that started yesterday afternoon. Pt reports fever at home. Denies shortness of breath, and cough.   Patient did not take any medication prior to arrival

## 2020-07-29 NOTE — LETTER
Joseluis Luna 55 
[address] [facility] [address] [id number] [phone] Work/School Note Date: 7/29/2020 To Whom It May concern: 
 
Yue Byrne was seen and treated today in the emergency room by the following provider(s): 
Attending Provider: Haylee Gipson MD. Yue Byrne is excused from work/school on 07/29/20 and 07/30/20. She is medically clear to return to work/school on 7/31/2020. Sincerely, Rosemarie Valladares MD

## 2020-07-29 NOTE — DISCHARGE INSTRUCTIONS
Patient Education        Female Urinary Tract: Anatomy Sketch    Current as of: November 8, 2019               Content Version: 12.5  © 2006-2020 [employer]. Care instructions adapted under license by [employer] (which disclaims liability or warranty for this information). If you have questions about a medical condition or this instruction, always ask your healthcare professional. [address]  any warranty or liability for your use of this information. Patient Education        Urinary Tract Infection in Women: Care Instructions  Your Care Instructions     A urinary tract infection, or UTI, is a general term for an infection anywhere between the kidneys and the urethra (where urine comes out). Most UTIs are bladder infections. They often cause pain or burning when you urinate. UTIs are caused by bacteria and can be cured with antibiotics. Be sure to complete your treatment so that the infection goes away. Follow-up care is a key part of your treatment and safety. Be sure to make and go to all appointments, and call your doctor if you are having problems. It's also a good idea to know your test results and keep a list of the medicines you take. How can you care for yourself at home? · Take your antibiotics as directed. Do not stop taking them just because you feel better. You need to take the full course of antibiotics. · Drink extra water and other fluids for the next day or two. This may help wash out the bacteria that are causing the infection. (If you have kidney, heart, or liver disease and have to limit fluids, talk with your doctor before you increase your fluid intake.)  · Avoid drinks that are carbonated or have caffeine. They can irritate the bladder. · Urinate often. Try to empty your bladder each time. · To relieve pain, take a hot bath or lay a heating pad set on low over your lower belly or genital area.  Never go to sleep with a heating pad in place. To prevent UTIs  · Drink plenty of water each day. This helps you urinate often, which clears bacteria from your system. (If you have kidney, heart, or liver disease and have to limit fluids, talk with your doctor before you increase your fluid intake.)  · Urinate when you need to. · Urinate right after you have sex. · Change sanitary pads often. · Avoid douches, bubble baths, feminine hygiene sprays, and other feminine hygiene products that have deodorants. · After going to the bathroom, wipe from front to back. When should you call for help? Call your doctor now or seek immediate medical care if:  · Symptoms such as fever, chills, nausea, or vomiting get worse or appear for the first time. · You have new pain in your back just below your rib cage. This is called flank pain. · There is new blood or pus in your urine. · You have any problems with your antibiotic medicine. Watch closely for changes in your health, and be sure to contact your doctor if:  · You are not getting better after taking an antibiotic for 2 days. · Your symptoms go away but then come back. Where can you learn more? Go to http://www.gray.com/  Enter V246 in the search box to learn more about \"Urinary Tract Infection in Women: Care Instructions. \"  Current as of: August 22, 2019               Content Version: 12.5  © 9072-1337 [employer]. Care instructions adapted under license by [employer] (which disclaims liability or warranty for this information). If you have questions about a medical condition or this instruction, always ask your healthcare professional. [address] any warranty or liability for your use of this information. You will need to use the antibiotics as directed. Drink plenty of fluids. You will need to follow-up with your own physician in 3 days if continued difficulty.   Otherwise follow-up in the next week to have your urine rechecked to be sure that it is cleared. You are given a work note for 2 days. See your physician sooner if any worsening of symptoms.

## 2020-07-30 LAB
BACTERIA SPEC CULT: NORMAL
CC UR VC: NORMAL
SERVICE CMNT-IMP: NORMAL

## 2020-08-11 ENCOUNTER — OFFICE VISIT (OUTPATIENT)
Dept: INTERNAL MEDICINE CLINIC | Age: 30
End: 2020-08-11
Payer: MEDICAID

## 2020-08-11 VITALS
OXYGEN SATURATION: 98 % | RESPIRATION RATE: 18 BRPM | SYSTOLIC BLOOD PRESSURE: 129 MMHG | HEIGHT: 61 IN | BODY MASS INDEX: 49.28 KG/M2 | WEIGHT: 261 LBS | TEMPERATURE: 98.3 F | DIASTOLIC BLOOD PRESSURE: 79 MMHG | HEART RATE: 89 BPM

## 2020-08-11 DIAGNOSIS — I10 ESSENTIAL HYPERTENSION: Primary | ICD-10-CM

## 2020-08-11 DIAGNOSIS — F32.A ANXIETY AND DEPRESSION: ICD-10-CM

## 2020-08-11 DIAGNOSIS — E87.6 HYPOKALEMIA: ICD-10-CM

## 2020-08-11 DIAGNOSIS — E55.9 VITAMIN D DEFICIENCY: ICD-10-CM

## 2020-08-11 DIAGNOSIS — F41.9 ANXIETY AND DEPRESSION: ICD-10-CM

## 2020-08-11 PROCEDURE — 99214 OFFICE O/P EST MOD 30 MIN: CPT | Performed by: NURSE PRACTITIONER

## 2020-08-11 RX ORDER — AMLODIPINE BESYLATE 10 MG/1
10 TABLET ORAL DAILY
Qty: 90 TAB | Refills: 1 | Status: SHIPPED | OUTPATIENT
Start: 2020-08-11 | End: 2020-11-04 | Stop reason: SDUPTHER

## 2020-08-11 RX ORDER — CHLORTHALIDONE 25 MG/1
25 TABLET ORAL DAILY
Qty: 90 TAB | Refills: 1 | Status: SHIPPED | OUTPATIENT
Start: 2020-08-11 | End: 2020-11-04 | Stop reason: SDUPTHER

## 2020-08-11 RX ORDER — METHOCARBAMOL 750 MG/1
750 TABLET, FILM COATED ORAL 4 TIMES DAILY
Qty: 30 TAB | Refills: 0 | Status: SHIPPED | OUTPATIENT
Start: 2020-08-11 | End: 2020-12-23

## 2020-08-11 RX ORDER — POTASSIUM CHLORIDE 20 MEQ/1
20 TABLET, EXTENDED RELEASE ORAL DAILY
Qty: 30 TAB | Refills: 0 | Status: CANCELLED | OUTPATIENT
Start: 2020-08-11

## 2020-08-11 RX ORDER — ESCITALOPRAM OXALATE 10 MG/1
10 TABLET ORAL DAILY
Qty: 90 TAB | Refills: 0 | Status: SHIPPED | OUTPATIENT
Start: 2020-08-11 | End: 2020-12-23

## 2020-08-11 NOTE — PROGRESS NOTES
Chief Complaint   Patient presents with    Follow-up     6 month follow up     Labs     potassium       1. Have you been to the ER, urgent care clinic since your last visit? Hospitalized since your last visit? Yes When: 07/30/2020 Where: [facility]  Reason for visit: SOB, Dizziness    2. Have you seen or consulted any other health care providers outside of the [address] [place] [address] since your last visit? Include any pap smears or colon screening.  No

## 2020-08-11 NOTE — PROGRESS NOTES
Subjective: (As above and below)     Chief Complaint   Patient presents with    Follow-up     6 month follow up    Anjum     potassium     Abraham Pereyra is a 27y.o. year old female who presents for     ED f/u: she presented to [facility] and then [facility] ED for chest pain and sob. She was admitted to [facility] and it seems that she was treated for pneumonia, some confusion about COVID testing. She rec'd abx. She reports feeling much better  K was low at [facility], she is taking supplement now - r/t HCTZ? Also tx for UTI but cx returned negative    Hypertension ROS:  taking medications as instructed, no medication side effects noted, no TIAs, no chest pain on exertion, no dyspnea on exertion, no swelling of ankles    Depression/anxiety: reports doing well     She is planning on breast reduction but on hold due to low K    Reviewed PmHx, RxHx, FmHx, SocHx, AllgHx and updated in chart. Family History   Problem Relation Age of Onset    Hypertension Maternal Grandmother        Past Medical History:   Diagnosis Date    Asthma     Asthma     has not had any problems no inhaler    Class 3 obesity in adult 2017    Essential hypertension 2017    H/O pilonidal cyst     Hidradenitis 2018    Psychiatric disorder     ANXIETY    Vaginal delivery       Social History     Socioeconomic History    Marital status: SINGLE     Spouse name: Not on file    Number of children: Not on file    Years of education: Not on file    Highest education level: Not on file   Tobacco Use    Smoking status: Current Some Day Smoker     Packs/day: 0.25     Types: Cigars     Last attempt to quit: 3/1/2011     Years since quittin.4    Smokeless tobacco: Never Used   Substance and Sexual Activity    Alcohol use: No    Drug use: No    Sexual activity: Yes     Partners: Male     Birth control/protection: Pill          Current Outpatient Medications   Medication Sig    amLODIPine (NORVASC) 10 mg tablet Take 1 Tab by mouth daily. FOR BLOOD PRESSURE    chlorthalidone (HYGROTEN) 25 mg tablet Take 1 Tab by mouth daily. FOR BLOOD PRESSURE    escitalopram oxalate (LEXAPRO) 10 mg tablet Take 1 Tab by mouth daily.  methocarbamoL (Robaxin-750) 750 mg tablet Take 1 Tab by mouth four (4) times daily. Indications: muscle spasm    potassium chloride (K-DUR, KLOR-CON) 20 mEq tablet Take 1 Tab by mouth daily. With diuretic (Hydrochlorthiazide or Lasix)  Indications: prevention of low potassium in the blood    fluticasone propionate (FLONASE) 50 mcg/actuation nasal spray 2 Sprays by Both Nostrils route daily.  Soft Lens Rinse-Store Solution (SALINE SOLUTION) soln Use as needed for wound care. No current facility-administered medications for this visit. Review of Systems:   Constitutional:    Negative for fever and chills, negative diaphoresis. HEENT:              Negative for neck pain and stiffness. Eyes:                  Negative for visual disturbance, itching, redness or discharge. Respiratory:        Negative for cough and shortness of breath. Cardiovascular:  Negative for chest pain and palpitations. Gastrointestinal: Negative for nausea, vomiting, abdominal pain, diarrhea or constipation. Genitourinary:     Negative for dysuria and frequency. Musculoskeletal: Negative for falls, tenderness and swelling. Skin:                    Negative for rash, masses or lesions. Neurological:       Negative for dizzyness, seizure, loss of consciousness, weakness and numbness. Objective:     Vitals:    08/11/20 1448   BP: 129/79   Pulse: 89   Resp: 18   Temp: 98.3 °F (36.8 °C)   TempSrc: Oral   SpO2: 98%   Weight: 261 lb (118.4 kg)   Height: 5' 1\" (1.549 m)       Results for orders placed or performed during the hospital encounter of 07/29/20   URINE CULTURE HOLD SAMPLE    Specimen: Serum   Result Value Ref Range    Urine culture hold        Urine on hold in Microbiology dept for 2 days.   If unpreserved urine is submitted, it cannot be used for addtional testing after 24 hours, recollection will be required. CULTURE, URINE    Specimen: Clean catch; Urine   Result Value Ref Range    Special Requests: NO SPECIAL REQUESTS      [place] Count [id number]  COLONIES/mL        Culture result: MIXED UROGENITAL CHIKIS ISOLATED     SAMPLES BEING HELD   Result Value Ref Range    SAMPLES BEING HELD 1BLU,1RED     COMMENT        Add-on orders for these samples will be processed based on acceptable specimen integrity and analyte stability, which may vary by analyte. CBC WITH AUTOMATED DIFF   Result Value Ref Range    WBC 8.4 3.6 - 11.0 K/uL    RBC 4.98 3.80 - 5.20 M/uL    HGB 12.1 11.5 - 16.0 g/dL    HCT 39.3 35.0 - 47.0 %    MCV 78.9 (L) 80.0 - 99.0 FL    MCH 24.3 (L) 26.0 - 34.0 PG    MCHC 30.8 30.0 - 36.5 g/dL    RDW 19.0 (H) 11.5 - 14.5 %    PLATELET 092 805 - 920 K/uL    MPV 10.1 8.9 - 12.9 FL    NRBC 0.0 0  WBC    ABSOLUTE NRBC 0.00 0.00 - 0.01 K/uL    NEUTROPHILS 78 (H) 32 - 75 %    LYMPHOCYTES 17 12 - 49 %    MONOCYTES 5 5 - 13 %    EOSINOPHILS 0 0 - 7 %    BASOPHILS 0 0 - 1 %    IMMATURE GRANULOCYTES 0 0.0 - 0.5 %    ABS. NEUTROPHILS 6.5 1.8 - 8.0 K/UL    ABS. LYMPHOCYTES 1.4 0.8 - 3.5 K/UL    ABS. MONOCYTES 0.4 0.0 - 1.0 K/UL    ABS. EOSINOPHILS 0.0 0.0 - 0.4 K/UL    ABS. BASOPHILS 0.0 0.0 - 0.1 K/UL    ABS. IMM. GRANS. 0.0 0.00 - 0.04 K/UL    DF AUTOMATED     METABOLIC PANEL, COMPREHENSIVE   Result Value Ref Range    Sodium 138 136 - 145 mmol/L    Potassium 3.1 (L) 3.5 - 5.1 mmol/L    Chloride 103 97 - 108 mmol/L    CO2 25 21 - 32 mmol/L    Anion gap 10 5 - 15 mmol/L    Glucose 98 65 - 100 mg/dL    BUN 11 6 - 20 MG/DL    Creatinine 1.05 (H) 0.55 - 1.02 MG/DL    BUN/Creatinine ratio 10 (L) 12 - 20      GFR est AA >60 >60 ml/min/1.73m2    GFR est non-AA >60 >60 ml/min/1.73m2    Calcium 8.5 8.5 - 10.1 MG/DL    Bilirubin, total 0.6 0.2 - 1.0 MG/DL    ALT (SGPT) 42 12 - 78 U/L    AST (SGOT) 23 15 - 37 U/L    Alk.  phosphatase 81 45 - 117 U/L Protein, total 8.4 (H) 6.4 - 8.2 g/dL    Albumin 3.8 3.5 - 5.0 g/dL    Globulin 4.6 (H) 2.0 - 4.0 g/dL    A-G Ratio 0.8 (L) 1.1 - 2.2     TROPONIN I   Result Value Ref Range    Troponin-I, Qt. <0.05 <0.05 ng/mL   URINALYSIS W/MICROSCOPIC   Result Value Ref Range    Color YELLOW/STRAW      Appearance CLOUDY (A) CLEAR      Specific gravity 1.023 1.003 - 1.030      pH (UA) 6.5 5.0 - 8.0      Protein Negative NEG mg/dL    Glucose Negative NEG mg/dL    Ketone Negative NEG mg/dL    Bilirubin Negative NEG      Blood LARGE (A) NEG      Urobilinogen 1.0 0.2 - 1.0 EU/dL    Nitrites Negative NEG      Leukocyte Esterase TRACE (A) NEG      WBC 5-10 0 - 4 /hpf    RBC 5-10 0 - 5 /hpf    Epithelial cells MODERATE (A) FEW /lpf    Bacteria 2+ (A) NEG /hpf    Hyaline cast 0-2 0 - 5 /lpf   LIPASE   Result Value Ref Range    Lipase 91 73 - 393 U/L   HCG URINE, QL. - POC   Result Value Ref Range    Pregnancy test,urine (POC) Negative NEG     EKG, 12 LEAD, INITIAL   Result Value Ref Range    Ventricular Rate 98 BPM    Atrial Rate 98 BPM    P-R Interval 140 ms    QRS Duration 86 ms    Q-T Interval 340 ms    QTC Calculation (Bezet) 434 ms    Calculated P Axis 50 degrees    Calculated R Axis 74 degrees    Calculated T Axis 5 degrees    Diagnosis       Normal sinus rhythm  No previous ECGs available  Confirmed by Rody Hendrickson MD, Indira Ortega ([id number]) on 7/29/2020 8:15:26 AM           Physical Examination: General appearance - alert, well appearing, and in no distress  Chest - clear to auscultation, no wheezes, rales or rhonchi, symmetric air entry  Heart - normal rate, regular rhythm, normal S1, S2, no murmurs, rubs, clicks or gallops  Extremities - no pedal edema noted      Assessment/ Plan:     1. Essential hypertension    - amLODIPine (NORVASC) 10 mg tablet; Take 1 Tab by mouth daily. FOR BLOOD PRESSURE  Dispense: 80 Tab; Refill: 1  - chlorthalidone (HYGROTEN) 25 mg tablet; Take 1 Tab by mouth daily.  FOR BLOOD PRESSURE  Dispense: 80 Tab; Refill: 1  - METABOLIC PANEL, BASIC  - LIPID PANEL    2. Anxiety and depression    - escitalopram oxalate (LEXAPRO) 10 mg tablet; Take 1 Tab by mouth daily. Dispense: 90 Tab; Refill: 0    3. Vitamin D deficiency    - VITAMIN D, 25 HYDROXY; Future    4. Hypokalemia    - METABOLIC PANEL, BASIC          I have discussed the diagnosis with the patient and the intended plan as seen in the above orders. The patient has received an after-visit summary and questions were answered concerning future plans. Pt conveyed understanding of plan. Medication Side Effects and Warnings were discussed with patient: yes  Patient Labs were reviewed: yes  Patient Past Records were reviewed:  yes    David Florez.  Luc Quevedo NP

## 2020-08-12 LAB
25(OH)D3+25(OH)D2 SERPL-MCNC: 14.3 NG/ML (ref 30–100)
BUN SERPL-MCNC: 16 MG/DL (ref 6–20)
BUN/CREAT SERPL: 16 (ref 9–23)
CALCIUM SERPL-MCNC: 10.3 MG/DL (ref 8.7–10.2)
CHLORIDE SERPL-SCNC: 99 MMOL/L (ref 96–106)
CHOLEST SERPL-MCNC: 165 MG/DL (ref 100–199)
CO2 SERPL-SCNC: 27 MMOL/L (ref 20–29)
CREAT SERPL-MCNC: 0.98 MG/DL (ref 0.57–1)
GLUCOSE SERPL-MCNC: 71 MG/DL (ref 65–99)
HDLC SERPL-MCNC: 30 MG/DL
INTERPRETATION, 910389: NORMAL
LDLC SERPL CALC-MCNC: ABNORMAL MG/DL (ref 0–99)
PDF IMAGE, 910387: NORMAL
POTASSIUM SERPL-SCNC: 3.8 MMOL/L (ref 3.5–5.2)
SODIUM SERPL-SCNC: 143 MMOL/L (ref 134–144)
TRIGL SERPL-MCNC: 513 MG/DL (ref 0–149)
VLDLC SERPL CALC-MCNC: ABNORMAL MG/DL (ref 5–40)

## 2020-08-13 ENCOUNTER — TELEPHONE (OUTPATIENT)
Dept: INTERNAL MEDICINE CLINIC | Age: 30
End: 2020-08-13

## 2020-08-13 DIAGNOSIS — E55.9 VITAMIN D DEFICIENCY: Primary | ICD-10-CM

## 2020-08-13 DIAGNOSIS — E78.1 HYPERTRIGLYCERIDEMIA: ICD-10-CM

## 2020-08-13 RX ORDER — ERGOCALCIFEROL 1.25 MG/1
50000 CAPSULE ORAL
Qty: 8 CAP | Refills: 0 | Status: SHIPPED | OUTPATIENT
Start: 2020-08-13 | End: 2020-10-02

## 2020-08-13 NOTE — TELEPHONE ENCOUNTER
Informed pt of lab results and providers recommendations. Pt states she does take a womens multivitamin. Pt understands to start vit d booster once a week for 8 weeks and to repeat labs in 1 month fasting. Pt states she has an appt w/ her surgeon @3PM and she will call back to let us know what type of form they require.

## 2020-08-13 NOTE — TELEPHONE ENCOUNTER
----- Message from Vahe Fuentes. Andreina Bowser NP sent at 8/13/2020  9:33 AM EDT -----  Please let her know her     Vit d is low - high dose booster given to take once weekly x 8 weeks then when done recc a women's multivtamin w D and calcium    Triglyceride portion of cholesterol really high - likely bc not fasting - repeat in 1 month fasting - mail lab slip    Potassium is normal. I suggest she keep her BP meds the same and continue potassium supp for now because her BP is good and she wants to have breast reduction - after surgery we can eval for changes but don't want to delay her surgery as we adjust bp meds  Does her surgeon need a whole preop clearance or just a note saying her potassium is back to normal    ----- Message -----  From: Noel Morales Lab Results In  Sent: 8/12/2020   9:36 AM EDT  To: Vahe Fuentes.  Andreina Bowser NP

## 2020-08-13 NOTE — TELEPHONE ENCOUNTER
Catalina with [facility] [email or web address] pre admission testing  is requesting.  reecent lab work and ov notes to be faxed to her @ [phone]  Catalina's ph# [phone]

## 2020-08-19 ENCOUNTER — TELEPHONE (OUTPATIENT)
Dept: INTERNAL MEDICINE CLINIC | Age: 30
End: 2020-08-19

## 2020-08-19 NOTE — TELEPHONE ENCOUNTER
Pt states she was scheduled for breast reductioin surgery but tested positive for COVID and was told to inform her PCP.   Pt #[phone]

## 2020-09-05 ENCOUNTER — HOSPITAL ENCOUNTER (EMERGENCY)
Age: 30
Discharge: HOME | End: 2020-09-05
Attending: EMERGENCY MEDICINE
Payer: MEDICAID

## 2020-09-05 VITALS
OXYGEN SATURATION: 93 % | TEMPERATURE: 98 F | BODY MASS INDEX: 49.88 KG/M2 | SYSTOLIC BLOOD PRESSURE: 143 MMHG | DIASTOLIC BLOOD PRESSURE: 63 MMHG | WEIGHT: 264 LBS | HEART RATE: 95 BPM | RESPIRATION RATE: 20 BRPM

## 2020-09-05 DIAGNOSIS — F10.920 ALCOHOLIC INTOXICATION WITHOUT COMPLICATION (HCC): Primary | ICD-10-CM

## 2020-09-05 DIAGNOSIS — R11.10 ACUTE VOMITING: ICD-10-CM

## 2020-09-05 DIAGNOSIS — N17.9 AKI (ACUTE KIDNEY INJURY) (HCC): ICD-10-CM

## 2020-09-05 DIAGNOSIS — E87.6 ACUTE HYPOKALEMIA: ICD-10-CM

## 2020-09-05 LAB
ALBUMIN SERPL-MCNC: 4.1 G/DL (ref 3.5–5)
ALBUMIN/GLOB SERPL: 1 {RATIO} (ref 1.1–2.2)
ALP SERPL-CCNC: 76 U/L (ref 45–117)
ALT SERPL-CCNC: 47 U/L (ref 12–78)
ANION GAP SERPL CALC-SCNC: 11 MMOL/L (ref 5–15)
AST SERPL-CCNC: 26 U/L (ref 15–37)
BASOPHILS # BLD: 0.1 K/UL (ref 0–0.1)
BASOPHILS NFR BLD: 0 % (ref 0–1)
BILIRUB SERPL-MCNC: 0.2 MG/DL (ref 0.2–1)
BUN SERPL-MCNC: 15 MG/DL (ref 6–20)
BUN/CREAT SERPL: 13 (ref 12–20)
CALCIUM SERPL-MCNC: 9 MG/DL (ref 8.5–10.1)
CHLORIDE SERPL-SCNC: 104 MMOL/L (ref 97–108)
CO2 SERPL-SCNC: 26 MMOL/L (ref 21–32)
CREAT SERPL-MCNC: 1.12 MG/DL (ref 0.55–1.02)
DIFFERENTIAL METHOD BLD: ABNORMAL
EOSINOPHIL # BLD: 0.1 K/UL (ref 0–0.4)
EOSINOPHIL NFR BLD: 1 % (ref 0–7)
ERYTHROCYTE [DISTWIDTH] IN BLOOD BY AUTOMATED COUNT: 18.1 % (ref 11.5–14.5)
ETHANOL SERPL-MCNC: 125 MG/DL
GLOBULIN SER CALC-MCNC: 4.2 G/DL (ref 2–4)
GLUCOSE SERPL-MCNC: 159 MG/DL (ref 65–100)
HCG SERPL QL: NEGATIVE
HCT VFR BLD AUTO: 37.7 % (ref 35–47)
HGB BLD-MCNC: 12 G/DL (ref 11.5–16)
IMM GRANULOCYTES # BLD AUTO: 0.1 K/UL (ref 0–0.04)
IMM GRANULOCYTES NFR BLD AUTO: 1 % (ref 0–0.5)
LIPASE SERPL-CCNC: 79 U/L (ref 73–393)
LYMPHOCYTES # BLD: 4.6 K/UL (ref 0.8–3.5)
LYMPHOCYTES NFR BLD: 34 % (ref 12–49)
MCH RBC QN AUTO: 24.7 PG (ref 26–34)
MCHC RBC AUTO-ENTMCNC: 31.8 G/DL (ref 30–36.5)
MCV RBC AUTO: 77.6 FL (ref 80–99)
MONOCYTES # BLD: 0.4 K/UL (ref 0–1)
MONOCYTES NFR BLD: 3 % (ref 5–13)
NEUTS SEG # BLD: 8.3 K/UL (ref 1.8–8)
NEUTS SEG NFR BLD: 61 % (ref 32–75)
NRBC # BLD: 0 K/UL (ref 0–0.01)
NRBC BLD-RTO: 0 PER 100 WBC
PLATELET # BLD AUTO: 359 K/UL (ref 150–400)
PMV BLD AUTO: 10.3 FL (ref 8.9–12.9)
POTASSIUM SERPL-SCNC: 2.5 MMOL/L (ref 3.5–5.1)
PROT SERPL-MCNC: 8.3 G/DL (ref 6.4–8.2)
RBC # BLD AUTO: 4.86 M/UL (ref 3.8–5.2)
SODIUM SERPL-SCNC: 141 MMOL/L (ref 136–145)
WBC # BLD AUTO: 13.6 K/UL (ref 3.6–11)

## 2020-09-05 PROCEDURE — 83690 ASSAY OF LIPASE: CPT

## 2020-09-05 PROCEDURE — 80307 DRUG TEST PRSMV CHEM ANLYZR: CPT

## 2020-09-05 PROCEDURE — 96374 THER/PROPH/DIAG INJ IV PUSH: CPT

## 2020-09-05 PROCEDURE — 96361 HYDRATE IV INFUSION ADD-ON: CPT

## 2020-09-05 PROCEDURE — 84703 CHORIONIC GONADOTROPIN ASSAY: CPT

## 2020-09-05 PROCEDURE — 36415 COLL VENOUS BLD VENIPUNCTURE: CPT

## 2020-09-05 PROCEDURE — 74011250636 HC RX REV CODE- 250/636: Performed by: EMERGENCY MEDICINE

## 2020-09-05 PROCEDURE — 74011250637 HC RX REV CODE- 250/637: Performed by: EMERGENCY MEDICINE

## 2020-09-05 PROCEDURE — 74011000250 HC RX REV CODE- 250: Performed by: EMERGENCY MEDICINE

## 2020-09-05 PROCEDURE — 85025 COMPLETE CBC W/AUTO DIFF WBC: CPT

## 2020-09-05 PROCEDURE — 99283 EMERGENCY DEPT VISIT LOW MDM: CPT

## 2020-09-05 PROCEDURE — 80053 COMPREHEN METABOLIC PANEL: CPT

## 2020-09-05 RX ORDER — AMMONIA 15 % (W/V)
1 AMPUL (EA) INHALATION
Status: DISCONTINUED | OUTPATIENT
Start: 2020-09-05 | End: 2020-09-05 | Stop reason: HOSPADM

## 2020-09-05 RX ORDER — POTASSIUM CHLORIDE 1500 MG/1
20 TABLET, FILM COATED, EXTENDED RELEASE ORAL DAILY
Qty: 7 TAB | Refills: 0 | Status: SHIPPED | OUTPATIENT
Start: 2020-09-05 | End: 2020-09-12

## 2020-09-05 RX ORDER — POTASSIUM CHLORIDE 750 MG/1
40 TABLET, FILM COATED, EXTENDED RELEASE ORAL
Status: COMPLETED | OUTPATIENT
Start: 2020-09-05 | End: 2020-09-05

## 2020-09-05 RX ORDER — ONDANSETRON 4 MG/1
4 TABLET, ORALLY DISINTEGRATING ORAL
Qty: 20 TAB | Refills: 0 | Status: SHIPPED | OUTPATIENT
Start: 2020-09-05 | End: 2020-12-23

## 2020-09-05 RX ORDER — ONDANSETRON 2 MG/ML
8 INJECTION INTRAMUSCULAR; INTRAVENOUS
Status: COMPLETED | OUTPATIENT
Start: 2020-09-05 | End: 2020-09-05

## 2020-09-05 RX ORDER — FAMOTIDINE 20 MG/1
20 TABLET, FILM COATED ORAL 2 TIMES DAILY
Qty: 20 TAB | Refills: 0 | Status: SHIPPED | OUTPATIENT
Start: 2020-09-05 | End: 2020-09-15

## 2020-09-05 RX ADMIN — ONDANSETRON 8 MG: 2 SOLUTION INTRAMUSCULAR; INTRAVENOUS at 01:16

## 2020-09-05 RX ADMIN — POTASSIUM CHLORIDE 40 MEQ: 750 TABLET, EXTENDED RELEASE ORAL at 03:20

## 2020-09-05 RX ADMIN — SODIUM CHLORIDE 1000 ML: 900 INJECTION, SOLUTION INTRAVENOUS at 01:16

## 2020-09-05 NOTE — ED NOTES
Patient  given copy of dc instructions and 0 script(s). Patient  verbalized understanding of instructions and script (s). Patient given a current medication reconciliation form and verbalized understanding of their medications. Patient verbalized understanding of the importance of discussing medications with  his or her physician or clinic they will be following up with. Patient alert and oriented and in no acute distress. Patient discharged home ambulatory with boyfriend.

## 2020-09-05 NOTE — ED TRIAGE NOTES
Friends called EMS after patient started \" vomiting in their car\". EMS relayed friends stated \" she drank a lot tonight\".  Vomiting on arrival

## 2020-09-05 NOTE — DISCHARGE INSTRUCTIONS
Patient Education        Acute Alcohol Intoxication: Care Instructions  Your Care Instructions     You have had treatment to help your body rid itself of alcohol. Too much alcohol upsets the body's fluid balance. Your doctor may have given you fluids and vitamins. For some people, drinking too much alcohol is a one-time event. For others, it is an ongoing problem. In either case, it is serious. It can be life-threatening. Follow-up care is a key part of your treatment and safety. Be sure to make and go to all appointments, and call your doctor if you are having problems. It's also a good idea to know your test results and keep a list of the medicines you take. How can you care for yourself at home? · Do not drink and drive. · Be safe with medicines. Take your medicines exactly as prescribed. Call your doctor if you think you are having a problem with your medicine. · Your doctor may have prescribed disulfiram (Antabuse). Do not drink any alcohol while you are taking this medicine. You may have severe or even life-threatening side effects from even small amounts of alcohol. · If you were given medicine to prevent nausea, be sure to take it exactly as prescribed. · Before you take any medicine, tell your doctor if:  ? You have had a bad reaction to any medicines in the past.  ? You are taking other medicines, including over-the-counter ones, or have other health problems. ? You are or could be pregnant. · Be prepared to have some symptoms of withdrawal in the next few days. · Drink plenty of liquids in the next few days. · Seek help if you need it to stop drinking. Getting counseling and joining a support group can help you stay sober. Try a support group such as Alcoholics Anonymous. · Avoid alcohol when you take medicines. It can react with many medicines and cause serious problems. When should you call for help? Call 911 anytime you think you may need emergency care.  For example, call if:    · You feel confused and are seeing things that are not there.     · You are thinking about killing yourself or hurting others.     · You have a seizure.     · You vomit blood or what looks like coffee grounds. Call your doctor now or seek immediate medical care if:    · You have trembling, restlessness, sweating, and other withdrawal symptoms that are new or that get worse.     · Your withdrawal symptoms come back after not bothering you for days or weeks.     · You can't stop vomiting. Watch closely for changes in your health, and be sure to contact your doctor if:    · You need help to stop drinking. Where can you learn more? Go to http://randi-robert.info/  Enter T102 in the search box to learn more about \"Acute Alcohol Intoxication: Care Instructions. \"  Current as of: June 29, 2020               Content Version: 12.6  © 6041-6489 [employer]. Care instructions adapted under license by [employer] (which disclaims liability or warranty for this information). If you have questions about a medical condition or this instruction, always ask your healthcare professional. [address] any warranty or liability for your use of this information.

## 2020-09-05 NOTE — ED NOTES
Pt arrived to ED via ems with c/o etoh intoxication and vomiting. Pt is in no acute distress. Will continue to monitor. See nursing assessment. Safety precautions in place; call light within reach. Emergency Department Nursing Plan of Care       The Nursing Plan of Care is developed from the Nursing assessment and Emergency Department Attending provider initial evaluation. The plan of care may be reviewed in the ED Provider note. The Plan of Care was developed with the following considerations:   Patient / Family readiness to learn indicated by:pt intoxicated  Persons(s) to be included in education: patient  Barriers to Learning/Limitations:pt intoxicated.     Signed     Eric Armstrong RN    9/5/2020   8:22 AM

## 2020-09-05 NOTE — ED PROVIDER NOTES
EMERGENCY DEPARTMENT HISTORY AND PHYSICAL EXAM      Please note that this dictation was completed with the assistance of \"Dragon\", the computer voice recognition software. Quite often unanticipated grammatical, syntax, homophones, and other interpretive errors are inadvertently transcribed by the computer software. Please disregard these errors and any errors that have escaped final proofreading. Thank you. Date: 2020  Patient Name: Gisela Sifuentes  : 1990  MRN: [id number]  History of Presenting Illness     Chief Complaint   Patient presents with    Alcohol intoxication    Vomiting       History Provided By: Patient and EMS    HPI: Gisela Sifuentes, 27 y.o. female with past medical history as documented below presents to the ED with c/o of alcohol intoxication prior to arrival.  According to EMS, friends called EMS after patient started to vomit in their car. They did not see any blood or bile in the emesis. Patient does admit to binge drinking tonight at a party. Patient denies any coingestion or other drug use. She has no complaints currently. She does smell of alcohol and is sleepy but arousable. Patient denies any chance of pregnancy. Denies any falls or trauma. Pt denies any other alleviating or exacerbating factors. Additionally, pt specifically denies any recent fever, chills, headache, abdominal pain, CP, SOB, lightheadedness, dizziness, numbness, weakness, lower extremity swelling, heart palpitations, urinary sxs, diarrhea, constipation, melena, hematochezia, cough, or congestion. There are no other complaints, changes or physical findings pertinent to the HPI at this time.     PCP: Lars Denny., NP    Past History   Past Medical History:  Past Medical History:   Diagnosis Date    Asthma     Asthma     has not had any problems no inhaler    Class 3 obesity in adult 2017    Essential hypertension 2017    H/O pilonidal cyst     Hidradenitis 2018    Psychiatric disorder     ANXIETY    Vaginal delivery        Past Surgical History:  Past Surgical History:   Procedure Laterality Date    HX APPENDECTOMY  2017    HX ORTHOPAEDIC      Broke l foot as child    HX OTHER SURGICAL      left foot on third toe     HX OTHER SURGICAL      Laparoscopic cholecystectomy.  HX OTHER SURGICAL  2016    Incision and drainage of pilonidal abscess; Dr. Pacheco Chi.  HX OTHER SURGICAL  2016    Incision, drainage, and excision of pilonidal cyst; Dr. Pacheco Chi.  HX OTHER SURGICAL  2018    Incision and drainage of pilonidal abscess; Dr. Pacheco Chi.  HX OTHER SURGICAL  02/15/2018    Excision of left axilla hidradenitis; Dr. Zulma Golden.  HX OTHER SURGICAL  2017    Excision of sebaceous cyst, right inframammary fold; Dr. Zumla Golden.  HX OTHER SURGICAL  2018    Incision and drainage of pilonidal abscess; Dr. Pacheco Chi.  HX OTHER SURGICAL  2019    Incision and drainage of pilonidal abscess; Dr. Pacheco Chi. Family History:  Family History   Problem Relation Age of Onset    Hypertension Maternal Grandmother        Social History:  Social History     Tobacco Use    Smoking status: Current Some Day Smoker     Packs/day: 0.25     Types: Cigars     Last attempt to quit: 3/1/2011     Years since quittin.5    Smokeless tobacco: Never Used   Substance Use Topics    Alcohol use: No    Drug use: No       Allergies: Allergies   Allergen Reactions    Aleve [Naproxen Sodium] Hives    Darvocet A500 [Propoxyphene N-Acetaminophen] Hives    Motrin [Ibuprofen] Hives    Bactrim [Sulfamethoprim Ds] Other (comments)     Abdominal pain.  Lortab [Hydrocodone-Acetaminophen] Nausea and Vomiting       Current Medications:  No current facility-administered medications on file prior to encounter.       Current Outpatient Medications on File Prior to Encounter   Medication Sig Dispense Refill    ergocalciferol (ERGOCALCIFEROL) 1,250 mcg (50,000 unit) capsule Take 1 Cap by mouth every seven (7) days for 8 doses. 8 Cap 0    amLODIPine (NORVASC) 10 mg tablet Take 1 Tab by mouth daily. FOR BLOOD PRESSURE 90 Tab 1    chlorthalidone (HYGROTEN) 25 mg tablet Take 1 Tab by mouth daily. FOR BLOOD PRESSURE 90 Tab 1    escitalopram oxalate (LEXAPRO) 10 mg tablet Take 1 Tab by mouth daily. 90 Tab 0    methocarbamoL (Robaxin-750) 750 mg tablet Take 1 Tab by mouth four (4) times daily. Indications: muscle spasm 30 Tab 0    potassium chloride (K-DUR, KLOR-CON) 20 mEq tablet Take 1 Tab by mouth daily. With diuretic (Hydrochlorthiazide or Lasix)  Indications: prevention of low potassium in the blood 30 Tab 0    fluticasone propionate (FLONASE) 50 mcg/actuation nasal spray 2 Sprays by Both Nostrils route daily. 1 Bottle 0    Soft Lens Rinse-Store Solution (SALINE SOLUTION) soln Use as needed for wound care. 1 Bottle as needed     Review of Systems   Review of Systems   Constitutional: Negative. Negative for chills and fever. HENT: Negative. Negative for congestion, facial swelling, rhinorrhea, sore throat, trouble swallowing and voice change. Eyes: Negative. Respiratory: Negative. Negative for apnea, cough, chest tightness, shortness of breath and wheezing. Cardiovascular: Negative. Negative for chest pain, palpitations and leg swelling. Gastrointestinal: Positive for nausea and vomiting. Negative for abdominal distention, abdominal pain, blood in stool, constipation and diarrhea. Endocrine: Negative. Negative for cold intolerance, heat intolerance and polyuria. Genitourinary: Negative. Negative for difficulty urinating, dysuria, flank pain, frequency, hematuria and urgency. Musculoskeletal: Negative. Negative for arthralgias, back pain, myalgias, neck pain and neck stiffness. Skin: Negative. Negative for color change and rash. Neurological: Negative.   Negative for dizziness, syncope, facial asymmetry, speech difficulty, weakness, light-headedness, numbness and headaches. Hematological: Negative. Does not bruise/bleed easily. Psychiatric/Behavioral: Negative. Negative for confusion and self-injury. The patient is not nervous/anxious. Physical Exam   Physical Exam  Vitals signs and nursing note reviewed. Constitutional:       General: She is not in acute distress. Appearance: She is well-developed. She is not diaphoretic. Comments: Smells of alcohol, sleepy but easily arousable. HENT:      Head: Normocephalic and atraumatic. Mouth/Throat:      Pharynx: No oropharyngeal exudate. Eyes:      Conjunctiva/sclera: Conjunctivae normal.      Pupils: Pupils are equal, round, and reactive to light. Neck:      Musculoskeletal: Normal range of motion. Cardiovascular:      Rate and Rhythm: Normal rate and regular rhythm. Heart sounds: Normal heart sounds. No murmur. No friction rub. No gallop. Pulmonary:      Effort: Pulmonary effort is normal. No respiratory distress. Breath sounds: Normal breath sounds. No wheezing or rales. Chest:      Chest wall: No tenderness. Abdominal:      General: Bowel sounds are normal. There is no distension. Palpations: Abdomen is soft. There is no mass. Tenderness: There is no abdominal tenderness. There is no guarding or rebound. Musculoskeletal: Normal range of motion. General: No tenderness or deformity. Skin:     General: Skin is warm. Findings: No rash. Neurological:      Mental Status: She is alert and oriented to person, place, and time. Cranial Nerves: No cranial nerve deficit. Motor: No abnormal muscle tone.       Coordination: Coordination normal.      Deep Tendon Reflexes: Reflexes normal.         Diagnostic Study Results     Labs -  Recent Results (from the past 24 hour(s))   CBC WITH AUTOMATED DIFF    Collection Time: 09/05/20  1:17 AM   Result Value Ref Range    WBC 13.6 (H) 3.6 - 11.0 K/uL    RBC 4.86 3.80 - 5.20 M/uL    HGB 12.0 11.5 - 16.0 g/dL    HCT 37.7 35.0 - 47.0 %    MCV 77.6 (L) 80.0 - 99.0 FL    MCH 24.7 (L) 26.0 - 34.0 PG    MCHC 31.8 30.0 - 36.5 g/dL    RDW 18.1 (H) 11.5 - 14.5 %    PLATELET 508 551 - 265 K/uL    MPV 10.3 8.9 - 12.9 FL    NRBC 0.0 0  WBC    ABSOLUTE NRBC 0.00 0.00 - 0.01 K/uL    NEUTROPHILS 61 32 - 75 %    LYMPHOCYTES 34 12 - 49 %    MONOCYTES 3 (L) 5 - 13 %    EOSINOPHILS 1 0 - 7 %    BASOPHILS 0 0 - 1 %    IMMATURE GRANULOCYTES 1 (H) 0.0 - 0.5 %    ABS. NEUTROPHILS 8.3 (H) 1.8 - 8.0 K/UL    ABS. LYMPHOCYTES 4.6 (H) 0.8 - 3.5 K/UL    ABS. MONOCYTES 0.4 0.0 - 1.0 K/UL    ABS. EOSINOPHILS 0.1 0.0 - 0.4 K/UL    ABS. BASOPHILS 0.1 0.0 - 0.1 K/UL    ABS. IMM. GRANS. 0.1 (H) 0.00 - 0.04 K/UL    DF AUTOMATED     METABOLIC PANEL, COMPREHENSIVE    Collection Time: 09/05/20  1:17 AM   Result Value Ref Range    Sodium 141 136 - 145 mmol/L    Potassium 2.5 (LL) 3.5 - 5.1 mmol/L    Chloride 104 97 - 108 mmol/L    CO2 26 21 - 32 mmol/L    Anion gap 11 5 - 15 mmol/L    Glucose 159 (H) 65 - 100 mg/dL    BUN 15 6 - 20 MG/DL    Creatinine 1.12 (H) 0.55 - 1.02 MG/DL    BUN/Creatinine ratio 13 12 - 20      GFR est AA >60 >60 ml/min/1.73m2    GFR est non-AA 57 (L) >60 ml/min/1.73m2    Calcium 9.0 8.5 - 10.1 MG/DL    Bilirubin, total 0.2 0.2 - 1.0 MG/DL    ALT (SGPT) 47 12 - 78 U/L    AST (SGOT) 26 15 - 37 U/L    Alk.  phosphatase 76 45 - 117 U/L    Protein, total 8.3 (H) 6.4 - 8.2 g/dL    Albumin 4.1 3.5 - 5.0 g/dL    Globulin 4.2 (H) 2.0 - 4.0 g/dL    A-G Ratio 1.0 (L) 1.1 - 2.2     HCG QL SERUM    Collection Time: 09/05/20  1:17 AM   Result Value Ref Range    HCG, Ql. Negative NEG     LIPASE    Collection Time: 09/05/20  1:17 AM   Result Value Ref Range    Lipase 79 73 - 393 U/L   ETHYL ALCOHOL    Collection Time: 09/05/20  1:17 AM   Result Value Ref Range    ALCOHOL(ETHYL),SERUM 125 (H) <10 MG/DL       Radiologic Studies -   No orders to display     CT Results  (Last 48 hours)    None        CXR Results  (Last 48 hours)    None          Medical Decision Making   I reviewed the vital signs, available nursing notes, past medical history, past surgical history, family history and social history. Vital Signs-Reviewed the patient's vital signs. Patient Vitals for the past 24 hrs:   Temp Pulse Resp BP SpO2   09/05/20 0101 98 °F (36.7 °C) 95 20 143/63 93 %     Pulse Oximetry Analysis - 93% on RA    Cardiac Monitor:   Rate: 95 bpm  Rhythm: Normal Sinus Rhythm      Records Reviewed: Nursing Notes, Old Medical Records, Previous electrocardiograms, Previous Radiology Studies and Previous Laboratory Studies    Provider Notes (Medical Decision Making):   Pt presents with alcohol intoxication. Pt denies any co-ingestion or self harm. No evidence of drug overdose. Stable vitals and no signs of trauma on exam. DDx: other toxidrome/intoxication, seizure, syncope. Given the story and most likely ETOH intoxication, Will get POC glucose, provide IVF's, goody bag, treat symptomatically for nausea/vomiting, check ETOH level, drug screen and monitor closely. Will allow patient to metabolize to freedom and ensure patient can keep down PO and ambulate prior to discharge. ED Course:   I am the first provider for this patient's ED visit today. Initial assessment performed. I discussed presenting problems, concerns and my formulated plan for today's visit with the patient and any available family members at bedside. I encouraged them to ask questions as they arise throughout the visit. HYPERTENSION COUNSELING  For 10 minutes, education was provided to the patient today regarding their hypertension. Patient is made aware of their elevated blood pressure and is instructed to follow up this week with their Primary Care for a recheck. Patient is counseled regarding consequences of chronic, uncontrolled hypertension including kidney disease, heart disease, stroke or even death.  Patient states their understanding and agrees to follow up this week. Additionally, during their visit, I discussed sodium restriction, maintaining ideal body weight and regular exercise program as physiologic means to achieve blood pressure control. The patient will strive towards this. TOBACCO COUNSELING:  Upon evaluation, pt expressed that they are a current tobacco user. For approximately 10 minutes, pt has been counseled on the dangers of smoking and was encouraged to quit as soon as possible in order to decrease further risks to their health. Pt has conveyed their understanding of the risks involved should they continue to use tobacco products. I reviewed our electronic medical record system for any past medical records that were available that may contribute to the patient's current condition, the nursing notes and vital signs from today's visit. Gabi Deutsch MD    ED Orders Placed :  Orders Placed This Encounter    CBC WITH AUTOMATED DIFF    METABOLIC PANEL, COMPREHENSIVE    HCG QL SERUM    LIPASE    BLOOD ALCOHOL (Ethyl Alcohol)    PO CHALLENGE    INSERT PERIPHERAL IV ONE TIME STAT    sodium chloride 0.9 % bolus infusion 1,000 mL    ondansetron (ZOFRAN) injection 8 mg    potassium chloride SR (KLOR-CON 10) tablet 40 mEq    ondansetron (Zofran ODT) 4 mg disintegrating tablet    famotidine (Pepcid) 20 mg tablet    potassium chloride SR (K-TAB) 20 mEq tablet    ammonia aromatic 15% (W/V) ampule for inhalation     ED Medications Administered:  Medications   sodium chloride 0.9 % bolus infusion 1,000 mL (0 mL IntraVENous IV Completed 9/5/20 0325)   ondansetron (ZOFRAN) injection 8 mg (8 mg IntraVENous Given 9/5/20 0116)   potassium chloride SR (KLOR-CON 10) tablet 40 mEq (40 mEq Oral Given 9/5/20 0320)           Progress Note:  Pt has sobered up. She is able to tolerate PO and ambulate with a steady gait. I have re-examined the patient. Pt states she feels much better and symptoms improved. Tolerating oral intake.  Abdomen is soft and without guarding, rebound or other peritoneal signs. I have discussed with patient the importance of close f/u and to return to the ED if symptoms don't improve or worsen. Progress Note:  Patient has been reassessed and reports feeling better and symptoms have improved significantly after ED treatment. Patient feels comfortable going home with close follow-up. Rivka Yoo final labs and imaging have been reviewed with her and available family and/or caregiver. They have been counseled regarding her diagnosis. She verbally conveys understanding and agreement of the signs, symptoms, diagnosis, treatment and prognosis and additionally agrees to follow up as recommended with Dr. Larissa Beck, NP and/or specialist in 24 - 48 hours. She also agrees with the care-plan we created together and conveys that all of her questions have been answered. I have also put together some discharge instructions for her that include: 1) educational information regarding their diagnosis, 2) how to care for their diagnosis at home, as well a 3) list of reasons why they would want to return to the ED prior to their follow-up appointment should the patient's condition change or symptoms worsen. I have answered all questions to the patient's satisfaction. Strict return precautions given. She both understood and agreed with plan as discussed. Vital signs stable for discharge. Pt very appreciative of care today. Disposition: Discharge  The pt is ready for discharge. The pt's signs, symptoms, diagnosis, and discharge instructions have been discussed and pt has conveyed their understanding. The pt is to follow up as recommended or return to ER should their symptoms worsen. Plan has been discussed and pt is in full agreement. Plan:  1. Return precautions as discussed.     2.   Discharge Medication List as of 9/5/2020  2:59 AM      START taking these medications    Details   ondansetron (Zofran ODT) 4 mg disintegrating tablet Take 1 Tab by mouth every eight (8) hours as needed for Nausea or Vomiting., Normal, Disp-20 Tab,R-0      famotidine (Pepcid) 20 mg tablet Take 1 Tab by mouth two (2) times a day for 10 days. , Normal, Disp-20 Tab,R-0      potassium chloride SR (K-TAB) 20 mEq tablet Take 1 Tab by mouth daily for 7 days. , Normal, Disp-7 Tab,R-0         CONTINUE these medications which have NOT CHANGED    Details   ergocalciferol (ERGOCALCIFEROL) 1,250 mcg (50,000 unit) capsule Take 1 Cap by mouth every seven (7) days for 8 doses. , Normal, Disp-8 Cap,R-0      amLODIPine (NORVASC) 10 mg tablet Take 1 Tab by mouth daily. FOR BLOOD PRESSURE, Normal, Disp-90 Tab,R-1EMERGENCY HOLDOVER PROVIDED: #[id number]. [facility] GAVE 3 AMLODIPINE BESYLATE 10 MG TAB. MD CONTACTED FOR AUTHORIZATION ON 06/15/2020      chlorthalidone (HYGROTEN) 25 mg tablet Take 1 Tab by mouth daily. FOR BLOOD PRESSURE, Normal, Disp-90 Tab,R-1      escitalopram oxalate (LEXAPRO) 10 mg tablet Take 1 Tab by mouth daily. , Normal, Disp-90 Tab,R-0      methocarbamoL (Robaxin-750) 750 mg tablet Take 1 Tab by mouth four (4) times daily. Indications: muscle spasm, Normal, Disp-30 Tab,R-0      potassium chloride (K-DUR, KLOR-CON) 20 mEq tablet Take 1 Tab by mouth daily. With diuretic (Hydrochlorthiazide or Lasix)  Indications: prevention of low potassium in the blood, Normal, Disp-30 Tab,R-0      fluticasone propionate (FLONASE) 50 mcg/actuation nasal spray 2 Sprays by Both Nostrils route daily. , Normal, Disp-1 Bottle, R-0      Soft Lens Rinse-Store Solution (SALINE SOLUTION) soln Use as needed for wound care., Print, Disp-1 Bottle, R-as needed           3.    Follow-up Information     Follow up With Specialties Details Why Contact Info    Torrie Keller., NP Nurse Practitioner   [facility]  [address] [facility] [address] [phone]      [facility] EMERGENCY DEPT Emergency Medicine  As needed, If symptoms worsen Miguelina Kendrick Instructed to return to ED if worse  Diagnosis   Clinical Impression:   1. Alcoholic intoxication without complication ([address]    2. Acute vomiting    3. Acute hypokalemia    4. KARLIE (acute kidney injury) ([address]        Attestation:  Doroteo Sommer MD, am the attending of record for this patient. I personally performed the services described in this documentation on this date, 9/5/2020 for patient, Nirav Frazier. I have reviewed the chart and verified that the record is accurate and complete. This note will not be viewable in [address]

## 2020-09-13 DIAGNOSIS — E78.1 HYPERTRIGLYCERIDEMIA: ICD-10-CM

## 2020-09-14 DIAGNOSIS — L21.9 SEBORRHEIC DERMATITIS OF SCALP: Primary | ICD-10-CM

## 2020-09-14 RX ORDER — KETOCONAZOLE 20 MG/ML
SHAMPOO TOPICAL
Qty: 1 BOTTLE | Refills: 0 | Status: SHIPPED | OUTPATIENT
Start: 2020-09-14 | End: 2020-11-04 | Stop reason: SDUPTHER

## 2020-11-04 ENCOUNTER — OFFICE VISIT (OUTPATIENT)
Dept: INTERNAL MEDICINE CLINIC | Age: 30
End: 2020-11-04
Payer: MEDICAID

## 2020-11-04 VITALS
OXYGEN SATURATION: 98 % | RESPIRATION RATE: 18 BRPM | BODY MASS INDEX: 50.79 KG/M2 | DIASTOLIC BLOOD PRESSURE: 93 MMHG | WEIGHT: 269 LBS | SYSTOLIC BLOOD PRESSURE: 140 MMHG | HEIGHT: 61 IN | HEART RATE: 94 BPM | TEMPERATURE: 98.3 F

## 2020-11-04 DIAGNOSIS — I10 ESSENTIAL HYPERTENSION: ICD-10-CM

## 2020-11-04 DIAGNOSIS — E66.01 MORBID OBESITY (HCC): Primary | ICD-10-CM

## 2020-11-04 DIAGNOSIS — M77.8 WRIST TENDONITIS: ICD-10-CM

## 2020-11-04 DIAGNOSIS — H60.501 ACUTE NON-INFECTIVE OTITIS EXTERNA OF RIGHT EAR, UNSPECIFIED TYPE: ICD-10-CM

## 2020-11-04 DIAGNOSIS — L21.9 SEBORRHEIC DERMATITIS OF SCALP: ICD-10-CM

## 2020-11-04 PROCEDURE — 99214 OFFICE O/P EST MOD 30 MIN: CPT | Performed by: NURSE PRACTITIONER

## 2020-11-04 RX ORDER — KETOCONAZOLE 20 MG/ML
SHAMPOO TOPICAL
Qty: 1 BOTTLE | Refills: 0 | Status: SHIPPED | OUTPATIENT
Start: 2020-11-04 | End: 2021-03-30 | Stop reason: SDUPTHER

## 2020-11-04 RX ORDER — IBUPROFEN 800 MG/1
800 TABLET ORAL
Qty: 60 TAB | Refills: 0 | Status: SHIPPED | OUTPATIENT
Start: 2020-11-04 | End: 2021-03-30 | Stop reason: SDUPTHER

## 2020-11-04 RX ORDER — AMLODIPINE BESYLATE 10 MG/1
10 TABLET ORAL DAILY
Qty: 90 TAB | Refills: 1 | Status: SHIPPED | OUTPATIENT
Start: 2020-11-04 | End: 2021-09-14 | Stop reason: SDUPTHER

## 2020-11-04 RX ORDER — CHLORTHALIDONE 25 MG/1
25 TABLET ORAL DAILY
Qty: 90 TAB | Refills: 1 | Status: SHIPPED | OUTPATIENT
Start: 2020-11-04 | End: 2021-08-12 | Stop reason: SDUPTHER

## 2020-11-04 RX ORDER — HYDROCORTISONE AND ACETIC ACID 20.75; 10.375 MG/ML; MG/ML
1 SOLUTION AURICULAR (OTIC) 3 TIMES DAILY
Qty: 10 ML | Refills: 0 | Status: SHIPPED | OUTPATIENT
Start: 2020-11-04 | End: 2020-11-14

## 2020-11-04 NOTE — PROGRESS NOTES
Chief Complaint   Patient presents with    Hearing Problem     pt states water got in ear around summertime, pt states feeling went away and has now come back worse     Weight Loss     pt would like to discuss surgery        1. Have you been to the ER, urgent care clinic since your last visit? Hospitalized since your last visit? No    2. Have you seen or consulted any other health care providers outside of the [address] [place] [address] since your last visit? Include any pap smears or colon screening.  No

## 2020-11-04 NOTE — PROGRESS NOTES
Subjective: (As above and below)     Chief Complaint   Patient presents with    Hearing Problem     pt states water got in ear around summertime, pt states feeling went away and has now come back worse     Weight Loss     pt would like to discuss surgery      Mayda Vasquez is a 27y.o. year old female who presents for        R ear problem: this summer she got water in her ear it was bothersome for some time, seemed to improve now s/s are back  Feels full, like there is water in it and decreased hearing  No new injuries    Right wrist pain few days, no inciting injury but she is R handed and does a lot of lifting  No swelling, pain w/ rotation, some tingling to fingers  Has not used anything otc    Weight:   She is interested in weight loss surgery. Has been trying on her own for some time  Would like more information    Wt Readings from Last 3 Encounters:   20 269 lb (122 kg)   20 264 lb (119.7 kg)   20 261 lb (118.4 kg)     Seborrheic dermatitis: shampoo works    Hypertension ROS:  No meds in a few days        Reviewed PmHx, RxHx, FmHx, SocHx, AllgHx and updated in chart.   Family History   Problem Relation Age of Onset    Hypertension Maternal Grandmother        Past Medical History:   Diagnosis Date    Asthma     Asthma     has not had any problems no inhaler    Class 3 obesity in adult 2017    Essential hypertension 2017    H/O pilonidal cyst     Hidradenitis 2018    Psychiatric disorder     ANXIETY    Vaginal delivery       Social History     Socioeconomic History    Marital status: SINGLE     Spouse name: Not on file    Number of children: Not on file    Years of education: Not on file    Highest education level: Not on file   Tobacco Use    Smoking status: Current Some Day Smoker     Packs/day: 0.25     Types: Cigars     Last attempt to quit: 3/1/2011     Years since quittin.6    Smokeless tobacco: Never Used   Substance and Sexual Activity    Alcohol use: No    Drug use: No    Sexual activity: Yes     Partners: Male     Birth control/protection: Pill          Current Outpatient Medications   Medication Sig    amLODIPine (NORVASC) 10 mg tablet Take 1 Tab by mouth daily. FOR BLOOD PRESSURE    chlorthalidone (HYGROTON) 25 mg tablet Take 1 Tab by mouth daily. FOR BLOOD PRESSURE    ketoconazole (NIZORAL) 2 % shampoo Use weekly as needed for dandruff    hydrocortisone-acetic acid (VOSOL-HC) otic solution Administer 1 Drop in right ear three (3) times daily for 10 days.  ibuprofen (MOTRIN) 800 mg tablet Take 1 Tab by mouth every eight (8) hours as needed for Pain. With food    ondansetron (Zofran ODT) 4 mg disintegrating tablet Take 1 Tab by mouth every eight (8) hours as needed for Nausea or Vomiting.  escitalopram oxalate (LEXAPRO) 10 mg tablet Take 1 Tab by mouth daily.  methocarbamoL (Robaxin-750) 750 mg tablet Take 1 Tab by mouth four (4) times daily. Indications: muscle spasm    potassium chloride (K-DUR, KLOR-CON) 20 mEq tablet Take 1 Tab by mouth daily. With diuretic (Hydrochlorthiazide or Lasix)  Indications: prevention of low potassium in the blood    fluticasone propionate (FLONASE) 50 mcg/actuation nasal spray 2 Sprays by Both Nostrils route daily.  Soft Lens Rinse-Store Solution (SALINE SOLUTION) soln Use as needed for wound care. No current facility-administered medications for this visit. Review of Systems:   Constitutional:    Negative for fever and chills, negative diaphoresis. HEENT:              Negative for neck pain and stiffness. Eyes:                  Negative for visual disturbance, itching, redness or discharge. Respiratory:        Negative for cough and shortness of breath. Cardiovascular:  Negative for chest pain and palpitations. Gastrointestinal: Negative for nausea, vomiting, abdominal pain, diarrhea or constipation. Genitourinary:     Negative for dysuria and frequency.    Musculoskeletal: Negative for falls, tenderness and swelling. Skin:                    Negative for rash, masses or lesions. Neurological:       Negative for dizzyness, seizure, loss of consciousness, weakness and numbness. Objective:     Vitals:    11/04/20 0919 11/04/20 0942   BP: (!) 140/93    Pulse: (!) 114 94   Resp: 18    Temp: 98.3 °F (36.8 °C)    TempSrc: Temporal    SpO2: 98%    Weight: 269 lb (122 kg)    Height: 5' 1\" (1.549 m)            Physical Examination: General appearance - alert, well appearing, and in no distress and overweight  Ears - right ear canal is boggy, mildly red, drum is nl  Left ear nl  Chest - clear to auscultation, no wheezes, rales or rhonchi, symmetric air entry  Heart - normal rate, regular rhythm, normal S1, S2, no murmurs, rubs, clicks or gallops  Musculoskeletal - R wrist, full rom but pain to lateral aspect  Neg phalen/tinel  Extremities - no pedal edema noted      Assessment/ Plan:     Follow-up and Dispositions    · Return in about 3 weeks (around 11/25/2020) for nv bp check. 1. Essential hypertension  rtc when ON meds so efficacy can be determined    - amLODIPine (NORVASC) 10 mg tablet; Take 1 Tab by mouth daily. FOR BLOOD PRESSURE  Dispense: 80 Tab; Refill: 1  - chlorthalidone (HYGROTON) 25 mg tablet; Take 1 Tab by mouth daily. FOR BLOOD PRESSURE  Dispense: 80 Tab; Refill: 1    2. Seborrheic dermatitis of scalp    - ketoconazole (NIZORAL) 2 % shampoo; Use weekly as needed for dandruff  Dispense: 1 Bottle; Refill: 0    3. Morbid obesity ([facility] [address]    - REFERRAL TO BARIATRIC SURGERY    4. Acute non-infective otitis externa of right ear, unspecified type    - hydrocortisone-acetic acid (VOSOL-HC) otic solution; Administer 1 Drop in right ear three (3) times daily for 10 days. Dispense: 10 mL; Refill: 0    5. Wrist tendonitis  Wrist brace, fu INI  - ibuprofen (MOTRIN) 800 mg tablet; Take 1 Tab by mouth every eight (8) hours as needed for Pain. With food  Dispense: 60 Tab;  Refill: 0      I have discussed the diagnosis with the patient and the intended plan as seen in the above orders. The patient has received an after-visit summary and questions were answered concerning future plans. Pt conveyed understanding of plan. Medication Side Effects and Warnings were discussed with patient: yes  Patient Labs were reviewed: yes  Patient Past Records were reviewed:  yes    Ronnie Cotter.  Brad Joe, DANNY

## 2020-11-05 ENCOUNTER — TELEPHONE (OUTPATIENT)
Dept: INTERNAL MEDICINE CLINIC | Age: 30
End: 2020-11-05

## 2020-11-05 NOTE — TELEPHONE ENCOUNTER
Pt states the er drops you prescribed for her is not covered by medicaid. She needs wither a prior auth or another rx sent for somethiing else. Please. She states she really needs something.   Pt # [phone]

## 2020-11-10 ENCOUNTER — TRANSCRIBE ORDER (OUTPATIENT)
Dept: INTERNAL MEDICINE CLINIC | Age: 30
End: 2020-11-10

## 2020-12-03 ENCOUNTER — TELEPHONE (OUTPATIENT)
Dept: INTERNAL MEDICINE CLINIC | Age: 30
End: 2020-12-03

## 2020-12-03 DIAGNOSIS — E87.6 HYPOKALEMIA: Primary | ICD-10-CM

## 2020-12-03 RX ORDER — POTASSIUM CHLORIDE 20 MEQ/1
20 TABLET, EXTENDED RELEASE ORAL DAILY
Qty: 90 TAB | Refills: 0 | Status: SHIPPED | OUTPATIENT
Start: 2020-12-03 | End: 2021-09-14 | Stop reason: SDUPTHER

## 2020-12-03 NOTE — TELEPHONE ENCOUNTER
----- Message from Arturo Kumar NP sent at 12/3/2020  8:27 AM EST -----  Sent K supp  Please have her check K in 2 weeks after starting - lab slip in to mail  ----- Message -----  From: Coy Farooq: 2020   4:35 PM EST  To: Arturo Kumar NP    Pt states she does not have K supp and she does need  ----- Message -----  From: James Camejo NP  Sent: 2020  10:52 AM EST  To: Ayse Ornelas'shelley labs from [facility]  Her K is low - she should be taking a K supp- does she have this?

## 2020-12-23 ENCOUNTER — OFFICE VISIT (OUTPATIENT)
Dept: OBGYN CLINIC | Age: 30
End: 2020-12-23
Payer: MEDICAID

## 2020-12-23 VITALS
SYSTOLIC BLOOD PRESSURE: 134 MMHG | DIASTOLIC BLOOD PRESSURE: 84 MMHG | BODY MASS INDEX: 49.47 KG/M2 | WEIGHT: 262 LBS | HEIGHT: 61 IN

## 2020-12-23 DIAGNOSIS — N92.6 MISSED MENSES: Primary | ICD-10-CM

## 2020-12-23 LAB
HCG URINE, QL. (POC): NEGATIVE
VALID INTERNAL CONTROL?: YES

## 2020-12-23 PROCEDURE — 99204 OFFICE O/P NEW MOD 45 MIN: CPT | Performed by: OBSTETRICS & GYNECOLOGY

## 2020-12-23 PROCEDURE — 81025 URINE PREGNANCY TEST: CPT | Performed by: OBSTETRICS & GYNECOLOGY

## 2020-12-23 NOTE — PROGRESS NOTES
Problem Visit  CC: absent menses since october    HPI: Ms. Liam Garcia is a 27 y.o.  female presenting with above. She has not had previous treatment for this problem. Onset: above  Location: vaginal  Quality: no menses  Severity: bothersome    OB History        1    Para   1    Term   1       0    AB   0    Living   1       SAB   0    TAB   0    Ectopic   0    Molar        Multiple   0    Live Births   1                Past Medical History:   Diagnosis Date    Asthma     Asthma     has not had any problems no inhaler    Class 3 obesity in adult 2017    Essential hypertension 2017    H/O pilonidal cyst     Hidradenitis 2018    Psychiatric disorder     ANXIETY    Vaginal delivery        Past Surgical History:   Procedure Laterality Date    HX APPENDECTOMY      HX BREAST REDUCTION  2020    HX ORTHOPAEDIC      Broke l foot as child    HX OTHER SURGICAL      left foot on third toe     HX OTHER SURGICAL      Laparoscopic cholecystectomy.  HX OTHER SURGICAL  2016    Incision and drainage of pilonidal abscess; Dr. Harsha Banks.  HX OTHER SURGICAL  2016    Incision, drainage, and excision of pilonidal cyst; Dr. Harsha Banks.  HX OTHER SURGICAL  2018    Incision and drainage of pilonidal abscess; Dr. Harsha Banks.  HX OTHER SURGICAL  02/15/2018    Excision of left axilla hidradenitis; Dr. Roxana Terry.  HX OTHER SURGICAL  2017    Excision of sebaceous cyst, right inframammary fold; Dr. Roxana Terry.  HX OTHER SURGICAL  2018    Incision and drainage of pilonidal abscess; Dr. Harsha Banks.  HX OTHER SURGICAL  2019    Incision and drainage of pilonidal abscess; Dr. Harsha Banks.        Family History   Problem Relation Age of Onset    Hypertension Maternal Grandmother        Social History     Socioeconomic History    Marital status: SINGLE     Spouse name: Not on file    Number of children: Not on file    Years of education: Not on file    Highest education level: Not on file   Occupational History    Not on file   Social Needs    Financial resource strain: Not on file    Food insecurity     Worry: Not on file     Inability: Not on file    Transportation needs     Medical: Not on file     Non-medical: Not on file   Tobacco Use    Smoking status: Current Some Day Smoker     Packs/day: 0.25     Types: Cigars     Last attempt to quit: 3/1/2011     Years since quittin.8    Smokeless tobacco: Never Used   Substance and Sexual Activity    Alcohol use: No    Drug use: No    Sexual activity: Yes     Partners: Male     Birth control/protection: None   Lifestyle    Physical activity     Days per week: Not on file     Minutes per session: Not on file    Stress: Not on file   Relationships    Social connections     Talks on phone: Not on file     Gets together: Not on file     Attends Faith service: Not on file     Active member of club or organization: Not on file     Attends meetings of clubs or organizations: Not on file     Relationship status: Not on file    Intimate partner violence     Fear of current or ex partner: Not on file     Emotionally abused: Not on file     Physically abused: Not on file     Forced sexual activity: Not on file   Other Topics Concern    Not on file   Social History Narrative    Not on file       Current Outpatient Medications   Medication Sig Dispense Refill    potassium chloride (K-DUR, KLOR-CON) 20 mEq tablet Take 1 Tab by mouth daily. With diuretic (Hydrochlorthiazide or Lasix)  Indications: prevention of low potassium in the blood 90 Tab 0    amLODIPine (NORVASC) 10 mg tablet Take 1 Tab by mouth daily. FOR BLOOD PRESSURE 90 Tab 1    chlorthalidone (HYGROTON) 25 mg tablet Take 1 Tab by mouth daily.  FOR BLOOD PRESSURE 90 Tab 1    ketoconazole (NIZORAL) 2 % shampoo Use weekly as needed for dandruff 1 Bottle 0    ibuprofen (MOTRIN) 800 mg tablet Take 1 Tab by mouth every eight (8) hours as needed for Pain. With food 60 Tab 0    fluticasone propionate (FLONASE) 50 mcg/actuation nasal spray 2 Sprays by Both Nostrils route daily. 1 Bottle 0       Allergies   Allergen Reactions    Aleve [Naproxen Sodium] Hives    Darvocet A500 [Propoxyphene N-Acetaminophen] Hives    Motrin [Ibuprofen] Hives    Bactrim [Sulfamethoprim Ds] Other (comments)     Abdominal pain.     Lortab [Hydrocodone-Acetaminophen] Nausea and Vomiting       Review of Systems - History obtained from the patient  Constitutional: negative for weight loss, fever, night sweats  HEENT: negative for hearing loss, earache, congestion, snoring, sorethroat  CV: negative for chest pain, palpitations, edema  Resp: negative for cough, shortness of breath, wheezing  GI: negative for change in bowel habits, abdominal pain, black or bloody stools  : negative for frequency, dysuria, hematuria  MSK: negative for back pain, joint pain, muscle pain  Skin :negative for itching, rash, hives  Neuro: negative for dizziness, headache, confusion, weakness  Psych: negative for anxiety, depression, change in mood  Heme/lymph: negative for bleeding, bruising, pallor    Physical Exam    Visit Vitals  /84 (BP 1 Location: Right arm, BP Patient Position: Sitting)   Ht 5' 1\" (1.549 m)   Wt 262 lb (118.8 kg)   LMP 10/14/2020 (Approximate)   BMI 49.50 kg/m²       HENT  · Head and Face: appears normal    Neck  · Inspection/Palpation: normal appearance, no masses or tenderness  · Lymph Nodes: no lymphadenopathy present  · Thyroid: gland size normal, nontender, no nodules or masses present on palpation    Chest  · Respiratory Effort: non-labored breathing  · Auscultation: Clear to auscultation bilaterlly, normal breath sounds    Cardiovascular  · Heart:  · Auscultation: regular rate and rhythm without murmur  · Extremities: no peripheral edema    Gastrointestinal  · Abdominal Examination: abdomen non-tender to palpation, normal bowel sounds, no masses present  · Liver and spleen: no hepatomegaly present, spleen not palpable  · Hernias: no hernias identified    Skin  · General Inspection: no rash, no lesions identified    Neurologic/Psychiatric  · Mental Status:  · Orientation: grossly oriented to person, place and time  · Mood and Affect: mood normal, affect appropriate    Results for orders placed or performed in visit on 12/23/20   AMB POC URINE PREGNANCY TEST, VISUAL COLOR COMPARISON   Result Value Ref Range    VALID INTERNAL CONTROL POC Yes     HCG urine, Ql. (POC) Negative Negative         Assessment/Plan:  Secondary amenorrhea with neg UPT today  Discussed TSH and prolactin today  Discussed likely anovulation -discussed importance of diet and exercise as well as weight loss    RTC: for annual , or sooner prn for problems or concerns. Handouts and instructions provided.     Samantha Hinton MD  12/23/2020  12:16 PM

## 2020-12-24 LAB
PROLACTIN SERPL-MCNC: 3 NG/ML (ref 4.8–23.3)
T4 FREE SERPL-MCNC: 1.2 NG/DL (ref 0.82–1.77)
TSH SERPL DL<=0.005 MIU/L-ACNC: 0.56 UIU/ML (ref 0.45–4.5)

## 2020-12-28 NOTE — PROGRESS NOTES
Per Dr. Kayla Jack- called and spoke with patient. Informed her of lab results. Patient verbalized understanding and had no questions at this time.  Patient given [facility] number to call to schedule

## 2021-01-06 ENCOUNTER — HOSPITAL ENCOUNTER (EMERGENCY)
Age: 31
Discharge: HOME | End: 2021-01-06
Attending: EMERGENCY MEDICINE
Payer: MEDICAID

## 2021-01-06 ENCOUNTER — APPOINTMENT (OUTPATIENT)
Dept: CT IMAGING | Age: 31
End: 2021-01-06
Attending: EMERGENCY MEDICINE
Payer: MEDICAID

## 2021-01-06 ENCOUNTER — APPOINTMENT (OUTPATIENT)
Dept: GENERAL RADIOLOGY | Age: 31
End: 2021-01-06
Attending: EMERGENCY MEDICINE
Payer: MEDICAID

## 2021-01-06 VITALS
BODY MASS INDEX: 49.47 KG/M2 | TEMPERATURE: 98.2 F | WEIGHT: 262 LBS | DIASTOLIC BLOOD PRESSURE: 64 MMHG | OXYGEN SATURATION: 100 % | HEIGHT: 61 IN | SYSTOLIC BLOOD PRESSURE: 119 MMHG | RESPIRATION RATE: 16 BRPM | HEART RATE: 100 BPM

## 2021-01-06 DIAGNOSIS — E86.0 DEHYDRATION: ICD-10-CM

## 2021-01-06 DIAGNOSIS — E87.6 HYPOKALEMIA: ICD-10-CM

## 2021-01-06 DIAGNOSIS — M54.12 CERVICAL RADICULOPATHY: ICD-10-CM

## 2021-01-06 DIAGNOSIS — R55 SYNCOPE AND COLLAPSE: Primary | ICD-10-CM

## 2021-01-06 LAB
ALBUMIN SERPL-MCNC: 3.4 G/DL (ref 3.5–5)
ALBUMIN/GLOB SERPL: 1 {RATIO} (ref 1.1–2.2)
ALP SERPL-CCNC: 65 U/L (ref 45–117)
ALT SERPL-CCNC: 36 U/L (ref 12–78)
ANION GAP SERPL CALC-SCNC: 6 MMOL/L (ref 5–15)
APPEARANCE UR: ABNORMAL
AST SERPL-CCNC: 21 U/L (ref 15–37)
BACTERIA URNS QL MICRO: ABNORMAL /HPF
BASOPHILS # BLD: 0 K/UL (ref 0–0.1)
BASOPHILS NFR BLD: 0 % (ref 0–1)
BILIRUB SERPL-MCNC: 0.5 MG/DL (ref 0.2–1)
BILIRUB UR QL: NEGATIVE
BUN SERPL-MCNC: 20 MG/DL (ref 6–20)
BUN/CREAT SERPL: 16 (ref 12–20)
CALCIUM SERPL-MCNC: 8.1 MG/DL (ref 8.5–10.1)
CHLORIDE SERPL-SCNC: 103 MMOL/L (ref 97–108)
CO2 SERPL-SCNC: 28 MMOL/L (ref 21–32)
COLOR UR: ABNORMAL
CREAT SERPL-MCNC: 1.23 MG/DL (ref 0.55–1.02)
DIFFERENTIAL METHOD BLD: ABNORMAL
EOSINOPHIL # BLD: 0.1 K/UL (ref 0–0.4)
EOSINOPHIL NFR BLD: 1 % (ref 0–7)
EPITH CASTS URNS QL MICRO: ABNORMAL /LPF
ERYTHROCYTE [DISTWIDTH] IN BLOOD BY AUTOMATED COUNT: 19.7 % (ref 11.5–14.5)
GLOBULIN SER CALC-MCNC: 3.5 G/DL (ref 2–4)
GLUCOSE SERPL-MCNC: 129 MG/DL (ref 65–100)
GLUCOSE UR STRIP.AUTO-MCNC: NEGATIVE MG/DL
HCG UR QL: NEGATIVE
HCT VFR BLD AUTO: 38.4 % (ref 35–47)
HGB BLD-MCNC: 11.9 G/DL (ref 11.5–16)
HGB UR QL STRIP: NEGATIVE
IMM GRANULOCYTES # BLD AUTO: 0 K/UL (ref 0–0.04)
IMM GRANULOCYTES NFR BLD AUTO: 0 % (ref 0–0.5)
KETONES UR QL STRIP.AUTO: NEGATIVE MG/DL
LEUKOCYTE ESTERASE UR QL STRIP.AUTO: NEGATIVE
LYMPHOCYTES # BLD: 4.9 K/UL (ref 0.8–3.5)
LYMPHOCYTES NFR BLD: 35 % (ref 12–49)
MCH RBC QN AUTO: 23.6 PG (ref 26–34)
MCHC RBC AUTO-ENTMCNC: 31 G/DL (ref 30–36.5)
MCV RBC AUTO: 76.2 FL (ref 80–99)
MONOCYTES # BLD: 0.4 K/UL (ref 0–1)
MONOCYTES NFR BLD: 3 % (ref 5–13)
NEUTS SEG # BLD: 8.6 K/UL (ref 1.8–8)
NEUTS SEG NFR BLD: 61 % (ref 32–75)
NITRITE UR QL STRIP.AUTO: NEGATIVE
NRBC # BLD: 0 K/UL (ref 0–0.01)
NRBC BLD-RTO: 0 PER 100 WBC
PH UR STRIP: 5.5 [PH] (ref 5–8)
PLATELET # BLD AUTO: 345 K/UL (ref 150–400)
PMV BLD AUTO: 10.4 FL (ref 8.9–12.9)
POTASSIUM SERPL-SCNC: 2.8 MMOL/L (ref 3.5–5.1)
PROT SERPL-MCNC: 6.9 G/DL (ref 6.4–8.2)
PROT UR STRIP-MCNC: 30 MG/DL
RBC # BLD AUTO: 5.04 M/UL (ref 3.8–5.2)
RBC #/AREA URNS HPF: ABNORMAL /HPF (ref 0–5)
RBC MORPH BLD: ABNORMAL
SODIUM SERPL-SCNC: 137 MMOL/L (ref 136–145)
SP GR UR REFRACTOMETRY: 1.03 (ref 1–1.03)
TROPONIN I SERPL-MCNC: <0.05 NG/ML
UA: UC IF INDICATED,UAUC: ABNORMAL
UROBILINOGEN UR QL STRIP.AUTO: 0.2 EU/DL (ref 0.2–1)
WBC # BLD AUTO: 14 K/UL (ref 3.6–11)
WBC URNS QL MICRO: ABNORMAL /HPF (ref 0–4)

## 2021-01-06 PROCEDURE — 36415 COLL VENOUS BLD VENIPUNCTURE: CPT

## 2021-01-06 PROCEDURE — 81001 URINALYSIS AUTO W/SCOPE: CPT

## 2021-01-06 PROCEDURE — 74011250637 HC RX REV CODE- 250/637: Performed by: EMERGENCY MEDICINE

## 2021-01-06 PROCEDURE — 96375 TX/PRO/DX INJ NEW DRUG ADDON: CPT

## 2021-01-06 PROCEDURE — 80053 COMPREHEN METABOLIC PANEL: CPT

## 2021-01-06 PROCEDURE — 84484 ASSAY OF TROPONIN QUANT: CPT

## 2021-01-06 PROCEDURE — 99285 EMERGENCY DEPT VISIT HI MDM: CPT

## 2021-01-06 PROCEDURE — 96361 HYDRATE IV INFUSION ADD-ON: CPT

## 2021-01-06 PROCEDURE — 72125 CT NECK SPINE W/O DYE: CPT

## 2021-01-06 PROCEDURE — 71046 X-RAY EXAM CHEST 2 VIEWS: CPT

## 2021-01-06 PROCEDURE — 85025 COMPLETE CBC W/AUTO DIFF WBC: CPT

## 2021-01-06 PROCEDURE — 93005 ELECTROCARDIOGRAM TRACING: CPT

## 2021-01-06 PROCEDURE — 81025 URINE PREGNANCY TEST: CPT

## 2021-01-06 PROCEDURE — 96374 THER/PROPH/DIAG INJ IV PUSH: CPT

## 2021-01-06 PROCEDURE — 74011250636 HC RX REV CODE- 250/636: Performed by: EMERGENCY MEDICINE

## 2021-01-06 RX ORDER — ONDANSETRON 2 MG/ML
4 INJECTION INTRAMUSCULAR; INTRAVENOUS
Status: COMPLETED | OUTPATIENT
Start: 2021-01-06 | End: 2021-01-06

## 2021-01-06 RX ORDER — METHYLPREDNISOLONE 4 MG/1
TABLET ORAL
Qty: 1 DOSE PACK | Refills: 0 | Status: SHIPPED | OUTPATIENT
Start: 2021-01-06 | End: 2021-01-18 | Stop reason: ALTCHOICE

## 2021-01-06 RX ORDER — POTASSIUM CHLORIDE 750 MG/1
40 TABLET, FILM COATED, EXTENDED RELEASE ORAL
Status: COMPLETED | OUTPATIENT
Start: 2021-01-06 | End: 2021-01-06

## 2021-01-06 RX ORDER — FENTANYL CITRATE 50 UG/ML
50 INJECTION, SOLUTION INTRAMUSCULAR; INTRAVENOUS ONCE
Status: COMPLETED | OUTPATIENT
Start: 2021-01-06 | End: 2021-01-06

## 2021-01-06 RX ADMIN — POTASSIUM CHLORIDE 40 MEQ: 750 TABLET, FILM COATED, EXTENDED RELEASE ORAL at 21:04

## 2021-01-06 RX ADMIN — SODIUM CHLORIDE 1000 ML: 9 INJECTION, SOLUTION INTRAVENOUS at 21:04

## 2021-01-06 RX ADMIN — ONDANSETRON 4 MG: 2 INJECTION INTRAMUSCULAR; INTRAVENOUS at 21:18

## 2021-01-06 RX ADMIN — FENTANYL CITRATE 50 MCG: 50 INJECTION, SOLUTION INTRAMUSCULAR; INTRAVENOUS at 21:34

## 2021-01-06 RX ADMIN — METHYLPREDNISOLONE SODIUM SUCCINATE 125 MG: 125 INJECTION, POWDER, FOR SOLUTION INTRAMUSCULAR; INTRAVENOUS at 21:04

## 2021-01-07 LAB
ATRIAL RATE: 86 BPM
CALCULATED P AXIS, ECG09: 39 DEGREES
CALCULATED R AXIS, ECG10: 67 DEGREES
CALCULATED T AXIS, ECG11: 29 DEGREES
DIAGNOSIS, 93000: NORMAL
P-R INTERVAL, ECG05: 140 MS
Q-T INTERVAL, ECG07: 386 MS
QRS DURATION, ECG06: 98 MS
QTC CALCULATION (BEZET), ECG08: 461 MS
VENTRICULAR RATE, ECG03: 86 BPM
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
## 2021-01-07 NOTE — DISCHARGE INSTRUCTIONS
Your lab work showed a low potassium of 2.8. Continue to take her past potassium pills and make sure you are having a good diet that is high in potassium. The CT of your neck also shows some narrowing and a possible pinched nerve in your neck area. This could be also contributing to the numbness and tingling in your bilateral hands. Please follow-up with neurosurgery about this. Take the steroids as prescribed to help with the inflammation in that area.

## 2021-01-07 NOTE — ED NOTES
Orthostatic vitals are as follows:    Lying: BP: 144/70 HR: 81    Sitting: BP: 136/114 HR: 90    Standing: BP: 177/144 HR: 85

## 2021-01-12 ENCOUNTER — HOSPITAL ENCOUNTER (EMERGENCY)
Age: 31
Discharge: HOME | End: 2021-01-12
Attending: EMERGENCY MEDICINE
Payer: MEDICAID

## 2021-01-12 VITALS
OXYGEN SATURATION: 99 % | HEART RATE: 96 BPM | HEIGHT: 61 IN | DIASTOLIC BLOOD PRESSURE: 105 MMHG | BODY MASS INDEX: 49.53 KG/M2 | SYSTOLIC BLOOD PRESSURE: 157 MMHG | TEMPERATURE: 98.7 F | RESPIRATION RATE: 22 BRPM | WEIGHT: 262.35 LBS

## 2021-01-12 DIAGNOSIS — R20.2 PARESTHESIA OF HAND, BILATERAL: Primary | ICD-10-CM

## 2021-01-12 PROCEDURE — 99282 EMERGENCY DEPT VISIT SF MDM: CPT

## 2021-01-12 PROCEDURE — 96372 THER/PROPH/DIAG INJ SC/IM: CPT

## 2021-01-12 PROCEDURE — 74011250636 HC RX REV CODE- 250/636: Performed by: EMERGENCY MEDICINE

## 2021-01-12 RX ORDER — GABAPENTIN 300 MG/1
300 CAPSULE ORAL 3 TIMES DAILY
Qty: 21 CAP | Refills: 0 | Status: SHIPPED | OUTPATIENT
Start: 2021-01-12 | End: 2021-01-18 | Stop reason: SDUPTHER

## 2021-01-12 RX ORDER — KETOROLAC TROMETHAMINE 30 MG/ML
30 INJECTION, SOLUTION INTRAMUSCULAR; INTRAVENOUS
Status: COMPLETED | OUTPATIENT
Start: 2021-01-12 | End: 2021-01-12

## 2021-01-12 RX ADMIN — KETOROLAC TROMETHAMINE 30 MG: 30 INJECTION, SOLUTION INTRAMUSCULAR at 16:03

## 2021-01-12 NOTE — LETTER
Joseluis Luna 55 
[address] [facility] [id number] [phone] Work/School Note Date: 1/12/2021 To Whom It May concern: 
 
Ck Melgar was seen and treated today in the emergency room by the following provider(s): 
Attending Provider: Tahir Barrow DO. Ck Melgar Special Instructions:  Patient needs to be on light duty at work until cleared by her neurosurgeon. Sincerely, Hailey Nunez DO

## 2021-01-12 NOTE — ED PROVIDER NOTES
Date: 1/12/2021  Patient Name: Nany Dasilva    History of Presenting Illness     Chief Complaint   Patient presents with    Wrist Pain       History Provided By: Patient    HPI: Nany Dasilva, 27 y.o. female with a past medical history of hypertension presents to the ED with cc of bilateral wrist pain. She had passed out last week and did a CT scan on her neck which showed a pinched nerve. Initially she had numbness in her bilateral wrists and hands. NO facial injuries, but she did have some neck pain. She is still having R neck and shoulder pain. The feeling started coming back on the L hand, but now it is a severe burning and tingling sensation in her bilateral hands. She is also having some swelling of the R hand and forearm. The pain goes up the bilateral arms up to the mid forearm. She denies any weakness. She finished a course of steroids that she was given at the OSH but has not taken any other medications. There are no other complaints, changes, or physical findings at this time. PCP: Carolina Land, NP    No current facility-administered medications on file prior to encounter. Current Outpatient Medications on File Prior to Encounter   Medication Sig Dispense Refill    methylPREDNISolone (Medrol, [place],) 4 mg tablet As directed 1 Dose Pack 0    potassium chloride (K-DUR, KLOR-CON) 20 mEq tablet Take 1 Tab by mouth daily. With diuretic (Hydrochlorthiazide or Lasix)  Indications: prevention of low potassium in the blood 90 Tab 0    amLODIPine (NORVASC) 10 mg tablet Take 1 Tab by mouth daily. FOR BLOOD PRESSURE 90 Tab 1    chlorthalidone (HYGROTON) 25 mg tablet Take 1 Tab by mouth daily. FOR BLOOD PRESSURE 90 Tab 1    ketoconazole (NIZORAL) 2 % shampoo Use weekly as needed for dandruff 1 Bottle 0    ibuprofen (MOTRIN) 800 mg tablet Take 1 Tab by mouth every eight (8) hours as needed for Pain.  With food 60 Tab 0    fluticasone propionate (FLONASE) 50 mcg/actuation nasal spray 2 Sprays by Both Nostrils route daily. 1 Bottle 0       Past History     Past Medical History:  Past Medical History:   Diagnosis Date    Asthma     Asthma     has not had any problems no inhaler    Class 3 obesity in adult 2017    Essential hypertension 2017    H/O pilonidal cyst     Hidradenitis 2018    Psychiatric disorder     ANXIETY    Vaginal delivery        Past Surgical History:  Past Surgical History:   Procedure Laterality Date    HX APPENDECTOMY      HX BREAST REDUCTION  2020    HX ORTHOPAEDIC      Broke l foot as child    HX OTHER SURGICAL      left foot on third toe     HX OTHER SURGICAL      Laparoscopic cholecystectomy.  HX OTHER SURGICAL  2016    Incision and drainage of pilonidal abscess; Dr. Miguelina Ye.  HX OTHER SURGICAL  2016    Incision, drainage, and excision of pilonidal cyst; Dr. Miguelina Ye.  HX OTHER SURGICAL  2018    Incision and drainage of pilonidal abscess; Dr. Miguelina Ye.  HX OTHER SURGICAL  02/15/2018    Excision of left axilla hidradenitis; Dr. Mckenna Michele.  HX OTHER SURGICAL  2017    Excision of sebaceous cyst, right inframammary fold; Dr. Mckenna Michele.  HX OTHER SURGICAL  2018    Incision and drainage of pilonidal abscess; Dr. Miguelina Ye.  HX OTHER SURGICAL  2019    Incision and drainage of pilonidal abscess; Dr. Miguelina Ye. Family History:  Family History   Problem Relation Age of Onset    Hypertension Maternal Grandmother        Social History:  Social History     Tobacco Use    Smoking status: Current Some Day Smoker     Packs/day: 0.25     Types: Cigars     Last attempt to quit: 3/1/2011     Years since quittin.8    Smokeless tobacco: Never Used   Substance Use Topics    Alcohol use: No    Drug use: No       Allergies:   Allergies   Allergen Reactions    Aleve [Naproxen Sodium] Hives    Darvocet A500 [Propoxyphene N-Acetaminophen] Hives    Motrin [Ibuprofen] Hives    Bactrim [Sulfamethoprim Ds] Other (comments)     Abdominal pain.  Lortab [Hydrocodone-Acetaminophen] Nausea and Vomiting         Review of Systems   Review of Systems   Neurological: Positive for numbness. Burning pain in her distal bilateral UE   All other systems reviewed and are negative. Physical Exam   Physical Exam  Constitutional:       Appearance: She is well-developed. HENT:      Head: Normocephalic and atraumatic. Eyes:      Pupils: Pupils are equal, round, and reactive to light. Neck:      Musculoskeletal: Normal range of motion. Vascular: No JVD. Trachea: No tracheal deviation. Cardiovascular:      Rate and Rhythm: Normal rate and regular rhythm. Heart sounds: Normal heart sounds. No murmur. No friction rub. No gallop. Pulmonary:      Effort: Pulmonary effort is normal.      Breath sounds: Normal breath sounds. No stridor. No wheezing or rales. Abdominal:      General: Bowel sounds are normal. There is no distension. Palpations: Abdomen is soft. There is no mass. Tenderness: There is no abdominal tenderness. There is no guarding. Musculoskeletal: Normal range of motion. Comments: Mild swelling of the R hand and R distal forearm. Very ttp. Normal ROM of the fingers and wrists bilaterally. +2 radial pulses bilaterally. Sensation intact bilaterally. Skin:     General: Skin is warm and dry. Findings: No rash. Neurological:      Mental Status: She is alert and oriented to person, place, and time. Psychiatric:         Behavior: Behavior normal.         Thought Content: Thought content normal.         Judgment: Judgment normal.         Diagnostic Study Results     Labs -   No results found for this or any previous visit (from the past 24 hour(s)).     Radiologic Studies -   No orders to display     CT Results  (Last 48 hours)    None        CXR Results  (Last 48 hours)    None          Medical Decision Making   I am the first provider for this patient. I reviewed the vital signs, available nursing notes, past medical history, past surgical history, family history and social history. Vital Signs-Reviewed the patient's vital signs. Patient Vitals for the past 12 hrs:   Temp Pulse Resp BP SpO2   01/12/21 1508 98.7 °F (37.1 °C) 96 22 (!) 157/105 99 %         Records Reviewed: Nursing Notes and Old Medical Records    Provider Notes (Medical Decision Making):   Pt with bilateral distal UE burning and paresthesias. Neurovascularly intact. CT scan at outside hospital showed degenerative diseases and narrowing at C5-C6. Will treat pain symptomatically and consult nsgy. ED Course:   Initial assessment performed. The patients presenting problems have been discussed, and they are in agreement with the care plan formulated and outlined with them. I have encouraged them to ask questions as they arise throughout their visit. ED Course as of Jan 13 1004   Tue Jan 12, 2021   1613 Spoke with Dr. Marilou Chun who recommended having her call the office tomorrow to get set up for an appointment this week. Also recommended starting her on a course of gabapentin.     [CK]      ED Course User Index  [CK] Agustín All,                Disposition:  Discharge    DISCHARGE PLAN:  1. Discharge Medication List as of 1/12/2021  4:37 PM      START taking these medications    Details   gabapentin (NEURONTIN) 300 mg capsule Take 1 Cap by mouth three (3) times daily for 7 days. Max Daily Amount: 900 mg., Normal, Disp-21 Cap, R-0         CONTINUE these medications which have NOT CHANGED    Details   methylPREDNISolone (Medrol, [place],) 4 mg tablet As directed, Normal, Disp-1 Dose Pack, R-0      potassium chloride (K-DUR, KLOR-CON) 20 mEq tablet Take 1 Tab by mouth daily. With diuretic (Hydrochlorthiazide or Lasix)  Indications: prevention of low potassium in the blood, Normal, Disp-90 Tab,R-0      amLODIPine (NORVASC) 10 mg tablet Take 1 Tab by mouth daily.  FOR BLOOD PRESSURE, Normal, Disp-90 Tab,R-1EMERGENCY HOLDOVER PROVIDED: [id number]. [facility] GAVE 3 AMLODIPINE BESYLATE 10 MG TAB. MD CONTACTED FOR AUTHORIZATION ON 06/15/2020      chlorthalidone (HYGROTON) 25 mg tablet Take 1 Tab by mouth daily. FOR BLOOD PRESSURE, Normal, Disp-90 Tab,R-1      ketoconazole (NIZORAL) 2 % shampoo Use weekly as needed for dandruff, Normal, Disp-1 Bottle,R-0      ibuprofen (MOTRIN) 800 mg tablet Take 1 Tab by mouth every eight (8) hours as needed for Pain. With food, Normal, Disp-60 Tab,R-0      fluticasone propionate (FLONASE) 50 mcg/actuation nasal spray 2 Sprays by Both Nostrils route daily. , Normal, Disp-1 Bottle, R-0           2. Follow-up Information     Follow up With Specialties Details Why Contact Damián Bravo., NP Nurse Practitioner Schedule an appointment as soon as possible for a visit   [address] [facility] [address] [place] [address]      Albert Oliver MD Neurosurgery  Please call to make a follow up appointment this week [address] [facility] [address] [phone]          3. Return to ED if worse     Diagnosis     Clinical Impression:   1. Paresthesia of hand, bilateral        Attestations:    Deartheron Mendez, DO    Please note that this dictation was completed with [employer], the computer voice recognition software. Quite often unanticipated grammatical, syntax, homophones, and other interpretive errors are inadvertently transcribed by the computer software. Please disregard these errors. Please excuse any errors that have escaped final proofreading. Thank you.

## 2021-01-12 NOTE — ED NOTES
Triage Note: Patient is coming in with bilateral wrist pain from fall from last week. Patient was diagnosed with pinched nurse and was placed on steroids. Patient has finished steroids and the pain continues along with the numbness and tingling x 1 week.  Patient was seen at [facility] last week

## 2021-01-18 ENCOUNTER — OFFICE VISIT (OUTPATIENT)
Dept: INTERNAL MEDICINE CLINIC | Age: 31
End: 2021-01-18
Payer: MEDICAID

## 2021-01-18 VITALS
BODY MASS INDEX: 48.9 KG/M2 | HEIGHT: 61 IN | SYSTOLIC BLOOD PRESSURE: 161 MMHG | TEMPERATURE: 98.4 F | WEIGHT: 259 LBS | OXYGEN SATURATION: 98 % | RESPIRATION RATE: 19 BRPM | HEART RATE: 98 BPM | DIASTOLIC BLOOD PRESSURE: 100 MMHG

## 2021-01-18 DIAGNOSIS — R55 SYNCOPE, UNSPECIFIED SYNCOPE TYPE: ICD-10-CM

## 2021-01-18 DIAGNOSIS — M54.12 CERVICAL RADICULOPATHY: Primary | ICD-10-CM

## 2021-01-18 DIAGNOSIS — I10 ESSENTIAL HYPERTENSION: ICD-10-CM

## 2021-01-18 DIAGNOSIS — R20.2 PARESTHESIA OF HAND, BILATERAL: ICD-10-CM

## 2021-01-18 PROCEDURE — 99214 OFFICE O/P EST MOD 30 MIN: CPT | Performed by: NURSE PRACTITIONER

## 2021-01-18 RX ORDER — OXYCODONE AND ACETAMINOPHEN 5; 325 MG/1; MG/1
1 TABLET ORAL
Qty: 10 TAB | Refills: 0 | Status: SHIPPED | OUTPATIENT
Start: 2021-01-18 | End: 2021-01-21

## 2021-01-18 RX ORDER — GABAPENTIN 300 MG/1
300 CAPSULE ORAL 3 TIMES DAILY
Qty: 90 CAP | Refills: 0 | Status: SHIPPED | OUTPATIENT
Start: 2021-01-18 | End: 2021-01-25

## 2021-01-18 RX ORDER — CYCLOBENZAPRINE HCL 10 MG
10 TABLET ORAL
Qty: 30 TAB | Refills: 0 | Status: SHIPPED | OUTPATIENT
Start: 2021-01-18 | End: 2021-07-06 | Stop reason: SDUPTHER

## 2021-01-18 NOTE — PROGRESS NOTES
Chief Complaint   Patient presents with   [facility] Fall     SPF 1/6/21 ED [facility] pt has burning and tingling yann hands     1. Have you been to the ER, urgent care clinic since your last visit? Hospitalized since your last visit? No    2. Have you seen or consulted any other health care providers outside of the [address] [place] [address] since your last visit? Include any pap smears or colon screening.  NO

## 2021-01-27 NOTE — ED NOTES
Non-adherent dressing placed to opened abscess under right breast.  Covered with ABD pad, paper tape applied. Patient given discharge paperwork. Patient BP elevated patient reports has not taken BP medications because ran out. Patient given prescription for antibiotic and work note. Patient ambulatory off unit with steady gait.
home

## 2021-03-18 ENCOUNTER — TELEPHONE (OUTPATIENT)
Dept: SURGERY | Age: 31
End: 2021-03-18

## 2021-03-18 NOTE — TELEPHONE ENCOUNTER
Called pt regarding Bariatric Referral from Sampson Byers., NP to Dr Malou Macdonald. Pt answered and stated she was still interested. Pt completed Seminar Online 09/2020. Emailed Bariatric Forms to email: [email or web address] as requested and pt confirmed she received emailed. Pt aware once received I will call to set up appt. Agreed and understood.

## 2021-03-26 ENCOUNTER — TELEPHONE (OUTPATIENT)
Dept: SURGERY | Age: 31
End: 2021-03-26

## 2021-03-26 NOTE — TELEPHONE ENCOUNTER
Called pt to follow up on Seminar and Forms. Pt stated she has completed Seminar and has not completed the Packet yet. I will call her back next week to follow up.

## 2021-03-30 ENCOUNTER — HOSPITAL ENCOUNTER (OUTPATIENT)
Dept: GENERAL RADIOLOGY | Age: 31
Discharge: HOME | End: 2021-03-30
Payer: MEDICAID

## 2021-03-30 ENCOUNTER — OFFICE VISIT (OUTPATIENT)
Dept: INTERNAL MEDICINE CLINIC | Age: 31
End: 2021-03-30
Payer: MEDICAID

## 2021-03-30 VITALS
WEIGHT: 257 LBS | SYSTOLIC BLOOD PRESSURE: 140 MMHG | TEMPERATURE: 98.7 F | DIASTOLIC BLOOD PRESSURE: 95 MMHG | OXYGEN SATURATION: 98 % | BODY MASS INDEX: 47.29 KG/M2 | HEART RATE: 99 BPM | RESPIRATION RATE: 20 BRPM | HEIGHT: 62 IN

## 2021-03-30 DIAGNOSIS — M25.562 ACUTE PAIN OF LEFT KNEE: ICD-10-CM

## 2021-03-30 DIAGNOSIS — L21.9 SEBORRHEIC DERMATITIS OF SCALP: Primary | ICD-10-CM

## 2021-03-30 DIAGNOSIS — E66.01 MORBID OBESITY (HCC): ICD-10-CM

## 2021-03-30 PROCEDURE — 99213 OFFICE O/P EST LOW 20 MIN: CPT | Performed by: NURSE PRACTITIONER

## 2021-03-30 PROCEDURE — 73562 X-RAY EXAM OF KNEE 3: CPT

## 2021-03-30 RX ORDER — IBUPROFEN 800 MG/1
800 TABLET ORAL
Qty: 60 TAB | Refills: 0 | Status: SHIPPED | OUTPATIENT
Start: 2021-03-30 | End: 2021-08-26 | Stop reason: SDUPTHER

## 2021-03-30 RX ORDER — KETOCONAZOLE 20 MG/ML
SHAMPOO TOPICAL
Qty: 1 BOTTLE | Refills: 0 | Status: SHIPPED | OUTPATIENT
Start: 2021-03-30 | End: 2021-07-06

## 2021-03-30 NOTE — PROGRESS NOTES
Subjective: (As above and below)     Chief Complaint   Patient presents with    Follow-up    Referral Request     derm    Knee Pain     left knee x 1 week, pt states knee also feels swollen and it is hard for her to up stairs    Request For New Medication     phentermine     Domenic Colon is a 32y.o. year old female who presents for     Hypertension ROS:  taking medications as instructed, no medication side effects noted, no TIAs, no chest pain on exertion, no dyspnea on exertion, no swelling of ankles    Weight: asking about phentermine, but is also working towards bariatric surgery    Wt Readings from Last 3 Encounters:   03/30/21 257 lb (116.6 kg)   01/18/21 259 lb (117.5 kg)   01/12/21 262 lb 5.6 oz (119 kg)     Derm referral request: for seborrheic dermatitis ketoconazole shampoo helps a bit    Knee pain: L knee, x 1 week, pain w/ flexion. No inciting injury, has intermittent swelling      Reviewed PmHx, RxHx, FmHx, SocHx, AllgHx and updated in chart.   Family History   Problem Relation Age of Onset    Hypertension Maternal Grandmother        Past Medical History:   Diagnosis Date    Asthma     Asthma     has not had any problems no inhaler    Class 3 obesity in adult 11/16/2017    Essential hypertension 11/16/2017    H/O pilonidal cyst     Hidradenitis 2/1/2018    Psychiatric disorder     ANXIETY    Vaginal delivery       Social History     Socioeconomic History    Marital status: SINGLE     Spouse name: Not on file    Number of children: Not on file    Years of education: Not on file    Highest education level: Not on file   Tobacco Use    Smoking status: Current Some Day Smoker     Packs/day: 0.25     Types: Cigars     Last attempt to quit: 3/1/2011     Years since quitting: 10.0    Smokeless tobacco: Never Used   Substance and Sexual Activity    Alcohol use: No    Drug use: No    Sexual activity: Yes     Partners: Male     Birth control/protection: None          Current Outpatient Medications   Medication Sig    potassium chloride (K-DUR, KLOR-CON) 20 mEq tablet Take 1 Tab by mouth daily. With diuretic (Hydrochlorthiazide or Lasix)  Indications: prevention of low potassium in the blood    amLODIPine (NORVASC) 10 mg tablet Take 1 Tab by mouth daily. FOR BLOOD PRESSURE    chlorthalidone (HYGROTON) 25 mg tablet Take 1 Tab by mouth daily. FOR BLOOD PRESSURE    cyclobenzaprine (FLEXERIL) 10 mg tablet Take 1 Tab by mouth three (3) times daily as needed for Muscle Spasm(s).  ketoconazole (NIZORAL) 2 % shampoo Use weekly as needed for dandruff    ibuprofen (MOTRIN) 800 mg tablet Take 1 Tab by mouth every eight (8) hours as needed for Pain. With food    fluticasone propionate (FLONASE) 50 mcg/actuation nasal spray 2 Sprays by Both Nostrils route daily. No current facility-administered medications for this visit. Review of Systems:   Constitutional:    Negative for fever and chills, negative diaphoresis. HEENT:              Negative for neck pain and stiffness. Eyes:                  Negative for visual disturbance, itching, redness or discharge. Respiratory:        Negative for cough and shortness of breath. Cardiovascular:  Negative for chest pain and palpitations. Gastrointestinal: Negative for nausea, vomiting, abdominal pain, diarrhea or constipation. Genitourinary:     Negative for dysuria and frequency. Musculoskeletal: knee pain   Skin:                    Negative for rash, masses or lesions. Neurological:       Negative for dizzyness, seizure, loss of consciousness, weakness and numbness.      Objective:     Vitals:    03/30/21 1416   BP: (!) 140/95   Pulse: 99   Resp: 20   Temp: 98.7 °F (37.1 °C)   TempSrc: Temporal   SpO2: 98%   Weight: 257 lb (116.6 kg)   Height: 5' 2\" (1.575 m)       Results for orders placed or performed during the hospital encounter of 01/06/21   CBC WITH AUTOMATED DIFF   Result Value Ref Range    WBC 14.0 (H) 3.6 - 11.0 K/uL    RBC 5.04 3.80 - 5.20 M/uL    HGB 11.9 11.5 - 16.0 g/dL    HCT 38.4 35.0 - 47.0 %    MCV 76.2 (L) 80.0 - 99.0 FL    MCH 23.6 (L) 26.0 - 34.0 PG    MCHC 31.0 30.0 - 36.5 g/dL    RDW 19.7 (H) 11.5 - 14.5 %    PLATELET 893 105 - 188 K/uL    MPV 10.4 8.9 - 12.9 FL    NRBC 0.0 0  WBC    ABSOLUTE NRBC 0.00 0.00 - 0.01 K/uL    NEUTROPHILS 61 32 - 75 %    LYMPHOCYTES 35 12 - 49 %    MONOCYTES 3 (L) 5 - 13 %    EOSINOPHILS 1 0 - 7 %    BASOPHILS 0 0 - 1 %    IMMATURE GRANULOCYTES 0 0.0 - 0.5 %    ABS. NEUTROPHILS 8.6 (H) 1.8 - 8.0 K/UL    ABS. LYMPHOCYTES 4.9 (H) 0.8 - 3.5 K/UL    ABS. MONOCYTES 0.4 0.0 - 1.0 K/UL    ABS. EOSINOPHILS 0.1 0.0 - 0.4 K/UL    ABS. BASOPHILS 0.0 0.0 - 0.1 K/UL    ABS. IMM. GRANS. 0.0 0.00 - 0.04 K/UL    DF AUTOMATED      RBC COMMENTS ANISOCYTOSIS  1+       METABOLIC PANEL, COMPREHENSIVE   Result Value Ref Range    Sodium 137 136 - 145 mmol/L    Potassium 2.8 (L) 3.5 - 5.1 mmol/L    Chloride 103 97 - 108 mmol/L    CO2 28 21 - 32 mmol/L    Anion gap 6 5 - 15 mmol/L    Glucose 129 (H) 65 - 100 mg/dL    BUN 20 6 - 20 MG/DL    Creatinine 1.23 (H) 0.55 - 1.02 MG/DL    BUN/Creatinine ratio 16 12 - 20      GFR est AA >60 >60 ml/min/1.73m2    GFR est non-AA 51 (L) >60 ml/min/1.73m2    Calcium 8.1 (L) 8.5 - 10.1 MG/DL    Bilirubin, total 0.5 0.2 - 1.0 MG/DL    ALT (SGPT) 36 12 - 78 U/L    AST (SGOT) 21 15 - 37 U/L    Alk.  phosphatase 65 45 - 117 U/L    Protein, total 6.9 6.4 - 8.2 g/dL    Albumin 3.4 (L) 3.5 - 5.0 g/dL    Globulin 3.5 2.0 - 4.0 g/dL    A-G Ratio 1.0 (L) 1.1 - 2.2     TROPONIN I   Result Value Ref Range    Troponin-I, Qt. <0.05 <0.05 ng/mL   URINALYSIS W/ REFLEX CULTURE    Specimen: Urine   Result Value Ref Range    Color YELLOW/STRAW      Appearance CLOUDY (A) CLEAR      Specific gravity 1.028 1.003 - 1.030      pH (UA) 5.5 5.0 - 8.0      Protein 30 (A) NEG mg/dL    Glucose Negative NEG mg/dL    Ketone Negative NEG mg/dL    Bilirubin Negative NEG      Blood Negative NEG      Urobilinogen 0.2 0.2 - 1.0 EU/dL    Nitrites Negative NEG      Leukocyte Esterase Negative NEG      WBC 5-10 0 - 4 /hpf    RBC 0-5 0 - 5 /hpf    Epithelial cells MODERATE (A) FEW /lpf    Bacteria 1+ (A) NEG /hpf    UA:UC IF INDICATED CULTURE NOT INDICATED BY UA RESULT CNI     HCG URINE, QL   Result Value Ref Range    HCG urine, QL Negative NEG     EKG, 12 LEAD, INITIAL   Result Value Ref Range    Ventricular Rate 86 BPM    Atrial Rate 86 BPM    P-R Interval 140 ms    QRS Duration 98 ms    Q-T Interval 386 ms    QTC Calculation (Bezet) 461 ms    Calculated P Axis 39 degrees    Calculated R Axis 67 degrees    Calculated T Axis 29 degrees    Diagnosis       Normal sinus rhythm  Nonspecific T wave abnormality  Prolonged QT  No previous ECGs available  Confirmed by Kaykay Miller P.VMamie ([id number]) on 1/7/2021 12:46:54 PM           Physical Examination: General appearance - alert, well appearing, and in no distress  Mental status - alert, oriented to person, place, and time, normal mood, behavior, speech, dress, motor activity, and thought processes  Chest - clear to auscultation, no wheezes, rales or rhonchi, symmetric air entry  Heart - normal rate, regular rhythm, normal S1, S2, no murmurs, rubs, clicks or gallops  Musculoskeletal -L knee: no warmth redness or swelling   ttp to med/lat joint line  Full ROM, no crepitus    Assessment/ Plan:       1. Seborrheic dermatitis of scalp    - ketoconazole (NIZORAL) 2 % shampoo; Use weekly as needed for dandruff  Dispense: 1 Bottle; Refill: 0  - REFERRAL TO DERMATOLOGY    2. Morbid obesity ([address]  Support her decision to fu on bariatric sx    3. Acute pain of left knee  Nsaids, exercises, weight loss              I have discussed the diagnosis with the patient and the intended plan as seen in the above orders. The patient has received an after-visit summary and questions were answered concerning future plans. Pt conveyed understanding of plan.       Medication Side Effects and Warnings were discussed with patient: yes  Patient Labs were reviewed: yes  Patient Past Records were reviewed:  yes    [employer].  Joaquin Rahman NP

## 2021-03-30 NOTE — PROGRESS NOTES
Chief Complaint   Patient presents with    Follow-up    Referral Request     derm    Knee Pain     left knee x 1 week, pt states knee also feels swollen and it is hard for her to up stairs    Request For New Medication     phentermine       1. Have you been to the ER, urgent care clinic since your last visit? Hospitalized since your last visit? No    2. Have you seen or consulted any other health care providers outside of the [address] [place] [address] since your last visit? Include any pap smears or colon screening.  Yes When: 03/29/2021 Where: Neuro Reason for visit: routine

## 2021-04-05 DIAGNOSIS — M54.12 CERVICAL RADICULOPATHY: Primary | ICD-10-CM

## 2021-04-12 NOTE — ANESTHESIA POSTPROCEDURE EVALUATION
Post-Anesthesia Evaluation and Assessment    Patient: Tyesha Richmond MRN: [id number]  SSN: [id number]    YOB: 1990  Age: 34 y.o. Sex: female      I have evaluated the patient and they are stable and ready for discharge from the PACU. Cardiovascular Function/Vital Signs  Visit Vitals  /78   Pulse 100   Temp 36.9 °C (98.4 °F)   Resp 14   Ht 5' 1\" (1.549 m)   Wt 114.8 kg (253 lb)   SpO2 96%   BMI 47.80 kg/m²       Patient is status post General anesthesia for Procedure(s):  INCISION AND DRAINAGE PILONIDAL ABCESS. Nausea/Vomiting: None    Postoperative hydration reviewed and adequate. Pain:  Pain Scale 1: Numeric (0 - 10) (07/30/19 1946)  Pain Intensity 1: 9 (07/30/19 1946)   Managed    Neurological Status:   Neuro (WDL): Within Defined Limits (07/30/19 1859)  Neuro  Neurologic State: Drowsy; Appropriate for age (07/30/19 1859)  Orientation Level: Oriented X4 (07/30/19 1822)  Cognition: Follows commands (07/30/19 1822)  Speech: Clear (07/30/19 1822)  LUE Motor Response: Purposeful (07/30/19 1822)  LLE Motor Response: Purposeful (07/30/19 1822)  RUE Motor Response: Purposeful (07/30/19 1822)  RLE Motor Response: Purposeful (07/30/19 1822)   At baseline    Mental Status, Level of Consciousness: Alert and  oriented to person, place, and time    Pulmonary Status:   O2 Device: Room air (07/30/19 1859)   Adequate oxygenation and airway patent    Complications related to anesthesia: None    Post-anesthesia assessment completed. No concerns    Signed By: Arnaud Reyes MD     July 31, 2019              Procedure(s):  INCISION AND DRAINAGE PILONIDAL ABCESS. general    <BSHSIANPOST>    Vitals Value Taken Time   /115 7/30/2019  8:15 PM   Temp 36.9 °C (98.4 °F) 7/30/2019  8:16 PM   Pulse 97 7/30/2019  8:16 PM   Resp 10 7/30/2019  8:16 PM   SpO2 94 % 7/30/2019  8:16 PM   Vitals shown include unvalidated device data. The Trauma Service and Pain Medication.    The Trauma Service team can address only short-term pain, because the Trauma Service does not manage chronic (long-term) pain, see the policies below:    • When you leave the hospital, you’ll be prescribed enough pain medication to last until your follow-up visit, if taken as directed. During the follow-up visit, you may be given one refill prescription, if needed.    • The Trauma Service does NOT refill pain medications over the phone.    • The Trauma Service will NOT replace a prescription that’s lost or stolen, or refill a prescription if you run out of medication early.    If your pain continues beyond 3 or 4 weeks, you’ll need to see your primary care provider about managing pain.    Safety with opioid medication  Opioids are strong medications that change the way the brain processes pain. Examples include hydrocodone and oxycodone. Opioids can be dangerous if you don’t use  them correctly.     Follow these rules for safety:  • Don’t drink alcohol or use recreational drugs while taking opioid pain pills.  • Don’t take sleep aids, anti-anxiety medication, or other pain relievers without your doctor’s permission.  • Don’t drive a car, operate dangerous machinery, or care for others while taking the medication. Opioid pain pills may harm your judgment.  • Never share your pain medication. Don’t give a pill to a friend or family member, even if the person is in pain. Sharing pain medications is dangerous and illegal.  • Lock up your pain medication so that others cannot take it. (Some opioid medications may be a target for break-ins and theft.)  • Take the medication only as long as you need it.    When the pain gets better, stop taking the medication. To avoid side effects from stopping suddenly, you may want to reduce the dose a little at a time for a few days.    • Watch for side effects.  - The most common effect is constipation. Drink extra water and take a laxative  or stool softener if needed.  - The most serious side effect is decreased breathing, especially while you sleep. This can be deadly. If family or friends notice this problem, they should get medical help right away.    • When you’ve finished taking the medication, get rid of leftover pills by dropping them off at a drug collection site.   For a list of sites, see [facility] [email or web address]-[email or web address]-drop-[email or web address]    Safety with over-the-counter pain pills  • Do not take more than the maximum daily limit of over-the-counter pain pills. Check the bottle for the number of pills you can safely take each day.  • Especially, do not take too much acetaminophen (Tylenol). Do not take more than 3,000 mg total every 24 hours. Some prescribed pain pills already contain acetaminophen. Ask your pharmacist if this is the case for you.

## 2021-04-20 ENCOUNTER — HOSPITAL ENCOUNTER (OUTPATIENT)
Dept: PHYSICAL THERAPY | Age: 31
Discharge: HOME | End: 2021-04-20

## 2021-04-20 NOTE — PROGRESS NOTES
[facility]  [address], [address] [facility]    OUTPATIENT PHYSICAL THERAPY      4/20/2021:  Travis Tejeda was not seen on this date for physical therapy for the following reasons:    [x]     Patient called to cancel the visit for the following reasons: transportation  []     Patient missed the visit and did not call to cancel.     Evangelista Bhakta, PT

## 2021-04-21 ENCOUNTER — APPOINTMENT (OUTPATIENT)
Dept: GENERAL RADIOLOGY | Age: 31
End: 2021-04-21
Attending: EMERGENCY MEDICINE
Payer: MEDICAID

## 2021-04-21 ENCOUNTER — HOSPITAL ENCOUNTER (EMERGENCY)
Age: 31
Discharge: HOME | End: 2021-04-21
Attending: EMERGENCY MEDICINE
Payer: MEDICAID

## 2021-04-21 ENCOUNTER — HOSPITAL ENCOUNTER (OUTPATIENT)
Dept: PHYSICAL THERAPY | Age: 31
Discharge: HOME | End: 2021-04-21
Payer: MEDICAID

## 2021-04-21 VITALS
HEART RATE: 91 BPM | WEIGHT: 261 LBS | DIASTOLIC BLOOD PRESSURE: 51 MMHG | BODY MASS INDEX: 49.28 KG/M2 | HEIGHT: 61 IN | SYSTOLIC BLOOD PRESSURE: 115 MMHG | TEMPERATURE: 97.7 F | RESPIRATION RATE: 18 BRPM | OXYGEN SATURATION: 95 %

## 2021-04-21 DIAGNOSIS — R06.02 SOB (SHORTNESS OF BREATH): ICD-10-CM

## 2021-04-21 DIAGNOSIS — J45.21 MILD INTERMITTENT ASTHMA WITH ACUTE EXACERBATION: Primary | ICD-10-CM

## 2021-04-21 DIAGNOSIS — F12.10 CANNABIS ABUSE: ICD-10-CM

## 2021-04-21 DIAGNOSIS — Z72.0 TOBACCO ABUSE: ICD-10-CM

## 2021-04-21 LAB
ATRIAL RATE: 83 BPM
CALCULATED P AXIS, ECG09: 58 DEGREES
CALCULATED R AXIS, ECG10: 90 DEGREES
CALCULATED T AXIS, ECG11: 42 DEGREES
DIAGNOSIS, 93000: NORMAL
P-R INTERVAL, ECG05: 152 MS
Q-T INTERVAL, ECG07: 396 MS
QRS DURATION, ECG06: 102 MS
QTC CALCULATION (BEZET), ECG08: 465 MS
VENTRICULAR RATE, ECG03: 83 BPM

## 2021-04-21 PROCEDURE — 77030025612 HC NEB KT VYRM -A

## 2021-04-21 PROCEDURE — 99285 EMERGENCY DEPT VISIT HI MDM: CPT

## 2021-04-21 PROCEDURE — 97140 MANUAL THERAPY 1/> REGIONS: CPT | Performed by: PHYSICAL THERAPIST

## 2021-04-21 PROCEDURE — 74011000250 HC RX REV CODE- 250: Performed by: EMERGENCY MEDICINE

## 2021-04-21 PROCEDURE — 74011636637 HC RX REV CODE- 636/637: Performed by: EMERGENCY MEDICINE

## 2021-04-21 PROCEDURE — 94640 AIRWAY INHALATION TREATMENT: CPT

## 2021-04-21 PROCEDURE — 71045 X-RAY EXAM CHEST 1 VIEW: CPT

## 2021-04-21 PROCEDURE — 97110 THERAPEUTIC EXERCISES: CPT | Performed by: PHYSICAL THERAPIST

## 2021-04-21 PROCEDURE — 93005 ELECTROCARDIOGRAM TRACING: CPT

## 2021-04-21 PROCEDURE — 97161 PT EVAL LOW COMPLEX 20 MIN: CPT | Performed by: PHYSICAL THERAPIST

## 2021-04-21 RX ORDER — PREDNISONE 20 MG/1
60 TABLET ORAL ONCE
Status: COMPLETED | OUTPATIENT
Start: 2021-04-21 | End: 2021-04-21

## 2021-04-21 RX ORDER — PREDNISONE 50 MG/1
50 TABLET ORAL DAILY
Qty: 5 TAB | Refills: 0 | Status: SHIPPED | OUTPATIENT
Start: 2021-04-21 | End: 2021-04-26

## 2021-04-21 RX ORDER — ALBUTEROL SULFATE 90 UG/1
2 AEROSOL, METERED RESPIRATORY (INHALATION)
Qty: 1 INHALER | Refills: 0 | Status: SHIPPED | OUTPATIENT
Start: 2021-04-21 | End: 2022-10-19

## 2021-04-21 RX ORDER — IPRATROPIUM BROMIDE AND ALBUTEROL SULFATE 2.5; .5 MG/3ML; MG/3ML
3 SOLUTION RESPIRATORY (INHALATION)
Status: COMPLETED | OUTPATIENT
Start: 2021-04-21 | End: 2021-04-21

## 2021-04-21 RX ADMIN — IPRATROPIUM BROMIDE AND ALBUTEROL SULFATE 3 ML: .5; 3 SOLUTION RESPIRATORY (INHALATION) at 01:44

## 2021-04-21 RX ADMIN — PREDNISONE 60 MG: 20 TABLET ORAL at 02:13

## 2021-04-21 NOTE — ED TRIAGE NOTES
Patient reports to ED c/o right sided chest pain and SOB that started 45 min PTA. Patient in NAD. Reports hx of asthma.

## 2021-04-21 NOTE — ED PROVIDER NOTES
EMERGENCY DEPARTMENT HISTORY AND PHYSICAL EXAM      Please note that this dictation was completed with the assistance of \"Dragon\", the computer voice recognition software. Quite often unanticipated grammatical, syntax, homophones, and other interpretive errors are inadvertently transcribed by the computer software. Please disregard these errors and any errors that have escaped final proofreading. Thank you. Patient Name: Mckenna James  : 1990  MRN: [id number]  History of Presenting Illness     Chief Complaint   Patient presents with    Shortness of Breath     History Provided By: Patient and EMS    HPI: Mckenna James, 32 y.o. female with past medical history as documented below presents to the ED with c/o of acute onset of 2 hours of mild shortness of breath, wheezing. According to EMS, patient admitted to eating an edible around 10 PM tonight. Patient states that shortly after she started to have wheezing and shortness of breath. Patient denies any choking episode. Patient reports mild chest tightness currently. Patient notes a history of asthma and states symptoms feel similar. Patient denies any sick contacts or exposure to COVID-19. Additionally, denies any pleuritic pain, leg swelling, history of DVT or PE. Denies any hormone use. Pt denies any other alleviating or exacerbating factors. Additionally, pt specifically denies any recent fever, chills, headache, nausea, vomiting, abdominal pain, CP, lightheadedness, dizziness, numbness, weakness, lower extremity swelling, heart palpitations, urinary sxs, diarrhea, constipation, melena, hematochezia, cough, or congestion. There are no other complaints, changes or physical findings pertinent to the HPI at this time.     PCP: Timothy Mccloud NP    Past History   Past Medical History:  Past Medical History:   Diagnosis Date    Asthma     Asthma     has not had any problems no inhaler    Class 3 obesity in adult 2017   Eusebio Pemberton hypertension 11/16/2017    H/O pilonidal cyst     Hidradenitis 2/1/2018    Psychiatric disorder     ANXIETY    Vaginal delivery        Past Surgical History:  Past Surgical History:   Procedure Laterality Date    HX APPENDECTOMY  2017    HX BREAST REDUCTION  12/2020    HX ORTHOPAEDIC      Broke l foot as child    HX OTHER SURGICAL      left foot on third toe     HX OTHER SURGICAL      Laparoscopic cholecystectomy.  HX OTHER SURGICAL  04/19/2016    Incision and drainage of pilonidal abscess; Dr. Leana Gardner.  HX OTHER SURGICAL  08/22/2016    Incision, drainage, and excision of pilonidal cyst; Dr. Leana Gardner.  HX OTHER SURGICAL  04/17/2018    Incision and drainage of pilonidal abscess; Dr. Leana Gardner.  HX OTHER SURGICAL  02/15/2018    Excision of left axilla hidradenitis; Dr. Faina Ridley.  HX OTHER SURGICAL  12/21/2017    Excision of sebaceous cyst, right inframammary fold; Dr. Faina Ridley.  HX OTHER SURGICAL  06/14/2018    Incision and drainage of pilonidal abscess; Dr. Leana Gardner.  HX OTHER SURGICAL  01/04/2019    Incision and drainage of pilonidal abscess; Dr. Leana Gardner. Family History:  Family History   Problem Relation Age of Onset    Hypertension Maternal Grandmother        Social History:  Social History     Tobacco Use    Smoking status: Current Some Day Smoker     Packs/day: 0.25     Types: Cigars     Last attempt to quit: 3/1/2011     Years since quitting: 10.1    Smokeless tobacco: Never Used   Substance Use Topics    Alcohol use: No    Drug use: Yes     Types: Marijuana     Comment: Patient reports taking an edible at 2200       Allergies: Allergies   Allergen Reactions    Aleve [Naproxen Sodium] Hives    Darvocet A500 [Propoxyphene N-Acetaminophen] Hives    Motrin [Ibuprofen] Hives    Bactrim [Sulfamethoprim Ds] Other (comments)     Abdominal pain.     Lortab [Hydrocodone-Acetaminophen] Nausea and Vomiting       Current Medications:  No current facility-administered medications on file prior to encounter. Current Outpatient Medications on File Prior to Encounter   Medication Sig Dispense Refill    amLODIPine (NORVASC) 10 mg tablet Take 1 Tab by mouth daily. FOR BLOOD PRESSURE 90 Tab 1    chlorthalidone (HYGROTON) 25 mg tablet Take 1 Tab by mouth daily. FOR BLOOD PRESSURE 90 Tab 1    ketoconazole (NIZORAL) 2 % shampoo Use weekly as needed for dandruff 1 Bottle 0    ibuprofen (MOTRIN) 800 mg tablet Take 1 Tab by mouth every eight (8) hours as needed for Pain. With food 60 Tab 0    cyclobenzaprine (FLEXERIL) 10 mg tablet Take 1 Tab by mouth three (3) times daily as needed for Muscle Spasm(s). 30 Tab 0    potassium chloride (K-DUR, KLOR-CON) 20 mEq tablet Take 1 Tab by mouth daily. With diuretic (Hydrochlorthiazide or Lasix)  Indications: prevention of low potassium in the blood 90 Tab 0    fluticasone propionate (FLONASE) 50 mcg/actuation nasal spray 2 Sprays by Both Nostrils route daily. 1 Bottle 0     Review of Systems   Review of Systems   Constitutional: Negative. Negative for chills and fever. HENT: Negative. Negative for congestion and sore throat. Eyes: Negative. Respiratory: Positive for shortness of breath and wheezing. Negative for cough and chest tightness. Cardiovascular: Negative. Negative for chest pain, palpitations and leg swelling. Gastrointestinal: Negative. Negative for abdominal distention, abdominal pain, blood in stool, constipation, diarrhea, nausea and vomiting. Endocrine: Negative. Genitourinary: Negative. Negative for dysuria, flank pain, frequency, hematuria and urgency. Musculoskeletal: Negative. Negative for arthralgias, back pain and myalgias. Skin: Negative. Negative for color change and rash. Neurological: Negative. Negative for dizziness, syncope, speech difficulty, weakness, light-headedness, numbness and headaches. Hematological: Negative. Psychiatric/Behavioral: Negative. Negative for confusion and self-injury. The patient is not nervous/anxious. All other systems reviewed and are negative. Physical Exam   Physical Exam  Vitals signs and nursing note reviewed. Constitutional:       General: She is not in acute distress. Appearance: She is well-developed. She is not diaphoretic. HENT:      Head: Normocephalic and atraumatic. Mouth/Throat:      Pharynx: No oropharyngeal exudate. Eyes:      Conjunctiva/sclera: Conjunctivae normal.   Neck:      Musculoskeletal: Normal range of motion. Cardiovascular:      Rate and Rhythm: Normal rate and regular rhythm. Heart sounds: Normal heart sounds. Pulmonary:      Effort: Pulmonary effort is normal. No respiratory distress. Breath sounds: Wheezing present. No rales. Chest:      Chest wall: No tenderness. Abdominal:      General: Bowel sounds are normal. There is no distension. Palpations: Abdomen is soft. There is no mass. Tenderness: There is no abdominal tenderness. There is no guarding or rebound. Musculoskeletal: Normal range of motion. Skin:     General: Skin is warm. Neurological:      Mental Status: She is alert and oriented to person, place, and time. Cranial Nerves: No cranial nerve deficit. Motor: No abnormal muscle tone. Diagnostic Study Results     Labs -   I have personally reviewed and interpreted all available laboratory results.    Recent Results (from the past 24 hour(s))   EKG, 12 LEAD, INITIAL    Collection Time: 04/21/21  1:14 AM   Result Value Ref Range    Ventricular Rate 83 BPM    Atrial Rate 83 BPM    P-R Interval 152 ms    QRS Duration 102 ms    Q-T Interval 396 ms    QTC Calculation (Bezet) 465 ms    Calculated P Axis 58 degrees    Calculated R Axis 90 degrees    Calculated T Axis 42 degrees    Diagnosis       Normal sinus rhythm  Rightward axis  Borderline ECG  When compared with ECG of 29-JUL-2020 06:26,  No significant change was found Radiologic Studies -   I have personally reviewed and interpreted all available imaging studies and agree with radiology interpretation and report. XR CHEST PORT   Final Result   No acute process identified. CT Results  (Last 48 hours)    None        CXR Results  (Last 48 hours)               04/21/21 0138  XR CHEST PORT Final result    Impression:  No acute process identified. Narrative:  Clinical history: sob   INDICATION:   sob   COMPARISON: 121       FINDINGS:   AP portable upright view of the chest demonstrates a enlarged cardiopericardial   silhouette. There is no pleural effusion. .There is no focal consolidation. .There   is no pneumothorax. . Patient is on a cardiac monitor. Medical Decision Making   I reviewed the vital signs, available nursing notes, past medical history, past surgical history, family history and social history. Vital Signs-Reviewed the patient's vital signs. Patient Vitals for the past 24 hrs:   Temp Pulse Resp BP SpO2   04/21/21 0215 -- 91 18 (!) 115/51 95 %   04/21/21 0110 97.7 °F (36.5 °C) 85 18 (!) 133/49 97 %       Pulse Oximetry Analysis - 97% on RA    Cardiac Monitor:   Rate: 85 bpm  The cardiac monitor revealed the following rhythm as interpreted by me: Normal Sinus Rhythm     The cardiac monitor was ordered secondary to the patient's history of SOB and to monitor the patient for dysrhythmia    ED EKG interpretation:  Rhythm: normal sinus rhythm; and regular .  Rate (approx.): 83; Axis: right axis deviation; P wave: normal; QRS interval: normal ; ST/T wave: normal; Other findings: normal. This EKG was interpreted by Katya Bellamy MD    Records Reviewed: Nursing Notes, Old Medical Records, Previous electrocardiograms, Previous Radiology Studies and Previous Laboratory Studies    Provider Notes (Medical Decision Making):   Patient presents with acute dyspnea and wheezing; likely acute bronchospasm vs asthma exacerbation; afebrile with stable vitals, no resp distress. DDx: asthma, copd, pna, pulmonary edema, acute bronchitis, ACS, ptx, pna. Will obtain EKG, CXR, provide O2 as needed for hypoxia, treat symptomatically and reassess. Will continue to monitor closely in ED. ED Course:   I am the first provider for this patient's ED visit today. Initial assessment performed. I discussed presenting problems, concerns and my formulated plan for today's visit with the patient and any available family members at bedside. I encouraged them to ask questions as they arise throughout the visit. TOBACCO COUNSELING:  Upon evaluation, pt expressed that they are a current tobacco user. For approximately 5 mins, pt has been counseled on the dangers of smoking and was encouraged to quit as soon as possible in order to decrease further risks to their health. Pt has conveyed their understanding of the risks involved should they continue to use tobacco products. I reviewed our electronic medical record system for any past medical records that were available that may contribute to the patient's current condition, the nursing notes and vital signs from today's visit. ED Orders Placed :  Orders Placed This Encounter    XR CHEST PORT    CARDIAC/RESPIRATORY MONITORING    EKG 12 LEAD INITIAL    albuterol-ipratropium (DUO-NEB) 2.5 MG-0.5 MG/3 ML    predniSONE (DELTASONE) tablet 60 mg    albuterol (PROVENTIL HFA, VENTOLIN HFA, PROAIR HFA) 90 mcg/actuation inhaler    predniSONE (DELTASONE) 50 mg tablet       ED Medications Administered:  Medications   albuterol-ipratropium (DUO-NEB) 2.5 MG-0.5 MG/3 ML (3 mL Nebulization Given 4/21/21 0144)   predniSONE (DELTASONE) tablet 60 mg (60 mg Oral Given 4/21/21 4961)         Progress Note:  The patient has been re-examined after nebulizer treatments and states that they are feeling better and have no new complaints. Stable vitals without hypoxia. On auscultation, wheezing is significantly improved.  The patient expresses understanding and agreement with diagnosis, care plan; including oral steroids, nebulizer or inhaler use, follow up and return instructions. The patient agrees to return in 24 hours if their symptoms are not improving or immediately if they have any change in their condition including worsening wheezing or any signs of increasing work of breathing. Progress Note:  Patient has been reassessed and reports feeling better and symptoms have improved significantly after ED treatment. Doyle Kennedy final labs and imaging have been reviewed with her and available family and/or caregiver. They have been counseled regarding her diagnosis. She verbally conveys understanding and agreement of the signs, symptoms, diagnosis, treatment and prognosis and additionally agrees to follow up as recommended with Dr. Soniya Beatty., NP and/or specialist in 24 - 48 hours. She also agrees with the care-plan we created together and conveys that all of her questions have been answered. I have also put together a packet of discharge instructions for her that include: 1) educational information regarding their diagnosis, 2) how to care for their diagnosis at home, as well a 3) list of reasons why they would want to return to the ED prior to their follow-up appointment should the patient's condition change or symptoms worsen. I have answered all questions to the patient's satisfaction. Strict return precautions given. She both understood and agreed with plan as discussed. Vital signs stable for discharge. Disposition:   DISCHARGE  The pt is ready for discharge. The pt's signs, symptoms, diagnosis, and discharge instructions have been discussed and pt has conveyed their understanding. The pt is to follow up as recommended or return to ER should their symptoms worsen. Plan has been discussed and pt is in agreement. Plan:  1.  Return precautions as discussed with patient and available family and/or caregiver. 2.   Current Discharge Medication List      START taking these medications    Details   albuterol (PROVENTIL HFA, VENTOLIN HFA, PROAIR HFA) 90 mcg/actuation inhaler Take 2 Puffs by inhalation every six (6) hours as needed for Wheezing. Qty: 1 Inhaler, Refills: 0      predniSONE (DELTASONE) 50 mg tablet Take 1 Tab by mouth daily for 5 days. Qty: 5 Tab, Refills: 0           3. Follow-up Information     Follow up With Specialties Details Why [address] [facility] EMERGENCY DEPT Emergency Medicine  As needed, If symptoms worsen Miguelina Kendrick          Instructed to return to ED if worse  Diagnosis   Clinical Impression:  1. Mild intermittent asthma with acute exacerbation    2. SOB (shortness of breath)    3. Cannabis abuse    4. Tobacco abuse        Attestation:  Ritesh Barker MD, am the attending of record for this patient. I personally performed the services described in this documentation on this date, 4/21/2021 for patient, Estefania Olivas. I have reviewed the chart and verified that the record is accurate and complete.

## 2021-04-21 NOTE — ED NOTES
Attempted to administer 60 mg Prednisone, patient is too drowsy at this time. Will continue to monitor and administer when patient is more alert.

## 2021-04-21 NOTE — ED NOTES
Patient presents to ED c/o x 45 min PTA of chest pain/SOB. Patient admits to taking an edible around 2200 last night. Patient denies other drug/etoh use. Patient drowsy during triage, slow to respond to questions. Patient otherwise in NAD at this time.

## 2021-04-21 NOTE — ED NOTES
Patient given copy of dc instructions and 0 paper script(s) and 2 electronic scripts. Patient verbalized understanding of instructions and script (s). Patient given a current medication reconciliation form and verbalized understanding of their medications. Patient verbalized understanding of the importance of discussing medications with  his or her physician or clinic they will be following up with. Patient alert and oriented and in no acute distress. Patient offered wheelchair from treatment area to hospital entrance, patient requested wheelchair.

## 2021-04-21 NOTE — PROGRESS NOTES
[facility]  [address], [address] [facility]    OUTPATIENT PHYSICAL THERAPY    INITIAL EVALUATION      NAME: Jesus Colby AGE: 32 y.o. GENDER: female  DATE: 4/21/2021  REFERRING PHYSICIAN: Bairon Alfaro., *    OBJECTIVE DATA SUMMARY:   Medical Diagnosis: Cervicalgia (M54.2)  PT Diagnosis: Other reduced mobility secondary to neck pain  Date of Onset: January 2021  Mechanism of Injury/Chief Complaint:  Patient passed out and hit head on   Present Symptoms: Patient presents with aching and throbbing pain at neck. Patient experiences burning and weakness to left hand, and occasionally to right hand. Patient experiences occasional headaches (2x/week). Functional Deficits and Limitations:   [x]     Sitting:   [x]    Dressing:   [x]    Reaching:  []     Standing:   [x]     Bathing:   [x]    Lifting:  []     Walking:   [x]     Cooking:   []    Yardwork:  [x]     Sleeping:   [x]     Cleaning:   []     Driving:  [x]     Work Tasks:  []     Recreation:   [x]    Other:     HISTORY:  Past Medical History:   Past Medical History:   Diagnosis Date    Asthma     Asthma     has not had any problems no inhaler    Class 3 obesity in adult 11/16/2017    Essential hypertension 11/16/2017    H/O pilonidal cyst     Hidradenitis 2/1/2018    Psychiatric disorder     ANXIETY    Vaginal delivery      Past Surgical History:   Procedure Laterality Date    HX APPENDECTOMY  2017    HX BREAST REDUCTION  12/2020    HX ORTHOPAEDIC      Broke l foot as child    HX OTHER SURGICAL      left foot on third toe     HX OTHER SURGICAL      Laparoscopic cholecystectomy.  HX OTHER SURGICAL  04/19/2016    Incision and drainage of pilonidal abscess; Dr. Hernández Favorite.  HX OTHER SURGICAL  08/22/2016    Incision, drainage, and excision of pilonidal cyst; Dr. Hernández Favorite.  HX OTHER SURGICAL  04/17/2018    Incision and drainage of pilonidal abscess; Dr. Hernández Favorite.     HX OTHER SURGICAL  02/15/2018    Excision of left axilla hidradenitis; Dr. Miri Jiménez.  HX OTHER SURGICAL  12/21/2017    Excision of sebaceous cyst, right inframammary fold; Dr. Miri Jiménez.  HX OTHER SURGICAL  06/14/2018    Incision and drainage of pilonidal abscess; Dr. Paty Acuna.  HX OTHER SURGICAL  01/04/2019    Incision and drainage of pilonidal abscess; Dr. Paty Acuna. 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 \"    Patients goals for therapy: release of neck pressure and pain    OBJECTIVE DATA SUMMARY:     CT cervical spine 1/6/2021:  IMPRESSION: Partial ossification of the posterior longitudinal ligament with superimposed multilevel degenerative change most significant at C5-6 with severe narrowing of the canal    EXAMINATION/PRESENTATION/DECISION MAKING:   Pain:  Location: Neck  Quality: aching and burning  Now: 10/10  Best: 10/10  Worst: 10/10  Factors that improve pain: massage    Posture:   Rounded shoulders    Strength:      LEFT  RIGHT  Shoulder flexion  4+/5  5/5  Shoulder abduction 4+/5  5/5    Shoulder ER  5/5  5/5  Shoulder IR  5/5  5/5  Elbow flexion  5/5  5/5  Elbow extension  5/5  5/5  Wrist flexion  5/5  5/5  Wrist extension  5/5  5/5    Left : 18 lbs   Right : 35 lbs    Range of Motion:   Cervical Spine (AROM)  Flexion  25% limited; posterior neck pain  Extension 75% limited; posterior neck pain   R side bend 50% limited; stretch  L side bend 50% limited; tightness  R rotation 70 degrees; pressure  L rotation 62 degrees; pressure    Joint Mobility:   Hypomobile cervical and upper thoracic spine; pain with CPA C3-C7    Palpation: Tender to bilateral UT, levator scapulae, cervical paraspinals, and rhomboids (Lt>Rt)    Neurologic Assessment:   Tone: Normal   Sensation: Constant tingling down LUE; occasional tingling in right UE   Reflexes: NT    Special Tests:   (+) Spurlings  Pain relief with cervical distraction    Mobility:   Transitional Movements: Increased time to perform    Gait: Slow pace    Balance: WNL    Functional Measure:   NDI: 33/50    Based on the above components, the patient evaluation is determined to be of the following complexity level: LOW     TREATMENT/INTERVENTION:  Modalities (Rationale): to decrease pain and muscle guarding  MHP to neck for 8 minutes in supine at end of session; no skin irritation noted    Therapeutic Exercises: to develop strength, endurance, range of motion, and flexibility  Initial HEP provided 4/21/2021: UT stretch; cervical nods; cervical retraction; cervical retraction with rotation; scapular retraction; median nerve glides    Exercises in BOLD performed this date:     Seated:   UT stretch: 2 reps with 15 sec holds  Cervical nods: 10 reps B  Cervical retraction: 10 reps with 5 sec holds  Cervical retraction with rotation: 10 reps B  Scapular retraction: 10 reps    Manual Therapy: for joint mobilization/manipulations and soft tissue mobilization  Right cervical side glides, grade I-II; pain relief  STM to bilateral UT, levator scapulae, and cervical paraspinals     Neuro Re-Education: to improve movement, balance, coordination, kinesthetic sense, posture, and proprioception for sitting or standing balance  Median nerve glides, left UE x 10 reps    Therapeutic Activities: to improve functional performance, power, or agility  None this date    Ambulation/Gait Training:  None this date    Activity tolerance and post treatment pain report:   Good tolerance to exercises and manual therapy; fair activity tolerance due to fatigue; 8/10    Education:  [x]     Home exercise program provided.   Education was provided to the patient on the following topics: Patient provided with initial HEP and educated on importance of regular performance of exercises in pain free ROM. Patient verbalized understanding of the topics presented. ASSESSMENT:   Mckenna James is a 32 y.o. female who presents with aching and throbbing pain at neck since January 2021. Patient experiences burning and weakness to left hand, and occasionally to right hand. Patient experiences occasional headaches. She has difficulty sleeping and performing her work duties such as repeated lifting due to pain. Patient tolerated clinic exercises and manual therapy well this date with pain reduction by end of session. Patient provided with initial HEP and educated on importance of regular performance of exercises in pain free ROM. Physical therapy problems based on objective data include: pain affecting function, decrease ROM, decrease strength, impaired gait/ balance, decrease ADL/ functional abilitiies, decrease activity tolerance, decrease flexibility/ joint mobility and decrease transfer abilities . Patient will benefit from skilled intervention to address these impairments. Rehabilitation potential is considered to be Good. Factors which may influence rehabilitation potential include good motivation; high pain levels. PLAN OF CARE:   Recommendations and Planned Interventions Include:  therapeutic activities, therapeutic exercises, manual therapy, neuro re-education, posture/biomechanics, traction, heat/ice, E-stim, home exercise program and TENS    Frequency/Duration:  Patient will benefit from physical therapy visits 1-2 times per week over 8 weeks to optimize improvement in these areas. GOALS  Short term goals  Time frame: 4 weeks  1. Patient will be compliant and independent with the initial HEP as evidenced by being able to perform without cuing. 2. Patient will report a 25% improvement in symptoms.   3. Patient report a 25% improvement in sleeping. 4. Patient will have a 25% increase in cervical extension ROM to allow ease with mobility. 5. Patient will tolerate 15 minutes of clinic activities before onset of symptoms. Long term goals  Time frame: 8 weeks  1. Patient will report pain level decrease to 2/10 to allow increased ease of movement. 2. Patient will have an improved score on the NDI outcome measure by 9 points to demonstrate an increase in functional activity tolerance. 3. Patient will be independent in final individualized HEP. 4. Patient will have an increase in cervical rotation to 80 degrees allow performance of work tasks. 5. Patient will have an increase in left shoulder flexion strength to 5/5 to allow performance of work tasks. 6. Patient will return to lifting without being limited by symptoms. 7. Patient will sleep 6-8 hours without being interrupted by pain. [x]     Patient has participated in goal setting and agrees to work toward plan of care. [x]     Patient was instructed to call if questions or concerns arise. Thank you for this referral.  Karime Dose, PT   Time Calculation: 62 mins    Patient Time in clinic:   Start Time: 1300   Stop Time: 1402    TREATMENT PLAN EFFECTIVE DATES:   4/21/2021 TO 6/25/2021  I have read the above plan of care for Kenny Jack and certify the need for skilled physical therapy services.     Physician Signature: ____________________________________________________    Date: _________________________________________________________________

## 2021-04-29 ENCOUNTER — HOSPITAL ENCOUNTER (OUTPATIENT)
Dept: PHYSICAL THERAPY | Age: 31
Discharge: HOME | End: 2021-04-29
Payer: MEDICAID

## 2021-04-29 PROCEDURE — 97140 MANUAL THERAPY 1/> REGIONS: CPT | Performed by: PHYSICAL THERAPIST

## 2021-04-29 PROCEDURE — 97110 THERAPEUTIC EXERCISES: CPT | Performed by: PHYSICAL THERAPIST

## 2021-04-29 NOTE — PROGRESS NOTES
[facility]  [address], [address] [facility]    OUTPATIENT PHYSICAL THERAPY DAILY TREATMENT NOTE  VISIT: 2    NAME: Amy Bains AGE: 32 y.o. GENDER: female  DATE: 4/29/2021  REFERRING PHYSICIAN: Ryan Nieves., *      GOALS  Short term goals  Time frame: 4 weeks  1. Patient will be compliant and independent with the initial HEP as evidenced by being able to perform without cuing. 2. Patient will report a 25% improvement in symptoms. 3. Patient report a 25% improvement in sleeping. 4. Patient will have a 25% increase in cervical extension ROM to allow ease with mobility. 5. Patient will tolerate 15 minutes of clinic activities before onset of symptoms.      Long term goals  Time frame: 8 weeks  1. Patient will report pain level decrease to 2/10 to allow increased ease of movement. 2. Patient will have an improved score on the NDI outcome measure by 9 points to demonstrate an increase in functional activity tolerance. 3. Patient will be independent in final individualized HEP. 4. Patient will have an increase in cervical rotation to 80 degrees allow performance of work tasks. 5. Patient will have an increase in left shoulder flexion strength to 5/5 to allow performance of work tasks. 6. Patient will return to lifting without being limited by symptoms. 7. Patient will sleep 6-8 hours without being interrupted by pain. SUBJECTIVE:   \"It feels better than it did. 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 with superimposed multilevel degenerative change most significant at C5-6 with severe narrowing of the canal     EXAMINATION/PRESENTATION/DECISION MAKING:   Pain:  Location: Neck  Quality: aching and burning  Now: 10/10  Best: 10/10  Worst: 10/10  Factors that improve pain: massage     Posture:   Rounded shoulders     Strength:                                          LEFT                  RIGHT  Shoulder flexion              4+/5                   5/5  Shoulder abduction        4+/5                   5/5                       Shoulder ER                   5/5                     5/5  Shoulder IR                    5/5                     5/5  Elbow flexion                  5/5                     5/5  Elbow extension           5/5                     5/5  Wrist flexion                    5/5                     5/5  Wrist extension               5/5                     5/5     Left : 18 lbs   Right : 35 lbs     Range of Motion:   Cervical Spine (AROM)  Flexion               25% limited; posterior neck pain  Extension           75% limited; posterior neck pain   R side bend       50% limited; stretch  L side bend        50% limited; tightness  R rotation           70 degrees; pressure  L rotation           62 degrees; pressure     Joint Mobility:   Hypomobile cervical and upper thoracic spine; pain with CPA C3-C7     Palpation:   Tender to bilateral UT, levator scapulae, cervical paraspinals, and rhomboids (Lt>Rt)     Neurologic Assessment:               Tone: Normal               Sensation: Constant tingling down LUE; occasional tingling in right UE               Reflexes: NT     Special Tests:   (+) Spurlings  Pain relief with cervical distraction     Mobility:               Transitional Movements: Increased time to perform                Gait: Slow pace     Balance:   WNL     Functional Measure:   NDI: 33/50     TREATMENT/INTERVENTION:  Modalities (Rationale): to decrease pain and muscle guarding  MHP to neck for 8 minutes in supine at end of session; no skin irritation noted     Therapeutic Exercises: to develop strength, endurance, range of motion, and flexibility  Initial HEP provided 4/21/2021: UT stretch; cervical nods; cervical retraction; cervical retraction with rotation; scapular retraction; median nerve glides     Exercises in BOLD performed this date:      Seated:   UT stretch: 2 reps with 15 sec holds  Cervical nods: 10 reps B  Cervical retraction: 10 reps with 5 sec holds  Cervical retraction with rotation: 10 reps B  Scapular retraction: 10 reps    Standing:   Scapular retraction with red TB: 2 sets of 10 reps  Triceps pull downs with red TB: 2 sets of 10 reps  Shoulder flexion to 90 degrees 2#: 10 reps     Manual Therapy: for joint mobilization/manipulations and soft tissue mobilization  Right cervical side glides, grade I-II; pain relief  STM to bilateral UT, levator scapulae, and cervical paraspinals   Manual cervical traction      Neuro Re-Education: to improve movement, balance, coordination, kinesthetic sense, posture, and proprioception for sitting or standing balance  Median nerve glides, left UE x 10 reps     Therapeutic Activities: to improve functional performance, power, or agility  None this date     Ambulation/Gait Training:  None this date     Activity tolerance and post treatment pain report:   Good/Fair; 6/10    Education:  Education was provided to the patient on the following topics: continue HEP in pain free ROM. [x]    No changes were made to the home exercise program.  []    The following changes were made to the home exercise program  Patient verbalized understanding of the topics presented. ASSESSMENT:   Patient returns following initial evaluation. Patient presents with decreased neck pain. Patient with continued radicular symptoms down BUE. Patient has the most discomfort with cervical extension. Patient with fairly regular performance of HEP. Patient tolerated clinic exercises and manual therapy well with pain relief noted.      Patients progression toward goals is as follows:  [x]     Improving appropriately and progressing toward goals  []     Improving slowly and progressing toward goals  []     Not making progress toward goals and plan of care will be adjusted    PLAN OF CARE:   Patient continues to benefit from skilled intervention to address the above impairments. [x]    Continue treatment per established plan of care.   []     Recommend the following changes to the plan of care    Recommendations/Intent for next treatment: progress as able    Nathen Snellen, PT   Time Calculation: 36 mins  Patient Time in clinic:   Start Time: 1430   Stop Time: [phone]

## 2021-05-07 ENCOUNTER — HOSPITAL ENCOUNTER (OUTPATIENT)
Dept: PHYSICAL THERAPY | Age: 31
Discharge: HOME | End: 2021-05-07
Payer: MEDICAID

## 2021-05-07 NOTE — PROGRESS NOTES
[facility]  [address], [address] [facility]    OUTPATIENT PHYSICAL THERAPY      5/7/2021:  Silvia Huston was not seen on this date for physical therapy for the following reasons:    [x]     Patient called to cancel the visit for the following reasons: schedule conflict  []     Patient missed the visit and did not call to cancel.     Andrea Escalera, PT

## 2021-05-11 ENCOUNTER — HOSPITAL ENCOUNTER (OUTPATIENT)
Dept: PHYSICAL THERAPY | Age: 31
Discharge: HOME | End: 2021-05-11
Payer: MEDICAID

## 2021-05-11 PROCEDURE — 97140 MANUAL THERAPY 1/> REGIONS: CPT | Performed by: PHYSICAL THERAPIST

## 2021-05-11 PROCEDURE — 97110 THERAPEUTIC EXERCISES: CPT | Performed by: PHYSICAL THERAPIST

## 2021-05-11 NOTE — PROGRESS NOTES
[facility]  [address], [address] [facility]    OUTPATIENT PHYSICAL THERAPY DAILY TREATMENT NOTE  VISIT: 3    NAME: Jorge Luis Gregg AGE: 32 y.o. GENDER: female  DATE: 5/11/2021  REFERRING PHYSICIAN: Patrick Merritt., *      GOALS  Short term goals  Time frame: 4 weeks  1. Patient will be compliant and independent with the initial HEP as evidenced by being able to perform without cuing. 2. Patient will report a 25% improvement in symptoms. 3. Patient report a 25% improvement in sleeping. 4. Patient will have a 25% increase in cervical extension ROM to allow ease with mobility. 5. Patient will tolerate 15 minutes of clinic activities before onset of symptoms.      Long term goals  Time frame: 8 weeks  1. Patient will report pain level decrease to 2/10 to allow increased ease of movement. 2. Patient will have an improved score on the NDI outcome measure by 9 points to demonstrate an increase in functional activity tolerance. 3. Patient will be independent in final individualized HEP. 4. Patient will have an increase in cervical rotation to 80 degrees allow performance of work tasks. 5. Patient will have an increase in left shoulder flexion strength to 5/5 to allow performance of work tasks. 6. Patient will return to lifting without being limited by symptoms. 7. Patient will sleep 6-8 hours without being interrupted by pain. SUBJECTIVE:   \"The pain is controllable. 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 with superimposed multilevel degenerative change most significant at C5-6 with severe narrowing of the canal     EXAMINATION/PRESENTATION/DECISION MAKING:   Pain:  Location: Neck  Quality: aching and burning  Now: 10/10  Best: 10/10  Worst: 10/10  Factors that improve pain: massage     Posture:   Rounded shoulders     Strength:                                          LEFT                  RIGHT  Shoulder flexion              4+/5                   5/5  Shoulder abduction        4+/5                   5/5                       Shoulder ER                   5/5                     5/5  Shoulder IR                    5/5                     5/5  Elbow flexion                  5/5                     5/5  Elbow extension           5/5                     5/5  Wrist flexion                    5/5                     5/5  Wrist extension               5/5                     5/5     Left : 18 lbs   Right : 35 lbs     Range of Motion:   Cervical Spine (AROM)  Flexion               25% limited; posterior neck pain  Extension           75% limited; posterior neck pain   R side bend       50% limited; stretch  L side bend        50% limited; tightness  R rotation           70 degrees; pressure  L rotation           62 degrees; pressure     Joint Mobility:   Hypomobile cervical and upper thoracic spine; pain with CPA C3-C7     Palpation:   Tender to bilateral UT, levator scapulae, cervical paraspinals, and rhomboids (Lt>Rt)     Neurologic Assessment:               Tone: Normal               Sensation: Constant tingling down LUE; occasional tingling in right UE               Reflexes: NT     Special Tests:   (+) Spurlings  Pain relief with cervical distraction     Mobility:               Transitional Movements: Increased time to perform                Gait: Slow pace     Balance:   WNL     Functional Measure:   NDI: 33/50     TREATMENT/INTERVENTION:  Modalities (Rationale): to decrease pain and muscle guarding  MHP to neck for 8 minutes in supine at end of session; no skin irritation noted-held this date     Therapeutic Exercises: to develop strength, endurance, range of motion, and flexibility  Initial HEP provided 4/21/2021: UT stretch; cervical nods; cervical retraction; cervical retraction with rotation; scapular retraction; median nerve glides     Exercises in BOLD performed this date:      Seated:   UT stretch: 2 reps with 15 sec holds  Cervical nods: 10 reps B  Cervical retraction: 10 reps with 5 sec holds  Cervical retraction with rotation: 10 reps B  Scapular retraction: 10 reps  Cervical SNAG to right: 5 reps    Standing:   Corner stretch: 5 reps with 10 sec holds  Scapular retraction with red TB: 2 sets of 10 reps  Triceps pull downs with red TB: 2 sets of 10 reps  Shoulder flexion to 90 degrees 2#: 10 reps    Supine:   Rolling out upper back over blue foam roller: 30 seconds     Manual Therapy: for joint mobilization/manipulations and soft tissue mobilization  Right cervical side glides, grade I-II; pain relief  PA mobs C6-T3; hypomobile and painful; pain relief   STM to bilateral cervical paraspinals   Manual cervical traction   Manual pec stretch: 2 reps with 30 sec holds     Neuro Re-Education: to improve movement, balance, coordination, kinesthetic sense, posture, and proprioception for sitting or standing balance  Median nerve glides, left UE x 10 reps     Therapeutic Activities: to improve functional performance, power, or agility  None this date     Activity tolerance and post treatment pain report:   Good/Fair; 5/10    Education:  Education was provided to the patient on the following topics: continue HEP in pain free ROM. []    No changes were made to the home exercise program.  [x]    The following changes were made to the home exercise program: Added corner stretch  Patient verbalized understanding of the topics presented. ASSESSMENT:   Patient presents with slight decrease in neck pain. Patient with continued radicular symptoms down BUE. Patient has the most discomfort with cervical extension. Patient with fairly regular performance of HEP. Patient tolerated clinic exercises fairly well with cues for form. Patient experienced pain relief following manual therapy.  Patient returns to her doctor next week.      Patients progression toward goals is as follows:  [x]     Improving appropriately and progressing toward goals  []     Improving slowly and progressing toward goals  []     Not making progress toward goals and plan of care will be adjusted    PLAN OF CARE:   Patient continues to benefit from skilled intervention to address the above impairments. [x]    Continue treatment per established plan of care.   []     Recommend the following changes to the plan of care    Recommendations/Intent for next treatment: progress as able    Deandre Craven, PT   Time Calculation: 34 mins  Patient Time in clinic:   Start Time: 1036   Stop Time: 1110

## 2021-05-13 ENCOUNTER — HOSPITAL ENCOUNTER (OUTPATIENT)
Dept: PHYSICAL THERAPY | Age: 31
Discharge: HOME | End: 2021-05-13
Payer: MEDICAID

## 2021-05-13 PROCEDURE — 97110 THERAPEUTIC EXERCISES: CPT | Performed by: PHYSICAL THERAPIST

## 2021-05-13 PROCEDURE — 97140 MANUAL THERAPY 1/> REGIONS: CPT | Performed by: PHYSICAL THERAPIST

## 2021-05-13 NOTE — PROGRESS NOTES
[facility]  [address], [address] [facility]    OUTPATIENT PHYSICAL THERAPY DAILY TREATMENT NOTE  VISIT: 4    NAME: Estefania Olivas AGE: 32 y.o. GENDER: female  DATE: 5/13/2021  REFERRING PHYSICIAN: Tanya Huber., *      GOALS  Short term goals  Time frame: 4 weeks  1. Patient will be compliant and independent with the initial HEP as evidenced by being able to perform without cuing. 2. Patient will report a 25% improvement in symptoms. 3. Patient report a 25% improvement in sleeping. 4. Patient will have a 25% increase in cervical extension ROM to allow ease with mobility. 5. Patient will tolerate 15 minutes of clinic activities before onset of symptoms.      Long term goals  Time frame: 8 weeks  1. Patient will report pain level decrease to 2/10 to allow increased ease of movement. 2. Patient will have an improved score on the NDI outcome measure by 9 points to demonstrate an increase in functional activity tolerance. 3. Patient will be independent in final individualized HEP. 4. Patient will have an increase in cervical rotation to 80 degrees allow performance of work tasks. 5. Patient will have an increase in left shoulder flexion strength to 5/5 to allow performance of work tasks. 6. Patient will return to lifting without being limited by symptoms. 7. Patient will sleep 6-8 hours without being interrupted by pain. SUBJECTIVE:   \"I feel good today. 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 with superimposed multilevel degenerative change most significant at C5-6 with severe narrowing of the canal     EXAMINATION/PRESENTATION/DECISION MAKING:   Pain:  Location: Neck  Quality: aching and burning  Now: 10/10  Best: 10/10  Worst: 10/10  Factors that improve pain: massage     Posture:   Rounded shoulders     Strength:                                          LEFT                  RIGHT  Shoulder flexion              4+/5                   5/5  Shoulder abduction        4+/5                   5/5                       Shoulder ER                   5/5                     5/5  Shoulder IR                    5/5                     5/5  Elbow flexion                  5/5                     5/5  Elbow extension           5/5                     5/5  Wrist flexion                    5/5                     5/5  Wrist extension               5/5                     5/5     Left : 18 lbs   Right : 35 lbs     Range of Motion:   Cervical Spine (AROM)  Flexion               25% limited; posterior neck pain  Extension           75% limited; posterior neck pain   R side bend       50% limited; stretch  L side bend        50% limited; tightness  R rotation           70 degrees; pressure  L rotation           62 degrees; pressure     Joint Mobility:   Hypomobile cervical and upper thoracic spine; pain with CPA C3-C7     Palpation:   Tender to bilateral UT, levator scapulae, cervical paraspinals, and rhomboids (Lt>Rt)     Neurologic Assessment:               Tone: Normal               Sensation: Constant tingling down LUE; occasional tingling in right UE               Reflexes: NT     Special Tests:   (+) Spurlings  Pain relief with cervical distraction     Mobility:               Transitional Movements: Increased time to perform                Gait: Slow pace     Balance:   WNL     Functional Measure:   NDI: 33/50     TREATMENT/INTERVENTION:  Modalities (Rationale): to decrease pain and muscle guarding  MHP to neck for 8 minutes in supine at end of session; no skin irritation noted-held this date     Therapeutic Exercises: to develop strength, endurance, range of motion, and flexibility  Initial HEP provided 4/21/2021: UT stretch; cervical nods; cervical retraction; cervical retraction with rotation; scapular retraction; median nerve glides     Exercises in BOLD performed this date:      Seated:   UT stretch: 2 reps with 15 sec holds  Cervical nods: 10 reps B  Cervical retraction: 10 reps with 5 sec holds  Cervical retraction with rotation: 10 reps B  Scapular retraction: 10 reps  Cervical SNAG to right: 5 reps  Trunk extension over chair: 10 reps  Trunk rotation with extension: 3 reps with 5 sec holds B     Standing:   Corner stretch: 5 reps with 10 sec holds  Scapular retraction with red TB: 2 sets of 10 reps  Triceps pull downs with red TB: 2 sets of 10 reps  Shoulder flexion to 90 degrees 3#: 10 reps  Thoracic mobility-overhead reach with cervical rotation while holding back of chair: 10 reps B    Supine:   Rolling out upper back over blue foam roller: 30 seconds     Manual Therapy: for joint mobilization/manipulations and soft tissue mobilization  Right cervical side glides, grade I-II; pain relief  PA mobs C6-T3; grade 2; pain relief   STM to bilateral cervical paraspinals   Manual cervical traction   Cervico-thoracic distraction in standing; no cavitation but pain relief reported  Manual pec stretch: 2 reps with 30 sec holds     Neuro Re-Education: to improve movement, balance, coordination, kinesthetic sense, posture, and proprioception for sitting or standing balance  Median nerve glides, left UE x 10 reps     Therapeutic Activities: to improve functional performance, power, or agility  None this date     Activity tolerance and post treatment pain report:   Good; 0/10    Education:  Education was provided to the patient on the following topics: continue HEP in pain free ROM. [x]    No changes were made to the home exercise program.  []    The following changes were made to the home exercise program:  Patient verbalized understanding of the topics presented. ASSESSMENT:   Patient presents with no neck pain today. She felt good after last session, and pain relief has extended into today.  Patient is only experiencing radicular symptoms down BUE when performing cervical extension, but symptoms are less intense. Patient with improved performance of HEP. Patient tolerated clinic exercises well with cues for form. Patient experienced pain relief following manual therapy. Patients progression toward goals is as follows:  [x]     Improving appropriately and progressing toward goals  []     Improving slowly and progressing toward goals  []     Not making progress toward goals and plan of care will be adjusted    PLAN OF CARE:   Patient continues to benefit from skilled intervention to address the above impairments. [x]    Continue treatment per established plan of care.   []     Recommend the following changes to the plan of care    Recommendations/Intent for next treatment: progress as able    Luz Marina Epstein, PT   Time Calculation: 40 mins  Patient Time in clinic:   Start Time: 1438   Stop Time: [phone]

## 2021-05-18 ENCOUNTER — HOSPITAL ENCOUNTER (OUTPATIENT)
Dept: PHYSICAL THERAPY | Age: 31
Discharge: HOME | End: 2021-05-18
Payer: MEDICAID

## 2021-05-18 NOTE — PROGRESS NOTES
[facility]  [address], [address] [facility]    OUTPATIENT PHYSICAL THERAPY      5/18/2021:  Alexandre Tom was not seen on this date for physical therapy for the following reasons:    [x]     Patient called to cancel the visit for the following reasons: not feeling well    []     Patient missed the visit and did not call to cancel.     Jenn Lopez, PT

## 2021-05-25 ENCOUNTER — HOSPITAL ENCOUNTER (OUTPATIENT)
Dept: PHYSICAL THERAPY | Age: 31
Discharge: HOME | End: 2021-05-25
Payer: MEDICAID

## 2021-05-25 PROCEDURE — 97110 THERAPEUTIC EXERCISES: CPT | Performed by: PHYSICAL THERAPIST

## 2021-05-25 PROCEDURE — 97530 THERAPEUTIC ACTIVITIES: CPT | Performed by: PHYSICAL THERAPIST

## 2021-05-25 NOTE — PROGRESS NOTES
[facility]  [address], [address] [facility]    OUTPATIENT PHYSICAL THERAPY DAILY TREATMENT NOTE  VISIT: 5    NAME: Anna Hayes AGE: 32 y.o. GENDER: female  DATE: 5/25/2021  REFERRING PHYSICIAN: James Huber., *      GOALS  Short term goals  Time frame: 4 weeks  1. Patient will be compliant and independent with the initial HEP as evidenced by being able to perform without cuing. MET  2. Patient will report a 25% improvement in symptoms. MET  3. Patient report a 25% improvement in sleeping. MET  4. Patient will have a 25% increase in cervical extension ROM to allow ease with mobility. MET  5. Patient will tolerate 15 minutes of clinic activities before onset of symptoms. MET     Long term goals  Time frame: 8 weeks  1. Patient will report pain level decrease to 2/10 to allow increased ease of movement. 2. Patient will have an improved score on the NDI outcome measure by 9 points to demonstrate an increase in functional activity tolerance. 3. Patient will be independent in final individualized HEP. 4. Patient will have an increase in cervical rotation to 80 degrees allow performance of work tasks. 5. Patient will have an increase in left shoulder flexion strength to 5/5 to allow performance of work tasks. 6. Patient will return to lifting without being limited by symptoms. 7. Patient will sleep 6-8 hours without being interrupted by pain. SUBJECTIVE:   \"I'm on a weight restriction of no more than 10 lbs for work and that's helped. I haven't had any sensations down my arm or any neck pain since last Monday now. 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 with superimposed multilevel degenerative change most significant at C5-6 with severe narrowing of the canal     EXAMINATION/PRESENTATION/DECISION MAKING: Pain:  Location: Neck  Quality: aching and burning  Now: 10/10  Best: 10/10  Worst: 10/10  Factors that improve pain: massage     Posture:   Rounded shoulders     Strength:                                          LEFT                  RIGHT  Shoulder flexion              4+/5                   5/5  Shoulder abduction        4+/5                   5/5                       Shoulder ER                   5/5                     5/5  Shoulder IR                    5/5                     5/5  Elbow flexion                  5/5                     5/5  Elbow extension           5/5                     5/5  Wrist flexion                    5/5                     5/5  Wrist extension               5/5                     5/5     Left : 18 lbs   Right : 35 lbs     Range of Motion:   Cervical Spine (AROM)  Flexion               25% limited; posterior neck pain  Extension           75% limited; posterior neck pain   R side bend       50% limited; stretch  L side bend        50% limited; tightness  R rotation           70 degrees; pressure  L rotation           62 degrees; pressure     Joint Mobility:   Hypomobile cervical and upper thoracic spine; pain with CPA C3-C7     Palpation:   Tender to bilateral UT, levator scapulae, cervical paraspinals, and rhomboids (Lt>Rt)     Neurologic Assessment:               Tone: Normal               Sensation: Constant tingling down LUE; occasional tingling in right UE               Reflexes: NT     Special Tests:   (+) Spurlings  Pain relief with cervical distraction     Mobility:               Transitional Movements: Increased time to perform                Gait: Slow pace     Balance:   WNL     Functional Measure:   NDI: 33/50     TREATMENT/INTERVENTION:  Modalities (Rationale): to decrease pain and muscle guarding  MHP to neck for 8 minutes in supine at end of session; no skin irritation noted-held this date     Therapeutic Exercises: to develop strength, endurance, range of motion, and flexibility  Initial HEP provided 4/21/2021: UT stretch; cervical nods; cervical retraction; cervical retraction with rotation; scapular retraction; median nerve glides     Exercises in BOLD performed this date:      Seated:   UT stretch: 2 reps with 15 sec holds  Cervical nods: 10 reps B  Cervical retraction: 10 reps with 5 sec holds  Cervical retraction with rotation: 10 reps B  Scapular retraction: 10 reps  Cervical SNAG to right: 5 reps  Trunk extension over chair: 10 reps  Trunk rotation with extension: 3 reps with 5 sec holds B     Standing:   Corner stretch: 5 reps with 10 sec holds  Scapular retraction with blue TB: 2 sets of 10 reps  Triceps pull downs with blue TB: 2 sets of 10 reps  Flys with blue TB: 10 reps  T's with blue TB: 2 sets of 10 reps  X's with blue TB: 10 reps   Shoulder flexion to 90 degrees 3#: 10 reps  Thoracic mobility-overhead reach with cervical rotation while holding back of chair: 10 reps B    Supine:   Rolling out upper back over blue foam roller: 30 seconds     Manual Therapy: for joint mobilization/manipulations and soft tissue mobilization  Right cervical side glides, grade I-II; pain relief  PA mobs C6-T3; grade 2; pain relief   STM to bilateral cervical paraspinals   Manual cervical traction   Cervico-thoracic distraction in standing; no cavitation but pain relief reported  Manual pec stretch: 2 reps with 30 sec holds     Neuro Re-Education: to improve movement, balance, coordination, kinesthetic sense, posture, and proprioception for sitting or standing balance  Median nerve glides, left UE x 10 reps     Therapeutic Activities: to improve functional performance, power, or agility  Educated on proper body mechanics with lifting to decrease stress on neck and upper back. Patient educated on TA hold to brace abdominals.  Practiced lifting 5# x 8 reps and 10# x3 reps.     Activity tolerance and post treatment pain report:   Good; 0/10    Education:  Education was provided to the patient on the following topics: continue HEP in pain free ROM. [x]    No changes were made to the home exercise program.  []    The following changes were made to the home exercise program:  Patient verbalized understanding of the topics presented. ASSESSMENT:   Patient presents with no neck pain or radicular symptoms for greater than one week. No radicular symptoms with cervical extension. Patient has been on restrictions for lifting up to 10 lbs at work since last Monday. She reports ease of symptoms with lifting restrictions. Patient with regular performance of HEP. Patient tolerated clinic exercises well with focus on scapular strengthening. Patient educated on proper body mechanics with lifting to decrease stress on neck and upper back. Patients progression toward goals is as follows:  [x]     Improving appropriately and progressing toward goals  []     Improving slowly and progressing toward goals  []     Not making progress toward goals and plan of care will be adjusted    PLAN OF CARE:   Patient continues to benefit from skilled intervention to address the above impairments. [x]    Continue treatment per established plan of care.   []     Recommend the following changes to the plan of care    Recommendations/Intent for next treatment: progress as able    Lalito Blocker, PT   Time Calculation: 30 mins  Patient Time in clinic:   Start Time: 1430   Stop Time: 1500

## 2021-06-03 ENCOUNTER — HOSPITAL ENCOUNTER (OUTPATIENT)
Dept: PHYSICAL THERAPY | Age: 31
Discharge: HOME | End: 2021-06-03
Payer: MEDICAID

## 2021-06-03 PROCEDURE — 97110 THERAPEUTIC EXERCISES: CPT | Performed by: PHYSICAL THERAPIST

## 2021-06-03 PROCEDURE — 97140 MANUAL THERAPY 1/> REGIONS: CPT | Performed by: PHYSICAL THERAPIST

## 2021-06-03 NOTE — PROGRESS NOTES
[facility]  [address], [address] [facility]    OUTPATIENT PHYSICAL THERAPY DAILY TREATMENT NOTE  VISIT: 6    NAME: Estefania Olivas AGE: 32 y.o. GENDER: female  DATE: 6/3/2021  REFERRING PHYSICIAN: Tanya Huber., *      GOALS  Short term goals  Time frame: 4 weeks  1. Patient will be compliant and independent with the initial HEP as evidenced by being able to perform without cuing. MET  2. Patient will report a 25% improvement in symptoms. MET  3. Patient report a 25% improvement in sleeping. MET  4. Patient will have a 25% increase in cervical extension ROM to allow ease with mobility. MET  5. Patient will tolerate 15 minutes of clinic activities before onset of symptoms. MET     Long term goals  Time frame: 8 weeks  1. Patient will report pain level decrease to 2/10 to allow increased ease of movement. 2. Patient will have an improved score on the NDI outcome measure by 9 points to demonstrate an increase in functional activity tolerance. 3. Patient will be independent in final individualized HEP. 4. Patient will have an increase in cervical rotation to 80 degrees allow performance of work tasks. 5. Patient will have an increase in left shoulder flexion strength to 5/5 to allow performance of work tasks. 6. Patient will return to lifting without being limited by symptoms. 7. Patient will sleep 6-8 hours without being interrupted by pain. SUBJECTIVE:   \"I had to do fast, heavy lifting at work yesterday and that set things off. 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 with superimposed multilevel degenerative change most significant at C5-6 with severe narrowing of the canal     EXAMINATION/PRESENTATION/DECISION MAKING:   Pain:  Location: Neck  Quality: aching and burning  Now: 10/10  Best: 10/10  Worst: 10/10  Factors that improve pain: massage     Posture:   Rounded shoulders     Strength:                                          LEFT                  RIGHT  Shoulder flexion              4+/5                   5/5  Shoulder abduction        4+/5                   5/5                       Shoulder ER                   5/5                     5/5  Shoulder IR                    5/5                     5/5  Elbow flexion                  5/5                     5/5  Elbow extension           5/5                     5/5  Wrist flexion                    5/5                     5/5  Wrist extension               5/5                     5/5     Left : 18 lbs   Right : 35 lbs     Range of Motion:   Cervical Spine (AROM)  Flexion               25% limited; posterior neck pain  Extension           75% limited; posterior neck pain   R side bend       50% limited; stretch  L side bend        50% limited; tightness  R rotation           70 degrees; pressure  L rotation           62 degrees; pressure     Joint Mobility:   Hypomobile cervical and upper thoracic spine; pain with CPA C3-C7     Palpation:   Tender to bilateral UT, levator scapulae, cervical paraspinals, and rhomboids (Lt>Rt)     Neurologic Assessment:               Tone: Normal               Sensation: Constant tingling down LUE; occasional tingling in right UE               Reflexes: NT     Special Tests:   (+) Spurlings  Pain relief with cervical distraction     Mobility:               Transitional Movements: Increased time to perform                Gait: Slow pace     Balance:   WNL     Functional Measure:   NDI: 33/50     TREATMENT/INTERVENTION:  Modalities (Rationale): to decrease pain and muscle guarding  MHP to neck for 8 minutes in supine at end of session; no skin irritation noted-held this date     Therapeutic Exercises: to develop strength, endurance, range of motion, and flexibility  Initial HEP provided 4/21/2021: UT stretch; cervical nods; cervical retraction; cervical retraction with rotation; scapular retraction; median nerve glides     Exercises in BOLD performed this date:      Seated:   UT stretch: 2 reps with 15 sec holds  Cervical nods: 10 reps B  Cervical retraction: 10 reps with 5 sec holds  Cervical retraction with rotation: 10 reps B  Shoulder rolls: 10 reps  Scapular retraction: 10 reps  Cervical SNAG to right: 5 reps  Trunk extension over chair: 10 reps  Trunk rotation with extension: 3 reps with 5 sec holds B     Standing:   Corner stretch: 5 reps with 10 sec holds  Scapular retraction with blue TB: 2 sets of 10 reps  Triceps pull downs with blue TB: 2 sets of 10 reps  Flys with blue TB: 10 reps  T's with blue TB: 2 sets of 10 reps  X's with blue TB: 2 sets of 10 reps   Shoulder flexion to 90 degrees 3#: 2 sets of 10 reps  Thoracic mobility-overhead reach with cervical rotation while holding back of chair: 10 reps B    Supine:   Rolling out upper back over blue foam roller: 30 seconds     Manual Therapy: for joint mobilization/manipulations and soft tissue mobilization  Right cervical side glides, grade I-II; pain relief  PA mobs C6-T3; grade 2; pain relief   STM to bilateral cervical paraspinals   Manual cervical traction   Instrument assisted soft tissue mobilization to bilateral UT. Patient educated on purpose and affect of intervention with good understanding verbalized. Cervico-thoracic distraction in standing; no cavitation but pain relief reported  Manual pec stretch: 2 reps with 30 sec holds     Neuro Re-Education: to improve movement, balance, coordination, kinesthetic sense, posture, and proprioception for sitting or standing balance  Median nerve glides, left UE x 10 reps     Therapeutic Activities: to improve functional performance, power, or agility  Educated on proper body mechanics with lifting to decrease stress on neck and upper back. Patient educated on TA hold to brace abdominals.  Practiced lifting 5# x 8 reps and 10# x3 reps.     Activity tolerance and post treatment pain report:   Good; 0/10    Education:  Education was provided to the patient on the following topics: continue HEP in pain free ROM. [x]    No changes were made to the home exercise program.  []    The following changes were made to the home exercise program:  Patient verbalized understanding of the topics presented. ASSESSMENT:   Patient presents with increased neck pain today due to fast, heavy lifting at work yesterday. Patient reports that she experienced radicular symptoms yesterday which have resolved this PM. She continues to no longer experience radicular symptoms with cervical extension AROM. Patient with regular performance of HEP. Patient tolerated clinic exercises well with no increase in pain. Patient with significant pain relief following manual therapy today. Patients progression toward goals is as follows:  [x]     Improving appropriately and progressing toward goals  []     Improving slowly and progressing toward goals  []     Not making progress toward goals and plan of care will be adjusted    PLAN OF CARE:   Patient continues to benefit from skilled intervention to address the above impairments. [x]    Continue treatment per established plan of care.   []     Recommend the following changes to the plan of care    Recommendations/Intent for next treatment: progress as able    Drew Moran, PT   Time Calculation: 42 mins  Patient Time in clinic:   Start Time: 1425   Stop Time: [phone]

## 2021-06-28 ENCOUNTER — HOSPITAL ENCOUNTER (EMERGENCY)
Age: 31
Discharge: HOME | End: 2021-06-28
Attending: EMERGENCY MEDICINE
Payer: MEDICAID

## 2021-06-28 VITALS
HEIGHT: 61 IN | OXYGEN SATURATION: 99 % | TEMPERATURE: 98.3 F | WEIGHT: 261.47 LBS | RESPIRATION RATE: 22 BRPM | SYSTOLIC BLOOD PRESSURE: 156 MMHG | DIASTOLIC BLOOD PRESSURE: 69 MMHG | HEART RATE: 83 BPM | BODY MASS INDEX: 49.37 KG/M2

## 2021-06-28 DIAGNOSIS — E87.6 HYPOKALEMIA: ICD-10-CM

## 2021-06-28 DIAGNOSIS — M62.838 MUSCLE SPASM: Primary | ICD-10-CM

## 2021-06-28 LAB
ALBUMIN SERPL-MCNC: 3.6 G/DL (ref 3.5–5)
ALBUMIN/GLOB SERPL: 0.9 {RATIO} (ref 1.1–2.2)
ALP SERPL-CCNC: 79 U/L (ref 45–117)
ALT SERPL-CCNC: 40 U/L (ref 12–78)
ANION GAP SERPL CALC-SCNC: 5 MMOL/L (ref 5–15)
APPEARANCE UR: CLEAR
AST SERPL-CCNC: 18 U/L (ref 15–37)
BACTERIA URNS QL MICRO: ABNORMAL /HPF
BASOPHILS # BLD: 0.1 K/UL (ref 0–0.1)
BASOPHILS NFR BLD: 1 % (ref 0–1)
BILIRUB SERPL-MCNC: 0.4 MG/DL (ref 0.2–1)
BILIRUB UR QL: NEGATIVE
BUN SERPL-MCNC: 13 MG/DL (ref 6–20)
BUN/CREAT SERPL: 17 (ref 12–20)
CALCIUM SERPL-MCNC: 8.8 MG/DL (ref 8.5–10.1)
CHLORIDE SERPL-SCNC: 105 MMOL/L (ref 97–108)
CO2 SERPL-SCNC: 30 MMOL/L (ref 21–32)
COLOR UR: ABNORMAL
CREAT SERPL-MCNC: 0.76 MG/DL (ref 0.55–1.02)
DIFFERENTIAL METHOD BLD: ABNORMAL
EOSINOPHIL # BLD: 0.2 K/UL (ref 0–0.4)
EOSINOPHIL NFR BLD: 2 % (ref 0–7)
EPITH CASTS URNS QL MICRO: ABNORMAL /LPF
ERYTHROCYTE [DISTWIDTH] IN BLOOD BY AUTOMATED COUNT: 18.7 % (ref 11.5–14.5)
GLOBULIN SER CALC-MCNC: 4 G/DL (ref 2–4)
GLUCOSE SERPL-MCNC: 87 MG/DL (ref 65–100)
GLUCOSE UR STRIP.AUTO-MCNC: NEGATIVE MG/DL
HCG UR QL: NEGATIVE
HCT VFR BLD AUTO: 38 % (ref 35–47)
HGB BLD-MCNC: 12 G/DL (ref 11.5–16)
HGB UR QL STRIP: NEGATIVE
HYALINE CASTS URNS QL MICRO: ABNORMAL /LPF (ref 0–5)
IMM GRANULOCYTES # BLD AUTO: 0 K/UL (ref 0–0.04)
IMM GRANULOCYTES NFR BLD AUTO: 0 % (ref 0–0.5)
KETONES UR QL STRIP.AUTO: NEGATIVE MG/DL
LEUKOCYTE ESTERASE UR QL STRIP.AUTO: ABNORMAL
LYMPHOCYTES # BLD: 3.2 K/UL (ref 0.8–3.5)
LYMPHOCYTES NFR BLD: 36 % (ref 12–49)
MCH RBC QN AUTO: 24.4 PG (ref 26–34)
MCHC RBC AUTO-ENTMCNC: 31.6 G/DL (ref 30–36.5)
MCV RBC AUTO: 77.2 FL (ref 80–99)
MONOCYTES # BLD: 0.6 K/UL (ref 0–1)
MONOCYTES NFR BLD: 6 % (ref 5–13)
NEUTS SEG # BLD: 4.9 K/UL (ref 1.8–8)
NEUTS SEG NFR BLD: 55 % (ref 32–75)
NITRITE UR QL STRIP.AUTO: NEGATIVE
NRBC # BLD: 0 K/UL (ref 0–0.01)
NRBC BLD-RTO: 0 PER 100 WBC
PH UR STRIP: 6 [PH] (ref 5–8)
PLATELET # BLD AUTO: 304 K/UL (ref 150–400)
PMV BLD AUTO: 10.3 FL (ref 8.9–12.9)
POTASSIUM SERPL-SCNC: 2.8 MMOL/L (ref 3.5–5.1)
PROT SERPL-MCNC: 7.6 G/DL (ref 6.4–8.2)
PROT UR STRIP-MCNC: NEGATIVE MG/DL
RBC # BLD AUTO: 4.92 M/UL (ref 3.8–5.2)
RBC #/AREA URNS HPF: ABNORMAL /HPF (ref 0–5)
SODIUM SERPL-SCNC: 140 MMOL/L (ref 136–145)
SP GR UR REFRACTOMETRY: 1.02 (ref 1–1.03)
TROPONIN I SERPL-MCNC: <0.05 NG/ML
UA: UC IF INDICATED,UAUC: ABNORMAL
UROBILINOGEN UR QL STRIP.AUTO: 1 EU/DL (ref 0.2–1)
WBC # BLD AUTO: 8.8 K/UL (ref 3.6–11)
WBC URNS QL MICRO: ABNORMAL /HPF (ref 0–4)

## 2021-06-28 PROCEDURE — 74011250636 HC RX REV CODE- 250/636: Performed by: PHYSICIAN ASSISTANT

## 2021-06-28 PROCEDURE — 85025 COMPLETE CBC W/AUTO DIFF WBC: CPT

## 2021-06-28 PROCEDURE — 80053 COMPREHEN METABOLIC PANEL: CPT

## 2021-06-28 PROCEDURE — 99285 EMERGENCY DEPT VISIT HI MDM: CPT

## 2021-06-28 PROCEDURE — 96365 THER/PROPH/DIAG IV INF INIT: CPT

## 2021-06-28 PROCEDURE — 93005 ELECTROCARDIOGRAM TRACING: CPT

## 2021-06-28 PROCEDURE — 36415 COLL VENOUS BLD VENIPUNCTURE: CPT

## 2021-06-28 PROCEDURE — 96360 HYDRATION IV INFUSION INIT: CPT

## 2021-06-28 PROCEDURE — 74011250637 HC RX REV CODE- 250/637: Performed by: PHYSICIAN ASSISTANT

## 2021-06-28 PROCEDURE — 81025 URINE PREGNANCY TEST: CPT

## 2021-06-28 PROCEDURE — 84484 ASSAY OF TROPONIN QUANT: CPT

## 2021-06-28 PROCEDURE — 87086 URINE CULTURE/COLONY COUNT: CPT

## 2021-06-28 PROCEDURE — 81001 URINALYSIS AUTO W/SCOPE: CPT

## 2021-06-28 RX ORDER — POTASSIUM CHLORIDE 7.45 MG/ML
10 INJECTION INTRAVENOUS ONCE
Status: COMPLETED | OUTPATIENT
Start: 2021-06-28 | End: 2021-06-28

## 2021-06-28 RX ORDER — POTASSIUM CHLORIDE 20 MEQ/1
40 TABLET, EXTENDED RELEASE ORAL
Status: COMPLETED | OUTPATIENT
Start: 2021-06-28 | End: 2021-06-28

## 2021-06-28 RX ORDER — METHOCARBAMOL 750 MG/1
750 TABLET, FILM COATED ORAL ONCE
Status: COMPLETED | OUTPATIENT
Start: 2021-06-28 | End: 2021-06-28

## 2021-06-28 RX ORDER — CYCLOBENZAPRINE HCL 10 MG
10 TABLET ORAL
Qty: 20 TABLET | Refills: 0 | Status: SHIPPED | OUTPATIENT
Start: 2021-06-28 | End: 2021-08-26

## 2021-06-28 RX ADMIN — POTASSIUM CHLORIDE 10 MEQ: 10 INJECTION, SOLUTION INTRAVENOUS at 19:26

## 2021-06-28 RX ADMIN — METHOCARBAMOL TABLETS 750 MG: 750 TABLET, COATED ORAL at 19:26

## 2021-06-28 RX ADMIN — POTASSIUM CHLORIDE 40 MEQ: 20 TABLET, EXTENDED RELEASE ORAL at 19:26

## 2021-06-28 NOTE — ED TRIAGE NOTES
Pt c/o \"fluttering\" sensation and pain to her chest.  Started 3 days ago, has been constant. Denies n/v, shortness of breath.
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
## 2021-06-28 NOTE — ED NOTES
NEGRITA Encinas at bedside evaluating the pt. Assumed care of pt from triage. Pt is A&O x 4. Pt reports CC of palpations for three days, Pt reports this happened once before on her right side after her breast reduction and it was a nerve that was twisted and her Breast reduction MD suggested she wait it out. The feeling went away. Pt reports this comes with intermittent pain which is what scared her. Pt on monitor x3 with call in reach.

## 2021-06-29 LAB
ATRIAL RATE: 84 BPM
BACTERIA SPEC CULT: NORMAL
CALCULATED P AXIS, ECG09: 56 DEGREES
CALCULATED R AXIS, ECG10: 76 DEGREES
CALCULATED T AXIS, ECG11: 4 DEGREES
DIAGNOSIS, 93000: NORMAL
P-R INTERVAL, ECG05: 156 MS
Q-T INTERVAL, ECG07: 372 MS
QRS DURATION, ECG06: 94 MS
QTC CALCULATION (BEZET), ECG08: 439 MS
SERVICE CMNT-IMP: NORMAL
VENTRICULAR RATE, ECG03: 84 BPM

## 2021-07-06 ENCOUNTER — HOSPITAL ENCOUNTER (EMERGENCY)
Age: 31
Discharge: HOME | End: 2021-07-06
Attending: EMERGENCY MEDICINE
Payer: MEDICAID

## 2021-07-06 ENCOUNTER — APPOINTMENT (OUTPATIENT)
Dept: GENERAL RADIOLOGY | Age: 31
End: 2021-07-06
Attending: PHYSICIAN ASSISTANT
Payer: MEDICAID

## 2021-07-06 VITALS
HEIGHT: 61 IN | SYSTOLIC BLOOD PRESSURE: 181 MMHG | TEMPERATURE: 98.1 F | DIASTOLIC BLOOD PRESSURE: 105 MMHG | OXYGEN SATURATION: 100 % | HEART RATE: 93 BPM | RESPIRATION RATE: 12 BRPM | BODY MASS INDEX: 48.74 KG/M2 | WEIGHT: 258.16 LBS

## 2021-07-06 DIAGNOSIS — S29.012A STRAIN OF THORACIC BACK REGION: Primary | ICD-10-CM
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
## 2021-07-06 PROCEDURE — 99283 EMERGENCY DEPT VISIT LOW MDM: CPT

## 2021-07-06 PROCEDURE — 74011250637 HC RX REV CODE- 250/637: Performed by: PHYSICIAN ASSISTANT

## 2021-07-06 RX ORDER — CYCLOBENZAPRINE HCL 10 MG
10 TABLET ORAL
Status: COMPLETED | OUTPATIENT
Start: 2021-07-06 | End: 2021-07-06

## 2021-07-06 RX ORDER — IBUPROFEN 400 MG/1
800 TABLET ORAL
Status: COMPLETED | OUTPATIENT
Start: 2021-07-06 | End: 2021-07-06

## 2021-07-06 RX ADMIN — CYCLOBENZAPRINE 10 MG: 10 TABLET, FILM COATED ORAL at 20:09

## 2021-07-06 RX ADMIN — IBUPROFEN 800 MG: 400 TABLET ORAL at 20:09

## 2021-07-07 NOTE — ED NOTES
Verbal report given to Gianfranco Vazquez RN. Skin warm and dry. Respirations even and unlabored. In no apparent distress at this time. Pt waiting for XR.
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
## 2021-07-09 NOTE — PROGRESS NOTES
[facility]  [address], [address] [facility]    OUTPATIENT physical Therapy discharge note      7/9/2021:  Patient will be discharged from physical therapy at this time. Criteria for termination of care:    []           Patient has plateaued  [x]           Patient has not returned to therapy  []           Patient has missed three or more visits without prior notification  []           Other    Patient was seen for 6 visits from 4/21/21 to 6/3/21. Please refer to the most recent progress note for the latest PT info available. If you need anything further faxed to you, please contact us at [phone].     Thank you for this referral.  Ajay Nielsen, PT

## 2021-07-19 ENCOUNTER — OFFICE VISIT (OUTPATIENT)
Dept: INTERNAL MEDICINE CLINIC | Age: 31
End: 2021-07-19
Payer: MEDICAID

## 2021-07-19 VITALS
WEIGHT: 254 LBS | BODY MASS INDEX: 47.95 KG/M2 | OXYGEN SATURATION: 97 % | SYSTOLIC BLOOD PRESSURE: 150 MMHG | HEIGHT: 61 IN | DIASTOLIC BLOOD PRESSURE: 101 MMHG | HEART RATE: 97 BPM | TEMPERATURE: 98.2 F | RESPIRATION RATE: 18 BRPM

## 2021-07-19 DIAGNOSIS — E65 ABDOMINAL PANNUS: ICD-10-CM

## 2021-07-19 DIAGNOSIS — B37.2 YEAST DERMATITIS: Primary | ICD-10-CM

## 2021-07-19 DIAGNOSIS — F17.200 SMOKER: ICD-10-CM

## 2021-07-19 DIAGNOSIS — I10 ESSENTIAL HYPERTENSION: ICD-10-CM

## 2021-07-19 PROCEDURE — 99213 OFFICE O/P EST LOW 20 MIN: CPT | Performed by: NURSE PRACTITIONER

## 2021-07-19 RX ORDER — NYSTATIN 100000 U/G
OINTMENT TOPICAL 2 TIMES DAILY
Qty: 45 G | Refills: 0 | Status: SHIPPED | OUTPATIENT
Start: 2021-07-19 | End: 2022-08-30

## 2021-07-19 NOTE — PROGRESS NOTES
Subjective: (As above and below)     Chief Complaint   Patient presents with    Follow-up     pt is wanting tummy tuck      Benito Covington is a 32y.o. year old female who presents for     Hypertension ROS:  No medications today, has not been doing home BP checks    BP Readings from Last 3 Encounters:   07/19/21 (!) 150/101   07/06/21 (!) 181/105   06/28/21 (!) 156/69       Smoker continues, few per day    Wants a tummy tuck: she struggles w/ weight loss, however, she is having recurrent yeast infections under her abdominal folds, itchy, red, irritative        Reviewed PmHx, RxHx, FmHx, SocHx, AllgHx and updated in chart.   Family History   Problem Relation Age of Onset    Hypertension Maternal Grandmother        Past Medical History:   Diagnosis Date    Asthma     Asthma     has not had any problems no inhaler    Class 3 obesity in adult 11/16/2017    Essential hypertension 11/16/2017    H/O pilonidal cyst     Hidradenitis 2/1/2018    Psychiatric disorder     ANXIETY    Vaginal delivery       Social History     Socioeconomic History    Marital status: SINGLE     Spouse name: Not on file    Number of children: Not on file    Years of education: Not on file    Highest education level: Not on file   Tobacco Use    Smoking status: Current Some Day Smoker     Packs/day: 0.25     Types: Cigars     Last attempt to quit: 3/1/2011     Years since quitting: 10.3    Smokeless tobacco: Never Used   Vaping Use    Vaping Use: Never used   Substance and Sexual Activity    Alcohol use: No    Drug use: Yes     Types: Marijuana     Comment: Patient reports taking an edible at 2200    Sexual activity: Yes     Partners: Male     Birth control/protection: None     Social Determinants of Health     Financial Resource Strain:     Difficulty of Paying Living Expenses:    Food Insecurity:     Worried About Running Out of Food in the Last Year:     Vinny of Food in the Last Year:    Transportation Needs:     Lack of Transportation (Medical):  Lack of Transportation (Non-Medical):    Physical Activity:     Days of Exercise per Week:     Minutes of Exercise per Session:    Stress:     Feeling of Stress :    Social Connections:     Frequency of Communication with Friends and Family:     Frequency of Social Gatherings with Friends and Family:     Attends Caodaism Services:     Active Member of Clubs or Organizations:     Attends Club or Organization Meetings:     Marital Status:           Current Outpatient Medications   Medication Sig    nystatin (MYCOSTATIN) 100,000 unit/gram ointment Apply  to affected area two (2) times a day.  cyclobenzaprine (FLEXERIL) 10 mg tablet Take 1 Tablet by mouth three (3) times daily as needed for Muscle Spasm(s).  albuterol (PROVENTIL HFA, VENTOLIN HFA, PROAIR HFA) 90 mcg/actuation inhaler Take 2 Puffs by inhalation every six (6) hours as needed for Wheezing.  ibuprofen (MOTRIN) 800 mg tablet Take 1 Tab by mouth every eight (8) hours as needed for Pain. With food    potassium chloride (K-DUR, KLOR-CON) 20 mEq tablet Take 1 Tab by mouth daily. With diuretic (Hydrochlorthiazide or Lasix)  Indications: prevention of low potassium in the blood    amLODIPine (NORVASC) 10 mg tablet Take 1 Tab by mouth daily. FOR BLOOD PRESSURE    chlorthalidone (HYGROTON) 25 mg tablet Take 1 Tab by mouth daily. FOR BLOOD PRESSURE     No current facility-administered medications for this visit. Review of Systems:   Constitutional:    Negative for fever and chills, negative diaphoresis. HEENT:              Negative for neck pain and stiffness. Eyes:                  Negative for visual disturbance, itching, redness or discharge. Respiratory:        Negative for cough and shortness of breath. Cardiovascular:  Negative for chest pain and palpitations. Gastrointestinal: Negative for nausea, vomiting, abdominal pain, diarrhea or constipation.   Genitourinary:     Negative for dysuria and frequency. Musculoskeletal: Negative for falls, tenderness and swelling. Skin:                    rash  Neurological:       Negative for dizzyness, seizure, loss of consciousness, weakness and numbness. Objective:     Vitals:    07/19/21 1557   BP: (!) 150/101   Pulse: 97   Resp: 18   Temp: 98.2 °F (36.8 °C)   TempSrc: Temporal   SpO2: 97%   Weight: 254 lb (115.2 kg)   Height: 5' 1\" (1.549 m)       Results for orders placed or performed during the hospital encounter of 06/28/21   CULTURE, URINE    Specimen: Urine   Result Value Ref Range    Special Requests: NO SPECIAL REQUESTS  Reflexed from [id number]        Culture result: No growth (<1,000 CFU/ML)     CBC WITH AUTOMATED DIFF   Result Value Ref Range    WBC 8.8 3.6 - 11.0 K/uL    RBC 4.92 3.80 - 5.20 M/uL    HGB 12.0 11.5 - 16.0 g/dL    HCT 38.0 35.0 - 47.0 %    MCV 77.2 (L) 80.0 - 99.0 FL    MCH 24.4 (L) 26.0 - 34.0 PG    MCHC 31.6 30.0 - 36.5 g/dL    RDW 18.7 (H) 11.5 - 14.5 %    PLATELET 664 729 - 097 K/uL    MPV 10.3 8.9 - 12.9 FL    NRBC 0.0 0  WBC    ABSOLUTE NRBC 0.00 0.00 - 0.01 K/uL    NEUTROPHILS 55 32 - 75 %    LYMPHOCYTES 36 12 - 49 %    MONOCYTES 6 5 - 13 %    EOSINOPHILS 2 0 - 7 %    BASOPHILS 1 0 - 1 %    IMMATURE GRANULOCYTES 0 0.0 - 0.5 %    ABS. NEUTROPHILS 4.9 1.8 - 8.0 K/UL    ABS. LYMPHOCYTES 3.2 0.8 - 3.5 K/UL    ABS. MONOCYTES 0.6 0.0 - 1.0 K/UL    ABS. EOSINOPHILS 0.2 0.0 - 0.4 K/UL    ABS. BASOPHILS 0.1 0.0 - 0.1 K/UL    ABS. IMM.  GRANS. 0.0 0.00 - 0.04 K/UL    DF AUTOMATED     METABOLIC PANEL, COMPREHENSIVE   Result Value Ref Range    Sodium 140 136 - 145 mmol/L    Potassium 2.8 (L) 3.5 - 5.1 mmol/L    Chloride 105 97 - 108 mmol/L    CO2 30 21 - 32 mmol/L    Anion gap 5 5 - 15 mmol/L    Glucose 87 65 - 100 mg/dL    BUN 13 6 - 20 MG/DL    Creatinine 0.76 0.55 - 1.02 MG/DL    BUN/Creatinine ratio 17 12 - 20      GFR est AA >60 >60 ml/min/1.73m2    GFR est non-AA >60 >60 ml/min/1.73m2    Calcium 8.8 8.5 - 10.1 MG/DL Bilirubin, total 0.4 0.2 - 1.0 MG/DL    ALT (SGPT) 40 12 - 78 U/L    AST (SGOT) 18 15 - 37 U/L    Alk. phosphatase 79 45 - 117 U/L    Protein, total 7.6 6.4 - 8.2 g/dL    Albumin 3.6 3.5 - 5.0 g/dL    Globulin 4.0 2.0 - 4.0 g/dL    A-G Ratio 0.9 (L) 1.1 - 2.2     URINALYSIS W/ REFLEX CULTURE    Specimen: Urine   Result Value Ref Range    Color YELLOW/STRAW      Appearance CLEAR CLEAR      Specific gravity 1.025 1.003 - 1.030      pH (UA) 6.0 5.0 - 8.0      Protein Negative NEG mg/dL    Glucose Negative NEG mg/dL    Ketone Negative NEG mg/dL    Bilirubin Negative NEG      Blood Negative NEG      Urobilinogen 1.0 0.2 - 1.0 EU/dL    Nitrites Negative NEG      Leukocyte Esterase SMALL (A) NEG      WBC 10-20 0 - 4 /hpf    RBC 0-5 0 - 5 /hpf    Epithelial cells MODERATE (A) FEW /lpf    Bacteria 1+ (A) NEG /hpf    UA:UC IF INDICATED URINE CULTURE ORDERED (A) CNI      Hyaline cast 2-5 0 - 5 /lpf   TROPONIN I   Result Value Ref Range    Troponin-I, Qt. <0.05 <0.05 ng/mL   HCG URINE, QL. - POC   Result Value Ref Range    Pregnancy test,urine (POC) Negative NEG     EKG, 12 LEAD, INITIAL   Result Value Ref Range    Ventricular Rate 84 BPM    Atrial Rate 84 BPM    P-R Interval 156 ms    QRS Duration 94 ms    Q-T Interval 372 ms    QTC Calculation (Bezet) 439 ms    Calculated P Axis 56 degrees    Calculated R Axis 76 degrees    Calculated T Axis 4 degrees    Diagnosis       Normal sinus rhythm  Nonspecific ST and T wave abnormality  Confirmed by Ariana Marte MD, Britton Evans ([id number]) on 6/29/2021 10:13:49 AM           Gen: Oriented to person, place and time and well-developed, well-nourished and in no distress. HEENT:    Head: normocephalic and atraumatic. Eyes:  EOM are normal. Pupils equal and round. Neck:  Normal range of motion. Neck supple. Cardiovascular: normal rate, regular rhythm and normal heart sounds. Pulmonary/Chest:  Effort normal and breath sounds normal.  No respiratory distress. No wheezes, no rales. Abdominal: soft, normal  bowel sounds. Musculoskeletal:  No edema, no tenderness. No calf tenderness or edema. Neurological:  Alert, oriented to person, place and time. Skin: to skin folds c/w yeast derm        Assessment/ Plan:       1. Yeast dermatitis  Discussed skin care  - nystatin (MYCOSTATIN) 100,000 unit/gram ointment; Apply  to affected area two (2) times a day. Dispense: 45 g; Refill: 0  - REFERRAL TO PLASTIC SURGERY    2. Abdominal pannus    - REFERRAL TO PLASTIC SURGERY    3. Essential hypertension  Gets meds asap needs soon fu for BP check    4. Smoker  Encouraged cessation        I have discussed the diagnosis with the patient and the intended plan as seen in the above orders. The patient has received an after-visit summary and questions were answered concerning future plans. Pt conveyed understanding of plan. Medication Side Effects and Warnings were discussed with patient: yes  Patient Labs were reviewed: yes  Patient Past Records were reviewed:  yes    Kamran Andrade.  Rosemarie Dias NP

## 2021-07-19 NOTE — PROGRESS NOTES
Chief Complaint   Patient presents with    Follow-up     pt is wanting tummy tuck        1. Have you been to the ER, urgent care clinic since your last visit? Hospitalized since your last visit? Yes When: 07/06/2021 Where: [facility] Reason for visit: back pain     2. Have you seen or consulted any other health care providers outside of the [address] [place] [address] since your last visit? Include any pap smears or colon screening.  No

## 2021-08-12 ENCOUNTER — CLINICAL SUPPORT (OUTPATIENT)
Dept: INTERNAL MEDICINE CLINIC | Age: 31
End: 2021-08-12

## 2021-08-12 VITALS
DIASTOLIC BLOOD PRESSURE: 101 MMHG | HEART RATE: 81 BPM | TEMPERATURE: 97.1 F | SYSTOLIC BLOOD PRESSURE: 148 MMHG | OXYGEN SATURATION: 97 % | RESPIRATION RATE: 18 BRPM

## 2021-08-12 DIAGNOSIS — I10 ESSENTIAL HYPERTENSION: ICD-10-CM

## 2021-08-12 RX ORDER — CHLORTHALIDONE 25 MG/1
25 TABLET ORAL DAILY
Qty: 90 TABLET | Refills: 1 | Status: SHIPPED | OUTPATIENT
Start: 2021-08-12 | End: 2021-09-14 | Stop reason: SDUPTHER

## 2021-08-12 NOTE — PROGRESS NOTES
Pt is here for BP check. /104 pt has not taken Chlorthalidone in 2 days. Provider made aware. Pt is to take ALL meds and come back in weeks for BP check with nurse. Pt verbalized understanding with no further questions.

## 2021-08-13 DIAGNOSIS — L21.9 SEBORRHEIC DERMATITIS OF SCALP: ICD-10-CM

## 2021-08-13 RX ORDER — KETOCONAZOLE 20 MG/ML
SHAMPOO TOPICAL
Qty: 120 ML | Refills: 0 | Status: SHIPPED | OUTPATIENT
Start: 2021-08-13 | End: 2021-09-14 | Stop reason: SDUPTHER

## 2021-08-26 ENCOUNTER — OFFICE VISIT (OUTPATIENT)
Dept: INTERNAL MEDICINE CLINIC | Age: 31
End: 2021-08-26
Payer: MEDICAID

## 2021-08-26 VITALS
BODY MASS INDEX: 49.47 KG/M2 | DIASTOLIC BLOOD PRESSURE: 82 MMHG | HEIGHT: 61 IN | OXYGEN SATURATION: 98 % | WEIGHT: 262 LBS | SYSTOLIC BLOOD PRESSURE: 134 MMHG | HEART RATE: 90 BPM | RESPIRATION RATE: 18 BRPM | TEMPERATURE: 98.3 F

## 2021-08-26 DIAGNOSIS — M17.12 ARTHRITIS OF LEFT KNEE: ICD-10-CM

## 2021-08-26 DIAGNOSIS — I10 ESSENTIAL HYPERTENSION: Primary | ICD-10-CM

## 2021-08-26 PROCEDURE — 99213 OFFICE O/P EST LOW 20 MIN: CPT | Performed by: NURSE PRACTITIONER

## 2021-08-26 RX ORDER — IBUPROFEN 800 MG/1
800 TABLET ORAL
Qty: 60 TABLET | Refills: 0 | Status: SHIPPED | OUTPATIENT
Start: 2021-08-26 | End: 2021-09-14 | Stop reason: SDUPTHER

## 2021-08-26 NOTE — PROGRESS NOTES
Subjective: (As above and below)     Chief Complaint   Patient presents with    Follow-up     BP    Knee Pain     left knee x 2-3 months, pt states knee has been slipping for past 2 week, has fallen twice     Irregular Menses     pt states her menstrual cycles are always 2 weeks long, has appt w/ ob/gyn Monday      Miguelina Nickerson is a 32y.o. year old female who presents for     Hypertension ROS:  taking medications as instructed, no medication side effects noted, no TIAs, no chest pain on exertion, no dyspnea on exertion, no swelling of ankles    BP Readings from Last 3 Encounters:   08/26/21 134/82   08/12/21 (!) 148/101   07/19/21 (!) 150/101         Weight:    Wt Readings from Last 3 Encounters:   08/26/21 262 lb (118.8 kg)   07/19/21 254 lb (115.2 kg)   07/06/21 258 lb 2.5 oz (117.1 kg)     Irregular menses:   Has appt w/ OBGYN next week  Last tsh nl    L knee pain x 2-3 months, no inciting injury  She had an xray in 3/2021 which showed  IMPRESSION  Evidence of very mild degenerative change involving the knee. She has worsening instability feelings and sensation of giving out        Reviewed PmHx, RxHx, FmHx, SocHx, AllgHx and updated in chart.   Family History   Problem Relation Age of Onset    Hypertension Maternal Grandmother        Past Medical History:   Diagnosis Date    Asthma     Asthma     has not had any problems no inhaler    Class 3 obesity in adult 11/16/2017    Essential hypertension 11/16/2017    H/O pilonidal cyst     Hidradenitis 2/1/2018    Psychiatric disorder     ANXIETY    Vaginal delivery       Social History     Socioeconomic History    Marital status: SINGLE     Spouse name: Not on file    Number of children: Not on file    Years of education: Not on file    Highest education level: Not on file   Tobacco Use    Smoking status: Current Some Day Smoker     Packs/day: 0.25     Types: Cigars     Last attempt to quit: 3/1/2011     Years since quitting: 10.4    Smokeless tobacco: Never Used   Vaping Use    Vaping Use: Never used   Substance and Sexual Activity    Alcohol use: No    Drug use: Yes     Types: Marijuana     Comment: Patient reports taking an edible at 2200    Sexual activity: Yes     Partners: Male     Birth control/protection: None     Social Determinants of Health     Financial Resource Strain:     Difficulty of Paying Living Expenses:    Food Insecurity:     Worried About Running Out of Food in the Last Year:     [address] in the Last Year:    Transportation Needs:     Lack of Transportation (Medical):  Lack of Transportation (Non-Medical):    Physical Activity:     Days of Exercise per Week:     Minutes of Exercise per Session:    Stress:     Feeling of Stress :    Social Connections:     Frequency of Communication with Friends and Family:     Frequency of Social Gatherings with Friends and Family:     Attends Alevism Services:     Active Member of Clubs or Organizations:     Attends Club or Organization Meetings:     Marital Status:           Current Outpatient Medications   Medication Sig    ketoconazole (NIZORAL) 2 % shampoo USE WEEKLY AS NEEDED FOR SHAMPOO    chlorthalidone (HYGROTON) 25 mg tablet Take 1 Tablet by mouth daily. FOR BLOOD PRESSURE    nystatin (MYCOSTATIN) 100,000 unit/gram ointment Apply  to affected area two (2) times a day.  albuterol (PROVENTIL HFA, VENTOLIN HFA, PROAIR HFA) 90 mcg/actuation inhaler Take 2 Puffs by inhalation every six (6) hours as needed for Wheezing.  potassium chloride (K-DUR, KLOR-CON) 20 mEq tablet Take 1 Tab by mouth daily. With diuretic (Hydrochlorthiazide or Lasix)  Indications: prevention of low potassium in the blood    amLODIPine (NORVASC) 10 mg tablet Take 1 Tab by mouth daily. FOR BLOOD PRESSURE    cyclobenzaprine (FLEXERIL) 10 mg tablet Take 1 Tablet by mouth three (3) times daily as needed for Muscle Spasm(s).  (Patient not taking: Reported on 8/26/2021)    ibuprofen (MOTRIN) 800 mg tablet Take 1 Tab by mouth every eight (8) hours as needed for Pain. With food (Patient not taking: Reported on 8/26/2021)     No current facility-administered medications for this visit. Review of Systems:   Constitutional:    Negative for fever and chills, negative diaphoresis. HEENT:              Negative for neck pain and stiffness. Eyes:                  Negative for visual disturbance, itching, redness or discharge. Respiratory:        Negative for cough and shortness of breath. Cardiovascular:  Negative for chest pain and palpitations. Gastrointestinal: Negative for nausea, vomiting, abdominal pain, diarrhea or constipation. Genitourinary:     Negative for dysuria and frequency. Musculoskeletal: Negative for falls, tenderness and swelling. Skin:                    Negative for rash, masses or lesions. Neurological:       Negative for dizzyness, seizure, loss of consciousness, weakness and numbness. Objective:     Vitals:    08/26/21 0855   BP: 134/82   Pulse: 90   Resp: 18   Temp: 98.3 °F (36.8 °C)   TempSrc: Temporal   SpO2: 98%   Weight: 262 lb (118.8 kg)   Height: 5' 1\" (1.549 m)           Physical Examination: General appearance - alert, well appearing, and in no distress  Mental status - alert, oriented to person, place, and time  Chest - clear to auscultation, no wheezes, rales or rhonchi, symmetric air entry  Heart - normal rate, regular rhythm, normal S1, S2, no murmurs, rubs, clicks or gallops  Extremities - no pedal edema noted      Assessment/ Plan:       1. Essential hypertension  Improved! 2. Arthritis of left knee  recc knee brace and exercises  PT INI  - ibuprofen (MOTRIN) 800 mg tablet; Take 1 Tablet by mouth every eight (8) hours as needed for Pain. With food  Dispense: 60 Tablet; Refill: 0    3. BMI 45.0-49.9, adult ([address]  I have reviewed/discussed the above normal BMI with the patient.   I have recommended the following interventions: dietary management education, guidance, and counseling and encourage exercise . .              I have discussed the diagnosis with the patient and the intended plan as seen in the above orders. The patient has received an after-visit summary and questions were answered concerning future plans. Pt conveyed understanding of plan. Medication Side Effects and Warnings were discussed with patient: yes  Patient Labs were reviewed: yes  Patient Past Records were reviewed:  yes    Dennis Herrera.  Alex Toledo NP

## 2021-08-26 NOTE — PROGRESS NOTES
Chief Complaint   Patient presents with    Follow-up     BP    Knee Pain     left knee x 2-3 months, pt states knee has been slipping for past 2 week, has fallen twice     Irregular Menses     pt states her menstrual cycles are always 2 weeks long, has appt w/ ob/gyn Monday        1. Have you been to the ER, urgent care clinic since your last visit? Hospitalized since your last visit? No    2. Have you seen or consulted any other health care providers outside of the [address] [place] [address] since your last visit? Include any pap smears or colon screening.  No

## 2021-08-26 NOTE — PATIENT INSTRUCTIONS
Knee: Exercises  Introduction  Here are some examples of exercises for you to try. The exercises may be suggested for a condition or for rehabilitation. Start each exercise slowly. Ease off the exercises if you start to have pain. You will be told when to start these exercises and which ones will work best for you. How to do the exercises  Quad sets   1. Sit with your leg straight and supported on the floor or a firm bed. (If you feel discomfort in the front or back of your knee, place a small towel roll under your knee.)  2. Tighten the muscles on top of your thigh by pressing the back of your knee flat down to the floor. (If you feel discomfort under your kneecap, place a small towel roll under your knee.)  3. Hold for about 6 seconds, then rest for up to 10 seconds. 4. Do 8 to 12 repetitions several times a day. Straight-leg raises to the front   1. Lie on your back with your good knee bent so that your foot rests flat on the floor. Your injured leg should be straight. Make sure that your low back has a normal curve. You should be able to slip your flat hand in between the floor and the small of your back, with your palm touching the floor and your back touching the back of your hand. 2. Tighten the thigh muscles in the injured leg by pressing the back of your knee flat down to the floor. Hold your knee straight. 3. Keeping the thigh muscles tight, lift your injured leg up so that your heel is about 12 inches off the floor. Hold for about 6 seconds and then lower slowly. 4. Do 8 to 12 repetitions, 3 times a day. Straight-leg raises to the outside   1. Lie on your side, with your injured leg on top. 2. Tighten the front thigh muscles of your injured leg to keep your knee straight. 3. Keep your hip and your leg straight in line with the rest of your body, and keep your knee pointing forward. Do not drop your hip back.   4. Lift your injured leg straight up toward the ceiling, about 12 inches off the floor. Hold for about 6 seconds, then slowly lower your leg. 5. Do 8 to 12 repetitions. Straight-leg raises to the back   1. Lie on your stomach, and lift your leg straight up behind you (toward the ceiling). 2. Lift your toes about 6 inches off the floor, hold for about 6 seconds, then lower slowly. 3. Do 8 to 12 repetitions. Straight-leg raises to the inside   1. Lie on the side of your body with the injured leg. 2. You can either prop your other (good) leg up on a chair, or you can bend your good knee and put that foot in front of your injured knee. Do not drop your hip back. 3. Tighten the muscles on the front of your thigh to straighten your injured knee. 4. Keep your kneecap pointing forward, and lift your whole leg up toward the ceiling about 6 inches. Hold for about 6 seconds, then lower slowly. 5. Do 8 to 12 repetitions. Heel dig bridging   1. Lie on your back with both knees bent and your ankles bent so that only your heels are digging into the floor. Your knees should be bent about 90 degrees. 2. Then push your heels into the floor, squeeze your buttocks, and lift your hips off the floor until your shoulders, hips, and knees are all in a straight line. 3. Hold for about 6 seconds as you continue to breathe normally, and then slowly lower your hips back down to the floor and rest for up to 10 seconds. 4. Do 8 to 12 repetitions. Hamstring curls   1. Lie on your stomach with your knees straight. If your kneecap is uncomfortable, roll up a washcloth and put it under your leg just above your kneecap. 2. Lift the foot of your injured leg by bending the knee so that you bring the foot up toward your buttock. If this motion hurts, try it without bending your knee quite as far. This may help you avoid any painful motion. 3. Slowly lower your leg back to the floor. 4. Do 8 to 12 repetitions.   5. With permission from your doctor or physical therapist, you may also want to add a cuff weight to your ankle (not more than 5 pounds). With weight, you do not have to lift your leg more than 12 inches to get a hamstring workout. Shallow standing knee bends   Do this exercise only if you have very little pain; if you have no clicking, locking, or giving way if you have an injured knee; and if it does not hurt while you are doing 8 to 12 repetitions. 1. Stand with your hands lightly resting on a counter or chair in front of you. Put your feet shoulder-width apart. 2. Slowly bend your knees so that you squat down like you are going to sit in a chair. Make sure your knees do not go in front of your toes. 3. Lower yourself about 6 inches. Your heels should remain on the floor at all times. 4. Rise slowly to a standing position. Heel raises   1. Stand with your feet a few inches apart, with your hands lightly resting on a counter or chair in front of you. 2. Slowly raise your heels off the floor while keeping your knees straight. 3. Hold for about 6 seconds, then slowly lower your heels to the floor. 4. Do 8 to 12 repetitions several times during the day. Follow-up care is a key part of your treatment and safety. Be sure to make and go to all appointments, and call your doctor if you are having problems. It's also a good idea to know your test results and keep a list of the medicines you take. Where can you learn more? Go to http://www.gray.com/  Enter E098 in the search box to learn more about \"Knee: Exercises. \"  Current as of: November 16, 2020               Content Version: 12.8  © 8224-8655 [employer]. Care instructions adapted under license by [employer] (which disclaims liability or warranty for this information). If you have questions about a medical condition or this instruction, always ask your healthcare professional. [address] any warranty or liability for your use of this information.

## 2021-08-31 ENCOUNTER — HOSPITAL ENCOUNTER (EMERGENCY)
Age: 31
Discharge: HOME | End: 2021-08-31
Attending: EMERGENCY MEDICINE
Payer: MEDICAID

## 2021-08-31 VITALS
SYSTOLIC BLOOD PRESSURE: 149 MMHG | DIASTOLIC BLOOD PRESSURE: 91 MMHG | HEART RATE: 94 BPM | OXYGEN SATURATION: 96 % | BODY MASS INDEX: 50.79 KG/M2 | TEMPERATURE: 98.5 F | WEIGHT: 269 LBS | RESPIRATION RATE: 18 BRPM | HEIGHT: 61 IN

## 2021-08-31 DIAGNOSIS — L08.9 WOUND INFECTION: Primary | ICD-10-CM

## 2021-08-31 DIAGNOSIS — T14.8XXA WOUND INFECTION: Primary | ICD-10-CM

## 2021-08-31 PROCEDURE — 99283 EMERGENCY DEPT VISIT LOW MDM: CPT

## 2021-08-31 PROCEDURE — 74011250637 HC RX REV CODE- 250/637: Performed by: EMERGENCY MEDICINE

## 2021-08-31 RX ORDER — DOXYCYCLINE HYCLATE 100 MG
100 TABLET ORAL
Status: COMPLETED | OUTPATIENT
Start: 2021-08-31 | End: 2021-08-31

## 2021-08-31 RX ORDER — CEPHALEXIN 500 MG/1
500 CAPSULE ORAL
Status: COMPLETED | OUTPATIENT
Start: 2021-08-31 | End: 2021-08-31

## 2021-08-31 RX ORDER — CEPHALEXIN 500 MG/1
500 CAPSULE ORAL 4 TIMES DAILY
Qty: 40 CAPSULE | Refills: 0 | Status: SHIPPED | OUTPATIENT
Start: 2021-08-31 | End: 2021-09-10

## 2021-08-31 RX ORDER — DOXYCYCLINE HYCLATE 100 MG
100 TABLET ORAL 2 TIMES DAILY
Qty: 20 TABLET | Refills: 0 | Status: SHIPPED | OUTPATIENT
Start: 2021-08-31 | End: 2021-09-10

## 2021-08-31 RX ADMIN — DOXYCYCLINE HYCLATE 100 MG: 100 TABLET, COATED ORAL at 22:21

## 2021-08-31 RX ADMIN — CEPHALEXIN 500 MG: 500 CAPSULE ORAL at 22:21

## 2021-09-01 NOTE — ED NOTES
Pt presents ambulatory to ED complaining of redness, warmth and pain to right lower lateral leg after getting tattoo yesterday. Pt reports putting shea butter and saran wrap on leg yesterday and noticed irritation today. Pt denies taking medication for symptoms. Pt is alert and oriented x 4, RR even and unlabored, skin is warm and dry. Assesment completed and pt updated on plan of care. Emergency Department Nursing Plan of Care       The Nursing Plan of Care is developed from the Nursing assessment and Emergency Department Attending provider initial evaluation. The plan of care may be reviewed in the ED Provider note.     The Plan of Care was developed with the following considerations:   Patient / Family readiness to learn indicated by:verbalized understanding  Persons(s) to be included in education: patient  Barriers to Learning/Limitations:Barb [address].    8/31/2021   10:09 PM

## 2021-09-01 NOTE — ED PROVIDER NOTES
70-year-old female presents with right lower leg pain at the site of a tattoo she received in someone's home yesterday. Area is erythematous and exquisitely tender to palpation. Denies fever, dizziness, weakness, nausea, vomiting. Past Medical History:   Diagnosis Date    Asthma     Asthma     has not had any problems no inhaler    Class 3 obesity in adult 11/16/2017    Essential hypertension 11/16/2017    H/O pilonidal cyst     Hidradenitis 2/1/2018    Psychiatric disorder     ANXIETY    Vaginal delivery        Past Surgical History:   Procedure Laterality Date    HX APPENDECTOMY  2017    HX BREAST REDUCTION  12/2020    HX ORTHOPAEDIC      Broke l foot as child    HX OTHER SURGICAL      left foot on third toe     HX OTHER SURGICAL      Laparoscopic cholecystectomy.  HX OTHER SURGICAL  04/19/2016    Incision and drainage of pilonidal abscess; Dr. Elyse Simmons.  HX OTHER SURGICAL  08/22/2016    Incision, drainage, and excision of pilonidal cyst; Dr. Elyse Simmons.  HX OTHER SURGICAL  04/17/2018    Incision and drainage of pilonidal abscess; Dr. Elyse Simmons.  HX OTHER SURGICAL  02/15/2018    Excision of left axilla hidradenitis; Dr. Rosy Orantes.  HX OTHER SURGICAL  12/21/2017    Excision of sebaceous cyst, right inframammary fold; Dr. Rosy Orantes.  HX OTHER SURGICAL  06/14/2018    Incision and drainage of pilonidal abscess; Dr. Elyse Simmons.  HX OTHER SURGICAL  01/04/2019    Incision and drainage of pilonidal abscess; Dr. Elyse Simmons.          Family History:   Problem Relation Age of Onset    Hypertension Maternal Grandmother        Social History     Socioeconomic History    Marital status: SINGLE     Spouse name: Not on file    Number of children: Not on file    Years of education: Not on file    Highest education level: Not on file   Occupational History    Not on file   Tobacco Use    Smoking status: Current Some Day Smoker     Packs/day: 0.25     Types: Cigars     Last attempt to quit: 3/1/2011     Years since quitting: 10.5    Smokeless tobacco: Never Used   Vaping Use    Vaping Use: Never used   Substance and Sexual Activity    Alcohol use: No    Drug use: Yes     Types: Marijuana     Comment: Patient reports taking an edible at 2200    Sexual activity: Yes     Partners: Male     Birth control/protection: None   Other Topics Concern    Not on file   Social History Narrative    Not on file     Social Determinants of Health     Financial Resource Strain:     Difficulty of Paying Living Expenses:    Food Insecurity:     Worried About Running Out of Food in the Last Year:     Ran Out of Food in the Last Year:    Transportation Needs:     Lack of Transportation (Medical):  Lack of Transportation (Non-Medical):    Physical Activity:     Days of Exercise per Week:     Minutes of Exercise per Session:    Stress:     Feeling of Stress :    Social Connections:     Frequency of Communication with Friends and Family:     Frequency of Social Gatherings with Friends and Family:     Attends Judaism Services:     Active Member of Clubs or Organizations:     Attends Club or Organization Meetings:     Marital Status:    Intimate Partner Violence:     Fear of Current or Ex-Partner:     Emotionally Abused:     Physically Abused:     Sexually Abused: ALLERGIES: Aleve [naproxen sodium], Darvocet a500 [propoxyphene n-acetaminophen], Bactrim [sulfamethoprim ds], and Lortab [hydrocodone-acetaminophen]    Review of Systems   Constitutional: Negative. Negative for chills, fever and unexpected weight change. HENT: Negative. Negative for congestion and trouble swallowing. Eyes: Negative for discharge. Respiratory: Negative. Negative for cough, chest tightness and shortness of breath. Cardiovascular: Negative. Negative for chest pain. Gastrointestinal: Negative. Negative for abdominal distention, abdominal pain, constipation, diarrhea and nausea. Endocrine: Negative. Genitourinary: Negative. Negative for difficulty urinating, dysuria, frequency and urgency. Musculoskeletal: Negative. Negative for arthralgias and myalgias. Skin: Positive for color change and rash. Allergic/Immunologic: Negative. Neurological: Negative. Negative for dizziness, speech difficulty and headaches. Hematological: Negative. Psychiatric/Behavioral: Negative. Negative for agitation and confusion. All other systems reviewed and are negative. Vitals:    08/31/21 2124   BP: (!) 149/91   Pulse: 94   Resp: 18   Temp: 98.5 °F (36.9 °C)   SpO2: 96%   Weight: 122 kg (269 lb)   Height: 5' 1\" (1.549 m)            Physical Exam  Vitals and nursing note reviewed. Constitutional:       Appearance: She is well-developed. HENT:      Head: Normocephalic and atraumatic. Eyes:      Conjunctiva/sclera: Conjunctivae normal.   Cardiovascular:      Rate and Rhythm: Normal rate and regular rhythm. Pulmonary:      Effort: Pulmonary effort is normal. No respiratory distress. Abdominal:      Palpations: Abdomen is soft. Tenderness: There is no abdominal tenderness. Musculoskeletal:         General: No deformity. Normal range of motion. Cervical back: Neck supple. Skin:     General: Skin is warm and dry. Findings: Erythema and rash present. Comments: Tattoo covering lateral right lower leg. Entire tattoo is erythematous with some yellowing/honey-crusting. No fluctuance. Neurological:      Mental Status: She is alert and oriented to person, place, and time. Psychiatric:         Behavior: Behavior normal.         Thought Content: Thought content normal.          MDM  Number of Diagnoses or Management Options         Procedures      LABORATORY TESTS:  No results found for this or any previous visit (from the past 12 hour(s)).     IMAGING RESULTS:  No orders to display       MEDICATIONS GIVEN:  Medications   doxycycline (VIBRA-TABS) tablet 100 mg (100 mg Oral Given 8/31/21 2221)   cephALEXin (KEFLEX) capsule 500 mg (500 mg Oral Given 8/31/21 2221)       IMPRESSION:  1. Wound infection        PLAN:  1. Discharge Medication List as of 8/31/2021 10:13 PM      START taking these medications    Details   doxycycline (VIBRA-TABS) 100 mg tablet Take 1 Tablet by mouth two (2) times a day for 10 days. , Normal, Disp-20 Tablet, R-0      cephALEXin (Keflex) 500 mg capsule Take 1 Capsule by mouth four (4) times daily for 10 days. , Normal, Disp-40 Capsule, R-0         CONTINUE these medications which have NOT CHANGED    Details   chlorthalidone (HYGROTON) 25 mg tablet Take 1 Tablet by mouth daily. FOR BLOOD PRESSURE, Normal, Disp-90 Tablet, R-1      amLODIPine (NORVASC) 10 mg tablet Take 1 Tab by mouth daily. FOR BLOOD PRESSURE, Normal, Disp-90 Tab,R-1EMERGENCY HOLDOVER PROVIDED: MR#[id number]. [facility] GAVE 3 AMLODIPINE BESYLATE 10 MG TAB. MD CONTACTED FOR AUTHORIZATION ON 06/15/2020      ibuprofen (MOTRIN) 800 mg tablet Take 1 Tablet by mouth every eight (8) hours as needed for Pain. With food, Normal, Disp-60 Tablet, R-0      ketoconazole (NIZORAL) 2 % shampoo USE WEEKLY AS NEEDED FOR SHAMPOO, Normal, Disp-120 mL, R-0DX Code Needed  PLEASE SEND REFILLS. nystatin (MYCOSTATIN) 100,000 unit/gram ointment Apply  to affected area two (2) times a day., Normal, Disp-45 g, R-0      albuterol (PROVENTIL HFA, VENTOLIN HFA, PROAIR HFA) 90 mcg/actuation inhaler Take 2 Puffs by inhalation every six (6) hours as needed for Wheezing., Normal, Disp-1 Inhaler, R-0      potassium chloride (K-DUR, KLOR-CON) 20 mEq tablet Take 1 Tab by mouth daily. With diuretic (Hydrochlorthiazide or Lasix)  Indications: prevention of low potassium in the blood, Normal, Disp-90 Tab,R-0           2.    Follow-up Information     Follow up With Specialties Details Why Contact Info    Michell Esparza, NP Nurse Practitioner   [address] [place] [facility] [address]  [phone] Return to ED if worse

## 2021-09-14 ENCOUNTER — OFFICE VISIT (OUTPATIENT)
Dept: INTERNAL MEDICINE CLINIC | Age: 31
End: 2021-09-14
Payer: MEDICAID

## 2021-09-14 VITALS
TEMPERATURE: 98.5 F | DIASTOLIC BLOOD PRESSURE: 97 MMHG | WEIGHT: 269 LBS | HEART RATE: 94 BPM | OXYGEN SATURATION: 99 % | SYSTOLIC BLOOD PRESSURE: 142 MMHG | RESPIRATION RATE: 18 BRPM | BODY MASS INDEX: 50.79 KG/M2 | HEIGHT: 61 IN

## 2021-09-14 DIAGNOSIS — Z31.9 DESIRE FOR PREGNANCY: Primary | ICD-10-CM

## 2021-09-14 DIAGNOSIS — L21.9 SEBORRHEIC DERMATITIS OF SCALP: ICD-10-CM

## 2021-09-14 DIAGNOSIS — M17.12 ARTHRITIS OF LEFT KNEE: ICD-10-CM

## 2021-09-14 DIAGNOSIS — L03.116 CELLULITIS OF LEFT LOWER EXTREMITY: ICD-10-CM

## 2021-09-14 DIAGNOSIS — I10 ESSENTIAL HYPERTENSION: ICD-10-CM

## 2021-09-14 PROCEDURE — 99214 OFFICE O/P EST MOD 30 MIN: CPT | Performed by: NURSE PRACTITIONER

## 2021-09-14 RX ORDER — IBUPROFEN 800 MG/1
800 TABLET ORAL
Qty: 60 TABLET | Refills: 0 | Status: SHIPPED | OUTPATIENT
Start: 2021-09-14 | End: 2021-11-04

## 2021-09-14 RX ORDER — POTASSIUM CHLORIDE 20 MEQ/1
20 TABLET, EXTENDED RELEASE ORAL DAILY
Qty: 90 TABLET | Refills: 0 | Status: SHIPPED | OUTPATIENT
Start: 2021-09-14 | End: 2021-09-14 | Stop reason: ALTCHOICE

## 2021-09-14 RX ORDER — KETOCONAZOLE 20 MG/ML
SHAMPOO TOPICAL
Qty: 120 ML | Refills: 0 | Status: SHIPPED | OUTPATIENT
Start: 2021-09-14 | End: 2021-09-30 | Stop reason: SDUPTHER

## 2021-09-14 RX ORDER — CHLORTHALIDONE 25 MG/1
25 TABLET ORAL DAILY
Qty: 90 TABLET | Refills: 1 | Status: SHIPPED | OUTPATIENT
Start: 2021-09-14 | End: 2021-09-14 | Stop reason: ALTCHOICE

## 2021-09-14 RX ORDER — LABETALOL 100 MG/1
100 TABLET, FILM COATED ORAL 2 TIMES DAILY
Qty: 180 TABLET | Refills: 0 | Status: SHIPPED | OUTPATIENT
Start: 2021-09-14 | End: 2021-09-30 | Stop reason: SDUPTHER

## 2021-09-14 RX ORDER — AMLODIPINE BESYLATE 10 MG/1
10 TABLET ORAL DAILY
Qty: 90 TABLET | Refills: 1 | Status: SHIPPED | OUTPATIENT
Start: 2021-09-14 | End: 2021-09-14 | Stop reason: ALTCHOICE

## 2021-09-14 RX ORDER — DOXYCYCLINE 100 MG/1
100 TABLET ORAL 2 TIMES DAILY
Qty: 8 TABLET | Refills: 0 | Status: SHIPPED | OUTPATIENT
Start: 2021-09-14 | End: 2021-09-18

## 2021-09-14 NOTE — PROGRESS NOTES
Subjective: (As above and below)     Chief Complaint   Patient presents with    Leg Pain     right leg, pt got a tattoo 2 weeks ago that has been infected, tattoo is healed, leg is swollen      Leticia Benito is a 32y.o. year old female who presents for       Skin problem: she got a tattoo 2 weeks ago which became infected  She went to ED was rx'd bactrim which caused cramping? Then changed to 10 day course of doxy- finished yesterday  = great improvement but still some swelling and redness  No fevers    Hypertension ROS:  She is actively trying to get pregnant,    BP Readings from Last 3 Encounters:   09/14/21 (!) 142/97   08/31/21 (!) 149/91   08/26/21 134/82       Last cycle 8/16/21    Reviewed PmHx, RxHx, FmHx, SocHx, AllgHx and updated in chart.   Family History   Problem Relation Age of Onset    Hypertension Maternal Grandmother        Past Medical History:   Diagnosis Date    Asthma     Asthma     has not had any problems no inhaler    Class 3 obesity in adult 11/16/2017    Essential hypertension 11/16/2017    H/O pilonidal cyst     Hidradenitis 2/1/2018    Psychiatric disorder     ANXIETY    Vaginal delivery       Social History     Socioeconomic History    Marital status: SINGLE     Spouse name: Not on file    Number of children: Not on file    Years of education: Not on file    Highest education level: Not on file   Tobacco Use    Smoking status: Current Some Day Smoker     Packs/day: 0.25     Types: Cigars     Last attempt to quit: 3/1/2011     Years since quitting: 10.5    Smokeless tobacco: Never Used   Vaping Use    Vaping Use: Never used   Substance and Sexual Activity    Alcohol use: No    Drug use: Yes     Types: Marijuana     Comment: Patient reports taking an edible at 2200    Sexual activity: Yes     Partners: Male     Birth control/protection: None     Social Determinants of Health     Financial Resource Strain:     Difficulty of Paying Living Expenses:    Food Insecurity:  Worried About [facility] in the Last Year:    [address] in the Last Year:    Transportation Needs:     Lack of Transportation (Medical):  Lack of Transportation (Non-Medical):    Physical Activity:     Days of Exercise per Week:     Minutes of Exercise per Session:    Stress:     Feeling of Stress :    Social Connections:     Frequency of Communication with Friends and Family:     Frequency of Social Gatherings with Friends and Family:     Attends Jain Services:     Active Member of Clubs or Organizations:     Attends Club or Organization Meetings:     Marital Status:           Current Outpatient Medications   Medication Sig    ibuprofen (MOTRIN) 800 mg tablet Take 1 Tablet by mouth every eight (8) hours as needed for Pain. With food    ketoconazole (NIZORAL) 2 % shampoo USE WEEKLY AS NEEDED FOR SHAMPOO    chlorthalidone (HYGROTON) 25 mg tablet Take 1 Tablet by mouth daily. FOR BLOOD PRESSURE    nystatin (MYCOSTATIN) 100,000 unit/gram ointment Apply  to affected area two (2) times a day.  albuterol (PROVENTIL HFA, VENTOLIN HFA, PROAIR HFA) 90 mcg/actuation inhaler Take 2 Puffs by inhalation every six (6) hours as needed for Wheezing.  potassium chloride (K-DUR, KLOR-CON) 20 mEq tablet Take 1 Tab by mouth daily. With diuretic (Hydrochlorthiazide or Lasix)  Indications: prevention of low potassium in the blood    amLODIPine (NORVASC) 10 mg tablet Take 1 Tab by mouth daily. FOR BLOOD PRESSURE     No current facility-administered medications for this visit. Review of Systems:   Constitutional:    Negative for fever and chills, negative diaphoresis. HEENT:              Negative for neck pain and stiffness. Eyes:                  Negative for visual disturbance, itching, redness or discharge. Respiratory:        Negative for cough and shortness of breath. Cardiovascular:  Negative for chest pain and palpitations.    Gastrointestinal: Negative for nausea, vomiting, abdominal pain, diarrhea or constipation. Genitourinary:     Negative for dysuria and frequency. Musculoskeletal: Negative for falls, tenderness and swelling. Skin:                    Negative for rash, masses or lesions. Neurological:       Negative for dizzyness, seizure, loss of consciousness, weakness and numbness. Objective:     Vitals:    09/14/21 1452   BP: (!) 142/97   Pulse: 94   Resp: 18   Temp: 98.5 °F (36.9 °C)   TempSrc: Temporal   SpO2: 99%   Weight: 269 lb (122 kg)   Height: 5' 1\" (1.549 m)           Gen: Oriented to person, place and time and well-developed, well-nourished and in no distress. HEENT:    Head: normocephalic and atraumatic. Eyes:  EOM are normal. Pupils equal and round. Neck:  Normal range of motion. Neck supple. Cardiovascular: normal rate, regular rhythm and normal heart sounds. Pulmonary/Chest:  Effort normal and breath sounds normal.  No respiratory distress. No wheezes, no rales. Abdominal: soft, normal  bowel sounds. Musculoskeletal:  No edema, no tenderness. No calf tenderness or edema. Neurological:  Alert, oriented to person, place and time. Skin: skin is warm and dry. RLE: some redness and warmth around tattoo, skin is peeling. No streaking  There is non pitting edema          Assessment/ Plan:       1. Seborrheic dermatitis of scalp    - ketoconazole (NIZORAL) 2 % shampoo; USE WEEKLY AS NEEDED FOR SHAMPOO  Dispense: 120 mL; Refill: 0    2. Essential hypertension    - labetaloL (NORMODYNE) 100 mg tablet; Take 1 Tablet by mouth two (2) times a day. Dispense: 180 Tablet; Refill: 0    3. Arthritis of left knee  Advised not to use if pregnant- tylenol only  - ibuprofen (MOTRIN) 800 mg tablet; Take 1 Tablet by mouth every eight (8) hours as needed for Pain. With food  Dispense: 60 Tablet; Refill: 0    4. Desire for pregnancy    - prenatal vit-iron fumarate-fa 27 mg iron- 0.8 mg tab tablet; Take 1 Tablet by mouth daily.   Dispense: 90 Tablet; Refill: 1    5. Cellulitis of left lower extremity  Extend course of abx, fuINI  - doxycycline (ADOXA) 100 mg tablet; Take 1 Tablet by mouth two (2) times a day for 4 days. Dispense: 8 Tablet; Refill: 0        I have discussed the diagnosis with the patient and the intended plan as seen in the above orders. The patient has received an after-visit summary and questions were answered concerning future plans. Pt conveyed understanding of plan. Medication Side Effects and Warnings were discussed with patient: yes  Patient Labs were reviewed: yes  Patient Past Records were reviewed:  yes    Palmira Herron.  Naseem Bass NP

## 2021-09-30 ENCOUNTER — OFFICE VISIT (OUTPATIENT)
Dept: INTERNAL MEDICINE CLINIC | Age: 31
End: 2021-09-30
Payer: MEDICAID

## 2021-09-30 VITALS
RESPIRATION RATE: 18 BRPM | WEIGHT: 267 LBS | OXYGEN SATURATION: 97 % | SYSTOLIC BLOOD PRESSURE: 179 MMHG | HEIGHT: 61 IN | HEART RATE: 89 BPM | BODY MASS INDEX: 50.41 KG/M2 | TEMPERATURE: 98.3 F | DIASTOLIC BLOOD PRESSURE: 108 MMHG

## 2021-09-30 DIAGNOSIS — L21.9 SEBORRHEIC DERMATITIS OF SCALP: ICD-10-CM

## 2021-09-30 DIAGNOSIS — I10 ESSENTIAL HYPERTENSION: Primary | ICD-10-CM

## 2021-09-30 PROCEDURE — 99213 OFFICE O/P EST LOW 20 MIN: CPT | Performed by: NURSE PRACTITIONER

## 2021-09-30 RX ORDER — LABETALOL 200 MG/1
200 TABLET, FILM COATED ORAL 2 TIMES DAILY
Qty: 180 TABLET | Refills: 0 | Status: SHIPPED | OUTPATIENT
Start: 2021-09-30 | End: 2022-01-11

## 2021-09-30 RX ORDER — KETOCONAZOLE 20 MG/ML
SHAMPOO TOPICAL
Qty: 120 ML | Refills: 0 | Status: SHIPPED | OUTPATIENT
Start: 2021-09-30 | End: 2022-08-30 | Stop reason: SDUPTHER

## 2021-09-30 NOTE — PROGRESS NOTES
Chief Complaint   Patient presents with    Follow-up     pt states she has been having headaches a lot, at least one every day        1. Have you been to the ER, urgent care clinic since your last visit? Hospitalized since your last visit? No    2. Have you seen or consulted any other health care providers outside of the [address] [place] [address] since your last visit? Include any pap smears or colon screening.  No

## 2021-10-07 ENCOUNTER — OFFICE VISIT (OUTPATIENT)
Dept: INTERNAL MEDICINE CLINIC | Age: 31
End: 2021-10-07
Payer: MEDICAID

## 2021-10-07 VITALS
TEMPERATURE: 98.2 F | OXYGEN SATURATION: 99 % | BODY MASS INDEX: 50.41 KG/M2 | WEIGHT: 267 LBS | RESPIRATION RATE: 18 BRPM | HEIGHT: 61 IN | SYSTOLIC BLOOD PRESSURE: 177 MMHG | HEART RATE: 86 BPM | DIASTOLIC BLOOD PRESSURE: 98 MMHG

## 2021-10-07 DIAGNOSIS — Z31.9 DESIRE FOR PREGNANCY: ICD-10-CM

## 2021-10-07 DIAGNOSIS — M54.12 CERVICAL RADICULOPATHY: ICD-10-CM

## 2021-10-07 DIAGNOSIS — N92.6 IRREGULAR MENSES: ICD-10-CM

## 2021-10-07 DIAGNOSIS — I10 ESSENTIAL HYPERTENSION: Primary | ICD-10-CM

## 2021-10-07 LAB
BILIRUB UR QL STRIP: NEGATIVE
GLUCOSE UR-MCNC: NEGATIVE MG/DL
HCG URINE, QL. (POC): NEGATIVE
KETONES P FAST UR STRIP-MCNC: NEGATIVE MG/DL
PH UR STRIP: 6 [PH] (ref 4.6–8)
PROT UR QL STRIP: NORMAL
SP GR UR STRIP: 1.03 (ref 1–1.03)
UA UROBILINOGEN AMB POC: NORMAL (ref 0.2–1)
URINALYSIS CLARITY POC: CLEAR
URINALYSIS COLOR POC: YELLOW
URINE BLOOD POC: NORMAL
URINE LEUKOCYTES POC: NEGATIVE
URINE NITRITES POC: NEGATIVE
VALID INTERNAL CONTROL?: YES

## 2021-10-07 PROCEDURE — 81025 URINE PREGNANCY TEST: CPT | Performed by: NURSE PRACTITIONER

## 2021-10-07 PROCEDURE — 99214 OFFICE O/P EST MOD 30 MIN: CPT | Performed by: NURSE PRACTITIONER

## 2021-10-07 PROCEDURE — 81003 URINALYSIS AUTO W/O SCOPE: CPT | Performed by: NURSE PRACTITIONER

## 2021-10-07 RX ORDER — PREDNISONE 10 MG/1
10 TABLET ORAL SEE ADMIN INSTRUCTIONS
Qty: 21 TABLET | Refills: 0 | Status: SHIPPED | OUTPATIENT
Start: 2021-10-07 | End: 2021-11-04 | Stop reason: ALTCHOICE

## 2021-10-07 RX ORDER — NIFEDIPINE 30 MG/1
30 TABLET, FILM COATED, EXTENDED RELEASE ORAL DAILY
Qty: 90 TABLET | Refills: 0 | Status: CANCELLED | OUTPATIENT
Start: 2021-10-07

## 2021-10-07 RX ORDER — TIZANIDINE 4 MG/1
4 TABLET ORAL
Qty: 30 TABLET | Refills: 1 | Status: SHIPPED | OUTPATIENT
Start: 2021-10-07 | End: 2021-11-22 | Stop reason: SDUPTHER

## 2021-10-07 RX ORDER — NIFEDIPINE 30 MG/1
30 TABLET, FILM COATED, EXTENDED RELEASE ORAL DAILY
Qty: 90 TABLET | Refills: 0 | Status: SHIPPED | OUTPATIENT
Start: 2021-10-07 | End: 2022-10-19 | Stop reason: SDUPTHER

## 2021-10-07 NOTE — PROGRESS NOTES
Subjective: (As above and below)     Chief Complaint   Patient presents with    Follow-up    Back Pain     x 2-3 days,  pt states she feels like her back is about to give out on her, pt states she feels like when she moves bone is slipping, back pain hurts so bad it is causing headaches      Marisa Mena is a 32y.o. year old female who presents for     Hypertension ROS:  taking medications as instructed, no medication side effects noted, no TIAs, no chest pain on exertion, no dyspnea on exertion, no swelling of ankles  She is seeing OBGYN- fertility work up (TSH nl)    Neck pain: known cervical stenosis- [place] [facility] [place] but she wishes to defer this for now- she was stable until a few days ago and has bad pain flare, no inciting injury  Pain to neck radiating to both arms, no hand weakness      Reviewed PmHx, RxHx, FmHx, SocHx, AllgHx and updated in chart.   Family History   Problem Relation Age of Onset    Hypertension Maternal Grandmother        Past Medical History:   Diagnosis Date    Asthma     Asthma     has not had any problems no inhaler    Class 3 obesity in adult 11/16/2017    Essential hypertension 11/16/2017    H/O pilonidal cyst     Hidradenitis 2/1/2018    Psychiatric disorder     ANXIETY    Vaginal delivery       Social History     Socioeconomic History    Marital status: SINGLE     Spouse name: Not on file    Number of children: Not on file    Years of education: Not on file    Highest education level: Not on file   Tobacco Use    Smoking status: Current Some Day Smoker     Packs/day: 0.25     Types: Cigars     Last attempt to quit: 3/1/2011     Years since quitting: 10.6    Smokeless tobacco: Never Used   Vaping Use    Vaping Use: Never used   Substance and Sexual Activity    Alcohol use: No    Drug use: Yes     Types: Marijuana     Comment: Patient reports taking an edible at 2200    Sexual activity: Yes     Partners: Male     Birth control/protection: None Social Determinants of Health     Financial Resource Strain:     Difficulty of Paying Living Expenses:    Food Insecurity:     Worried About Running Out of Food in the Last Year:     [address] in the Last Year:    Transportation Needs:     Lack of Transportation (Medical):  Lack of Transportation (Non-Medical):    Physical Activity:     Days of Exercise per Week:     Minutes of Exercise per Session:    Stress:     Feeling of Stress :    Social Connections:     Frequency of Communication with Friends and Family:     Frequency of Social Gatherings with Friends and Family:     Attends Christian Services:     Active Member of Clubs or Organizations:     Attends Club or Organization Meetings:     Marital Status:           Current Outpatient Medications   Medication Sig    predniSONE (STERAPRED DS) 10 mg dose pack Take 1 Tablet by mouth See Admin Instructions. See administration instruction per 10mg dose pack    tiZANidine (ZANAFLEX) 4 mg tablet Take 1 Tablet by mouth three (3) times daily as needed for Muscle Spasm(s).  ketoconazole (NIZORAL) 2 % shampoo USE WEEKLY AS NEEDED FOR SHAMPOO    labetaloL (NORMODYNE) 200 mg tablet Take 1 Tablet by mouth two (2) times a day.  ibuprofen (MOTRIN) 800 mg tablet Take 1 Tablet by mouth every eight (8) hours as needed for Pain. With food    nystatin (MYCOSTATIN) 100,000 unit/gram ointment Apply  to affected area two (2) times a day.  albuterol (PROVENTIL HFA, VENTOLIN HFA, PROAIR HFA) 90 mcg/actuation inhaler Take 2 Puffs by inhalation every six (6) hours as needed for Wheezing.  prenatal vit-iron fumarate-fa 27 mg iron- 0.8 mg tab tablet Take 1 Tablet by mouth daily. No current facility-administered medications for this visit. Review of Systems:   Constitutional:    Negative for fever and chills, negative diaphoresis. HEENT:              Negative for neck pain and stiffness.   Eyes:                  Negative for visual disturbance, itching, redness or discharge. Respiratory:        Negative for cough and shortness of breath. Cardiovascular:  Negative for chest pain and palpitations. Gastrointestinal: Negative for nausea, vomiting, abdominal pain, diarrhea or constipation. Genitourinary:     Negative for dysuria and frequency. Musculoskeletal: Negative for falls, tenderness and swelling. Skin:                    Negative for rash, masses or lesions. Neurological:       Negative for dizzyness, seizure, loss of consciousness, weakness and numbness. Objective:     Vitals:    10/07/21 1102 10/07/21 1127   BP: (!) 160/97 (!) 177/98   Pulse: 83 86   Resp: 18    Temp: 98.2 °F (36.8 °C)    TempSrc: Temporal    SpO2: 99%    Weight: 267 lb (121.1 kg)    Height: 5' 1\" (1.549 m)        Results for orders placed or performed during the hospital encounter of 06/28/21   CULTURE, URINE    Specimen: Urine   Result Value Ref Range    Special Requests: NO SPECIAL REQUESTS  Reflexed from [id number]        Culture result: No growth (<1,000 CFU/ML)     CBC WITH AUTOMATED DIFF   Result Value Ref Range    WBC 8.8 3.6 - 11.0 K/uL    RBC 4.92 3.80 - 5.20 M/uL    HGB 12.0 11.5 - 16.0 g/dL    HCT 38.0 35.0 - 47.0 %    MCV 77.2 (L) 80.0 - 99.0 FL    MCH 24.4 (L) 26.0 - 34.0 PG    MCHC 31.6 30.0 - 36.5 g/dL    RDW 18.7 (H) 11.5 - 14.5 %    PLATELET 298 230 - 076 K/uL    MPV 10.3 8.9 - 12.9 FL    NRBC 0.0 0  WBC    ABSOLUTE NRBC 0.00 0.00 - 0.01 K/uL    NEUTROPHILS 55 32 - 75 %    LYMPHOCYTES 36 12 - 49 %    MONOCYTES 6 5 - 13 %    EOSINOPHILS 2 0 - 7 %    BASOPHILS 1 0 - 1 %    IMMATURE GRANULOCYTES 0 0.0 - 0.5 %    ABS. NEUTROPHILS 4.9 1.8 - 8.0 K/UL    ABS. LYMPHOCYTES 3.2 0.8 - 3.5 K/UL    ABS. MONOCYTES 0.6 0.0 - 1.0 K/UL    ABS. EOSINOPHILS 0.2 0.0 - 0.4 K/UL    ABS. BASOPHILS 0.1 0.0 - 0.1 K/UL    ABS. IMM.  GRANS. 0.0 0.00 - 0.04 K/UL    DF AUTOMATED     METABOLIC PANEL, COMPREHENSIVE   Result Value Ref Range    Sodium 140 136 - 145 mmol/L    Potassium 2.8 (L) 3.5 - 5.1 mmol/L    Chloride 105 97 - 108 mmol/L    CO2 30 21 - 32 mmol/L    Anion gap 5 5 - 15 mmol/L    Glucose 87 65 - 100 mg/dL    BUN 13 6 - 20 MG/DL    Creatinine 0.76 0.55 - 1.02 MG/DL    BUN/Creatinine ratio 17 12 - 20      GFR est AA >60 >60 ml/min/1.73m2    GFR est non-AA >60 >60 ml/min/1.73m2    Calcium 8.8 8.5 - 10.1 MG/DL    Bilirubin, total 0.4 0.2 - 1.0 MG/DL    ALT (SGPT) 40 12 - 78 U/L    AST (SGOT) 18 15 - 37 U/L    Alk.  phosphatase 79 45 - 117 U/L    Protein, total 7.6 6.4 - 8.2 g/dL    Albumin 3.6 3.5 - 5.0 g/dL    Globulin 4.0 2.0 - 4.0 g/dL    A-G Ratio 0.9 (L) 1.1 - 2.2     URINALYSIS W/ REFLEX CULTURE    Specimen: Urine   Result Value Ref Range    Color YELLOW/STRAW      Appearance CLEAR CLEAR      Specific gravity 1.025 1.003 - 1.030      pH (UA) 6.0 5.0 - 8.0      Protein Negative NEG mg/dL    Glucose Negative NEG mg/dL    Ketone Negative NEG mg/dL    Bilirubin Negative NEG      Blood Negative NEG      Urobilinogen 1.0 0.2 - 1.0 EU/dL    Nitrites Negative NEG      Leukocyte Esterase SMALL (A) NEG      WBC 10-20 0 - 4 /hpf    RBC 0-5 0 - 5 /hpf    Epithelial cells MODERATE (A) FEW /lpf    Bacteria 1+ (A) NEG /hpf    UA:UC IF INDICATED URINE CULTURE ORDERED (A) CNI      Hyaline cast 2-5 0 - 5 /lpf   TROPONIN I   Result Value Ref Range    Troponin-I, Qt. <0.05 <0.05 ng/mL   HCG URINE, QL. - POC   Result Value Ref Range    Pregnancy test,urine (POC) Negative NEG     EKG, 12 LEAD, INITIAL   Result Value Ref Range    Ventricular Rate 84 BPM    Atrial Rate 84 BPM    P-R Interval 156 ms    QRS Duration 94 ms    Q-T Interval 372 ms    QTC Calculation (Bezet) 439 ms    Calculated P Axis 56 degrees    Calculated R Axis 76 degrees    Calculated T Axis 4 degrees    Diagnosis       Normal sinus rhythm  Nonspecific ST and T wave abnormality  Confirmed by Jaimie Gates MD, [facility] ([id number]) on 6/29/2021 10:13:49 AM         Results for orders placed or performed in visit on 10/07/21   AMB POC URINALYSIS DIP STICK AUTO W/O MICRO   Result Value Ref Range    Color (UA POC) Yellow     Clarity (UA POC) Clear     Glucose (UA POC) Negative Negative    Bilirubin (UA POC) Negative Negative    Ketones (UA POC) Negative Negative    Specific gravity (UA POC) 1.030 1.001 - 1.035    Blood (UA POC) Trace Negative    pH (UA POC) 6.0 4.6 - 8.0    Protein (UA POC) 2+ Negative    Urobilinogen (UA POC) 0.2 mg/dL 0.2 - 1    Nitrites (UA POC) Negative Negative    Leukocyte esterase (UA POC) Negative Negative   AMB POC URINE PREGNANCY TEST, VISUAL COLOR COMPARISON   Result Value Ref Range    VALID INTERNAL CONTROL POC Yes     HCG urine, Ql. (POC) Negative Negative       Gen: Oriented to person, place and time and well-developed, well-nourished and in no distress. HEENT:    Head: normocephalic and atraumatic. Eyes:  EOM are normal. Pupils equal and round. Neck:  Normal range of motion. Neck supple. Cardiovascular: normal rate, regular rhythm and normal heart sounds. Pulmonary/Chest:  Effort normal and breath sounds normal.  No respiratory distress. No wheezes, no rales. Abdominal: soft, normal  bowel sounds. Musculoskeletal:  No edema, no tenderness. No calf tenderness or edema. ttp to c spine  Neurological:  Alert, oriented to person, place and time. Skin: skin is warm and dry. Assessment/ Plan:     1. Essential hypertension  Add 2nd agent  - AMB POC URINALYSIS DIP STICK AUTO W/O MICRO  - NIFEdipine ER (ADALAT CC) 30 mg ER tablet; Take 1 Tablet by mouth daily. Dispense: 90 Tablet; Refill: 0    2. Cervical radiculopathy    - predniSONE (STERAPRED DS) 10 mg dose pack; Take 1 Tablet by mouth See Admin Instructions. See administration instruction per 10mg dose pack  Dispense: 21 Tablet; Refill: 0  - tiZANidine (ZANAFLEX) 4 mg tablet; Take 1 Tablet by mouth three (3) times daily as needed for Muscle Spasm(s). Dispense: 30 Tablet; Refill: 1    3.  Irregular menses    - AMB POC URINE PREGNANCY TEST, VISUAL COLOR COMPARISON    4. Desire for pregnancy        I have discussed the diagnosis with the patient and the intended plan as seen in the above orders. The patient has received an after-visit summary and questions were answered concerning future plans. Pt conveyed understanding of plan. Medication Side Effects and Warnings were discussed with patient: yes  Patient Labs were reviewed: yes  Patient Past Records were reviewed:  yes    Yeison Kent.  Abdias Ivey NP

## 2021-10-07 NOTE — LETTER
NOTIFICATION RETURN TO WORK / SCHOOL    10/7/2021 11:28 AM    Ms. Nany Dasilva  [address] [place] [id number]      To Whom It May Concern:    Nany Dasilva is currently under the care of Marciano Mcgrath. She will return to work/school on: 10/11/21    If there are questions or concerns please have the patient contact our office. Sincerely,      Oscar Dee.  [place], DANNY

## 2021-10-07 NOTE — PROGRESS NOTES
Chief Complaint   Patient presents with    Follow-up    Back Pain     x 2-3 days,  pt states she feels like her back is about to give out on her, pt states she feels like when she moves bone is slipping, back pain hurts so bad it is causing headaches        1. Have you been to the ER, urgent care clinic since your last visit? Hospitalized since your last visit? No    2. Have you seen or consulted any other health care providers outside of the [address] [place] [address] since your last visit? Include any pap smears or colon screening.  No

## 2021-11-04 ENCOUNTER — OFFICE VISIT (OUTPATIENT)
Dept: INTERNAL MEDICINE CLINIC | Age: 31
End: 2021-11-04
Payer: MEDICAID

## 2021-11-04 VITALS
DIASTOLIC BLOOD PRESSURE: 110 MMHG | WEIGHT: 271 LBS | RESPIRATION RATE: 18 BRPM | HEART RATE: 86 BPM | BODY MASS INDEX: 51.16 KG/M2 | HEIGHT: 61 IN | TEMPERATURE: 97.8 F | SYSTOLIC BLOOD PRESSURE: 177 MMHG | OXYGEN SATURATION: 98 %

## 2021-11-04 DIAGNOSIS — M54.12 CERVICAL RADICULOPATHY: ICD-10-CM

## 2021-11-04 DIAGNOSIS — I10 ESSENTIAL HYPERTENSION: ICD-10-CM

## 2021-11-04 DIAGNOSIS — Z23 NEEDS FLU SHOT: Primary | ICD-10-CM

## 2021-11-04 DIAGNOSIS — L02.91 ABSCESS: ICD-10-CM

## 2021-11-04 PROCEDURE — 90686 IIV4 VACC NO PRSV 0.5 ML IM: CPT | Performed by: INTERNAL MEDICINE

## 2021-11-04 PROCEDURE — 99214 OFFICE O/P EST MOD 30 MIN: CPT | Performed by: NURSE PRACTITIONER

## 2021-11-04 PROCEDURE — 90471 IMMUNIZATION ADMIN: CPT | Performed by: INTERNAL MEDICINE

## 2021-11-04 RX ORDER — MELOXICAM 15 MG/1
15 TABLET ORAL DAILY
Qty: 30 TABLET | Refills: 1 | Status: SHIPPED | OUTPATIENT
Start: 2021-11-04 | End: 2021-11-04

## 2021-11-04 RX ORDER — PREGABALIN 150 MG/1
150 CAPSULE ORAL 3 TIMES DAILY
Qty: 90 CAPSULE | Refills: 0 | Status: SHIPPED | OUTPATIENT
Start: 2021-11-04 | End: 2022-10-19

## 2021-11-04 RX ORDER — CHLORTHALIDONE 25 MG/1
25 TABLET ORAL DAILY
Qty: 90 TABLET | Refills: 0 | Status: SHIPPED | OUTPATIENT
Start: 2021-11-04 | End: 2022-07-13

## 2021-11-04 RX ORDER — CEPHALEXIN 500 MG/1
500 CAPSULE ORAL 4 TIMES DAILY
Qty: 20 CAPSULE | Refills: 0 | Status: SHIPPED | OUTPATIENT
Start: 2021-11-04 | End: 2021-11-09

## 2021-11-04 NOTE — LETTER
NOTIFICATION RETURN TO WORK / SCHOOL 
 
11/4/2021 10:35 AM 
 
Ms. Giovanni Munguia [facility], [address] [facility] [id number] To Whom It May Concern: 
 
Giovanni Munguia is currently under the care of Marciano Mcgrath. She is followed by cervical radiculopathy and is being referred to a neurosurgeon. Please allow her  to do light duty- no heavy lifting over 30 pounds. If there are questions or concerns please have the patient contact our office. Sincerely, Linda Watt NP

## 2021-11-04 NOTE — PROGRESS NOTES
Chief Complaint   Patient presents with    Follow-up    Back Pain     pt states muscle relaxers do not help, back is painful and burns        1. Have you been to the ER, urgent care clinic since your last visit? Hospitalized since your last visit? No    2. Have you seen or consulted any other health care providers outside of the [address] [place] [address] since your last visit? Include any pap smears or colon screening. Barb Dasilva is a 32 y.o. female who presents for routine immunizations. She denies any symptoms , reactions or allergies that would exclude them from being immunized today. Risks and adverse reactions were discussed and the VIS was given to them. All questions were addressed. She was observed for 10 min post injection. There were no reactions observed.     Sarahi Garcia    Verbal order given by Jing Jeffery NP

## 2021-11-04 NOTE — PATIENT INSTRUCTIONS
Vaccine Information Statement    Influenza (Flu) Vaccine (Inactivated or Recombinant): What You Need to Know    Many vaccine information statements are available in Syriac and other languages. See www.immunize.org/vis. Hojas de información sobre vacunas están disponibles en español y en muchos otros idiomas. Visite www.immunize.org/vis. 1. Why get vaccinated? Influenza vaccine can prevent influenza (flu). Flu is a contagious disease that spreads around the [facility] every year, usually between October and May. Anyone can get the flu, but it is more dangerous for some people. Infants and young children, people 72 years and older, pregnant people, and people with certain health conditions or a weakened immune system are at greatest risk of flu complications. Pneumonia, bronchitis, sinus infections, and ear infections are examples of flu-related complications. If you have a medical condition, such as heart disease, cancer, or diabetes, flu can make it worse. Flu can cause fever and chills, sore throat, muscle aches, fatigue, cough, headache, and runny or stuffy nose. Some people may have vomiting and diarrhea, though this is more common in children than adults. In an average year, thousands of people in the [facility] die from flu, and many more are hospitalized. Flu vaccine prevents millions of illnesses and flu-related visits to the doctor each year. 2. Influenza vaccines     CDC recommends everyone 6 months and older get vaccinated every flu season. Children 6 months through 6years of age may need 2 doses during a single flu season. Everyone else needs only 1 dose each flu season. It takes about 2 weeks for protection to develop after vaccination. There are many flu viruses, and they are always changing. Each year a new flu vaccine is made to protect against the influenza viruses believed to be likely to cause disease in the upcoming flu season.  Even when the vaccine doesnt exactly match these viruses, it may still provide some protection. Influenza vaccine does not cause flu. Influenza vaccine may be given at the same time as other vaccines. 3. Talk with your health care provider    Tell your vaccination provider if the person getting the vaccine:   Has had an allergic reaction after a previous dose of influenza vaccine, or has any severe, life-threatening allergies    Has ever had Guillain-Barré Syndrome (also called GBS)    In some cases, your health care provider may decide to postpone influenza vaccination until a future visit. Influenza vaccine can be administered at any time during pregnancy. People who are or will be pregnant during influenza season should receive inactivated influenza vaccine. People with minor illnesses, such as a cold, may be vaccinated. People who are moderately or severely ill should usually wait until they recover before getting influenza vaccine. Your health care provider can give you more information. 4. Risks of a vaccine reaction     Soreness, redness, and swelling where the shot is given, fever, muscle aches, and headache can happen after influenza vaccination.  There may be a very small increased risk of Guillain-Barré Syndrome (GBS) after inactivated influenza vaccine (the flu shot). Katya Peaks children who get the flu shot along with pneumococcal vaccine (PCV13) and/or DTaP vaccine at the same time might be slightly more likely to have a seizure caused by fever. Tell your health care provider if a child who is getting flu vaccine has ever had a seizure. People sometimes faint after medical procedures, including vaccination. Tell your provider if you feel dizzy or have vision changes or ringing in the ears. As with any medicine, there is a very remote chance of a vaccine causing a severe allergic reaction, other serious injury, or death. 5. What if there is a serious problem?     An allergic reaction could occur after the vaccinated person leaves the clinic. If you see signs of a severe allergic reaction (hives, swelling of the face and throat, difficulty breathing, a fast heartbeat, dizziness, or weakness), call 9-1-1 and get the person to the nearest hospital.    For other signs that concern you, call your health care provider. Adverse reactions should be reported to the Vaccine Adverse Event Reporting System (VAERS). Your health care provider will usually file this report, or you can do it yourself. Visit the VAERS website at www.vaers. [facility].gov or call 5-694.164.3635. VAERS is only for reporting reactions, and VAERS staff members do not give medical advice. 6. The National Vaccine Injury Compensation Program    The [facility] Vaccine Injury Compensation Program (VICP) is a federal program that was created to compensate people who may have been injured by certain vaccines. Claims regarding alleged injury or death due to vaccination have a time limit for filing, which may be as short as two years. Visit the VICP website at www.[facility].gov/vaccinecompensation or call 9-847.457.7730 to learn about the program and about filing a claim. 7. How can I learn more?  Ask your health care provider.  Call your local or state health department.  Visit the website of the Food and Drug Administration (FDA) for vaccine package inserts and additional information at www.fda.gov/vaccines-blood-biologics/vaccines.  Contact the Centers for Disease Control and Prevention (CDC):  - Call 8-397.552.3244 (1-800-CDC-INFO) or  - Visit CDCs influenza website at www.cdc.gov/flu. Vaccine Information Statement   Inactivated Influenza Vaccine   8/6/2021  [address]y [address]an 722WA-18   Department of Health and Human Services  Centers for Disease Control and Prevention    Office Use Only

## 2021-11-22 ENCOUNTER — OFFICE VISIT (OUTPATIENT)
Dept: INTERNAL MEDICINE CLINIC | Age: 31
End: 2021-11-22
Payer: MEDICAID

## 2021-11-22 VITALS
RESPIRATION RATE: 18 BRPM | HEIGHT: 61 IN | BODY MASS INDEX: 50.6 KG/M2 | TEMPERATURE: 98.6 F | OXYGEN SATURATION: 97 % | DIASTOLIC BLOOD PRESSURE: 92 MMHG | HEART RATE: 97 BPM | SYSTOLIC BLOOD PRESSURE: 141 MMHG | WEIGHT: 268 LBS

## 2021-11-22 DIAGNOSIS — I10 ESSENTIAL HYPERTENSION: ICD-10-CM

## 2021-11-22 DIAGNOSIS — R29.818 SUSPECTED SLEEP APNEA: ICD-10-CM

## 2021-11-22 DIAGNOSIS — M54.12 CERVICAL RADICULOPATHY: Primary | ICD-10-CM

## 2021-11-22 DIAGNOSIS — F33.1 MODERATE EPISODE OF RECURRENT MAJOR DEPRESSIVE DISORDER (HCC): ICD-10-CM

## 2021-11-22 DIAGNOSIS — N92.6 MISSED MENSES: ICD-10-CM

## 2021-11-22 DIAGNOSIS — Z13.31 POSITIVE DEPRESSION SCREENING: ICD-10-CM

## 2021-11-22 LAB
HCG URINE, QL. (POC): NEGATIVE
VALID INTERNAL CONTROL?: YES

## 2021-11-22 PROCEDURE — 81025 URINE PREGNANCY TEST: CPT | Performed by: NURSE PRACTITIONER

## 2021-11-22 PROCEDURE — 99214 OFFICE O/P EST MOD 30 MIN: CPT | Performed by: NURSE PRACTITIONER

## 2021-11-22 RX ORDER — ESCITALOPRAM OXALATE 10 MG/1
10 TABLET ORAL DAILY
Qty: 30 TABLET | Refills: 2 | Status: SHIPPED | OUTPATIENT
Start: 2021-11-22 | End: 2022-09-29 | Stop reason: SDUPTHER

## 2021-11-22 RX ORDER — TIZANIDINE 4 MG/1
4 TABLET ORAL
Qty: 30 TABLET | Refills: 1 | Status: SHIPPED | OUTPATIENT
Start: 2021-11-22 | End: 2022-10-19

## 2021-11-22 NOTE — PROGRESS NOTES
Subjective: (As above and below)     Chief Complaint   Patient presents with    Follow-up    Referral Request     sleep apnea      Eulalia Uriostegui is a 32y.o. year old female who presents for     Hypertension ROS:  taking medications as instructed, no medication side effects noted, no TIAs, no chest pain on exertion, no dyspnea on exertion, no swelling of ankles    BP Readings from Last 3 Encounters:   11/22/21 (!) 141/92   11/04/21 (!) 177/110   10/07/21 (!) 177/98       Suspected sleep apnea: +poor sleep, snoring, night waking  Has not had sleep study      Depression and anxiety: feeing worse, interested in resuming medication, was on lexapro in the past and thinks it helped  +life stressors  Denies thoughts of dto/dts      Reviewed PmHx, RxHx, FmHx, SocHx, AllgHx and updated in chart. Family History   Problem Relation Age of Onset    Hypertension Maternal Grandmother        Past Medical History:   Diagnosis Date    Asthma     Asthma     has not had any problems no inhaler    Class 3 obesity in adult 11/16/2017    Essential hypertension 11/16/2017    H/O pilonidal cyst     Hidradenitis 2/1/2018    Psychiatric disorder     ANXIETY    Vaginal delivery       Social History     Socioeconomic History    Marital status: SINGLE   Tobacco Use    Smoking status: Current Some Day Smoker     Packs/day: 0.25     Types: Cigars     Last attempt to quit: 3/1/2011     Years since quitting: 10.7    Smokeless tobacco: Never Used   Vaping Use    Vaping Use: Never used   Substance and Sexual Activity    Alcohol use: No    Drug use: Yes     Types: Marijuana     Comment: Patient reports taking an edible at 2200    Sexual activity: Yes     Partners: Male     Birth control/protection: None          Current Outpatient Medications   Medication Sig    pregabalin (LYRICA) 150 mg capsule Take 1 Capsule by mouth three (3) times daily.  Max Daily Amount: 450 mg.    chlorthalidone (HYGROTON) 25 mg tablet Take 1 Tablet by mouth daily.    tiZANidine (ZANAFLEX) 4 mg tablet Take 1 Tablet by mouth three (3) times daily as needed for Muscle Spasm(s).  NIFEdipine ER (ADALAT CC) 30 mg ER tablet Take 1 Tablet by mouth daily.  ketoconazole (NIZORAL) 2 % shampoo USE WEEKLY AS NEEDED FOR SHAMPOO    labetaloL (NORMODYNE) 200 mg tablet Take 1 Tablet by mouth two (2) times a day.  prenatal vit-iron fumarate-fa 27 mg iron- 0.8 mg tab tablet Take 1 Tablet by mouth daily.  nystatin (MYCOSTATIN) 100,000 unit/gram ointment Apply  to affected area two (2) times a day.  albuterol (PROVENTIL HFA, VENTOLIN HFA, PROAIR HFA) 90 mcg/actuation inhaler Take 2 Puffs by inhalation every six (6) hours as needed for Wheezing. No current facility-administered medications for this visit. Review of Systems:   Constitutional:    Negative for fever and chills, negative diaphoresis. HEENT:              Negative for neck pain and stiffness. Eyes:                  Negative for visual disturbance, itching, redness or discharge. Respiratory:        Negative for cough and shortness of breath. Cardiovascular:  Negative for chest pain and palpitations. Gastrointestinal: Negative for nausea, vomiting, abdominal pain, diarrhea or constipation. Genitourinary:     Negative for dysuria and frequency. Musculoskeletal: Negative for falls, tenderness and swelling. Skin:                    Negative for rash, masses or lesions. Neurological:       Negative for dizzyness, seizure, loss of consciousness, weakness and numbness.      Objective:     Vitals:    11/22/21 1409   BP: (!) 141/92   Pulse: 97   Resp: 18   Temp: 98.6 °F (37 °C)   TempSrc: Temporal   SpO2: 97%   Weight: 268 lb (121.6 kg)   Height: 5' 1\" (1.549 m)       Results for orders placed or performed in visit on 11/22/21   AMB POC URINE PREGNANCY TEST, VISUAL COLOR COMPARISON   Result Value Ref Range    VALID INTERNAL CONTROL POC Yes     HCG urine, Ql. (POC) Negative Negative         Physical Examination: General appearance - alert, well appearing, and in no distress  Mental status - alert, oriented to person, place, and time  Chest - clear to auscultation, no wheezes, rales or rhonchi, symmetric air entry  Heart - normal rate and regular rhythm  Extremities - no pedal edema noted      Assessment/ Plan:       1. Cervical radiculopathy  - tiZANidine (ZANAFLEX) 4 mg tablet; Take 1 Tablet by mouth three (3) times daily as needed for Muscle Spasm(s). Dispense: 30 Tablet; Refill: 1    2. Suspected sleep apnea    - SLEEP MEDICINE REFERRAL    3. Essential hypertension  Cont lifestyle improvements  - CBC WITH AUTOMATED DIFF; Future  - METABOLIC PANEL, COMPREHENSIVE; Future  - LIPID PANEL; Future    4. Positive depression screening  l    5. Moderate episode of recurrent major depressive disorder (HCC)    - escitalopram oxalate (LEXAPRO) 10 mg tablet; Take 1 Tablet by mouth daily. Dispense: 30 Tablet; Refill: 2    6. Missed menses    - AMB POC URINE PREGNANCY TEST, VISUAL COLOR COMPARISON        I have discussed the diagnosis with the patient and the intended plan as seen in the above orders. The patient has received an after-visit summary and questions were answered concerning future plans. Pt conveyed understanding of plan. Medication Side Effects and Warnings were discussed with patient: yes  Patient Labs were reviewed: yes  Patient Past Records were reviewed:  yes    Lizeth Paniagua. DANNY Naylor    Depression screen positive, PHQ 9 Score: 17, C-SSRS completed.

## 2021-11-22 NOTE — PROGRESS NOTES
Chief Complaint   Patient presents with    Follow-up    Referral Request     sleep apnea        1. Have you been to the ER, urgent care clinic since your last visit? Hospitalized since your last visit? No    2. Have you seen or consulted any other health care providers outside of the [address] [place] [address] since your last visit? Include any pap smears or colon screening.  No

## 2021-12-22 ENCOUNTER — VIRTUAL VISIT (OUTPATIENT)
Dept: INTERNAL MEDICINE CLINIC | Age: 31
End: 2021-12-22

## 2021-12-22 NOTE — PROGRESS NOTES
Chief Complaint   Patient presents with    Follow-up     depression     Other     doxy. me [phone]     Pt rates back pain 10/10     1. Have you been to the ER, urgent care clinic since your last visit? Hospitalized since your last visit? No    2. Have you seen or consulted any other health care providers outside of the [address] [place] [address] since your last visit? Include any pap smears or colon screening.  No

## 2021-12-27 NOTE — PROGRESS NOTES
A user error has taken place: encounter opened in error, closed for administrative reasons    Attempted video visit- video working, sound not. Attempted to do patient phone call- called 3x  Also LPN called- no answers.

## 2022-01-11 DIAGNOSIS — I10 ESSENTIAL HYPERTENSION: ICD-10-CM

## 2022-01-11 RX ORDER — LABETALOL 200 MG/1
200 TABLET, FILM COATED ORAL 2 TIMES DAILY
Qty: 180 TABLET | Refills: 0 | Status: SHIPPED | OUTPATIENT
Start: 2022-01-11 | End: 2022-10-19 | Stop reason: SDUPTHER

## 2022-02-07 ENCOUNTER — VIRTUAL VISIT (OUTPATIENT)
Dept: SLEEP MEDICINE | Age: 32
End: 2022-02-07
Payer: MEDICAID

## 2022-02-07 DIAGNOSIS — G47.33 OSA (OBSTRUCTIVE SLEEP APNEA): Primary | ICD-10-CM

## 2022-02-07 DIAGNOSIS — E66.01 MORBID OBESITY WITH BMI OF 50.0-59.9, ADULT (HCC): ICD-10-CM

## 2022-02-07 DIAGNOSIS — E66.2 OBESITY HYPOVENTILATION SYNDROME (HCC): ICD-10-CM

## 2022-02-07 PROCEDURE — 99204 OFFICE O/P NEW MOD 45 MIN: CPT | Performed by: SPECIALIST

## 2022-02-07 NOTE — PROGRESS NOTES
[facility]., [facility]. [place], [address]  Tel.  [phone]  Fax. [facility] 60  [facility], [address] [facility]  Tel.  [phone]  Fax. [phone] [address] [address] Mary Ramos   Tel.  [phone]  Fax. [phone]     Noris Fair is a 32 y.o. female who was seen by synchronous (real-time) audio-video technology on 2/7/2022. Consent:  She and/or her healthcare decision maker is aware that this patient-initiated Telehealth encounter is a billable service, with coverage as determined by her insurance carrier. She is aware that she may receive a bill and has provided verbal consent to proceed: Yes    I was in the office while conducting this encounter. Chief Complaint       Chief Complaint   Patient presents with    Sleep Problem     Np referred for a sleep consult    Other     [phone]        [facility]      Noris Fair is 32 y.o. female seen for evaluation of a sleep disorder. She has history of snoring and fatigue. She has a varying sleep schedule as a function of work. Works other in the evening or overnight. In general, she sleeps for 4 hours. She has been told of snoring described as loud, heard through closed doors, in separate rooms. Associated with apparent apnea. She may awaken several times from sleep. She notes leg cramps/leg jerks. She has frequent dreams. She may doze if she is seated and active such as when watching TV or riding as a passenger. She does not report sleep talking or sleepwalking, nocturnal incontinence, abnormal arm or leg movements, hypnagogue hallucinations, sleep paralysis or cataplexy. The patient has not undergone diagnostic testing for the current problems. Most recent metabolic panel: CO2: 30 mmol/liter.     Sharon Sleepiness Score: 19       Allergies   Allergen Reactions    Aleve [Naproxen Sodium] Hives    Darvocet A500 [Propoxyphene N-Acetaminophen] Hives    Bactrim [Sulfamethoprim Ds] Other (comments)     Abdominal pain.  Lortab [Hydrocodone-Acetaminophen] Nausea and Vomiting       Current Outpatient Medications   Medication Sig Dispense Refill    labetaloL (NORMODYNE) 200 mg tablet TAKE 1 TABLET BY MOUTH TWO (2) TIMES A DAY. 180 Tablet 0    escitalopram oxalate (LEXAPRO) 10 mg tablet Take 1 Tablet by mouth daily. 30 Tablet 2    chlorthalidone (HYGROTON) 25 mg tablet Take 1 Tablet by mouth daily. 90 Tablet 0    NIFEdipine ER (ADALAT CC) 30 mg ER tablet Take 1 Tablet by mouth daily. 90 Tablet 0    ketoconazole (NIZORAL) 2 % shampoo USE WEEKLY AS NEEDED FOR SHAMPOO 120 mL 0    prenatal vit-iron fumarate-fa 27 mg iron- 0.8 mg tab tablet Take 1 Tablet by mouth daily. 90 Tablet 1    tiZANidine (ZANAFLEX) 4 mg tablet Take 1 Tablet by mouth three (3) times daily as needed for Muscle Spasm(s). (Patient not taking: Reported on 12/22/2021) 30 Tablet 1    pregabalin (LYRICA) 150 mg capsule Take 1 Capsule by mouth three (3) times daily. Max Daily Amount: 450 mg. (Patient not taking: Reported on 12/22/2021) 90 Capsule 0    nystatin (MYCOSTATIN) 100,000 unit/gram ointment Apply  to affected area two (2) times a day. 45 g 0    albuterol (PROVENTIL HFA, VENTOLIN HFA, PROAIR HFA) 90 mcg/actuation inhaler Take 2 Puffs by inhalation every six (6) hours as needed for Wheezing. 1 Inhaler 0        She  has a past medical history of Asthma, Asthma, Class 3 obesity in adult (11/16/2017), Essential hypertension (11/16/2017), H/O pilonidal cyst, Hidradenitis (2/1/2018), Psychiatric disorder, and Vaginal delivery.     She has no past medical history of Abnormal Pap smear, Acquired hypothyroidism, Anemia NEC, Complication of anesthesia, Diabetes ([facility] Diabetes mellitus, Difficult intubation, Essential hypertension, Genital herpes, unspecified, Heart abnormalities, Herpes gestationis, Herpes simplex without mention of complication, Human immunodeficiency virus (HIV) disease ([facility] OTHER MEDICAL, Infertility, Kidney disease, Malignant hyperthermia due to anesthesia, Nausea & vomiting, Phlebitis and thrombophlebitis of unspecified site, Psychiatric problem, Rhesus isoimmunization unspecified as to episode of care in pregnancy, Systemic lupus erythematosus ([facility] Trauma, Unspecified breast disorder, Unspecified diseases of blood and blood-forming organs, or Unspecified epilepsy without mention of intractable epilepsy. She  has a past surgical history that includes hx appendectomy (2017); hx orthopaedic; hx other surgical; hx other surgical; hx other surgical (04/19/2016); hx other surgical (08/22/2016); hx other surgical (04/17/2018); hx other surgical (02/15/2018); hx other surgical (12/21/2017); hx other surgical (06/14/2018); hx other surgical (01/04/2019); and hx breast reduction (12/2020). She family history includes Hypertension in her maternal grandmother. She  reports that she has been smoking cigars. She has been smoking about 0.25 packs per day. She has never used smokeless tobacco. She reports current drug use. Drug: Marijuana. She reports that she does not drink alcohol. Review of Systems:  ROS    Due to this being a telemedicine evaluation, certain elements of the physical examination are unable to be assessed. Objective:     BMI: 50.64    General:   Conversant, cooperative   Eyes: no nystagmus   Oropharynx:  tongue large       Neck:   No carotid bruits;             Skin:  no obvious rashes   Neuro:  Speech fluent, face symmetrical, tongue movement normal   Psych:  Normal affect,  normal countenance        Assessment:       ICD-10-CM ICD-9-CM    1. LAWANDA (obstructive sleep apnea)  G47.33 327.23    2. Morbid obesity with BMI of 50.0-59.9, adult ([facility])  E66.01 278.01     Z68.43 V85.43    3. Obesity hypoventilation syndrome ([facility])  E66.2 278.03        History consistent with sleep disordered breathing. Potential obesityhypoventilation syndrome. Patient will be evaluated with a sleep study.  ETCO2 monitoring during initial portion of the recording. Plan:     No orders of the defined types were placed in this encounter. * Patient has a history and examination consistent with the diagnosis of sleep apnea. Potential obesityhypoventilation syndrome. * Sleep testing was ordered for initial evaluation. ET CO2 monitoring during initial portion of the study. * She was provided information on sleep apnea including corresponding risk factors and the importance of proper treatment. * Treatment options if indicated were reviewed today. Instructions:  o The patient would benefit from weight reduction measures. o Do not engage in activities requiring a normal degree of alertness if fatigue is present. o The patient understands that untreated or undertreated sleep apnea could impair judgement and the ability to function normally during the day.  o Call or return if symptoms worsen or persist.          Dahiana Rodríguez MD, [facility]  Electronically signed 02/07/22     Pursuant to the emergency declaration under the [facility], [facility] waiver authority and the [employer] Act, this Virtual  Visit was conducted, with patient's consent, to reduce the patient's risk of exposure to COVID-19 and provide continuity of care for an established patient. Services were provided through a video synchronous discussion virtually to substitute for in-person clinic visit. Daniel Yap MD     This note was created using voice recognition software. Despite editing, there may be syntax errors. This note will not be viewable in [address]

## 2022-02-14 ENCOUNTER — TELEPHONE (OUTPATIENT)
Dept: SLEEP MEDICINE | Age: 32
End: 2022-02-14

## 2022-02-14 NOTE — TELEPHONE ENCOUNTER
Per [employer], clinicals not attached. Reattached and faxed to [phone].  Requesting a call tomorrow to [facility] at [phone]

## 2022-02-14 NOTE — TELEPHONE ENCOUNTER
[facility]IM patient pending p2p. Uploaded clinicals and BMP to [facility]IM site. Case set to close on or before 2/18.  Pending case number [id number] ID # [id number] P: [phone]

## 2022-02-16 NOTE — TELEPHONE ENCOUNTER
In lab sleep study approved.   Auth # [id number]  Valid through 4/14/22    STEWART Sow-BC, [facility]   Nurse Practitioner  [facility] [address]

## 2022-02-18 LAB
SARS-COV-2, NAA 2 DAY TAT: NORMAL
SARS-COV-2, NAA: NOT DETECTED

## 2022-02-21 ENCOUNTER — HOSPITAL ENCOUNTER (OUTPATIENT)
Dept: SLEEP MEDICINE | Age: 32
Discharge: HOME | End: 2022-02-21
Payer: MEDICAID

## 2022-02-21 VITALS
DIASTOLIC BLOOD PRESSURE: 94 MMHG | HEART RATE: 105 BPM | OXYGEN SATURATION: 95 % | TEMPERATURE: 97.7 F | SYSTOLIC BLOOD PRESSURE: 157 MMHG

## 2022-02-21 DIAGNOSIS — G47.33 OSA (OBSTRUCTIVE SLEEP APNEA): ICD-10-CM

## 2022-02-21 DIAGNOSIS — E66.2 OBESITY HYPOVENTILATION SYNDROME (HCC): ICD-10-CM

## 2022-02-21 DIAGNOSIS — E66.01 MORBID OBESITY WITH BMI OF 50.0-59.9, ADULT (HCC): ICD-10-CM

## 2022-02-21 PROCEDURE — 95810 POLYSOM 6/> YRS 4/> PARAM: CPT | Performed by: SPECIALIST

## 2022-02-21 PROCEDURE — 95811 POLYSOM 6/>YRS CPAP 4/> PARM: CPT

## 2022-02-25 ENCOUNTER — DOCUMENTATION ONLY (OUTPATIENT)
Dept: SLEEP MEDICINE | Age: 32
End: 2022-02-25

## 2022-02-25 ENCOUNTER — TELEPHONE (OUTPATIENT)
Dept: SLEEP MEDICINE | Age: 32
End: 2022-02-25

## 2022-02-25 DIAGNOSIS — G47.33 OSA (OBSTRUCTIVE SLEEP APNEA): Primary | ICD-10-CM

## 2022-02-25 NOTE — TELEPHONE ENCOUNTER
Sleep study performed for potential sleep disordered breathing. During initial portion of the study: 131.9 minutes recorded of which 122.5 minutes spent asleep with a sleep efficiency of 92.9%. Sleep onset at 0.1 minutes; REM sleep not observed. 303 respiratory events occurred of which 36 hypopnea and 267 apnea. Apnea-hypopnea index severely elevated at 148.4/h. Minimal SaO2 74%. Snoring: Extremely loud    During the second portion of the study CPAP employed. 340.9 minutes recorded which 315.2 minutes spent asleep with a sleep efficiency of 92.5%. Sleep onset at 9.7 minutes; REM onset at 166.5 minutes with total REM representing 47.4% of sleep time (REM-rebound). All sleep stages were observed. 140 respiratory events occurred in which 115 hypopnea and 35 apnea. (30 central, 22 obstructive). Overall AHI 28.6/h. Minimal SaO2 81%. CPAP initiated at 5 cm and increased to 14 cm. At 14 cm CPAP: 167.8 minutes recorded which 166.3 minutes spent asleep and 149.5 minutes in rem. Corresponding AHI 4/h. Minimal SaO2 83:; baseline SaO2 95%. Small nasal DreamWear mask employed. Impression: Severe sleep disordered breathing responding to CPAP at 14 cm. Recommendation: APAP 13-16 cm    Sleep technologist: Please review study results with the patient. Order has been entered for APAP 13-16 cm. Please schedule first compliance appointment.

## 2022-02-28 ENCOUNTER — DOCUMENTATION ONLY (OUTPATIENT)
Dept: SLEEP MEDICINE | Age: 32
End: 2022-02-28

## 2022-03-19 PROBLEM — L72.3 SEBACEOUS CYST: Status: ACTIVE | Noted: 2017-11-16

## 2022-03-19 PROBLEM — L73.2 HIDRADENITIS: Status: ACTIVE | Noted: 2018-02-01

## 2022-03-19 PROBLEM — E66.01 OBESITY, MORBID (HCC): Status: ACTIVE | Noted: 2018-03-01

## 2022-03-19 PROBLEM — I10 ESSENTIAL HYPERTENSION: Status: ACTIVE | Noted: 2017-11-16

## 2022-05-07 NOTE — PROGRESS NOTES
Chief Complaint   Patient presents with    Well Woman     Pt presents in office for annual exam with no complaints. Pt report cony last pap was two years ago at [facility]. 3 most recent PHQ Screens 9/18/2019   PHQ Not Done -   Little interest or pleasure in doing things Not at all   Feeling down, depressed, irritable, or hopeless Several days   Total Score PHQ 2 1     1. Have you been to the ER, urgent care clinic since your last visit? Hospitalized since your last visit? No    2. Have you seen or consulted any other health care providers outside of the [address] [place] [address] since your last visit? Include any pap smears or colon screening.  No Negative

## 2022-05-12 ENCOUNTER — HOSPITAL ENCOUNTER (EMERGENCY)
Age: 32
Discharge: LEFT BEFORE DISCHARGE | End: 2022-05-12
Payer: MEDICAID

## 2022-05-12 PROCEDURE — 75810000275 HC EMERGENCY DEPT VISIT NO LEVEL OF CARE

## 2022-05-31 RX ORDER — POTASSIUM CHLORIDE 1500 MG/1
TABLET, EXTENDED RELEASE ORAL
Qty: 90 TABLET | Refills: 0 | Status: SHIPPED | OUTPATIENT
Start: 2022-05-31 | End: 2022-08-30 | Stop reason: SDUPTHER

## 2022-06-30 ENCOUNTER — HOSPITAL ENCOUNTER (EMERGENCY)
Age: 32
Discharge: HOME | End: 2022-06-30
Attending: EMERGENCY MEDICINE
Payer: MEDICAID

## 2022-06-30 VITALS
HEART RATE: 95 BPM | WEIGHT: 265 LBS | RESPIRATION RATE: 18 BRPM | BODY MASS INDEX: 50.03 KG/M2 | SYSTOLIC BLOOD PRESSURE: 166 MMHG | DIASTOLIC BLOOD PRESSURE: 108 MMHG | TEMPERATURE: 98.2 F | OXYGEN SATURATION: 98 % | HEIGHT: 61 IN

## 2022-06-30 DIAGNOSIS — L02.419 AXILLARY ABSCESS: Primary | ICD-10-CM

## 2022-06-30 DIAGNOSIS — R03.0 ELEVATED BLOOD PRESSURE READING: ICD-10-CM

## 2022-06-30 PROCEDURE — 75810000289 HC I&D ABSCESS SIMP/COMP/MULT

## 2022-06-30 PROCEDURE — 99283 EMERGENCY DEPT VISIT LOW MDM: CPT

## 2022-06-30 PROCEDURE — 74011250637 HC RX REV CODE- 250/637: Performed by: PHYSICIAN ASSISTANT

## 2022-06-30 PROCEDURE — 74011000250 HC RX REV CODE- 250: Performed by: PHYSICIAN ASSISTANT

## 2022-06-30 RX ORDER — DOXYCYCLINE HYCLATE 100 MG
100 TABLET ORAL
Status: COMPLETED | OUTPATIENT
Start: 2022-06-30 | End: 2022-06-30

## 2022-06-30 RX ORDER — BUPIVACAINE HYDROCHLORIDE 5 MG/ML
5 INJECTION, SOLUTION EPIDURAL; INTRACAUDAL ONCE
Status: COMPLETED | OUTPATIENT
Start: 2022-06-30 | End: 2022-06-30

## 2022-06-30 RX ORDER — DOXYCYCLINE HYCLATE 100 MG
100 TABLET ORAL 2 TIMES DAILY
Qty: 20 TABLET | Refills: 0 | Status: SHIPPED | OUTPATIENT
Start: 2022-06-30 | End: 2022-06-30 | Stop reason: SDUPTHER

## 2022-06-30 RX ORDER — DOXYCYCLINE HYCLATE 100 MG
100 TABLET ORAL 2 TIMES DAILY
Qty: 20 TABLET | Refills: 0 | Status: SHIPPED | OUTPATIENT
Start: 2022-06-30 | End: 2022-07-10

## 2022-06-30 RX ORDER — LIDOCAINE HYDROCHLORIDE AND EPINEPHRINE 20; 10 MG/ML; UG/ML
1.5 INJECTION, SOLUTION INFILTRATION; PERINEURAL ONCE
Status: COMPLETED | OUTPATIENT
Start: 2022-06-30 | End: 2022-06-30

## 2022-06-30 RX ADMIN — BUPIVACAINE HYDROCHLORIDE 25 MG: 5 INJECTION, SOLUTION EPIDURAL; INTRACAUDAL; PERINEURAL at 10:06

## 2022-06-30 RX ADMIN — DOXYCYCLINE HYCLATE 100 MG: 100 TABLET, COATED ORAL at 10:06

## 2022-06-30 RX ADMIN — LIDOCAINE HYDROCHLORIDE AND EPINEPHRINE 30 MG: 20; 10 INJECTION, SOLUTION INFILTRATION; PERINEURAL at 10:06

## 2022-06-30 NOTE — DISCHARGE INSTRUCTIONS
Start Doxycycline  Apply warm compresses 3 times daily  Take Tylenol or ibuprofen for pain  Follow up in 2-3 days for packing removal, earlier for any new or worsening symptoms  Follow up with general surgery

## 2022-06-30 NOTE — Clinical Note
Alfonso Johnson was seen and treated in our emergency department on 6/30/2022. Please excuse Ms. Alfonso Johnson from work until 7/2/22. She may return earlier if feeling better.              Bulmaro Grant

## 2022-06-30 NOTE — ED PROVIDER NOTES
EMERGENCY DEPARTMENT HISTORY AND PHYSICAL EXAM      Date: 6/30/2022  Patient Name: Caroline Bucio    History of Presenting Illness     Chief Complaint   Patient presents with    Skin Problem     pt ambulatory into triage with cc of abcess in right axillae       History Provided By: Patient    HPI: Caroline Bucio, 28 y.o. female presents to the ED with cc of a painful mass to the R axilla for 2 days. At the onset of symptoms, the patient attempted warm compresses, but notes the mass grew larger and more painful. The patient notes a history of similar symptoms for which she had surgically removed in the past. Current pain is a 10/10, worse with touching and moving her right arm. The patient denies medicating symptoms. She is unsure of the surgeon who performed the last procedure. The patient denies F/C, CP, SOB, abdominal pain, N/V/D, discharge from site, and chance of pregnancy. PMH: HTN  Allergies: NSAIDs, Lortab, and Darvocet  SH: pt denies tobacco, alcohol, and illicit drug use    There are no other complaints, changes, or physical findings at this time. PCP: Padilla Gomez., NP    No current facility-administered medications on file prior to encounter. Current Outpatient Medications on File Prior to Encounter   Medication Sig Dispense Refill    Klor-Con M20 20 mEq tablet TAKE 1 TABLET BY MOUTH EVERY WITH DIURETIC (HYDROCHLORTHIAZIDE OR LASIX) 90 Tablet 0    labetaloL (NORMODYNE) 200 mg tablet TAKE 1 TABLET BY MOUTH TWO (2) TIMES A DAY. 180 Tablet 0    tiZANidine (ZANAFLEX) 4 mg tablet Take 1 Tablet by mouth three (3) times daily as needed for Muscle Spasm(s). (Patient not taking: Reported on 12/22/2021) 30 Tablet 1    escitalopram oxalate (LEXAPRO) 10 mg tablet Take 1 Tablet by mouth daily. 30 Tablet 2    pregabalin (LYRICA) 150 mg capsule Take 1 Capsule by mouth three (3) times daily. Max Daily Amount: 450 mg.  (Patient not taking: Reported on 12/22/2021) 90 Capsule 0    chlorthalidone (HYGROTON) 25 mg tablet Take 1 Tablet by mouth daily. 90 Tablet 0    NIFEdipine ER (ADALAT CC) 30 mg ER tablet Take 1 Tablet by mouth daily. 90 Tablet 0    ketoconazole (NIZORAL) 2 % shampoo USE WEEKLY AS NEEDED FOR SHAMPOO 120 mL 0    prenatal vit-iron fumarate-fa 27 mg iron- 0.8 mg tab tablet Take 1 Tablet by mouth daily. 90 Tablet 1    nystatin (MYCOSTATIN) 100,000 unit/gram ointment Apply  to affected area two (2) times a day. 45 g 0    albuterol (PROVENTIL HFA, VENTOLIN HFA, PROAIR HFA) 90 mcg/actuation inhaler Take 2 Puffs by inhalation every six (6) hours as needed for Wheezing. 1 Inhaler 0       Past History     Past Medical History:  Past Medical History:   Diagnosis Date    Asthma     Asthma     has not had any problems no inhaler    Class 3 obesity in adult 11/16/2017    Essential hypertension 11/16/2017    H/O pilonidal cyst     Hidradenitis 2/1/2018    Psychiatric disorder     ANXIETY    Vaginal delivery        Past Surgical History:  Past Surgical History:   Procedure Laterality Date    HX APPENDECTOMY  2017    HX BREAST REDUCTION  12/2020    HX ORTHOPAEDIC      Broke l foot as child    HX OTHER SURGICAL      left foot on third toe     HX OTHER SURGICAL      Laparoscopic cholecystectomy.  HX OTHER SURGICAL  04/19/2016    Incision and drainage of pilonidal abscess; Dr. Nia Lobo.   OTHER SURGICAL  08/22/2016    Incision, drainage, and excision of pilonidal cyst; Dr. Nia Lobo.   OTHER SURGICAL  04/17/2018    Incision and drainage of pilonidal abscess; Dr. Nia Lobo.   OTHER SURGICAL  02/15/2018    Excision of left axilla hidradenitis; Dr. Joselyn Ribeiro.   OTHER SURGICAL  12/21/2017    Excision of sebaceous cyst, right inframammary fold; Dr. Joselyn Ribeiro.   OTHER SURGICAL  06/14/2018    Incision and drainage of pilonidal abscess; Dr. Nia Lobo.   OTHER SURGICAL  01/04/2019    Incision and drainage of pilonidal abscess; Dr. Nia Lobo.        Family History:  Family History   Problem Relation Age of Onset    Hypertension Maternal Grandmother        Social History:  Social History     Tobacco Use    Smoking status: Current Some Day Smoker     Packs/day: 0.25     Types: Cigars     Last attempt to quit: 3/1/2011     Years since quittin.3    Smokeless tobacco: Never Used   Vaping Use    Vaping Use: Never used   Substance Use Topics    Alcohol use: No    Drug use: Yes     Types: Marijuana     Comment: Patient reports taking an edible at 2200       Allergies: Allergies   Allergen Reactions    Aleve [Naproxen Sodium] Hives    Darvocet A500 [Propoxyphene N-Acetaminophen] Hives    Bactrim [Sulfamethoprim Ds] Other (comments)     Abdominal pain.  Lortab [Hydrocodone-Acetaminophen] Nausea and Vomiting         Review of Systems   Review of Systems   Constitutional: Negative. Negative for chills and fever. HENT: Negative. Negative for congestion. Eyes: Negative. Negative for pain and discharge. Respiratory: Negative for cough, chest tightness and shortness of breath. Cardiovascular: Negative. Negative for chest pain and palpitations. Gastrointestinal: Negative. Negative for abdominal pain, diarrhea, nausea and vomiting. Genitourinary: Negative. Negative for dysuria and hematuria. Musculoskeletal: Negative. Negative for arthralgias and myalgias. Skin: Negative for color change. Positive mass  Denies discharge     Allergic/Immunologic: Negative. Negative for environmental allergies and food allergies. Neurological: Negative. Negative for dizziness and headaches. Psychiatric/Behavioral: Negative. Physical Exam   Physical Exam  Vitals reviewed. Constitutional:       General: She is not in acute distress. Appearance: She is well-developed. She is not diaphoretic. Comments: Pt appears in moderate discomfort, awake and alert. HENT:      Head: Normocephalic and atraumatic.       Right Ear: External ear normal.      Left Ear: External ear normal.      Nose: Nose normal.   Eyes:      General:         Right eye: No discharge. Left eye: No discharge. Conjunctiva/sclera: Conjunctivae normal.      Pupils: Pupils are equal, round, and reactive to light. Cardiovascular:      Rate and Rhythm: Normal rate. Heart sounds: Normal heart sounds. Pulmonary:      Effort: Pulmonary effort is normal. No respiratory distress. Breath sounds: Normal breath sounds. No wheezing or rales. Musculoskeletal:         General: No tenderness. Normal range of motion. Skin:     General: Skin is warm and dry. Coloration: Skin is not pale. Findings: No erythema or rash. Comments: 7cm x 7cm indurated mass to R axilla. No surrounding erythema or edema. No fluctuance. No active drainage. Neurological:      Mental Status: She is alert and oriented to person, place, and time. Coordination: Coordination normal.      Comments: No focal neuro deficits. Psychiatric:         Behavior: Behavior normal.         Diagnostic Study Results     Labs -   No results found for this or any previous visit (from the past 12 hour(s)). Radiologic Studies -   No orders to display     CT Results  (Last 48 hours)    None        CXR Results  (Last 48 hours)    None          Medical Decision Making   I am the first provider for this patient. I reviewed the vital signs, available nursing notes, past medical history, past surgical history, family history and social history. Vital Signs-Reviewed the patient's vital signs. Patient Vitals for the past 12 hrs:   Temp Pulse Resp BP SpO2   06/30/22 0826 98.2 °F (36.8 °C) 95 18 (!) 166/108 98 %           Records Reviewed: Old Medical Records    Provider Notes (Medical Decision Making):   Probable abscess    ED Course:   Initial assessment performed.  The patients presenting problems have been discussed, and they are in agreement with the care plan formulated and outlined with them. I have encouraged them to ask questions as they arise throughout their visit. PROCEDURES  Procedure Note - Incision and Drainage:   10:05 AM  Performed by: NEGRITA Bonilla  Complexity: complex  Skin prepped with Chloraprep. Sterile field established. Anesthesia achieved with 3.5 mLs of 50/50 mixture of Lidocaine 2% with epinephrine and Bupivicaine 0.5% without epinephrine using local infiltration. Abscess to axilla(e):right was incised with # 11 blade, and 20mLs of malodorous purulent drainage was expressed. Wound probed and irrigated. Area was packed using 1/4 inch iodoform gauze. Sterile dressing applied. The procedure took 16-30 minutes, and pt tolerated well. Written by NEGRITA Bonilla. Disposition:  10:19am  DISCHARGE PLAN:  1. Discharge Medication List as of 6/30/2022 10:16 AM      CONTINUE these medications which have NOT CHANGED    Details   Klor-Con M20 20 mEq tablet TAKE 1 TABLET BY MOUTH EVERY WITH DIURETIC (HYDROCHLORTHIAZIDE OR LASIX), Normal, Disp-90 Tablet, R-0      labetaloL (NORMODYNE) 200 mg tablet TAKE 1 TABLET BY MOUTH TWO (2) TIMES A DAY., Normal, Disp-180 Tablet, R-0DX Code Needed  . tiZANidine (ZANAFLEX) 4 mg tablet Take 1 Tablet by mouth three (3) times daily as needed for Muscle Spasm(s). , Normal, Disp-30 Tablet, R-1      escitalopram oxalate (LEXAPRO) 10 mg tablet Take 1 Tablet by mouth daily. , Normal, Disp-30 Tablet, R-2      pregabalin (LYRICA) 150 mg capsule Take 1 Capsule by mouth three (3) times daily. Max Daily Amount: 450 mg., Normal, Disp-90 Capsule, R-0      chlorthalidone (HYGROTON) 25 mg tablet Take 1 Tablet by mouth daily. , Normal, Disp-90 Tablet, R-0      NIFEdipine ER (ADALAT CC) 30 mg ER tablet Take 1 Tablet by mouth daily. , Normal, Disp-90 Tablet, R-0      ketoconazole (NIZORAL) 2 % shampoo USE WEEKLY AS NEEDED FOR SHAMPOO, Normal, Disp-120 mL, R-0DX Code Needed  PLEASE SEND REFILLS.       prenatal vit-iron fumarate-fa 27 mg iron- 0.8 mg tab tablet Take 1 Tablet by mouth daily. , Normal, Disp-90 Tablet, R-1      nystatin (MYCOSTATIN) 100,000 unit/gram ointment Apply  to affected area two (2) times a day., Normal, Disp-45 g, R-0      albuterol (PROVENTIL HFA, VENTOLIN HFA, PROAIR HFA) 90 mcg/actuation inhaler Take 2 Puffs by inhalation every six (6) hours as needed for Wheezing., Normal, Disp-1 Inhaler, R-0           2. Follow-up Information     Follow up With Specialties Details Why Contact Info    Rangel Borges MD Colon and Rectal Surgery Schedule an appointment as soon as possible for a visit in 4 days  [address] [place] [address] Dr SALAZAR [address] [facility]      [address] DEPT Emergency Medicine In 2 days For wound re-check [address] [place] [address]  [phone]        3. Return to ED if worse     Diagnosis     Clinical Impression:   1. Axillary abscess    2. Elevated blood pressure reading        Attestations:    NEGRITA العراقي    Please note that this dictation was completed with [employer], the computer voice recognition software. Quite often unanticipated grammatical, syntax, homophones, and other interpretive errors are inadvertently transcribed by the computer software. Please disregard these errors. Please excuse any errors that have escaped final proofreading. Thank you.

## 2022-06-30 NOTE — ED NOTES
I have reviewed discharge instructions with the patient. The patient verbalized understanding. Patient ambulatory out of ED at this time. Patient made aware of prescription to be picked up at pharmacy.

## 2022-07-02 ENCOUNTER — HOSPITAL ENCOUNTER (EMERGENCY)
Age: 32
Discharge: LEFT BEFORE DISCHARGE | End: 2022-07-02
Payer: MEDICAID

## 2022-07-02 VITALS
WEIGHT: 256.39 LBS | HEART RATE: 100 BPM | RESPIRATION RATE: 18 BRPM | TEMPERATURE: 98.7 F | BODY MASS INDEX: 48.41 KG/M2 | DIASTOLIC BLOOD PRESSURE: 94 MMHG | SYSTOLIC BLOOD PRESSURE: 142 MMHG | HEIGHT: 61 IN | OXYGEN SATURATION: 100 %

## 2022-07-02 PROCEDURE — 75810000275 HC EMERGENCY DEPT VISIT NO LEVEL OF CARE

## 2022-07-03 ENCOUNTER — HOSPITAL ENCOUNTER (EMERGENCY)
Age: 32
Discharge: LEFT BEFORE DISCHARGE | End: 2022-07-03

## 2022-07-03 NOTE — ED NOTES
Called to waiting area by registration to staff to speak with. Introduced self and role, visitor with pt expressed concerns regarding wait time. Explanation of wait provided. Visitor continued to express concerns. Attempted to explain triage process. At that time, visitor held her hand up and stated, \"I don't want to hear what you have to say. \" Pt ambulatory out of ED. No apparent distress noted at time of departure.

## 2022-08-30 ENCOUNTER — VIRTUAL VISIT (OUTPATIENT)
Dept: INTERNAL MEDICINE CLINIC | Age: 32
End: 2022-08-30
Payer: MEDICAID

## 2022-08-30 DIAGNOSIS — L21.9 SEBORRHEIC DERMATITIS OF SCALP: ICD-10-CM

## 2022-08-30 DIAGNOSIS — B37.2 YEAST DERMATITIS: ICD-10-CM

## 2022-08-30 PROCEDURE — 99213 OFFICE O/P EST LOW 20 MIN: CPT | Performed by: NURSE PRACTITIONER

## 2022-08-30 RX ORDER — NYSTATIN 100000 U/G
OINTMENT TOPICAL 2 TIMES DAILY
Qty: 45 G | Refills: 1 | Status: SHIPPED | OUTPATIENT
Start: 2022-08-30 | End: 2022-10-19

## 2022-08-30 RX ORDER — POTASSIUM CHLORIDE 20 MEQ/1
TABLET, EXTENDED RELEASE ORAL
Qty: 90 TABLET | Refills: 0 | Status: SHIPPED | OUTPATIENT
Start: 2022-08-30 | End: 2022-09-29 | Stop reason: SDUPTHER

## 2022-08-30 RX ORDER — KETOCONAZOLE 20 MG/ML
SHAMPOO TOPICAL
Qty: 120 ML | Refills: 0 | Status: SHIPPED | OUTPATIENT
Start: 2022-08-30 | End: 2022-09-29 | Stop reason: SDUPTHER

## 2022-08-30 NOTE — PROGRESS NOTES
Chief Complaint   Patient presents with    Rash     Thinks she may have ringworms, started on side of thigh x 1 week, has spread to back, arms, chest, and neck        1. \"Have you been to the ER, urgent care clinic since your last visit? Hospitalized since your last visit? \" No    2. \"Have you seen or consulted any other health care providers outside of the [address] [place] [address] since your last visit? \" No     3. For patients aged 39-70: Has the patient had a colonoscopy / FIT/ Cologuard? NA - based on age      If the patient is female:    4. For patients aged 41-77: Has the patient had a mammogram within the past 2 years? NA - based on age or sex      11. For patients aged 21-65: Has the patient had a pap smear?  Yes - no Care Gap present

## 2022-08-30 NOTE — PROGRESS NOTES
Caroline Bucio is a 28 y.o. female who was seen by synchronous (real-time) audio-video technology on 8/30/2022 for Rash (Thinks she may have ringworms, started on side of thigh x 1 week, has spread to back, arms, chest, and neck )        Assessment & Plan:   Diagnoses and all orders for this visit:    1. Seborrheic dermatitis of scalp  -     ketoconazole (NIZORAL) 2 % shampoo; USE WEEKLY AS NEEDED FOR SHAMPOO    2. Yeast dermatitis  -     nystatin (MYCOSTATIN) 100,000 unit/gram ointment; Apply  to affected area two (2) times a day. Other orders  -     potassium chloride (Klor-Con M20) 20 mEq tablet; TAKE 1 TABLET BY MOUTH EVERY WITH DIURETIC (HYDROCHLORTHIAZIDE OR LASIX)      The complexity of medical decision making for this visit is moderate     Refilling nizoral anyway and informed that she may use that to her skin rash as well, recc cool shower, dry off well, loose fitting clothes, nystatin for spot treatment  Fu INI    Subjective:       Prior to Admission medications    Medication Sig Start Date End Date Taking? Authorizing Provider   chlorthalidone (HYGROTON) 25 mg tablet TAKE 1 TABLET BY MOUTH DAILY FOR BLOOD PRESSURE 7/13/22  Yes Padilla Donahue NP   Klor-Con M20 20 mEq tablet TAKE 1 TABLET BY MOUTH EVERY WITH DIURETIC (HYDROCHLORTHIAZIDE OR LASIX) 5/31/22  Yes Padilla Donahue NP   labetaloL (NORMODYNE) 200 mg tablet TAKE 1 TABLET BY MOUTH TWO (2) TIMES A DAY. 1/11/22  Yes Padilla Donahue NP   escitalopram oxalate (LEXAPRO) 10 mg tablet Take 1 Tablet by mouth daily. 11/22/21  Yes Padilla Donahue NP   NIFEdipine ER (ADALAT CC) 30 mg ER tablet Take 1 Tablet by mouth daily. 10/7/21  Yes Padilla Donahue NP   ketoconazole (NIZORAL) 2 % shampoo USE WEEKLY AS NEEDED FOR SHAMPOO 9/30/21  Yes Padilla Donahue NP   tiZANidine (ZANAFLEX) 4 mg tablet Take 1 Tablet by mouth three (3) times daily as needed for Muscle Spasm(s).   Patient not taking: Reported on 12/22/2021 11/22/21 Padma Rodarte NP   pregabalin (LYRICA) 150 mg capsule Take 1 Capsule by mouth three (3) times daily. Max Daily Amount: 450 mg. Patient not taking: Reported on 12/22/2021 11/4/21   Padma Rodarte NP   prenatal vit-iron fumarate-fa 27 mg iron- 0.8 mg tab tablet Take 1 Tablet by mouth daily. Patient not taking: Reported on 8/30/2022 9/14/21   Padma Rodarte NP   nystatin (MYCOSTATIN) 100,000 unit/gram ointment Apply  to affected area two (2) times a day.  7/19/21   Padma Rodarte NP   albuterol (PROVENTIL HFA, VENTOLIN HFA, PROAIR HFA) 90 mcg/actuation inhaler Take 2 Puffs by inhalation every six (6) hours as needed for Wheezing. 4/21/21   Carol Hassan MD         ROS    Rash: to trunk, legs - raised lesions forming a ring, itchy  Spreading  Thought it was new body wash but changed back to regular dial soap and s/s still persist      Objective:     Patient-Reported Vitals 2/7/2022   Patient-Reported Weight 264.0lb   Patient-Reported Height -   Patient-Reported Pulse 90   Patient-Reported Temperature 98.1   Patient-Reported SpO2 97   Patient-Reported Systolic  311   Patient-Reported Diastolic 94   Patient-Reported Peak Flow -   Patient-Reported LMP -        [INSTRUCTIONS:  \"[x]\" Indicates a positive item  \"[]\" Indicates a negative item  -- DELETE ALL ITEMS NOT EXAMINED]    Constitutional: [x] Appears well-developed and well-nourished [x] No apparent distress      [] Abnormal -     Mental status: [x] Alert and awake  [x] Oriented to person/place/time [x] Able to follow commands    [] Abnormal -     Eyes:   EOM    [x]  Normal    [] Abnormal -   Sclera  [x]  Normal    [] Abnormal -          Discharge [x]  None visible   [] Abnormal -     HENT: [x] Normocephalic, atraumatic  [] Abnormal -   [x] Mouth/Throat: Mucous membranes are moist    External Ears [x] Normal  [] Abnormal -    Neck: [x] No visualized mass [] Abnormal -     Pulmonary/Chest: [x] Respiratory effort normal   [x] No visualized signs of difficulty breathing or respiratory distress        [] Abnormal -      Musculoskeletal:   [x] Normal gait with no signs of ataxia         [x] Normal range of motion of neck        [] Abnormal -     Neurological:        [x] No Facial Asymmetry (Cranial nerve 7 motor function) (limited exam due to video visit)          [x] No gaze palsy        [] Abnormal -          Skin:        [x] No significant exanthematous lesions or discoloration noted on facial skin         [] Abnormal -            Psychiatric:       [x] Normal Affect [] Abnormal -        [x] No Hallucinations    Other pertinent observable physical exam findings:-        We discussed the expected course, resolution and complications of the diagnosis(es) in detail. Medication risks, benefits, costs, interactions, and alternatives were discussed as indicated. I advised her to contact the office if her condition worsens, changes or fails to improve as anticipated. She expressed understanding with the diagnosis(es) and plan. Sophie Hawkins, was evaluated through a synchronous (real-time) audio-video encounter. The patient (or guardian if applicable) is aware that this is a billable service, which includes applicable co-pays. This Virtual Visit was conducted with patient's (and/or legal guardian's) consent. The visit was conducted pursuant to the emergency declaration under the [address] [place] [address] authority and the [employer] General Act. Patient identification was verified, and a caregiver was present when appropriate. The patient was located at: Home: Sandra Rodriguez [address] [place] [phone]  The provider was located at: Home: [unfilled]        [facility].  Lul Hicks NP

## 2022-09-29 DIAGNOSIS — F33.1 MODERATE EPISODE OF RECURRENT MAJOR DEPRESSIVE DISORDER (HCC): ICD-10-CM

## 2022-09-29 DIAGNOSIS — L21.9 SEBORRHEIC DERMATITIS OF SCALP: ICD-10-CM

## 2022-09-29 DIAGNOSIS — I10 ESSENTIAL HYPERTENSION: ICD-10-CM

## 2022-09-29 NOTE — TELEPHONE ENCOUNTER
Patient is requesting to schedule an appt in another encounter    Last Visit: 8/30/22 with DANNY Naylor  Previous Refill Encounter(s): 8/30/22 Nizoral #120 ml & Klor-Con #90, 7/13/22 [place] #90, 11/22/21 Lexapro #30 with 2 refills    Requested Prescriptions     Pending Prescriptions Disp Refills    escitalopram oxalate (LEXAPRO) 10 mg tablet 90 Tablet 1     Sig: Take 1 Tablet by mouth daily. chlorthalidone (HYGROTON) 25 mg tablet 90 Tablet 1     Sig: Take 1 Tablet by mouth daily. Indications: blood pressure    ketoconazole (NIZORAL) 2 % shampoo 120 mL 1     Sig: USE WEEKLY AS NEEDED FOR SHAMPOO    potassium chloride (Klor-Con M20) 20 mEq tablet 90 Tablet 1     Sig: TAKE 1 TABLET BY MOUTH EVERY WITH DIURETIC (HYDROCHLORTHIAZIDE OR LASIX)         For Pharmacy [address] in place:   Recommendation Provided To:    Intervention Detail: New Rx: 4, reason: Patient Preference  Gap Closed?:   Intervention Accepted By:   Time Spent (min): 5

## 2022-09-30 RX ORDER — POTASSIUM CHLORIDE 20 MEQ/1
TABLET, EXTENDED RELEASE ORAL
Qty: 90 TABLET | Refills: 1 | Status: SHIPPED | OUTPATIENT
Start: 2022-09-30 | End: 2022-10-19 | Stop reason: SDUPTHER

## 2022-09-30 RX ORDER — CHLORTHALIDONE 25 MG/1
25 TABLET ORAL DAILY
Qty: 90 TABLET | Refills: 1 | Status: SHIPPED | OUTPATIENT
Start: 2022-09-30 | End: 2022-10-19 | Stop reason: SDUPTHER

## 2022-09-30 RX ORDER — ESCITALOPRAM OXALATE 10 MG/1
10 TABLET ORAL DAILY
Qty: 90 TABLET | Refills: 1 | Status: SHIPPED | OUTPATIENT
Start: 2022-09-30 | End: 2022-10-19

## 2022-09-30 RX ORDER — KETOCONAZOLE 20 MG/ML
SHAMPOO TOPICAL
Qty: 120 ML | Refills: 1 | Status: SHIPPED | OUTPATIENT
Start: 2022-09-30 | End: 2022-10-19 | Stop reason: SDUPTHER

## 2022-10-19 ENCOUNTER — OFFICE VISIT (OUTPATIENT)
Dept: INTERNAL MEDICINE CLINIC | Age: 32
End: 2022-10-19
Payer: MEDICAID

## 2022-10-19 VITALS
BODY MASS INDEX: 50.03 KG/M2 | WEIGHT: 265 LBS | HEART RATE: 98 BPM | DIASTOLIC BLOOD PRESSURE: 113 MMHG | RESPIRATION RATE: 18 BRPM | TEMPERATURE: 98.1 F | OXYGEN SATURATION: 96 % | HEIGHT: 61 IN | SYSTOLIC BLOOD PRESSURE: 180 MMHG

## 2022-10-19 DIAGNOSIS — B36.0 TINEA VERSICOLOR: Primary | ICD-10-CM

## 2022-10-19 DIAGNOSIS — I10 ESSENTIAL HYPERTENSION: ICD-10-CM

## 2022-10-19 DIAGNOSIS — F34.1 DYSTHYMIA: ICD-10-CM

## 2022-10-19 DIAGNOSIS — L21.9 SEBORRHEIC DERMATITIS OF SCALP: ICD-10-CM

## 2022-10-19 DIAGNOSIS — N92.6 LATE MENSES: ICD-10-CM

## 2022-10-19 PROCEDURE — 81025 URINE PREGNANCY TEST: CPT | Performed by: NURSE PRACTITIONER

## 2022-10-19 PROCEDURE — 99214 OFFICE O/P EST MOD 30 MIN: CPT | Performed by: NURSE PRACTITIONER

## 2022-10-19 RX ORDER — POTASSIUM CHLORIDE 20 MEQ/1
TABLET, EXTENDED RELEASE ORAL
Qty: 90 TABLET | Refills: 1 | Status: SHIPPED | OUTPATIENT
Start: 2022-10-19

## 2022-10-19 RX ORDER — SERTRALINE HYDROCHLORIDE 50 MG/1
50 TABLET, FILM COATED ORAL DAILY
Qty: 90 TABLET | Refills: 0 | Status: CANCELLED | OUTPATIENT
Start: 2022-10-19

## 2022-10-19 RX ORDER — ESCITALOPRAM OXALATE 10 MG/1
10 TABLET ORAL DAILY
Qty: 90 TABLET | Refills: 0 | Status: SHIPPED | OUTPATIENT
Start: 2022-10-19

## 2022-10-19 RX ORDER — NIFEDIPINE 30 MG/1
30 TABLET, FILM COATED, EXTENDED RELEASE ORAL DAILY
Qty: 90 TABLET | Refills: 0 | Status: SHIPPED | OUTPATIENT
Start: 2022-10-19

## 2022-10-19 RX ORDER — LABETALOL 200 MG/1
200 TABLET, FILM COATED ORAL 2 TIMES DAILY
Qty: 180 TABLET | Refills: 0 | Status: SHIPPED | OUTPATIENT
Start: 2022-10-19

## 2022-10-19 RX ORDER — KETOCONAZOLE 20 MG/ML
SHAMPOO TOPICAL
Qty: 120 ML | Refills: 1 | Status: SHIPPED | OUTPATIENT
Start: 2022-10-19

## 2022-10-19 RX ORDER — SERTRALINE HYDROCHLORIDE 50 MG/1
TABLET, FILM COATED ORAL
COMMUNITY
Start: 2022-10-16 | End: 2022-10-19

## 2022-10-19 RX ORDER — CHLORTHALIDONE 25 MG/1
25 TABLET ORAL DAILY
Qty: 90 TABLET | Refills: 1 | Status: SHIPPED | OUTPATIENT
Start: 2022-10-19

## 2022-10-19 NOTE — PROGRESS NOTES
Subjective: (As above and below)     Chief Complaint   Patient presents with    Ringworm     Getting worse, on right arm and back      Maggie Aguilar is a 28y.o. year old female who presents for     Rash: c/w tinea versicolor, rx'd ketoconazole shampoo- it does help- her chest and arms are cleared but still has widespread rash to back, she does endorse that she can't get her to back as well to apply    HTN: she is out of nifedipine, she had been fluctuating to desire for pregnancy, she states that she does not plan on pregnancy at this time  Diet is likely     Depression: stable on lexapro      Reviewed PmHx, RxHx, FmHx, SocHx, AllgHx and updated in chart. Family History   Problem Relation Age of Onset    Hypertension Maternal Grandmother        Past Medical History:   Diagnosis Date    Asthma     Asthma     has not had any problems no inhaler    Class 3 obesity in adult 2017    Essential hypertension 2017    H/O pilonidal cyst     Hidradenitis 2018    Psychiatric disorder     ANXIETY    Vaginal delivery       Social History     Socioeconomic History    Marital status: SINGLE   Tobacco Use    Smoking status: Some Days     Packs/day: 0.25     Types: Cigars, Cigarettes     Last attempt to quit: 3/1/2011     Years since quittin.6    Smokeless tobacco: Never   Vaping Use    Vaping Use: Never used   Substance and Sexual Activity    Alcohol use: No    Drug use: Yes     Types: Marijuana     Comment: Patient reports taking an edible at 2200    Sexual activity: Yes     Partners: Male     Birth control/protection: None          Current Outpatient Medications   Medication Sig    labetaloL (NORMODYNE) 200 mg tablet Take 1 Tablet by mouth two (2) times a day. chlorthalidone (HYGROTON) 25 mg tablet Take 1 Tablet by mouth daily.  Indications: blood pressure    ketoconazole (NIZORAL) 2 % shampoo USE WEEKLY AS NEEDED FOR SHAMPOO    potassium chloride (Klor-Con M20) 20 mEq tablet TAKE 1 TABLET BY MOUTH EVERY WITH DIURETIC (HYDROCHLORTHIAZIDE OR LASIX)    escitalopram oxalate (LEXAPRO) 10 mg tablet Take 1 Tablet by mouth daily. For depression    NIFEdipine ER (ADALAT CC) 30 mg ER tablet Take 1 Tablet by mouth daily. No current facility-administered medications for this visit. Review of Systems:   Constitutional:    Negative for fever and chills, negative diaphoresis. HEENT:              Negative for neck pain and stiffness. Eyes:                  Negative for visual disturbance, itching, redness or discharge. Respiratory:        Negative for cough and shortness of breath. Cardiovascular:  Negative for chest pain and palpitations. Gastrointestinal: Negative for nausea, vomiting, abdominal pain, diarrhea or constipation. Genitourinary:     Negative for dysuria and frequency. Musculoskeletal: Negative for falls, tenderness and swelling. Skin:                    Negative for rash, masses or lesions. Neurological:       Negative for dizzyness, seizure, loss of consciousness, weakness and numbness. Objective:     Vitals:    10/19/22 1114 10/19/22 1144   BP: (!) 195/122 (!) 180/113   Pulse: 96 98   Resp: 18    Temp: 98.1 °F (36.7 °C)    TempSrc: Temporal    SpO2: 96%    Weight: 265 lb (120.2 kg)    Height: 5' 1\" (1.549 m)      Results for orders placed or performed in visit on 10/19/22   AMB POC URINE PREGNANCY TEST, VISUAL COLOR COMPARISON   Result Value Ref Range    VALID INTERNAL CONTROL POC Yes     HCG urine, Ql. (POC) Negative Negative         Gen: Oriented to person, place and time and well-developed, well-nourished and in no distress. HEENT:    Head: normocephalic and atraumatic. Eyes:  EOM are normal. Pupils equal and round. Neck:  Normal range of motion. Neck supple. Cardiovascular: normal rate, regular rhythm and normal heart sounds. Pulmonary/Chest:  Effort normal and breath sounds normal.  No respiratory distress. No wheezes, no rales.    Abdominal: soft, normal  bowel sounds. Musculoskeletal:  No edema, no tenderness. No calf tenderness or edema. Neurological:  Alert, oriented to person, place and time. Skin: skin is warm and dry. Back: light colored flat dry patches scattered      Assessment/ Plan:     Follow-up and Dispositions    Return in about 2 weeks (around 11/2/2022) for nv bp check. 1. Essential hypertension  Resume nifedipine  Discussed need for close fu and med adherence  - labetaloL (NORMODYNE) 200 mg tablet; Take 1 Tablet by mouth two (2) times a day. Dispense: 180 Tablet; Refill: 0  - chlorthalidone (HYGROTON) 25 mg tablet; Take 1 Tablet by mouth daily. Indications: blood pressure  Dispense: 90 Tablet; Refill: 1  - METABOLIC PANEL, COMPREHENSIVE  - CBC W/O DIFF  - LIPID PANEL  - NIFEdipine ER (ADALAT CC) 30 mg ER tablet; Take 1 Tablet by mouth daily. Dispense: 90 Tablet; Refill: 0    2. Seborrheic dermatitis of scalp      3. Tinea versicolor    - ketoconazole (NIZORAL) 2 % shampoo; USE WEEKLY AS NEEDED FOR SHAMPOO  Dispense: 120 mL; Refill: 1  - REFERRAL TO DERMATOLOGY    4. Late menses    - AMB POC URINE PREGNANCY TEST, VISUAL COLOR COMPARISON    5. Dysthymia    - escitalopram oxalate (LEXAPRO) 10 mg tablet; Take 1 Tablet by mouth daily. For depression  Dispense: 90 Tablet; Refill: 0          I have discussed the diagnosis with the patient and the intended plan as seen in the above orders. The patient has received an after-visit summary and questions were answered concerning future plans. Pt conveyed understanding of plan. Medication Side Effects and Warnings were discussed with patient: yes  Patient Labs were reviewed: yes  Patient Past Records were reviewed:  yes    Camilo Speaker.  Vika Aguilar NP

## 2022-10-19 NOTE — PROGRESS NOTES
Chief Complaint   Patient presents with    Ringworm     Getting worse, on right arm and back        1. \"Have you been to the ER, urgent care clinic since your last visit? Hospitalized since your last visit? \" No    2. \"Have you seen or consulted any other health care providers outside of the [address] [place] [address] since your last visit? \" No     3. For patients aged 39-70: Has the patient had a colonoscopy / FIT/ Cologuard? NA - based on age      If the patient is female:    4. For patients aged 41-77: Has the patient had a mammogram within the past 2 years? NA - based on age or sex      11. For patients aged 21-65: Has the patient had a pap smear?  Yes - no Care Gap present

## 2022-10-20 LAB
HCG URINE, QL. (POC): NEGATIVE
VALID INTERNAL CONTROL?: YES

## 2022-11-21 DIAGNOSIS — I10 ESSENTIAL HYPERTENSION: ICD-10-CM

## 2022-11-21 DIAGNOSIS — B36.0 TINEA VERSICOLOR: ICD-10-CM

## 2022-11-21 DIAGNOSIS — F34.1 DYSTHYMIA: ICD-10-CM

## 2022-11-21 RX ORDER — POTASSIUM CHLORIDE 20 MEQ/1
TABLET, EXTENDED RELEASE ORAL
Qty: 90 TABLET | Refills: 1 | OUTPATIENT
Start: 2022-11-21

## 2022-11-21 RX ORDER — KETOCONAZOLE 20 MG/ML
SHAMPOO, SUSPENSION TOPICAL
Qty: 120 ML | Refills: 1 | OUTPATIENT
Start: 2022-11-21

## 2022-11-21 RX ORDER — ESCITALOPRAM OXALATE 10 MG/1
10 TABLET ORAL DAILY
Qty: 90 TABLET | Refills: 0 | OUTPATIENT
Start: 2022-11-21

## 2022-11-21 RX ORDER — LABETALOL 200 MG/1
200 TABLET, FILM COATED ORAL 2 TIMES DAILY
Qty: 180 TABLET | Refills: 0 | OUTPATIENT
Start: 2022-11-21

## 2022-11-21 RX ORDER — NIFEDIPINE 30 MG/1
30 TABLET, FILM COATED, EXTENDED RELEASE ORAL DAILY
Qty: 90 TABLET | Refills: 0 | OUTPATIENT
Start: 2022-11-21

## 2022-11-21 RX ORDER — CHLORTHALIDONE 25 MG/1
25 TABLET ORAL DAILY
Qty: 90 TABLET | Refills: 1 | OUTPATIENT
Start: 2022-11-21

## 2022-11-21 NOTE — TELEPHONE ENCOUNTER
Duplicate requests: All 6 requested medications where sent to [id number] on 10/19/2022. For Pharmacy Admin Tracking Only    Intervention Detail: Discontinued Rx: 6, reason: Duplicate Therapy  Time Spent (min): 5    Requested Prescriptions     Pending Prescriptions Disp Refills    labetaloL (NORMODYNE) 200 mg tablet 180 Tablet 0     Sig: Take 1 Tablet by mouth two (2) times a day. chlorthalidone (HYGROTON) 25 mg tablet 90 Tablet 1     Sig: Take 1 Tablet by mouth daily. Indications: blood pressure    ketoconazole (NIZORAL) 2 % shampoo 120 mL 1     Sig: USE WEEKLY AS NEEDED FOR SHAMPOO    potassium chloride (Klor-Con M20) 20 mEq tablet 90 Tablet 1     Sig: TAKE 1 TABLET BY MOUTH EVERY WITH DIURETIC (HYDROCHLORTHIAZIDE OR LASIX)    escitalopram oxalate (LEXAPRO) 10 mg tablet 90 Tablet 0     Sig: Take 1 Tablet by mouth daily. For depression    NIFEdipine ER (ADALAT CC) 30 mg ER tablet 90 Tablet 0     Sig: Take 1 Tablet by mouth daily.

## 2023-01-05 ENCOUNTER — NURSE TRIAGE (OUTPATIENT)
Dept: OTHER | Facility: CLINIC | Age: 33
End: 2023-01-05

## 2023-01-05 NOTE — TELEPHONE ENCOUNTER
Location of patient: [address] [facility]  call from Sheryle Dus at [facility] with [employer]. Subjective: Caller states \"My blood pressure has been running high. \"     Current Symptoms: elevated blood pressure  3-4 days it  was- 209/119  2 days ago it was 180/100     Intermittent chest pain when 209/119  Intermittent SOB with exertion    Denies HA,blurred vision, dizziness    Onset: a few weeks  intermittent    Associated Symptoms: reduced activity    Pain Severity: 0/10; Temperature: n/a     What has been tried: nothing    LMP: NA Pregnant: No    Recommended disposition:  ED f/u    Care advice provided, patient verbalizes understanding; denies any other questions or concerns; instructed to call back for any new or worsening symptoms. Patient/Caller agrees with recommended disposition; writer provided warm transfer to [employer] at [facility] for appointment scheduling    Attention Provider: Thank you for allowing me to participate in the care of your patient. The patient was connected to triage in response to information provided to the [facility]. Please do not respond through this encounter as the response is not directed to a shared pool.         Reason for Disposition   [1] Recent medical visit within 24 hours AND [2] condition / symptoms SAME (unchanged) AND [3] caller has additional questions triager can answer    Protocols used: Recent Medical Visit for Illness Follow-up Call-ADULT-

## 2023-01-06 ENCOUNTER — VIRTUAL VISIT (OUTPATIENT)
Dept: INTERNAL MEDICINE CLINIC | Age: 33
End: 2023-01-06
Payer: MEDICAID

## 2023-01-06 DIAGNOSIS — L73.2 HIDRADENITIS: Primary | ICD-10-CM

## 2023-01-06 DIAGNOSIS — I10 ESSENTIAL HYPERTENSION: ICD-10-CM

## 2023-01-06 DIAGNOSIS — R73.09 ELEVATED GLUCOSE: ICD-10-CM

## 2023-01-06 DIAGNOSIS — F34.1 DYSTHYMIA: ICD-10-CM

## 2023-01-06 RX ORDER — LABETALOL 200 MG/1
200 TABLET, FILM COATED ORAL 2 TIMES DAILY
Qty: 180 TABLET | Refills: 0 | Status: SHIPPED | OUTPATIENT
Start: 2023-01-06

## 2023-01-06 RX ORDER — ESCITALOPRAM OXALATE 10 MG/1
10 TABLET ORAL DAILY
Qty: 90 TABLET | Refills: 0 | Status: SHIPPED | OUTPATIENT
Start: 2023-01-06

## 2023-01-06 RX ORDER — CHLORTHALIDONE 25 MG/1
25 TABLET ORAL DAILY
Qty: 90 TABLET | Refills: 1 | Status: SHIPPED | OUTPATIENT
Start: 2023-01-06

## 2023-01-06 RX ORDER — NIFEDIPINE 30 MG/1
30 TABLET, FILM COATED, EXTENDED RELEASE ORAL DAILY
Qty: 90 TABLET | Refills: 0 | Status: SHIPPED | OUTPATIENT
Start: 2023-01-06

## 2023-01-06 NOTE — PROGRESS NOTES
Domenic Colon is a 28 y.o. female who was seen by synchronous (real-time) audio-video technology on 1/6/2023 for No chief complaint on file. Assessment & Plan:   Diagnoses and all orders for this visit:    1. Hidradenitis  -     REFERRAL TO DERMATOLOGY    2. Essential hypertension  -     labetaloL (NORMODYNE) 200 mg tablet; Take 1 Tablet by mouth two (2) times a day. -     NIFEdipine ER (ADALAT CC) 30 mg ER tablet; Take 1 Tablet by mouth daily. -     chlorthalidone (HYGROTON) 25 mg tablet; Take 1 Tablet by mouth daily. Indications: blood pressure  -     METABOLIC PANEL, COMPREHENSIVE; Future  -     CBC W/O DIFF; Future  -     LIPID PANEL; Future    3. Dysthymia  -     escitalopram oxalate (LEXAPRO) 10 mg tablet; Take 1 Tablet by mouth daily. For depression    4. Elevated glucose  -     HEMOGLOBIN A1C WITH EAG; Future    The complexity of medical decision making for this visit is moderate     Home BP check  Labs      Subjective:       Prior to Admission medications    Medication Sig Start Date End Date Taking? Authorizing Provider   labetaloL (NORMODYNE) 200 mg tablet Take 1 Tablet by mouth two (2) times a day. 1/6/23  Yes Melissa Broderick NP   NIFEdipine ER (ADALAT CC) 30 mg ER tablet Take 1 Tablet by mouth daily. 1/6/23  Yes Melissa Broderick NP   chlorthalidone (HYGROTON) 25 mg tablet Take 1 Tablet by mouth daily. Indications: blood pressure 1/6/23  Yes Melissa Broderick NP   escitalopram oxalate (LEXAPRO) 10 mg tablet Take 1 Tablet by mouth daily. For depression 1/6/23  Yes Melissa Broderick NP   ketoconazole (NIZORAL) 2 % shampoo USE WEEKLY AS NEEDED FOR SHAMPOO 10/19/22   Neha QUINN, DANNY   potassium chloride (Klor-Con M20) 20 mEq tablet TAKE 1 TABLET BY MOUTH EVERY WITH DIURETIC (HYDROCHLORTHIAZIDE OR LASIX) 10/19/22   Melissa Broderick NP   labetaloL (NORMODYNE) 200 mg tablet Take 1 Tablet by mouth two (2) times a day.  10/19/22 1/6/23  Melissa Broderick NP chlorthalidone (HYGROTON) 25 mg tablet Take 1 Tablet by mouth daily. Indications: blood pressure 10/19/22 1/6/23  Abeba QUINN, NP   escitalopram oxalate (LEXAPRO) 10 mg tablet Take 1 Tablet by mouth daily. For depression 10/19/22 1/6/23  Lord Flash., NP   NIFEdipine ER (ADALAT CC) 30 mg ER tablet Take 1 Tablet by mouth daily.  10/19/22 1/6/23  Lord Flash., NP         ROS    Hypertension ROS:  taking medications as instructed, no medication side effects noted, no TIAs, no chest pain on exertion, no dyspnea on exertion, no swelling of ankles    BP Readings from Last 3 Encounters:   10/19/22 (!) 180/113   07/02/22 (!) 142/94   06/30/22 (!) 166/108          Depression; stable      ER follow up on 12/31/22 for abscess which has self drained, she has dx of hiadrenitis  Has recurrent axillary lesions   Had sx once for this  Has not seen derm yet          Objective:     Patient-Reported Vitals 2/7/2022   Patient-Reported Weight 264.0lb   Patient-Reported Height -   Patient-Reported Pulse 90   Patient-Reported Temperature 98.1   Patient-Reported SpO2 97   Patient-Reported Systolic  796   Patient-Reported Diastolic 94   Patient-Reported Peak Flow -   Patient-Reported LMP -        [INSTRUCTIONS:  \"[x]\" Indicates a positive item  \"[]\" Indicates a negative item  -- DELETE ALL ITEMS NOT EXAMINED]    Constitutional: [x] Appears well-developed and well-nourished [x] No apparent distress      [] Abnormal -     Mental status: [x] Alert and awake  [x] Oriented to person/place/time [x] Able to follow commands    [] Abnormal -     Eyes:   EOM    [x]  Normal    [] Abnormal -   Sclera  [x]  Normal    [] Abnormal -          Discharge [x]  None visible   [] Abnormal -     HENT: [x] Normocephalic, atraumatic  [] Abnormal -   [x] Mouth/Throat: Mucous membranes are moist    External Ears [x] Normal  [] Abnormal -    Neck: [x] No visualized mass [] Abnormal -     Pulmonary/Chest: [x] Respiratory effort normal [x] No visualized signs of difficulty breathing or respiratory distress        [] Abnormal -      Musculoskeletal:   [x] Normal gait with no signs of ataxia         [x] Normal range of motion of neck        [] Abnormal -     Neurological:        [x] No Facial Asymmetry (Cranial nerve 7 motor function) (limited exam due to video visit)          [x] No gaze palsy        [] Abnormal -          Skin:        [x] No significant exanthematous lesions or discoloration noted on facial skin         [] Abnormal -            Psychiatric:       [x] Normal Affect [] Abnormal -        [x] No Hallucinations    Other pertinent observable physical exam findings:-        We discussed the expected course, resolution and complications of the diagnosis(es) in detail. Medication risks, benefits, costs, interactions, and alternatives were discussed as indicated. I advised her to contact the office if her condition worsens, changes or fails to improve as anticipated. She expressed understanding with the diagnosis(es) and plan. Maura Kanner, was evaluated through a synchronous (real-time) audio-video encounter. The patient (or guardian if applicable) is aware that this is a billable service, which includes applicable co-pays. This Virtual Visit was conducted with patient's (and/or legal guardian's) consent. The visit was conducted pursuant to the emergency declaration under the [address] [place] [address] and the [employer] General Act. Patient identification was verified, and a caregiver was present when appropriate. The patient was located at: Home: Sandra Rodriguez [address] [place] [phone]  The provider was located at: Home: [unfilled]        [facility].  Julisa Ho NP

## 2023-01-31 ENCOUNTER — OFFICE VISIT (OUTPATIENT)
Dept: SURGERY | Age: 33
End: 2023-01-31
Payer: MEDICAID

## 2023-01-31 VITALS
WEIGHT: 283 LBS | HEIGHT: 61 IN | OXYGEN SATURATION: 95 % | RESPIRATION RATE: 18 BRPM | SYSTOLIC BLOOD PRESSURE: 193 MMHG | HEART RATE: 93 BPM | TEMPERATURE: 98.2 F | DIASTOLIC BLOOD PRESSURE: 106 MMHG | BODY MASS INDEX: 53.43 KG/M2

## 2023-01-31 DIAGNOSIS — E66.01 MORBID OBESITY (HCC): Primary | ICD-10-CM

## 2023-01-31 PROCEDURE — 3080F DIAST BP >= 90 MM HG: CPT | Performed by: SURGERY

## 2023-01-31 PROCEDURE — 3077F SYST BP >= 140 MM HG: CPT | Performed by: SURGERY

## 2023-01-31 PROCEDURE — 99204 OFFICE O/P NEW MOD 45 MIN: CPT | Performed by: SURGERY

## 2023-01-31 NOTE — PROGRESS NOTES
Identified pt with two pt identifiers (name and ). Reviewed chart in preparation for visit and have obtained necessary documentation. Ernesto Adams is a 28 y.o. female  Chief Complaint   Patient presents with    New Patient    Morbid Obesity     New Bariatric patient, paperwork completed and scanned. Visit Vitals  BP (!) 193/106 (BP 1 Location: Right lower arm, BP Patient Position: Sitting, BP Cuff Size: Large adult)   Pulse 93   Temp 98.2 °F (36.8 °C) (Oral)   Resp 18   Ht 5' 1\" (1.549 m)   Wt 283 lb (128.4 kg)   SpO2 95%   BMI 53.47 kg/m²       1. Have you been to the ER, urgent care clinic since your last visit? Hospitalized since your last visit? No    2. Have you seen or consulted any other health care providers outside of the [address] [place] [address] since your last visit? Include any pap smears or colon screening. No       I asked the patient about taking her BP medications. She informed me she had not taken them today. She has been out of them for the past two days. I told her she should keep an eye on her BP because she may need a dosage adjustment but your PCP should be aware of high blood pressures. She left without me repeating her BP.

## 2023-01-31 NOTE — PROGRESS NOTES
Bariatric Surgery Consultation    CC: Obesity  Subjective: The patient is a 28 y.o. obese  female with a Body mass index is 53.47 kg/m². . They have been considering surgery for some time now. The patient presents to the clinic today to discuss surgical weight loss options. They have made multiple attempts at weight loss over the years without success. They have tried diet and exercise. Unfortunately, none of these have lead to meaningful, sustained weight loss. The patient now suffers from multiple medical conditions related to their obesity including sleep apnea and HTN. Relevant previous surgical history includes: nati an. They have attended the bariatric seminar before the visit. The patient's goals for the surgery include: lose weight and be healthier. Tobacco use: no  GERD/hiatal hernia: sometimes  Sleep Apnea assessment: Uses CPAP    Patient Active Problem List    Diagnosis Date Noted    Obesity, morbid ([facility] [address] 03/01/2018    Hidradenitis 02/01/2018    Sebaceous cyst 11/16/2017    Class 3 obesity in adult 11/16/2017    Essential hypertension 11/16/2017      Past Medical History:   Diagnosis Date    Asthma     Asthma     has not had any problems no inhaler    Class 3 obesity in adult 11/16/2017    Essential hypertension 11/16/2017    H/O pilonidal cyst     Hidradenitis 2/1/2018    Psychiatric disorder     ANXIETY    Vaginal delivery      Past Surgical History:   Procedure Laterality Date    HX APPENDECTOMY  2017    HX BREAST REDUCTION  12/2020    HX ORTHOPAEDIC      Broke l foot as child    HX OTHER SURGICAL      left foot on third toe     HX OTHER SURGICAL      Laparoscopic cholecystectomy. HX OTHER SURGICAL  04/19/2016    Incision and drainage of pilonidal abscess; Dr. Madelaine Perdue. HX OTHER SURGICAL  08/22/2016    Incision, drainage, and excision of pilonidal cyst; Dr. Madelaine Perdue. HX OTHER SURGICAL  04/17/2018    Incision and drainage of pilonidal abscess; Dr. Madelaine Perdue. HX OTHER SURGICAL  02/15/2018    Excision of left axilla hidradenitis; Dr. Larry Camejo. HX OTHER SURGICAL  2017    Excision of sebaceous cyst, right inframammary fold; Dr. Larry Camejo. HX OTHER SURGICAL  2018    Incision and drainage of pilonidal abscess; Dr. Stoney Alonso. HX OTHER SURGICAL  2019    Incision and drainage of pilonidal abscess; Dr. Stoney Alonso. Social History     Tobacco Use    Smoking status: Some Days     Packs/day: 0.25     Types: Cigars, Cigarettes     Last attempt to quit: 3/1/2011     Years since quittin.9    Smokeless tobacco: Never   Substance Use Topics    Alcohol use: No      Family History   Problem Relation Age of Onset    Hypertension Maternal Grandmother       Prior to Admission medications    Medication Sig Start Date End Date Taking? Authorizing Provider   labetaloL (NORMODYNE) 200 mg tablet Take 1 Tablet by mouth two (2) times a day. 23  Yes Asiya Call., NP   NIFEdipine ER (ADALAT CC) 30 mg ER tablet Take 1 Tablet by mouth daily. 23  Yes Asiya Call., NP   chlorthalidone (HYGROTON) 25 mg tablet Take 1 Tablet by mouth daily. Indications: blood pressure 23  Yes Asiya Call., NP   escitalopram oxalate (LEXAPRO) 10 mg tablet Take 1 Tablet by mouth daily. For depression 23  Yes Asiya Call., NP   ketoconazole (NIZORAL) 2 % shampoo USE WEEKLY AS NEEDED FOR SHAMPOO 10/19/22  Yes Asiya Call., NP   potassium chloride (Klor-Con M20) 20 mEq tablet TAKE 1 TABLET BY MOUTH EVERY WITH DIURETIC (HYDROCHLORTHIAZIDE OR LASIX) 10/19/22  Yes Asiya Call., NP     Allergies   Allergen Reactions    Aleve [Naproxen Sodium] Hives    Darvocet A500 [Propoxyphene N-Acetaminophen] Hives    Bactrim [Sulfamethoprim Ds] Other (comments)     Abdominal pain.     Lortab [Hydrocodone-Acetaminophen] Nausea and Vomiting        Review of Systems:    Constitutional: No fever or chills  Neurologic: No headache  Eyes: No scleral icterus or irritated eyes  Nose: No nasal pain or drainage  Mouth: No oral lesions or sore throat  Cardiac: No palpations or chest pain  Pulmonary: No cough or shortness of breath  Gastrointestinal: No nausea, emesis, diarrhea, or constipation  Genitourinary: No dysuria  Musculoskeletal: No muscle or joint tenderness  Skin: No rashes or lesions  Psychiatric: No anxiety or depressed mood      Objective:     Physical Exam:  Visit Vitals  BP (!) 193/106 (BP 1 Location: Right lower arm, BP Patient Position: Sitting, BP Cuff Size: Large adult)   Pulse 93   Temp 98.2 °F (36.8 °C) (Oral)   Resp 18   Ht 5' 1\" (1.549 m)   Wt 283 lb (128.4 kg)   SpO2 95%   BMI 53.47 kg/m²     General: No acute distress, conversant  Eyes: PERRLA, no scleral icterus  HENT: Normocephalic without oral lesions  Neck: Trachea midline without LAD  Cardiac: Normal pulse rate and rhythm  Pulmonary: Symmetric chest rise with normal effort  GI: Soft, obese, NT, ND, no hernia, no splenomegaly  Skin: Warm without rash  Extremities: No edema or joint stiffness  Psych: Appropriate mood and affect    Assessment:     Morbid obesity with comorbidities  Plan:   The patient presents today with multiple complications relating to their obesity as detailed above. The patient has made multiple unsuccessful attempts at weight loss as detailed above. The patient desires surgical weight loss for a better chance of lifelong weight control and control of co-morbidities. They have attended the bariatric seminar and meet the qualifications for surgery based on the NIH criteria. We have discussed the various surgical options including the robotic sleeve gastrectomy, robotic gastric bypass, laparoscopic vs robotic single anastomosis duodenal ileostomy (YOHAN), and duodenal switch. We also discussed none operative alternatives. The patient is currently interested in the gastric bypass. I believe they are a good candidate for this procedure.     They will need to a/an UGI to evaluate their anatomy pre operatively. H pylori antigen ordered as well. The patient will be required to not gain weight during this period if starting BMI is 35-40, lose 5% of starting weight if BMI is >40, or lose 10% of starting weight if BMI >60 during the supervised weight loss / pre operative process before our next visit for pre-operative consents. The goal for this patient is to lose 14 lbs. We have discussed the possible complications of bariatric surgery which include but are not limited to failed weight loss, weight regain, malnutrition, leak, bleed, stricture, gastric ulcer, gastric fistula, gastric bleed, gallstones, new or worsening gastric reflux, nausea, emesis, internal hernia, abdominal wall hernia, gastric perforation, need for revision / conversion / or reversal, pregnancy complications and loss, intestinal ischemia, post operative skin complications, possible thinning of their hair, bowel obstruction, dumping syndrome, wound infection, blood clots (DVT, mesenteric thrombus, pulmonary embolism), increased addictive tendency, risk of anesthesia, and death. The patient understands this is a life altering decision and will require compliance to the program for the remainder of their life in order to be monitored to avoid complication and ensure successful, sustained weight control. They will be placed on a lifelong low carbohydrate and low sugar diet, exercise, and vitamin regimen and will require frequent blood draws and office visits to ensure adequate nutrition and program compliance. Visits and follow up will be in compliance with the guidelines set forth by [facility]. I have specifically mentioned the need to avoid all personal and second-hand tobacco exposure, systemic steroids, and NSAIDS after any bariatric surgery to help avoid the above listed complications.  The patient has expressed understanding of the above and would like to enroll in the program. The patient will be submitted for medical and psychological clearance along with establishing with out dietician and joining the pre / post operative support group. They will be screened for depression and sleep apnea and treated pre operatively if needed. The patient will have to demonstrate cessation of tobacco if relevant for at least 3 months preoperatively along with having a controlled HbA1c less than 8. After successful completion of the preoperative regimen the patient will be submitted for insurance approval and pending this will be scheduled for surgery. All questions from the patient have been answered and they have demonstrated appropriate understanding of the process.       Patient Active Problem List    Diagnosis Date Noted    Obesity, morbid ([facility] [address] 03/01/2018    Hidradenitis 02/01/2018    Sebaceous cyst 11/16/2017    Class 3 obesity in adult 11/16/2017    Essential hypertension 11/16/2017         Signed By: Mike Guy MD  Bariatric and General Surgeon  [facility]    January 31, 2023

## 2023-02-14 ENCOUNTER — PATIENT MESSAGE (OUTPATIENT)
Dept: SURGERY | Age: 33
End: 2023-02-14

## 2023-02-17 ENCOUNTER — TELEPHONE (OUTPATIENT)
Dept: INTERNAL MEDICINE CLINIC | Age: 33
End: 2023-02-17

## 2023-02-24 ENCOUNTER — HOSPITAL ENCOUNTER (OUTPATIENT)
Dept: GENERAL RADIOLOGY | Age: 33
Discharge: HOME | End: 2023-02-24
Attending: SURGERY
Payer: MEDICAID

## 2023-02-24 DIAGNOSIS — E66.01 MORBID OBESITY (HCC): ICD-10-CM

## 2023-02-24 PROCEDURE — 74240 X-RAY XM UPR GI TRC 1CNTRST: CPT

## 2023-02-28 ENCOUNTER — CLINICAL SUPPORT (OUTPATIENT)
Dept: SURGERY | Age: 33
End: 2023-02-28

## 2023-02-28 DIAGNOSIS — E66.01 MORBID OBESITY (HCC): Primary | ICD-10-CM

## 2023-02-28 NOTE — PROGRESS NOTES
Pre-operative Bariatric Nutrition Evaluation ([facility]  of 6)     Date: 2023   Violetta San M.D. Name: Echo Sawyer  :  1990  Age:  35  Gender: Female   Type of Surgery: [x]           Gastric Bypass   []           Sleeve Gastrectomy    ASSESSMENT:    Past Medical History:insulin resistance, recently diagnosed with Type II DM, sleep apnea     Medications/Supplements:   Prior to Admission medications    Medication Sig Start Date End Date Taking? Authorizing Provider   labetaloL (NORMODYNE) 200 mg tablet Take 1 Tablet by mouth two (2) times a day. 23   Jovana Pulido NP   NIFEdipine ER (ADALAT CC) 30 mg ER tablet Take 1 Tablet by mouth daily. 23   Jovana Pulido NP   chlorthalidone (HYGROTON) 25 mg tablet Take 1 Tablet by mouth daily. Indications: blood pressure 23   Amirah QUINN, DANNY   escitalopram oxalate (LEXAPRO) 10 mg tablet Take 1 Tablet by mouth daily. For depression 23   Jovana Pulido NP   ketoconazole (NIZORAL) 2 % shampoo USE WEEKLY AS NEEDED FOR SHAMPOO 10/19/22   Amirah QUINN NP   potassium chloride (Klor-Con M20) 20 mEq tablet TAKE 1 TABLET BY MOUTH EVERY WITH DIURETIC (HYDROCHLORTHIAZIDE OR LASIX) 10/19/22   Jovana Pulido NP       Food Allergies/Intolerances:none    Anthropometrics:    Ht:61\"   Recent Office Wt: 283#    IBW: 105#    %IBW: 269%     BMI:53    Category: obesity III     Reported wt history: Pt presents today for pre-op nutrition evaluation for wt loss surgery. Reports lowest adult BW of 180# and highest adult BW of 283#. Attributes wt gain over the years r/t wt gain after having children and decreased activity, family h/o obesity. Has attempted wt loss through various methods with most successful wt loss of 5-10# . Has been unable to maintain long term or significant wt loss and is now seeking approval for weight loss surgery.  Pt will need to complete 6 months of supervised weight loss for insurance requirements with a 14# wt loss recommended during that time. Exercise/Physical Activity:none at present    Reported Diet History:Herbal Life, apple cider weight loss pills, Felicitas Awan     24 Hour Diet Recall  Breakfast  Skips, if she does it's a breakfast sandwich from [employer] (work) or eggs, [employer]  Tends to skip most days - usually fast food    Dinner  Pizza or baked chicken, salad and garlic bread    Snacks     Beverages  Mostly water, juice (not daily); no soda, coffee, tea        Environment/Psychosocial/Support: Pt reports support system is \"not good\" and states she is her own support system. Pt has 3 kids (ages 3, 9, 8). Pt lives with her sister and they share grocery shopping and cooking. Pt work full time at a [employer] (overnight) and part time at [employer]. Pt's mother and grandmother have both had weight loss surgery. Pt's mother passed away a few years ago. NUTRITION DIAGNOSIS:  Excessive energy intake r/t food and beverage choices evidenced by diet recall that reveals heavy reliance on fast food/restaurant meals. Physical inactivity r/t multiple etiologies evidenced by pt with no current exercise. NUTRITION INTERVENTION:  Pt educated on nutrition recommendations for weight loss surgery, specifically gastric bypass. Instructed on consuming 3 meals per day starting now. Use the balanced plate method to plan meals, include 3 oz of lean source of protein, 1/2 cup whole grains, unlimited non-starchy vegetables, 1/2 cup fruit and 1 serving of low fat dairy. Utilize handouts listing healthy snack and meal ideas to limit restaurant meals. After surgery measure all meals to 1/2 cup. Each meal will contain a 1/4 cup lean protein and 1/4 cup fruit, non-starchy vegetable or starch (limiting to once per day). Aim for 60 g protein per day. Sip on 48-64 oz of sugar free, calorie free, non-carbonated beverages each day. Do not use a straw.  Do not consume beverages 30 minutes before, during or 30 minutes after meals. Read all nutrition labels. Demonstrated and emphasized identifying serving size, total fat, sugar and protein content. Defined low fat as </= 3 g per serving. Discussed lean and extra lean sources of protein. Provided list of low fat cooking methods. Avoid foods with sugar listed in the first 3 ingredients and >/15 g sugar per serving. Excess sugar/fat intake may lead to dumping syndrome. Discussed signs and symptoms of dumping syndrome. Practice mindful eating habits; take small bites, chew thoroughly, avoid distractions, utilize hunger/fullness scale. Consume meals over 20-30 minutes. Attend Bariatric Support Group and increase physical activity (approved per MD) for long term weight maintenance. NUTRITION MONITORING AND EVALUATION:    The following goals were established with patient;  Use exercise richard downloaded to phone. Work with co-worker on setting up accountability partner. Work on eating 3 meals a day. Do not skip meals. Use protein shakes instead of skipping lunch. List of shakes provided. Continue to drink mostly water and limit juice to 4 oz per day. Print and review nutrition education materials. Follow up next month for continued nutrition education and supervised weight loss. Specific tips and techniques to facilitate compliance with above recommendations were provided and discussed. Nutrition evaluation reveals important lifestyle changes indicated. Goals set and recommendations made. Will continue to assess. If further details are desired please feel free to contact me at [phone]. This phone number was also provided to the patient for any further questions or concerns.            Sal Gallo RD

## 2023-03-08 DIAGNOSIS — F34.1 DYSTHYMIA: ICD-10-CM

## 2023-03-08 DIAGNOSIS — B36.0 TINEA VERSICOLOR: ICD-10-CM

## 2023-03-08 DIAGNOSIS — I10 ESSENTIAL HYPERTENSION: ICD-10-CM

## 2023-03-09 RX ORDER — KETOCONAZOLE 20 MG/ML
SHAMPOO, SUSPENSION TOPICAL
Qty: 120 ML | Refills: 1 | Status: SHIPPED | OUTPATIENT
Start: 2023-03-09

## 2023-03-09 RX ORDER — LABETALOL 200 MG/1
200 TABLET, FILM COATED ORAL 2 TIMES DAILY
Qty: 180 TABLET | Refills: 0 | Status: SHIPPED | OUTPATIENT
Start: 2023-03-09

## 2023-03-09 RX ORDER — CHLORTHALIDONE 25 MG/1
25 TABLET ORAL DAILY
Qty: 90 TABLET | Refills: 1 | Status: SHIPPED | OUTPATIENT
Start: 2023-03-09

## 2023-03-09 RX ORDER — ESCITALOPRAM OXALATE 10 MG/1
10 TABLET ORAL DAILY
Qty: 90 TABLET | Refills: 0 | Status: SHIPPED | OUTPATIENT
Start: 2023-03-09

## 2023-03-09 RX ORDER — NIFEDIPINE 30 MG/1
30 TABLET, FILM COATED, EXTENDED RELEASE ORAL DAILY
Qty: 90 TABLET | Refills: 0 | Status: SHIPPED | OUTPATIENT
Start: 2023-03-09

## 2023-03-27 ENCOUNTER — TELEPHONE (OUTPATIENT)
Dept: SURGERY | Age: 33
End: 2023-03-27

## 2023-03-27 NOTE — TELEPHONE ENCOUNTER
Pt was scheduled for a nutrition office appointment today - Monday 3/27 at 9:00 am. An email reminder was sent 2 days in advance. Pt did not show up for the appointment. Attempted to reach pt and phone number listed in the system is not a working number     Will attempt to email pt again with instructions on how to reschedule the appointment.      Vitaly Oates, [address] [place] [address]   Bariatric Dietitian  [phone]

## 2023-03-29 ENCOUNTER — CLINICAL SUPPORT (OUTPATIENT)
Dept: SURGERY | Age: 33
End: 2023-03-29

## 2023-03-29 DIAGNOSIS — E66.01 MORBID OBESITY (HCC): Primary | ICD-10-CM

## 2023-03-29 NOTE — PROGRESS NOTES
[facility] at [facility]  Supervised Weight Loss     Date:   3/29/2023    Patient's Name: Mary Anne Dawson  : 1990    Insurance:  [employer] +             Session: 2 of  6  Surgery: Gastric Bypass  Surgeon:  Tristian Martinez M.D. Height: 61\"  Previous EMR Weight:    283      Lbs. BMI: 53   Pounds Lost since last month: 0               Pounds Gained since last month: 0    Starting Weight: 283#   Previous Months Weight: 283#  Overall Pounds Lost: 0  Overall Pounds Gained: 0    Other Pertinent Information: Today's appointment was completed over the phone. Today's wt was self-reported. Smoking Status:  none  Alcohol Intake: none    I have reviewed with pt the guidelines of the supervised wt loss program.  Pt understands the expectations of some wt loss during the program and that wt gain could delay the process. I have also explained that appointments need to be consecutive and missing an appointment may result in starting over. Pt has received this information in writing. Changes that patient has made since last month include:  No sweets or sodas. More veggies, less starchy foods. Eating Habits and Behaviors  General healthy eating guidelines were discussed. A nutrition lesson was presented on portion control. Patients were instructed implement portion control now using the balanced plate method (1/2 plate non-starchy vegetables, 1/4 plate lean meat, and 1/4 plate whole grains and to include fruit and/or milk at meals or snack). We discussed measuring meals to 1/2 cup total per meal after surgery and appropriate portion progression long term. Patient's current diet habits include: Pt is eating 2-3 meals per day. Breakfast is . Lunch is sometimes a protein shake. Meals are mostly protein and vegetables. Limiting starchy foods. Pt is eating refined carbohydrate foods (bread, pasta, rice, potatoes) in moderation.  Pt is eating sweets/desserts minimal to none. Pt is eating meals prepared outside of the home 1-3 times per week. Pt is drinking mostly water. No sodas. Pt reports no to emotional eating. Physical Activity/Exercise  We talked about the importance of increasing daily physical activity and beginning to develop an exercise regimen/routine. We talked about exercise as being an important part of long term weight loss after surgery. Comments:  During class, I discussed with patient the importance of getting into an exercise routine. Pt has a gym membership and plans to start using this week. Plans to use treadmill. Pt has been encouraged to consider intervals of walking/exercise as tolerated (10-15 minutes, 2-3 times per day). Behavior Modification       We talked about how to eat more mindfully. Tips and recommendations for how to make these changes were provided. Pt was encouraged to keep a food journal and record what they were taking in daily. Overall Assessment: Pt demonstrates some small appropriate lifestyle changes evidenced by reported changes. Unsure of current wt. Will continue to assess. Patient-Set Goals:   1. Nutrition - maintain protein and produce focused meals, can allow 1/2 cup starchy foods PRN  2. Exercise - implement exercise and slowly increase as tolerated, aim for 150 minutes per week  3.  Behavior -continue to work on eating 3 meals a day and use protein shakes PRN (list provided)     Daved Romberg, ANNA  3/29/2023

## 2023-04-26 ENCOUNTER — CLINICAL SUPPORT (OUTPATIENT)
Dept: SURGERY | Age: 33
End: 2023-04-26

## 2023-04-26 DIAGNOSIS — E66.01 MORBID OBESITY (HCC): Primary | ICD-10-CM

## 2023-04-26 NOTE — PROGRESS NOTES
[facility] at [facility]  Supervised Weight Loss     Date:   2023    Patient's Name: Felice Emerson  : 1990    Insurance:  [employer] +                   Session: 3 of  6  Surgery: Gastric Bypass                  Surgeon:  Katelin Colby M.D. Height: 61\"                 Previous EMR Weight:    283      Lbs. BMI: 53              Pounds Lost since last month: 0               Pounds Gained since last month: 0     Starting Weight: 283#                       Previous Months Weight: 283#  Overall Pounds Lost: 0                    Overall Pounds Gained: 0     Other Pertinent Information: Today's appointment was completed over the phone. Pt has not yet weighed herself since starting the program.     Smoking Status:  none  Alcohol Intake: none    I have reviewed with pt the guidelines of the supervised wt loss program.  Pt understands the expectations of some wt loss during the program and that wt gain could delay the process. I have also explained that appointments need to be consecutive and missing an appointment may result in starting over. Pt has received this information in writing. Changes that patient has made since last month include: Followed a liquid diet last week as recommended by a friend of hers. States she is attempting a \"no meat\" diet next week. Started walking for exercise. Trying to not eat after 7:00 pm.       Eating Habits and Behaviors  General healthy eating guidelines were also discussed. Pts were instructed that their plate should be made up 1/2 plate coming from non-starchy vegetables, 1/4 coming from lean meat, and 1/4 of their plate coming from carbohydrates, including fruits, starches, or milk. We discussed measuring meals to 1/2 cup total per meal after surgery. Drinking only calorie-free, sugar-free and non-carbonated beverages.  We discussed the importance of drinking 64 ounces of fluid per day to prevent dehydration post-operatively. Patient's current diet habits include: Pt is eating 3 meals per day. Has been using Herbal Life protein shakes. Did not like Premier Protein shakes. Dinner was vegetables and grilled chicken. Snack choices include not reported. Pt is eating refined carbohydrate foods (bread, pasta, rice, potatoes) in moderation. Pt is eating sweets/desserts none. Pt is using mostly healthy cooking methods. Pt is eating meals prepared outside of the home not reported. Pt is drinking water (no sodas, teas, juices). Pt reports no to emotional eating. Physical Activity/Exercise  An exercise presentation was provided including information about exercise programs available both before and after surgery. We talked about the importance of increasing daily physical activity and beginning to develop an exercise regimen/routine. We talked about exercise as being an important part of long term weight loss after surgery. Comments:  During class, I discussed with patient the importance of getting into an exercise routine. Pt is currently walking for activity. Plans to start going to the gym with her sister starting next week. Pt has been encouraged to maintain and increase as tolerated. Behavior Modification       We talked about how to eat more mindfully. Tips and recommendations for how to make these changes were provided. Pt was encouraged to keep a food journal and record what they were taking in daily. Overall Assessment: Pt demonstrates small appropriate lifestyle changes evidenced by reported changes. Some reported changes conflict with recommendations for general healthy lifestyle changes in preparation for weight loss surgery. Evidenced based recommendations were discussed and provided. Pt has not weighed herself for the past 3 months. Pt was instructed to weigh herself before next month's appointment. Will continue to assess. Patient-Set Goals:   1. Nutrition - continue to try protein shakes for tolerance/taste (re-sent list of protein shakes again), protein foods at each meal for hunger control and achieving adequate protein intake (discouraged the full liquid and \"no meat\" diets). 2. Exercise - maintain walking and attending gym  3.  Behavior -weekly weight checks    Ivana Tomlin, ANNA  4/26/2023

## 2023-05-25 ENCOUNTER — OFFICE VISIT (OUTPATIENT)
Age: 33
End: 2023-05-25

## 2023-05-25 DIAGNOSIS — E66.01 MORBID OBESITY (HCC): Primary | ICD-10-CM

## 2023-05-25 NOTE — PROGRESS NOTES
[facility] at [facility]  Supervised Weight Loss     Date:   2023    Patient's Name: Can Watts  : 1990    Insurance:  Jerri Beck +                    Session: 4 of  6   Surgery: Gastric Bypass                  Surgeon:  Elizabeth Al M.D. Height: 61\"                 Reported Weight:    284       Lbs. BMI: 53               Pounds Lost since last month: 0               Pounds Gained since last month:  1#       Starting Weight: 283#                       Previous Month's Weight:  283#   Overall Pounds Lost: 0                    Overall Pounds Gained:  1#       Other Pertinent Information: Today's appointment was completed over the phone. Pt reports being insulin resistant. Pt recommended to lose 14# prior to surgery. Smoking Status:  none  Alcohol Intake: none    I have reviewed with pt the guidelines of the supervised wt loss program.  Pt understands the expectations of some wt loss during the program and that wt gain could delay the process. I have also explained that appointments need to be consecutive and missing an appointment may result in starting over. Pt has received this information in writing. Changes that patient has made since last month include:  Not skipping meals, portion control. Eating Habits and Behaviors  A nutrition lesson was presented on label reading with specific guidelines provided for limiting added sugars. This information will help increase healthy food choices, promote weight loss and prevent dumping syndrome after gastric bypass. We also reviewed the general nutrition guidelines for bariatric surgery. Patient's current diet habits include: Pt is eating 3 meals per day. Snack choices include minimal to none. Meals are mostly meat (ex. Grilled chicken) and vegetables (zucchini, cauliflower).  Pt is eating refined carbohydrate foods (bread, pasta, rice, potatoes) in

## 2023-06-26 ENCOUNTER — OFFICE VISIT (OUTPATIENT)
Age: 33
End: 2023-06-26

## 2023-06-26 DIAGNOSIS — E66.01 MORBID OBESITY (HCC): Primary | ICD-10-CM

## 2023-06-26 SDOH — ECONOMIC STABILITY: FOOD INSECURITY: WITHIN THE PAST 12 MONTHS, YOU WORRIED THAT YOUR FOOD WOULD RUN OUT BEFORE YOU GOT MONEY TO BUY MORE.: OFTEN TRUE

## 2023-06-26 SDOH — ECONOMIC STABILITY: TRANSPORTATION INSECURITY
IN THE PAST 12 MONTHS, HAS LACK OF TRANSPORTATION KEPT YOU FROM MEETINGS, WORK, OR FROM GETTING THINGS NEEDED FOR DAILY LIVING?: NO

## 2023-06-26 SDOH — ECONOMIC STABILITY: HOUSING INSECURITY
IN THE LAST 12 MONTHS, WAS THERE A TIME WHEN YOU DID NOT HAVE A STEADY PLACE TO SLEEP OR SLEPT IN A SHELTER (INCLUDING NOW)?: NO

## 2023-06-26 SDOH — ECONOMIC STABILITY: FOOD INSECURITY: WITHIN THE PAST 12 MONTHS, THE FOOD YOU BOUGHT JUST DIDN'T LAST AND YOU DIDN'T HAVE MONEY TO GET MORE.: OFTEN TRUE

## 2023-06-26 SDOH — ECONOMIC STABILITY: INCOME INSECURITY: HOW HARD IS IT FOR YOU TO PAY FOR THE VERY BASICS LIKE FOOD, HOUSING, MEDICAL CARE, AND HEATING?: VERY HARD

## 2023-06-27 ENCOUNTER — TELEMEDICINE (OUTPATIENT)
Facility: CLINIC | Age: 33
End: 2023-06-27
Payer: MEDICAID

## 2023-06-27 DIAGNOSIS — G47.33 OBSTRUCTIVE SLEEP APNEA SYNDROME: ICD-10-CM

## 2023-06-27 DIAGNOSIS — I10 PRIMARY HYPERTENSION: Primary | ICD-10-CM

## 2023-06-27 DIAGNOSIS — F32.A ANXIETY AND DEPRESSION: ICD-10-CM

## 2023-06-27 DIAGNOSIS — E66.01 MORBID OBESITY (HCC): ICD-10-CM

## 2023-06-27 DIAGNOSIS — R73.03 PREDIABETES: ICD-10-CM

## 2023-06-27 DIAGNOSIS — F41.9 ANXIETY AND DEPRESSION: ICD-10-CM

## 2023-06-27 DIAGNOSIS — L21.0 DANDRUFF: ICD-10-CM

## 2023-06-27 PROCEDURE — 99214 OFFICE O/P EST MOD 30 MIN: CPT | Performed by: NURSE PRACTITIONER

## 2023-06-27 RX ORDER — KETOCONAZOLE 20 MG/ML
SHAMPOO TOPICAL
Qty: 120 ML | Refills: 1 | Status: SHIPPED | OUTPATIENT
Start: 2023-06-27

## 2023-06-27 RX ORDER — CHLORTHALIDONE 25 MG/1
25 TABLET ORAL DAILY
Qty: 90 TABLET | Refills: 1 | Status: SHIPPED | OUTPATIENT
Start: 2023-06-27

## 2023-06-27 RX ORDER — POTASSIUM CHLORIDE 20 MEQ/1
TABLET, EXTENDED RELEASE ORAL
Qty: 90 TABLET | Refills: 1 | Status: SHIPPED | OUTPATIENT
Start: 2023-06-27

## 2023-06-27 RX ORDER — NIFEDIPINE 30 MG/1
30 TABLET, FILM COATED, EXTENDED RELEASE ORAL DAILY
Qty: 90 TABLET | Refills: 1 | Status: SHIPPED | OUTPATIENT
Start: 2023-06-27

## 2023-06-27 RX ORDER — LABETALOL 200 MG/1
200 TABLET, FILM COATED ORAL 2 TIMES DAILY
Qty: 180 TABLET | Refills: 1 | Status: SHIPPED | OUTPATIENT
Start: 2023-06-27

## 2023-06-27 RX ORDER — ESCITALOPRAM OXALATE 20 MG/1
20 TABLET ORAL DAILY
Qty: 90 TABLET | Refills: 1 | Status: SHIPPED | OUTPATIENT
Start: 2023-06-27

## 2023-06-27 ASSESSMENT — PATIENT HEALTH QUESTIONNAIRE - PHQ9
1. LITTLE INTEREST OR PLEASURE IN DOING THINGS: 3
SUM OF ALL RESPONSES TO PHQ QUESTIONS 1-9: 15
5. POOR APPETITE OR OVEREATING: 1
9. THOUGHTS THAT YOU WOULD BE BETTER OFF DEAD, OR OF HURTING YOURSELF: 1
3. TROUBLE FALLING OR STAYING ASLEEP: 3
4. FEELING TIRED OR HAVING LITTLE ENERGY: 3
SUM OF ALL RESPONSES TO PHQ9 QUESTIONS 1 & 2: 6
2. FEELING DOWN, DEPRESSED OR HOPELESS: 3
7. TROUBLE CONCENTRATING ON THINGS, SUCH AS READING THE NEWSPAPER OR WATCHING TELEVISION: 0
10. IF YOU CHECKED OFF ANY PROBLEMS, HOW DIFFICULT HAVE THESE PROBLEMS MADE IT FOR YOU TO DO YOUR WORK, TAKE CARE OF THINGS AT HOME, OR GET ALONG WITH OTHER PEOPLE: 1
SUM OF ALL RESPONSES TO PHQ QUESTIONS 1-9: 14
6. FEELING BAD ABOUT YOURSELF - OR THAT YOU ARE A FAILURE OR HAVE LET YOURSELF OR YOUR FAMILY DOWN: 1
SUM OF ALL RESPONSES TO PHQ QUESTIONS 1-9: 15
SUM OF ALL RESPONSES TO PHQ QUESTIONS 1-9: 15
8. MOVING OR SPEAKING SO SLOWLY THAT OTHER PEOPLE COULD HAVE NOTICED. OR THE OPPOSITE, BEING SO FIGETY OR RESTLESS THAT YOU HAVE BEEN MOVING AROUND A LOT MORE THAN USUAL: 0

## 2023-06-27 ASSESSMENT — COLUMBIA-SUICIDE SEVERITY RATING SCALE - C-SSRS
2. HAVE YOU ACTUALLY HAD ANY THOUGHTS OF KILLING YOURSELF?: NO
1. WITHIN THE PAST MONTH, HAVE YOU WISHED YOU WERE DEAD OR WISHED YOU COULD GO TO SLEEP AND NOT WAKE UP?: YES
6. HAVE YOU EVER DONE ANYTHING, STARTED TO DO ANYTHING, OR PREPARED TO DO ANYTHING TO END YOUR LIFE?: NO

## 2023-07-25 ENCOUNTER — OFFICE VISIT (OUTPATIENT)
Age: 33
End: 2023-07-25

## 2023-07-25 DIAGNOSIS — E66.01 MORBID OBESITY (HCC): Primary | ICD-10-CM

## 2023-07-25 NOTE — PROGRESS NOTES
[address] [facility] at [facility]  Supervised Weight Loss     Date:   2023    Patient's Name: Clover Brooke  : 1990    Insurance:  Saima De La Fuente +                    Session: 6    Surgery: Gastric Bypass                  Surgeon:  Terese Frye M.D. Height: 61\"                 Previously Reported Weight:    284       Lbs. BMI: 53               Pounds Lost since last month: 0               Pounds Gained since last month:  0#       Starting Weight: 283#                       Previous Month's Weight:  284#   Overall Pounds Lost: 0                    Overall Pounds Gained:  1#       Other Pertinent Information: Today's appointment was completed over the phone. Pt recommended to lose 14# prior to surgery. Smoking Status:  none  Alcohol Intake: none    I have reviewed with pt the guidelines of the supervised wt loss program.  Pt understands the expectations of some wt loss during the program and that wt gain could delay the process. I have also explained that appointments need to be consecutive and missing an appointment may result in starting over. Pt has received this information in writing. Changes that patient has made since last month include:  Reduced carbohydrate intake. Tried protein shakes (disliked). Eating Habits and Behaviors  A nutrition lesson specific to vitamins was provided. We discussed the various reasons for needing vitamins and different types and doses. General healthy eating guidelines were also discussed. Pts were instructed that their plate should be made up 1/2 plate coming from non-starchy vegetables, 1/4 coming from lean meat, and 1/4 of their plate coming from carbohydrates, including fruits, starches, or milk. We discussed measuring meals to 1/2 cup total per meal after surgery. Drinking only calorie-free, sugar-free and non-carbonated beverages.  We discussed the importance of drinking 64 ounces of

## 2023-08-09 ENCOUNTER — NURSE ONLY (OUTPATIENT)
Facility: CLINIC | Age: 33
End: 2023-08-09

## 2023-08-09 VITALS
HEIGHT: 61 IN | TEMPERATURE: 98.2 F | BODY MASS INDEX: 53.43 KG/M2 | HEART RATE: 86 BPM | WEIGHT: 283 LBS | RESPIRATION RATE: 18 BRPM | OXYGEN SATURATION: 98 % | SYSTOLIC BLOOD PRESSURE: 149 MMHG | DIASTOLIC BLOOD PRESSURE: 79 MMHG

## 2023-08-09 DIAGNOSIS — E11.9 TYPE 2 DIABETES MELLITUS WITHOUT COMPLICATION, WITHOUT LONG-TERM CURRENT USE OF INSULIN (HCC): Primary | ICD-10-CM

## 2023-08-09 RX ORDER — CYCLOBENZAPRINE HCL 10 MG
10 TABLET ORAL 2 TIMES DAILY PRN
Qty: 30 TABLET | Refills: 0 | Status: SHIPPED | OUTPATIENT
Start: 2023-08-09 | End: 2023-09-08

## 2023-08-09 NOTE — PROGRESS NOTES
BP Readings from Last 3 Encounters:   08/09/23 (!) 149/79   01/31/23 (!) 193/106   10/19/22 (!) 180/113       Pt thought she had full visit today  Asks for msk relaxant for back pain and labs    BP is not at goal but Much better, will schedule VV soon to review labs and fu BP

## 2023-08-15 ENCOUNTER — TELEMEDICINE (OUTPATIENT)
Facility: CLINIC | Age: 33
End: 2023-08-15
Payer: MEDICAID

## 2023-08-15 DIAGNOSIS — F32.A DEPRESSIVE DISORDER: ICD-10-CM

## 2023-08-15 DIAGNOSIS — N92.6 LATE MENSES: ICD-10-CM

## 2023-08-15 DIAGNOSIS — E11.9 TYPE 2 DIABETES MELLITUS WITHOUT COMPLICATION, WITHOUT LONG-TERM CURRENT USE OF INSULIN (HCC): ICD-10-CM

## 2023-08-15 DIAGNOSIS — M54.9 CHRONIC BILATERAL BACK PAIN, UNSPECIFIED BACK LOCATION: Primary | ICD-10-CM

## 2023-08-15 DIAGNOSIS — G89.29 CHRONIC BILATERAL BACK PAIN, UNSPECIFIED BACK LOCATION: Primary | ICD-10-CM

## 2023-08-15 DIAGNOSIS — I10 PRIMARY HYPERTENSION: ICD-10-CM

## 2023-08-15 PROCEDURE — 3044F HG A1C LEVEL LT 7.0%: CPT | Performed by: NURSE PRACTITIONER

## 2023-08-15 PROCEDURE — 99214 OFFICE O/P EST MOD 30 MIN: CPT | Performed by: NURSE PRACTITIONER

## 2023-08-15 NOTE — PROGRESS NOTES
Jada Wilkins is a 35 y.o. female  HIPAA verified by two patient identifiers. Health Maintenance Due   Topic Date Due    Varicella vaccine (1 of 2 - 2-dose childhood series) Never done    Pneumococcal 0-64 years Vaccine (1 - PCV) Never done    Diabetic Alb to Cr ratio (uACR) test  Never done    Hepatitis C screen  Never done    DTaP/Tdap/Td vaccine (3 - Td or Tdap) 01/27/2021    COVID-19 Vaccine (5 - Booster) 11/25/2021    Flu vaccine (1) 08/01/2023     Chief Complaint   Patient presents with    Hypertension     Follow up      Patient-Reported Vitals 8/15/2023   Patient-Reported Weight 284lb   Patient-Reported Height -   Patient-Reported Systolic -   Patient-Reported Diastolic -   Patient-Reported Pulse -   Patient-Reported Temperature -   Patient-Reported SpO2 -   Patient-Reported Peak Flow -         Pain Scale:9 /10  Pain Location: back  1. Have you been to the ER, urgent care clinic since your last visit? Hospitalized since your last visit? No    2. Have you seen or consulted any other health care providers outside of the [address] [place] [address] since your last visit? Include any pap smears or colon screening.  No

## 2023-08-15 NOTE — PROGRESS NOTES
Pt is here for   Chief Complaint   Patient presents with    Hypertension     Follow up      1. Have you been to the ER, urgent care clinic since your last visit? Hospitalized since your last visit? No    2. Have you seen or consulted any other health care providers outside of the [address] [place] [address] Avenue since your last visit? Include any pap smears or colon screening.  No

## 2023-08-17 LAB — B-HCG SERPL QL: NEGATIVE MIU/ML

## 2023-09-18 DIAGNOSIS — I10 PRIMARY HYPERTENSION: ICD-10-CM

## 2023-09-18 DIAGNOSIS — L21.0 DANDRUFF: ICD-10-CM

## 2023-09-18 DIAGNOSIS — R73.03 PREDIABETES: ICD-10-CM

## 2023-09-18 DIAGNOSIS — F32.A ANXIETY AND DEPRESSION: ICD-10-CM

## 2023-09-18 DIAGNOSIS — F41.9 ANXIETY AND DEPRESSION: ICD-10-CM

## 2023-09-18 RX ORDER — LABETALOL 200 MG/1
200 TABLET, FILM COATED ORAL 2 TIMES DAILY
Qty: 180 TABLET | Refills: 1 | Status: SHIPPED | OUTPATIENT
Start: 2023-09-18 | End: 2023-09-21 | Stop reason: SDUPTHER

## 2023-09-18 RX ORDER — POTASSIUM CHLORIDE 20 MEQ/1
TABLET, EXTENDED RELEASE ORAL
Qty: 90 TABLET | Refills: 1 | Status: SHIPPED | OUTPATIENT
Start: 2023-09-18 | End: 2023-09-21 | Stop reason: SDUPTHER

## 2023-09-18 RX ORDER — KETOCONAZOLE 20 MG/ML
SHAMPOO TOPICAL
Qty: 120 ML | Refills: 1 | Status: SHIPPED | OUTPATIENT
Start: 2023-09-18 | End: 2023-09-21 | Stop reason: SDUPTHER

## 2023-09-18 RX ORDER — NIFEDIPINE 30 MG/1
30 TABLET, FILM COATED, EXTENDED RELEASE ORAL DAILY
Qty: 90 TABLET | Refills: 1 | Status: SHIPPED | OUTPATIENT
Start: 2023-09-18 | End: 2023-09-21 | Stop reason: SDUPTHER

## 2023-09-18 RX ORDER — ESCITALOPRAM OXALATE 20 MG/1
20 TABLET ORAL DAILY
Qty: 90 TABLET | Refills: 1 | Status: SHIPPED | OUTPATIENT
Start: 2023-09-18 | End: 2023-09-21 | Stop reason: SDUPTHER

## 2023-09-18 RX ORDER — CHLORTHALIDONE 25 MG/1
25 TABLET ORAL DAILY
Qty: 90 TABLET | Refills: 1 | Status: SHIPPED | OUTPATIENT
Start: 2023-09-18 | End: 2023-09-21 | Stop reason: SDUPTHER

## 2023-09-21 ENCOUNTER — OFFICE VISIT (OUTPATIENT)
Facility: CLINIC | Age: 33
End: 2023-09-21
Payer: MEDICAID

## 2023-09-21 VITALS
OXYGEN SATURATION: 97 % | HEART RATE: 93 BPM | RESPIRATION RATE: 18 BRPM | SYSTOLIC BLOOD PRESSURE: 152 MMHG | TEMPERATURE: 98.2 F | BODY MASS INDEX: 54 KG/M2 | DIASTOLIC BLOOD PRESSURE: 99 MMHG | WEIGHT: 286 LBS | HEIGHT: 61 IN

## 2023-09-21 DIAGNOSIS — E66.01 OBESITY, MORBID (HCC): ICD-10-CM

## 2023-09-21 DIAGNOSIS — F32.A DEPRESSIVE DISORDER: ICD-10-CM

## 2023-09-21 DIAGNOSIS — E11.9 TYPE 2 DIABETES MELLITUS WITHOUT COMPLICATION, WITHOUT LONG-TERM CURRENT USE OF INSULIN (HCC): ICD-10-CM

## 2023-09-21 DIAGNOSIS — L21.0 DANDRUFF: ICD-10-CM

## 2023-09-21 DIAGNOSIS — I10 ESSENTIAL HYPERTENSION: Primary | ICD-10-CM

## 2023-09-21 PROBLEM — F17.200 SMOKER: Status: ACTIVE | Noted: 2023-09-21

## 2023-09-21 LAB
ALBUMIN, URINE, POC: 80 MG/L
CREATININE, URINE, POC: 300 MG/DL
GLUCOSE, POC: 198 MG/DL
MICROALB/CREAT RATIO, POC: NORMAL MG/G

## 2023-09-21 PROCEDURE — 82044 UR ALBUMIN SEMIQUANTITATIVE: CPT | Performed by: NURSE PRACTITIONER

## 2023-09-21 PROCEDURE — 3080F DIAST BP >= 90 MM HG: CPT | Performed by: NURSE PRACTITIONER

## 2023-09-21 PROCEDURE — 3077F SYST BP >= 140 MM HG: CPT | Performed by: NURSE PRACTITIONER

## 2023-09-21 PROCEDURE — 82962 GLUCOSE BLOOD TEST: CPT | Performed by: NURSE PRACTITIONER

## 2023-09-21 PROCEDURE — 99214 OFFICE O/P EST MOD 30 MIN: CPT | Performed by: NURSE PRACTITIONER

## 2023-09-21 PROCEDURE — 3044F HG A1C LEVEL LT 7.0%: CPT | Performed by: NURSE PRACTITIONER

## 2023-09-21 RX ORDER — LABETALOL 200 MG/1
200 TABLET, FILM COATED ORAL 2 TIMES DAILY
Qty: 180 TABLET | Refills: 1 | Status: SHIPPED | OUTPATIENT
Start: 2023-09-21

## 2023-09-21 RX ORDER — NIFEDIPINE 60 MG/1
60 TABLET, EXTENDED RELEASE ORAL DAILY
Qty: 90 TABLET | Refills: 1 | Status: SHIPPED | OUTPATIENT
Start: 2023-09-21

## 2023-09-21 RX ORDER — NIFEDIPINE 30 MG/1
30 TABLET, FILM COATED, EXTENDED RELEASE ORAL DAILY
Qty: 90 TABLET | Refills: 1 | Status: SHIPPED | OUTPATIENT
Start: 2023-09-21 | End: 2023-09-21 | Stop reason: DRUGHIGH

## 2023-09-21 RX ORDER — CHLORTHALIDONE 25 MG/1
25 TABLET ORAL DAILY
Qty: 90 TABLET | Refills: 1 | Status: SHIPPED | OUTPATIENT
Start: 2023-09-21

## 2023-09-21 RX ORDER — ESCITALOPRAM OXALATE 20 MG/1
20 TABLET ORAL DAILY
Qty: 90 TABLET | Refills: 1 | Status: SHIPPED | OUTPATIENT
Start: 2023-09-21

## 2023-09-21 RX ORDER — POTASSIUM CHLORIDE 20 MEQ/1
TABLET, EXTENDED RELEASE ORAL
Qty: 90 TABLET | Refills: 1 | Status: SHIPPED | OUTPATIENT
Start: 2023-09-21

## 2023-09-21 RX ORDER — KETOCONAZOLE 20 MG/ML
SHAMPOO TOPICAL
Qty: 120 ML | Refills: 1 | Status: SHIPPED | OUTPATIENT
Start: 2023-09-21

## 2023-09-21 SDOH — ECONOMIC STABILITY: FOOD INSECURITY: WITHIN THE PAST 12 MONTHS, THE FOOD YOU BOUGHT JUST DIDN'T LAST AND YOU DIDN'T HAVE MONEY TO GET MORE.: NEVER TRUE

## 2023-09-21 SDOH — ECONOMIC STABILITY: INCOME INSECURITY: HOW HARD IS IT FOR YOU TO PAY FOR THE VERY BASICS LIKE FOOD, HOUSING, MEDICAL CARE, AND HEATING?: NOT HARD AT ALL

## 2023-09-21 SDOH — ECONOMIC STABILITY: FOOD INSECURITY: WITHIN THE PAST 12 MONTHS, YOU WORRIED THAT YOUR FOOD WOULD RUN OUT BEFORE YOU GOT MONEY TO BUY MORE.: NEVER TRUE

## 2023-09-21 ASSESSMENT — PATIENT HEALTH QUESTIONNAIRE - PHQ9
9. THOUGHTS THAT YOU WOULD BE BETTER OFF DEAD, OR OF HURTING YOURSELF: 0
4. FEELING TIRED OR HAVING LITTLE ENERGY: 0
3. TROUBLE FALLING OR STAYING ASLEEP: 0
SUM OF ALL RESPONSES TO PHQ QUESTIONS 1-9: 0
SUM OF ALL RESPONSES TO PHQ QUESTIONS 1-9: 0
7. TROUBLE CONCENTRATING ON THINGS, SUCH AS READING THE NEWSPAPER OR WATCHING TELEVISION: 0
5. POOR APPETITE OR OVEREATING: 0
8. MOVING OR SPEAKING SO SLOWLY THAT OTHER PEOPLE COULD HAVE NOTICED. OR THE OPPOSITE, BEING SO FIGETY OR RESTLESS THAT YOU HAVE BEEN MOVING AROUND A LOT MORE THAN USUAL: 0
1. LITTLE INTEREST OR PLEASURE IN DOING THINGS: 0
6. FEELING BAD ABOUT YOURSELF - OR THAT YOU ARE A FAILURE OR HAVE LET YOURSELF OR YOUR FAMILY DOWN: 0
SUM OF ALL RESPONSES TO PHQ QUESTIONS 1-9: 0
2. FEELING DOWN, DEPRESSED OR HOPELESS: 0
SUM OF ALL RESPONSES TO PHQ QUESTIONS 1-9: 0
SUM OF ALL RESPONSES TO PHQ9 QUESTIONS 1 & 2: 0

## 2023-09-21 NOTE — PROGRESS NOTES
Subjective: (As above and below)     Chief Complaint   Patient presents with    Diabetes    Follow-up     Early Mychal is a 35y.o. year old female who presents for     Diabetic Review of Systems - medication compliance: compliant most of the time, diabetic diet compliance: compliant most of the time, home glucose monitoring: is not performed. Eye exam due    Hypertension ROS:  taking medications as instructed, no medication side effects noted, no TIAs, no chest pain on exertion, no dyspnea on exertion, no swelling of ankles    Wt Readings from Last 3 Encounters:   23 286 lb (129.7 kg)   23 283 lb (128.4 kg)   23 283 lb (128.4 kg)       BP Readings from Last 3 Encounters:   23 (!) 152/99   23 (!) 149/79   23 (!) 193/106       Depression stable on current meds            Reviewed PmHx, RxHx, FmHx, SocHx, AllgHx and updated in chart.   Family History   Problem Relation Age of Onset    Hypertension Maternal Grandmother        Past Medical History:   Diagnosis Date    Asthma     has not had any problems no inhaler    Asthma     Class 3 obesity in adult 2017    Depressive disorder 2021    H/O pilonidal cyst     Hidradenitis 2018    Psychiatric disorder     ANXIETY    Type 2 diabetes mellitus without complication, without long-term current use of insulin ([address]) 8/15/2023    Vaginal delivery       Social History     Socioeconomic History    Marital status: Single     Spouse name: None    Number of children: None    Years of education: None    Highest education level: None   Tobacco Use    Smoking status: Some Days     Packs/day: .25     Types: Cigarettes     Last attempt to quit: 3/1/2011     Years since quittin.5     Passive exposure: Current    Smokeless tobacco: Never   Vaping Use    Vaping Use: Never used   Substance and Sexual Activity    Alcohol use: No    Drug use: Yes     Types: Marijuana [place] Callander)    Sexual activity: Yes     Partners: Male     Birth

## 2023-10-16 ENCOUNTER — OFFICE VISIT (OUTPATIENT)
Age: 33
End: 2023-10-16
Payer: MEDICAID

## 2023-10-16 DIAGNOSIS — E66.01 MORBID OBESITY (HCC): ICD-10-CM

## 2023-10-16 DIAGNOSIS — F43.10 POST TRAUMATIC STRESS DISORDER: ICD-10-CM

## 2023-10-16 DIAGNOSIS — F41.9 ANXIETY DISORDER, UNSPECIFIED TYPE: ICD-10-CM

## 2023-10-16 DIAGNOSIS — F33.2 SEVERE EPISODE OF RECURRENT MAJOR DEPRESSIVE DISORDER, WITHOUT PSYCHOTIC FEATURES (HCC): Primary | ICD-10-CM

## 2023-10-16 PROCEDURE — 90791 PSYCH DIAGNOSTIC EVALUATION: CPT | Performed by: COUNSELOR

## 2023-10-16 NOTE — PROGRESS NOTES
take one day at a time      Ms. Ita Sun was provided with information about the possibility of psychological changes following surgery to include depression, anxiety and transfer of addiction. Ms. Ita Sun was strongly encouraged to seek help if she notices changes in her mood or behaviors. The patient was reminded that support groups are available following surgery and that she should continue to utilize them to assist in transition post-surgery. Ms. Ita Sun was advised to contact a healthcare professional (her surgeon, primary care provider, or the undersigned clinician) should symptoms of depression or anxiety occur and become unmanageable. PATIENT MOTIVATION AND KNOWLEDGE:    Motivation for surgery:  Ms. Ita Sun would like to have the surgery to improve her health. Understanding of procedure:  Ms. Ita Sun stated that she does not have a very good understanding of the difference between the gastric bypass and gastric sleeve. This therapist encouraged the patient to discuss this with her doctor and to do some research on these two procedures. ICD-10-CM    1. Severe episode of recurrent major depressive disorder, without psychotic features ([address])  F33.2       2. Anxiety disorder, unspecified type  F41.9       3. Post traumatic stress disorder  F43.10       4. Morbid obesity ([address])  E66.01            RECOMMENDATIONS:  TBD - this will be discussed at the follow-up appointment on 11/15/23.    __________________________________________________    Plans:  Patient will return in a few weeks for a follow-up appointment to discuss recommendations from the bariatric psychosocial evaluation.   __________________________________________________    Signature: Kary Castillo LPC, NCC, CCTP     Date: 10/16/2023

## 2023-11-15 ENCOUNTER — OFFICE VISIT (OUTPATIENT)
Age: 33
End: 2023-11-15
Payer: MEDICAID

## 2023-11-15 DIAGNOSIS — F41.9 ANXIETY DISORDER, UNSPECIFIED TYPE: ICD-10-CM

## 2023-11-15 DIAGNOSIS — F33.2 SEVERE EPISODE OF RECURRENT MAJOR DEPRESSIVE DISORDER, WITHOUT PSYCHOTIC FEATURES (HCC): Primary | ICD-10-CM

## 2023-11-15 DIAGNOSIS — F43.10 POST TRAUMATIC STRESS DISORDER: ICD-10-CM

## 2023-11-15 DIAGNOSIS — E66.01 MORBID OBESITY (HCC): ICD-10-CM

## 2023-11-15 PROCEDURE — 90834 PSYTX W PT 45 MINUTES: CPT | Performed by: COUNSELOR

## 2023-11-15 NOTE — PROGRESS NOTES
[facility]  Psychosocial Evaluation Part 2 - Mental Health Progress Note    This note will not be viewable in [employer] for the following reason(s). This is a Psychotherapy Note. ([address] Providers Only)    Name: Bang Bass  : 1990  MRN: [id number]  Date of Service: 11/15/2023  Location of Service: [place] for both provider and patient  Type of Service: Individual Therapy (IN-PERSON)  Start Time:  3:08 PM   End Time:   3:46 PM  CPT code: 08541      ICD-10-CM    1. Severe episode of recurrent major depressive disorder, without psychotic features ([facility])  F33.2       2. Anxiety disorder, unspecified type  F41.9       3. Post traumatic stress disorder  F43.10       4. Morbid obesity ([address] [facility])  E66.01            Reason for Visit:  Follow-up to Bariatric Evaluation - to determine appropriateness for bariatric surgery from a psychosocial perspective with requirements and/or recommendations. Following the Bariatric Psychosocial Evaluation - Part 2, a formal copy of the Bariatric Psychosocial Evaluation will be scanned into Epic EHR. Subjective (patient report):  Patient acknowledged taking medication and denied any side-effects. Did not endorse any depression, anxiety, siomara or psychotic symptoms. Objective (factual account of what was observed, mental health status):  Mental Health Status: Patient was dressed properly. No abnormal psychomotor movements observed. Intellectual functioning appeared to be intact. Mood and affect were congruent. Insight was adequate. Judgment was adequate. Speech was normal, clear. Thought process was coherent. Patient did not report suicidal or homicidal ideations, intent or plans. Patient denied self-injury behaviors. Patient denied alcohol and other substance abuse. Patient was oriented to the four spheres: self, place, time and situation. Patient response to intervention was appropriate. Patient progress is adequate.  Protective factor:

## 2023-11-16 ENCOUNTER — OFFICE VISIT (OUTPATIENT)
Facility: CLINIC | Age: 33
End: 2023-11-16

## 2023-11-16 VITALS
RESPIRATION RATE: 19 BRPM | DIASTOLIC BLOOD PRESSURE: 98 MMHG | WEIGHT: 282 LBS | TEMPERATURE: 98.5 F | SYSTOLIC BLOOD PRESSURE: 145 MMHG | HEART RATE: 100 BPM | BODY MASS INDEX: 53.24 KG/M2 | OXYGEN SATURATION: 100 % | HEIGHT: 61 IN

## 2023-11-16 DIAGNOSIS — I10 ESSENTIAL HYPERTENSION: ICD-10-CM

## 2023-11-16 DIAGNOSIS — F41.9 ANXIETY: ICD-10-CM

## 2023-11-16 DIAGNOSIS — G47.33 OSA (OBSTRUCTIVE SLEEP APNEA): ICD-10-CM

## 2023-11-16 DIAGNOSIS — E11.9 TYPE 2 DIABETES MELLITUS WITHOUT COMPLICATION, WITHOUT LONG-TERM CURRENT USE OF INSULIN (HCC): Primary | ICD-10-CM

## 2023-11-16 DIAGNOSIS — E66.01 OBESITY, MORBID (HCC): ICD-10-CM

## 2023-11-16 LAB
GLUCOSE, POC: 189 MG/DL
HBA1C MFR BLD: 6.1 %

## 2023-11-16 RX ORDER — BLOOD PRESSURE TEST KIT
KIT MISCELLANEOUS
Qty: 1 KIT | Refills: 0 | Status: SHIPPED | OUTPATIENT
Start: 2023-11-16

## 2023-11-16 RX ORDER — CHLORTHALIDONE 50 MG/1
50 TABLET ORAL DAILY
COMMUNITY
Start: 2018-04-20

## 2023-11-16 RX ORDER — HYDROXYZINE HYDROCHLORIDE 25 MG/1
25 TABLET, FILM COATED ORAL
Qty: 30 TABLET | Refills: 1 | Status: SHIPPED | OUTPATIENT
Start: 2023-11-16 | End: 2024-01-15

## 2023-11-16 ASSESSMENT — PATIENT HEALTH QUESTIONNAIRE - PHQ9
SUM OF ALL RESPONSES TO PHQ QUESTIONS 1-9: 16
4. FEELING TIRED OR HAVING LITTLE ENERGY: 3
2. FEELING DOWN, DEPRESSED OR HOPELESS: 3
3. TROUBLE FALLING OR STAYING ASLEEP: 2
SUM OF ALL RESPONSES TO PHQ QUESTIONS 1-9: 16
8. MOVING OR SPEAKING SO SLOWLY THAT OTHER PEOPLE COULD HAVE NOTICED. OR THE OPPOSITE, BEING SO FIGETY OR RESTLESS THAT YOU HAVE BEEN MOVING AROUND A LOT MORE THAN USUAL: 1
SUM OF ALL RESPONSES TO PHQ9 QUESTIONS 1 & 2: 6
5. POOR APPETITE OR OVEREATING: 2
SUM OF ALL RESPONSES TO PHQ QUESTIONS 1-9: 16
7. TROUBLE CONCENTRATING ON THINGS, SUCH AS READING THE NEWSPAPER OR WATCHING TELEVISION: 1
10. IF YOU CHECKED OFF ANY PROBLEMS, HOW DIFFICULT HAVE THESE PROBLEMS MADE IT FOR YOU TO DO YOUR WORK, TAKE CARE OF THINGS AT HOME, OR GET ALONG WITH OTHER PEOPLE: 1
6. FEELING BAD ABOUT YOURSELF - OR THAT YOU ARE A FAILURE OR HAVE LET YOURSELF OR YOUR FAMILY DOWN: 1
9. THOUGHTS THAT YOU WOULD BE BETTER OFF DEAD, OR OF HURTING YOURSELF: 0
1. LITTLE INTEREST OR PLEASURE IN DOING THINGS: 3
SUM OF ALL RESPONSES TO PHQ QUESTIONS 1-9: 16

## 2023-11-16 NOTE — PROGRESS NOTES
HEMOGLOBIN A1C  - AMB POC GLUCOSE BLOOD, BY GLUCOSE MONITORING DEVICE    2. Essential hypertension    - chlorthalidone (HYGROTEN) 50 MG tablet; Take 1 tablet by mouth daily  - Blood Pressure KIT; Check blood pressure 3x per week  Dispense: 1 kit; Refill: 0    3. NORAH (obstructive sleep apnea)  Discussed importance of adherence, try atarax at night for anxiety/sleep    4. Obesity, morbid ([address])  She works less now so is hoping to exercise    5. Anxiety    - hydrOXYzine HCl (ATARAX) 25 MG tablet; Take 1 tablet by mouth nightly as needed (sleep apnea)  Dispense: 30 tablet; Refill: 1        I have discussed the diagnosis with the patient and the intended plan as seen in the above orders. The patient has received an after-visit summary and questions were answered concerning future plans. Pt conveyed understanding of plan. Medication Side Effects and Warnings were discussed with patient: Yes  Patient Labs were reviewed: Yes  Patient Past Records were reviewed:   Yes    ALICE Rose NP

## 2023-12-27 DIAGNOSIS — L21.0 DANDRUFF: ICD-10-CM

## 2023-12-27 RX ORDER — KETOCONAZOLE 20 MG/ML
SHAMPOO TOPICAL
Qty: 120 ML | Refills: 1 | Status: SHIPPED | OUTPATIENT
Start: 2023-12-27

## 2024-01-10 ENCOUNTER — OFFICE VISIT (OUTPATIENT)
Age: 34
End: 2024-01-10
Payer: MEDICAID

## 2024-01-10 DIAGNOSIS — F43.10 POST TRAUMATIC STRESS DISORDER: ICD-10-CM

## 2024-01-10 DIAGNOSIS — F41.9 ANXIETY DISORDER, UNSPECIFIED TYPE: ICD-10-CM

## 2024-01-10 DIAGNOSIS — E66.01 MORBID OBESITY (HCC): ICD-10-CM

## 2024-01-10 DIAGNOSIS — F33.2 SEVERE EPISODE OF RECURRENT MAJOR DEPRESSIVE DISORDER, WITHOUT PSYCHOTIC FEATURES (HCC): Primary | ICD-10-CM

## 2024-01-10 PROCEDURE — 90832 PSYTX W PT 30 MINUTES: CPT | Performed by: COUNSELOR

## 2024-01-10 NOTE — PROGRESS NOTES
[facility]  [facility]    This note will not be viewable in [employer] for the following reason(s). This is a Psychotherapy Note. ([facility] Providers Only)    Name: Maris Mckeon  : 1990  MRN: [id number]  Date of Service: 1/10/2024  Location of Service: [place] for both provider and patient  Type of Service:  Individual Therapy (IN-PERSON)  Start Time:  11:05 AM   End Time:   11:35 AM  CPT code: 67049        ICD-10-CM      1. Severe episode of recurrent major depressive disorder, without psychotic features (HCC)  F33.2         2. Anxiety disorder, unspecified type  F41.9         3. Post traumatic stress disorder  F43.10         4. Morbid obesity (HCC)  E66.01            Reason for Visit:   REQUIRED SESSIONS FOR BARIATRIC SURGERY     Subjective (patient report):  Patient acknowledged taking medication and denied any side-effects.  Did not endorse any depression, anxiety, siomara or psychotic symptoms.     Maris reported that she has been trying to make a fresh start for herself.  She stated that  is a new year and she is not taking on anyone else's problems.  However, she was facilitating a conversation between her brother (who is in [place]) with someone else and also stated that her sister (whose children she cares for) is coming out of shelter in 2024.  She also reported that a cousin had been shot and killed in [place], [place].  She mentioned that she had been in shelter for 2 years before when she was 22 years-old on drug charges.  She stated that as of this year those charges are cleared so she feels like she has a fresh start.  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  She left early because she had an emergency PCP session to go to.     Objective (factual account of what was observed, mental health status):  Mental Health Status: Patient was dressed properly. No abnormal psychomotor movements observed. Intellectual

## 2024-01-17 DIAGNOSIS — I10 ESSENTIAL HYPERTENSION: ICD-10-CM

## 2024-01-17 DIAGNOSIS — L21.0 DANDRUFF: ICD-10-CM

## 2024-01-17 DIAGNOSIS — I10 ESSENTIAL (PRIMARY) HYPERTENSION: ICD-10-CM

## 2024-01-18 RX ORDER — NIFEDIPINE 30 MG
30 TABLET, EXTENDED RELEASE ORAL DAILY
Qty: 90 TABLET | OUTPATIENT
Start: 2024-01-18

## 2024-01-18 RX ORDER — BLOOD PRESSURE TEST KIT
KIT MISCELLANEOUS
Qty: 1 KIT | Refills: 0 | Status: SHIPPED | OUTPATIENT
Start: 2024-01-18

## 2024-01-18 RX ORDER — POTASSIUM CHLORIDE 20 MEQ/1
TABLET, EXTENDED RELEASE ORAL
Qty: 90 TABLET | Refills: 1 | Status: SHIPPED | OUTPATIENT
Start: 2024-01-18

## 2024-01-18 RX ORDER — NIFEDIPINE 60 MG/1
60 TABLET, EXTENDED RELEASE ORAL DAILY
Qty: 90 TABLET | Refills: 1 | Status: SHIPPED | OUTPATIENT
Start: 2024-01-18

## 2024-01-18 RX ORDER — ESCITALOPRAM OXALATE 20 MG/1
20 TABLET ORAL DAILY
Qty: 90 TABLET | Refills: 1 | Status: SHIPPED | OUTPATIENT
Start: 2024-01-18

## 2024-01-18 RX ORDER — LABETALOL 200 MG/1
200 TABLET, FILM COATED ORAL 2 TIMES DAILY
Qty: 180 TABLET | Refills: 1 | Status: SHIPPED | OUTPATIENT
Start: 2024-01-18

## 2024-01-18 RX ORDER — KETOCONAZOLE 20 MG/ML
SHAMPOO TOPICAL
Qty: 120 ML | Refills: 1 | Status: SHIPPED | OUTPATIENT
Start: 2024-01-18

## 2024-01-25 RX ORDER — NIFEDIPINE 30 MG
30 TABLET, EXTENDED RELEASE ORAL DAILY
Qty: 90 TABLET | Refills: 0 | OUTPATIENT
Start: 2024-01-25

## 2024-02-23 DIAGNOSIS — I10 ESSENTIAL HYPERTENSION: ICD-10-CM

## 2024-02-23 RX ORDER — POTASSIUM CHLORIDE 20 MEQ/1
TABLET, EXTENDED RELEASE ORAL
Qty: 90 TABLET | Refills: 1 | Status: SHIPPED | OUTPATIENT
Start: 2024-02-23

## 2024-02-23 RX ORDER — BLOOD PRESSURE TEST KIT
KIT MISCELLANEOUS
Qty: 1 KIT | Refills: 0 | Status: SHIPPED | OUTPATIENT
Start: 2024-02-23

## 2024-03-04 NOTE — PROGRESS NOTES
Stable, continue current medications and management.   Subjective: (As above and below)     Chief Complaint   Patient presents with    Follow-up     pt states she has been having headaches a lot, at least one every day      Eulalia Uriostegui is a 32y.o. year old female who presents for     Hypertension ROS:  taking medications as instructed, no medication side effects noted, no TIAs, no chest pain on exertion, no dyspnea on exertion, no swelling of ankles  She has ABBOTT, tested negative from covid, denies wheezing  Former smoker  BP has been running high lately  No changes in diet  She still desires pregnancy    Wt Readings from Last 3 Encounters:   09/30/21 267 lb (121.1 kg)   09/14/21 269 lb (122 kg)   08/31/21 269 lb (122 kg)         BP Readings from Last 3 Encounters:   09/30/21 (!) 179/108   09/14/21 (!) 142/97   08/31/21 (!) 149/91       Headaches: frontal, pressure, since changing to labetolol        Reviewed PmHx, RxHx, FmHx, SocHx, AllgHx and updated in chart.   Family History   Problem Relation Age of Onset    Hypertension Maternal Grandmother        Past Medical History:   Diagnosis Date    Asthma     Asthma     has not had any problems no inhaler    Class 3 obesity in adult 11/16/2017    Essential hypertension 11/16/2017    H/O pilonidal cyst     Hidradenitis 2/1/2018    Psychiatric disorder     ANXIETY    Vaginal delivery       Social History     Socioeconomic History    Marital status: SINGLE     Spouse name: Not on file    Number of children: Not on file    Years of education: Not on file    Highest education level: Not on file   Tobacco Use    Smoking status: Current Some Day Smoker     Packs/day: 0.25     Types: Cigars     Last attempt to quit: 3/1/2011     Years since quitting: 10.5    Smokeless tobacco: Never Used   Vaping Use    Vaping Use: Never used   Substance and Sexual Activity    Alcohol use: No    Drug use: Yes     Types: Marijuana     Comment: Patient reports taking an edible at 2200    Sexual activity: Yes     Partners: Male Birth control/protection: None     Social Determinants of Health     Financial Resource Strain:     Difficulty of Paying Living Expenses:    Food Insecurity:     Worried About Running Out of Food in the Last Year:     [address] in the Last Year:    Transportation Needs:     Lack of Transportation (Medical):  Lack of Transportation (Non-Medical):    Physical Activity:     Days of Exercise per Week:     Minutes of Exercise per Session:    Stress:     Feeling of Stress :    Social Connections:     Frequency of Communication with Friends and Family:     Frequency of Social Gatherings with Friends and Family:     Attends [employer] Services:     Active Member of Clubs or Organizations:     Attends Club or Organization Meetings:     Marital Status:           Current Outpatient Medications   Medication Sig    ketoconazole (NIZORAL) 2 % shampoo USE WEEKLY AS NEEDED FOR SHAMPOO    labetaloL (NORMODYNE) 200 mg tablet Take 1 Tablet by mouth two (2) times a day.  ibuprofen (MOTRIN) 800 mg tablet Take 1 Tablet by mouth every eight (8) hours as needed for Pain. With food    albuterol (PROVENTIL HFA, VENTOLIN HFA, PROAIR HFA) 90 mcg/actuation inhaler Take 2 Puffs by inhalation every six (6) hours as needed for Wheezing.  prenatal vit-iron fumarate-fa 27 mg iron- 0.8 mg tab tablet Take 1 Tablet by mouth daily.  nystatin (MYCOSTATIN) 100,000 unit/gram ointment Apply  to affected area two (2) times a day. No current facility-administered medications for this visit. Review of Systems:   Constitutional:    Negative for fever and chills, negative diaphoresis. HEENT:              Negative for neck pain and stiffness. Eyes:                  Negative for visual disturbance, itching, redness or discharge. Respiratory:        Negative for cough and shortness of breath. Cardiovascular:  Negative for chest pain and palpitations.    Gastrointestinal: Negative for nausea, vomiting, abdominal pain, diarrhea or constipation. Genitourinary:     Negative for dysuria and frequency. Musculoskeletal: Negative for falls, tenderness and swelling. Skin:                    Negative for rash, masses or lesions. Neurological:       Negative for dizzyness, seizure, loss of consciousness, weakness and numbness. Objective:     Vitals:    09/30/21 1346   BP: (!) 179/108   Pulse: 89   Resp: 18   Temp: 98.3 °F (36.8 °C)   TempSrc: Temporal   SpO2: 97%   Weight: 267 lb (121.1 kg)   Height: 5' 1\" (1.549 m)       Gen: Oriented to person, place and time and well-developed, well-nourished and in no distress. HEENT:    Head: normocephalic and atraumatic. Eyes:  EOM are normal. Pupils equal and round. Neck:  Normal range of motion. Neck supple. Cardiovascular: normal rate, regular rhythm and normal heart sounds. Pulmonary/Chest:  Effort normal and breath sounds normal.  No respiratory distress. No wheezes, no rales. Abdominal: soft, normal  bowel sounds. Musculoskeletal:  No edema, no tenderness. No calf tenderness or edema. Neurological:  Alert, oriented to person, place and time. Skin: skin is warm and dry. Assessment/ Plan:     1. Seborrheic dermatitis of scalp    - ketoconazole (NIZORAL) 2 % shampoo; USE WEEKLY AS NEEDED FOR SHAMPOO  Dispense: 120 mL; Refill: 0    2. Essential hypertension  - labetaloL (NORMODYNE) 200 mg tablet; Take 1 Tablet by mouth two (2) times a day. Dispense: 180 Tablet; Refill: 0        I have discussed the diagnosis with the patient and the intended plan as seen in the above orders. The patient has received an after-visit summary and questions were answered concerning future plans. Pt conveyed understanding of plan. Medication Side Effects and Warnings were discussed with patient: yes  Patient Labs were reviewed: yes  Patient Past Records were reviewed:  yes    Talya Pat.  Sharyn Luz NP

## 2024-03-13 NOTE — PERIOP NOTES
Provided patient with the follow supplies as requested by Dr. Emery Edmonds. 
1 container of 1/4 in plain Nu-Gaize 2 packs of 4x4's 1 bottle of sterile water 1 roll of 2in Medipore Tape. 1939 - Pt states unable to void at this time, pt bladder scanned which showed 58mL. 2015 - Pt voided. Use cold compresses for the first 48 hours while awake to minimize bruising and swelling. It works well to put a washcloth in a bowl of ice water and use the cold washcloth.     Use a warm compress 5-10 minutes 4 times per day for the next 2 days or as needed prior to next appointment.     Apply the prescribed/provided ointment to the incision three times a day for five days.

## 2024-04-02 DIAGNOSIS — L21.0 DANDRUFF: ICD-10-CM

## 2024-04-02 DIAGNOSIS — I10 ESSENTIAL HYPERTENSION: ICD-10-CM

## 2024-04-02 RX ORDER — KETOCONAZOLE 20 MG/ML
SHAMPOO TOPICAL
Qty: 120 ML | Refills: 0 | Status: SHIPPED | OUTPATIENT
Start: 2024-04-02

## 2024-04-02 RX ORDER — NIFEDIPINE 60 MG/1
60 TABLET, EXTENDED RELEASE ORAL DAILY
Qty: 90 TABLET | Refills: 0 | Status: SHIPPED | OUTPATIENT
Start: 2024-04-02

## 2024-04-02 RX ORDER — POTASSIUM CHLORIDE 20 MEQ/1
TABLET, EXTENDED RELEASE ORAL
Qty: 90 TABLET | Refills: 0 | Status: SHIPPED | OUTPATIENT
Start: 2024-04-02

## 2024-04-10 NOTE — TELEPHONE ENCOUNTER
Pt requested refill(s) via [employer]     Duplicate request:   04/02/2024 Glucophage 500 mg #180 was sent to [id number].     For Pharmacy Admin Tracking Only    Program: Medication Refill  Intervention Detail: Discontinued Rx: 1, reason: Duplicate Therapy  Time Spent (min): 5    Requested Prescriptions     Pending Prescriptions Disp Refills    metFORMIN (GLUCOPHAGE) 500 MG tablet 180 tablet 0     Sig: Take 1 tablet by mouth 2 times daily (with meals)

## 2024-04-20 ENCOUNTER — HOSPITAL ENCOUNTER (EMERGENCY)
Facility: HOSPITAL | Age: 34
Discharge: HOME | End: 2024-04-20
Attending: STUDENT IN AN ORGANIZED HEALTH CARE EDUCATION/TRAINING PROGRAM
Payer: MEDICAID

## 2024-04-20 ENCOUNTER — APPOINTMENT (OUTPATIENT)
Facility: HOSPITAL | Age: 34
End: 2024-04-20
Payer: MEDICAID

## 2024-04-20 VITALS
DIASTOLIC BLOOD PRESSURE: 76 MMHG | SYSTOLIC BLOOD PRESSURE: 125 MMHG | HEART RATE: 105 BPM | TEMPERATURE: 98.1 F | OXYGEN SATURATION: 100 % | RESPIRATION RATE: 22 BRPM

## 2024-04-20 DIAGNOSIS — N93.9 VAGINAL BLEEDING: Primary | ICD-10-CM

## 2024-04-20 LAB
ALBUMIN SERPL-MCNC: 3.4 G/DL (ref 3.5–5)
ALBUMIN/GLOB SERPL: 0.9 (ref 1.1–2.2)
ALP SERPL-CCNC: 75 U/L (ref 45–117)
ALT SERPL-CCNC: 45 U/L (ref 12–78)
ANION GAP SERPL CALC-SCNC: 4 MMOL/L (ref 5–15)
AST SERPL-CCNC: 27 U/L (ref 15–37)
BASOPHILS # BLD: 0 K/UL (ref 0–0.1)
BASOPHILS NFR BLD: 0 % (ref 0–1)
BILIRUB SERPL-MCNC: 0.3 MG/DL (ref 0.2–1)
BUN SERPL-MCNC: 13 MG/DL (ref 6–20)
BUN/CREAT SERPL: 14 (ref 12–20)
CALCIUM SERPL-MCNC: 8.4 MG/DL (ref 8.5–10.1)
CHLORIDE SERPL-SCNC: 105 MMOL/L (ref 97–108)
CO2 SERPL-SCNC: 29 MMOL/L (ref 21–32)
COMMENT:: NORMAL
CREAT SERPL-MCNC: 0.94 MG/DL (ref 0.55–1.02)
DIFFERENTIAL METHOD BLD: ABNORMAL
EOSINOPHIL # BLD: 0.2 K/UL (ref 0–0.4)
EOSINOPHIL NFR BLD: 2 % (ref 0–7)
ERYTHROCYTE [DISTWIDTH] IN BLOOD BY AUTOMATED COUNT: 17.6 % (ref 11.5–14.5)
GLOBULIN SER CALC-MCNC: 3.7 G/DL (ref 2–4)
GLUCOSE SERPL-MCNC: 130 MG/DL (ref 65–100)
HCG SERPL QL: NEGATIVE
HCT VFR BLD AUTO: 24 % (ref 35–47)
HCT VFR BLD AUTO: 24.9 % (ref 35–47)
HGB BLD-MCNC: 7.4 G/DL (ref 11.5–16)
HGB BLD-MCNC: 7.8 G/DL (ref 11.5–16)
IMM GRANULOCYTES # BLD AUTO: 0.1 K/UL (ref 0–0.04)
IMM GRANULOCYTES NFR BLD AUTO: 1 % (ref 0–0.5)
LYMPHOCYTES # BLD: 2.3 K/UL (ref 0.8–3.5)
LYMPHOCYTES NFR BLD: 26 % (ref 12–49)
MAGNESIUM SERPL-MCNC: 1.8 MG/DL (ref 1.6–2.4)
MCH RBC QN AUTO: 25.3 PG (ref 26–34)
MCHC RBC AUTO-ENTMCNC: 31.3 G/DL (ref 30–36.5)
MCV RBC AUTO: 80.8 FL (ref 80–99)
MONOCYTES # BLD: 0.3 K/UL (ref 0–1)
MONOCYTES NFR BLD: 4 % (ref 5–13)
NEUTS SEG # BLD: 6.1 K/UL (ref 1.8–8)
NEUTS SEG NFR BLD: 67 % (ref 32–75)
NRBC # BLD: 0.05 K/UL (ref 0–0.01)
NRBC BLD-RTO: 0.6 PER 100 WBC
NT PRO BNP: 21 PG/ML
PLATELET # BLD AUTO: 384 K/UL (ref 150–400)
PMV BLD AUTO: 10.1 FL (ref 8.9–12.9)
POTASSIUM SERPL-SCNC: 2.7 MMOL/L (ref 3.5–5.1)
PROT SERPL-MCNC: 7.1 G/DL (ref 6.4–8.2)
RBC # BLD AUTO: 3.08 M/UL (ref 3.8–5.2)
SODIUM SERPL-SCNC: 138 MMOL/L (ref 136–145)
SPECIMEN HOLD: NORMAL
WBC # BLD AUTO: 9 K/UL (ref 3.6–11)

## 2024-04-20 PROCEDURE — 76830 TRANSVAGINAL US NON-OB: CPT

## 2024-04-20 PROCEDURE — 96374 THER/PROPH/DIAG INJ IV PUSH: CPT

## 2024-04-20 PROCEDURE — 96361 HYDRATE IV INFUSION ADD-ON: CPT

## 2024-04-20 PROCEDURE — 2580000003 HC RX 258: Performed by: STUDENT IN AN ORGANIZED HEALTH CARE EDUCATION/TRAINING PROGRAM

## 2024-04-20 PROCEDURE — 36415 COLL VENOUS BLD VENIPUNCTURE: CPT

## 2024-04-20 PROCEDURE — 85025 COMPLETE CBC W/AUTO DIFF WBC: CPT

## 2024-04-20 PROCEDURE — 85018 HEMOGLOBIN: CPT

## 2024-04-20 PROCEDURE — 80053 COMPREHEN METABOLIC PANEL: CPT

## 2024-04-20 PROCEDURE — 84703 CHORIONIC GONADOTROPIN ASSAY: CPT

## 2024-04-20 PROCEDURE — 83735 ASSAY OF MAGNESIUM: CPT

## 2024-04-20 PROCEDURE — 85014 HEMATOCRIT: CPT

## 2024-04-20 PROCEDURE — 83880 ASSAY OF NATRIURETIC PEPTIDE: CPT

## 2024-04-20 PROCEDURE — 6360000002 HC RX W HCPCS: Performed by: STUDENT IN AN ORGANIZED HEALTH CARE EDUCATION/TRAINING PROGRAM

## 2024-04-20 PROCEDURE — 6370000000 HC RX 637 (ALT 250 FOR IP): Performed by: STUDENT IN AN ORGANIZED HEALTH CARE EDUCATION/TRAINING PROGRAM

## 2024-04-20 PROCEDURE — 76856 US EXAM PELVIC COMPLETE: CPT

## 2024-04-20 PROCEDURE — 99284 EMERGENCY DEPT VISIT MOD MDM: CPT

## 2024-04-20 RX ORDER — MORPHINE SULFATE 4 MG/ML
4 INJECTION, SOLUTION INTRAMUSCULAR; INTRAVENOUS
Status: COMPLETED | OUTPATIENT
Start: 2024-04-20 | End: 2024-04-20

## 2024-04-20 RX ORDER — POTASSIUM CHLORIDE 750 MG/1
40 TABLET, FILM COATED, EXTENDED RELEASE ORAL ONCE
Status: COMPLETED | OUTPATIENT
Start: 2024-04-20 | End: 2024-04-20

## 2024-04-20 RX ORDER — MEDROXYPROGESTERONE ACETATE 5 MG/1
5 TABLET ORAL DAILY
Qty: 7 TABLET | Refills: 0 | Status: SHIPPED | OUTPATIENT
Start: 2024-04-20 | End: 2024-04-27

## 2024-04-20 RX ORDER — 0.9 % SODIUM CHLORIDE 0.9 %
1000 INTRAVENOUS SOLUTION INTRAVENOUS ONCE
Status: COMPLETED | OUTPATIENT
Start: 2024-04-20 | End: 2024-04-20

## 2024-04-20 RX ORDER — MORPHINE SULFATE 4 MG/ML
4 INJECTION, SOLUTION INTRAMUSCULAR; INTRAVENOUS EVERY 4 HOURS PRN
Status: DISCONTINUED | OUTPATIENT
Start: 2024-04-20 | End: 2024-04-20

## 2024-04-20 RX ADMIN — SODIUM CHLORIDE 1000 ML: 9 INJECTION, SOLUTION INTRAVENOUS at 17:14

## 2024-04-20 RX ADMIN — MORPHINE SULFATE 4 MG: 4 INJECTION, SOLUTION INTRAMUSCULAR; INTRAVENOUS at 17:35

## 2024-04-20 RX ADMIN — POTASSIUM CHLORIDE 40 MEQ: 750 TABLET, FILM COATED, EXTENDED RELEASE ORAL at 17:35

## 2024-04-20 ASSESSMENT — PAIN SCALES - GENERAL
PAINLEVEL_OUTOF10: 10
PAINLEVEL_OUTOF10: 10

## 2024-04-20 ASSESSMENT — PAIN DESCRIPTION - DESCRIPTORS
DESCRIPTORS: ACHING;STABBING
DESCRIPTORS: ACHING;DISCOMFORT;SHARP

## 2024-04-20 ASSESSMENT — PAIN DESCRIPTION - PAIN TYPE: TYPE: ACUTE PAIN

## 2024-04-20 ASSESSMENT — PAIN - FUNCTIONAL ASSESSMENT
PAIN_FUNCTIONAL_ASSESSMENT: PREVENTS OR INTERFERES SOME ACTIVE ACTIVITIES AND ADLS
PAIN_FUNCTIONAL_ASSESSMENT: ACTIVITIES ARE NOT PREVENTED

## 2024-04-20 ASSESSMENT — PAIN DESCRIPTION - ONSET: ONSET: ON-GOING

## 2024-04-20 ASSESSMENT — PAIN DESCRIPTION - ORIENTATION
ORIENTATION: LOWER
ORIENTATION: LOWER

## 2024-04-20 ASSESSMENT — PAIN DESCRIPTION - LOCATION
LOCATION: ABDOMEN
LOCATION: ABDOMEN;VAGINA

## 2024-04-20 ASSESSMENT — PAIN DESCRIPTION - FREQUENCY: FREQUENCY: CONTINUOUS

## 2024-04-20 NOTE — ED TRIAGE NOTES
Patient in through ems from home with complaints of intermittent vaginal bleeding x2 weeks. She went to her GYN who gave her a course of medication to help stopped the bleeding, her last dose was yesterday. Despite the medication she is still passing large clots and going through, \"4 pads an hour\". +dizziness. Denies any chance of pregnancy.

## 2024-04-20 NOTE — ED NOTES
Bedside and Verbal shift change report given to IRMA Chacon (oncoming nurse) by IRMA Mehta (offgoing nurse). Report included the following information Nurse Handoff Report, ED Encounter Summary, ED SBAR, Adult Overview, Intake/Output, MAR, and Recent Results.

## 2024-04-21 NOTE — ED PROVIDER NOTES
Laparoscopic cholecystectomy.    OTHER SURGICAL HISTORY  04/19/2016    Incision and drainage of pilonidal abscess; Dr. Reed.    OTHER SURGICAL HISTORY  08/22/2016    Incision, drainage, and excision of pilonidal cyst; Dr. Reed.    OTHER SURGICAL HISTORY  04/17/2018    Incision and drainage of pilonidal abscess; Dr. Schmitz.    OTHER SURGICAL HISTORY  02/15/2018    Excision of left axilla hidradenitis; Dr. Pfeiffer.    OTHER SURGICAL HISTORY  12/21/2017    Excision of sebaceous cyst, right inframammary fold; Dr. Pfeiffer.    OTHER SURGICAL HISTORY  06/14/2018    Incision and drainage of pilonidal abscess; Dr. Reed.    OTHER SURGICAL HISTORY  01/04/2019    Incision and drainage of pilonidal abscess; Dr. Reed.         CURRENT MEDICATIONS       Discharge Medication List as of 4/20/2024  8:54 PM        CONTINUE these medications which have NOT CHANGED    Details   metFORMIN (GLUCOPHAGE) 500 MG tablet Take 1 tablet by mouth 2 times daily (with meals), Disp-180 tablet, R-0Normal      NIFEdipine (PROCARDIA XL) 60 MG extended release tablet Take 1 tablet by mouth daily, Disp-90 tablet, R-0Dose increaseNormal      ketoconazole (NIZORAL) 2 % shampoo USE WEEKLY AS NEEDED FOR SHAMPOO, Disp-120 mL, R-0, Normal      potassium chloride (KLOR-CON M) 20 MEQ extended release tablet TAKE 1 TABLET BY MOUTH EVERY WITH DIURETIC (chlorthalidone), Disp-90 tablet, R-0Normal      Blood Pressure KIT Disp-1 kit, R-0, NormalCheck blood pressure 3x per week      escitalopram (LEXAPRO) 20 MG tablet Take 1 tablet by mouth daily, Disp-90 tablet, R-1Normal      labetalol (NORMODYNE) 200 MG tablet Take 1 tablet by mouth 2 times daily, Disp-180 tablet, R-1Normal      chlorthalidone (HYGROTEN) 50 MG tablet Take 1 tablet by mouth dailyHistorical Med             ALLERGIES     Naproxen sodium, Propoxyphene, Hydrocodone-acetaminophen, and Sulfa antibiotics    FAMILY HISTORY       Family History   Problem Relation Age of

## 2024-04-21 NOTE — ED NOTES
Received report for transfer of care. Pt resting in stretcher on full monitor, call bell in reach. Daughters at bedside. Repeat h&h sent. Pt updated on poc.

## 2024-04-30 ENCOUNTER — TELEPHONE (OUTPATIENT)
Age: 34
End: 2024-04-30

## 2024-04-30 DIAGNOSIS — L30.9 DERMATITIS: Primary | ICD-10-CM

## 2024-04-30 NOTE — TELEPHONE ENCOUNTER
Identified patient with two patient identifiers (name and ). Reviewed chart in preparation for encounter and have obtained necessary documentation.     Called patient to inform her per Felicita She is NOT cleared at this time. Patient was required to have 4 counseling sessions and has only completed 1 of 4 sessions. Patient stated she is no longer interested in the program. I informed patient I would let the team know.

## 2024-05-03 ENCOUNTER — TELEMEDICINE (OUTPATIENT)
Facility: CLINIC | Age: 34
End: 2024-05-03
Payer: MEDICAID

## 2024-05-03 DIAGNOSIS — L30.9 DERMATITIS: ICD-10-CM

## 2024-05-03 DIAGNOSIS — D50.0 IRON DEFICIENCY ANEMIA DUE TO CHRONIC BLOOD LOSS: Primary | ICD-10-CM

## 2024-05-03 DIAGNOSIS — F33.1 MODERATE EPISODE OF RECURRENT MAJOR DEPRESSIVE DISORDER (HCC): ICD-10-CM

## 2024-05-03 DIAGNOSIS — I10 ESSENTIAL HYPERTENSION: ICD-10-CM

## 2024-05-03 PROCEDURE — 99213 OFFICE O/P EST LOW 20 MIN: CPT | Performed by: NURSE PRACTITIONER

## 2024-05-03 RX ORDER — DOCUSATE SODIUM 100 MG/1
100 CAPSULE, LIQUID FILLED ORAL 2 TIMES DAILY PRN
Qty: 60 CAPSULE | Refills: 0 | Status: SHIPPED | OUTPATIENT
Start: 2024-05-03 | End: 2024-06-02

## 2024-05-03 RX ORDER — POTASSIUM CHLORIDE 20 MEQ/1
TABLET, EXTENDED RELEASE ORAL
Qty: 90 TABLET | Refills: 0 | Status: CANCELLED | OUTPATIENT
Start: 2024-05-03

## 2024-05-03 RX ORDER — TRIAMCINOLONE ACETONIDE 1 MG/G
OINTMENT TOPICAL 2 TIMES DAILY
Qty: 30 G | Refills: 0 | Status: SHIPPED | OUTPATIENT
Start: 2024-05-03 | End: 2024-05-10

## 2024-05-03 RX ORDER — ESCITALOPRAM OXALATE 20 MG/1
20 TABLET ORAL DAILY
Qty: 90 TABLET | Refills: 1 | Status: SHIPPED | OUTPATIENT
Start: 2024-05-03

## 2024-05-03 RX ORDER — FERROUS SULFATE 325(65) MG
325 TABLET ORAL 2 TIMES DAILY
Qty: 60 TABLET | Refills: 3 | Status: SHIPPED | OUTPATIENT
Start: 2024-05-03

## 2024-05-07 RX ORDER — POTASSIUM CHLORIDE 20 MEQ/1
TABLET, EXTENDED RELEASE ORAL
Qty: 90 TABLET | Refills: 0 | Status: SHIPPED | OUTPATIENT
Start: 2024-05-07

## 2024-05-08 RX ORDER — NIFEDIPINE 60 MG/1
60 TABLET, EXTENDED RELEASE ORAL DAILY
Qty: 90 TABLET | Refills: 0 | Status: SHIPPED | OUTPATIENT
Start: 2024-05-08

## 2024-05-13 ENCOUNTER — CLINICAL DOCUMENTATION (OUTPATIENT)
Facility: CLINIC | Age: 34
End: 2024-05-13

## 2024-05-16 ENCOUNTER — OFFICE VISIT (OUTPATIENT)
Facility: CLINIC | Age: 34
End: 2024-05-16
Payer: MEDICAID

## 2024-05-16 VITALS
BODY MASS INDEX: 53.43 KG/M2 | TEMPERATURE: 98.6 F | OXYGEN SATURATION: 100 % | WEIGHT: 283 LBS | SYSTOLIC BLOOD PRESSURE: 160 MMHG | DIASTOLIC BLOOD PRESSURE: 87 MMHG | RESPIRATION RATE: 19 BRPM | HEIGHT: 61 IN | HEART RATE: 94 BPM

## 2024-05-16 DIAGNOSIS — E11.9 TYPE 2 DIABETES MELLITUS WITHOUT COMPLICATION, WITHOUT LONG-TERM CURRENT USE OF INSULIN (HCC): Primary | ICD-10-CM

## 2024-05-16 DIAGNOSIS — M48.02 CERVICAL STENOSIS OF SPINAL CANAL: ICD-10-CM

## 2024-05-16 DIAGNOSIS — E11.9 TYPE 2 DIABETES MELLITUS WITHOUT COMPLICATION, WITHOUT LONG-TERM CURRENT USE OF INSULIN (HCC): ICD-10-CM

## 2024-05-16 DIAGNOSIS — D50.9 IRON DEFICIENCY ANEMIA, UNSPECIFIED IRON DEFICIENCY ANEMIA TYPE: ICD-10-CM

## 2024-05-16 DIAGNOSIS — I10 ESSENTIAL HYPERTENSION: ICD-10-CM

## 2024-05-16 LAB
GLUCOSE, POC: 115 MG/DL
HBA1C MFR BLD: 5.2 %

## 2024-05-16 PROCEDURE — 3079F DIAST BP 80-89 MM HG: CPT | Performed by: NURSE PRACTITIONER

## 2024-05-16 PROCEDURE — 3077F SYST BP >= 140 MM HG: CPT | Performed by: NURSE PRACTITIONER

## 2024-05-16 PROCEDURE — 99214 OFFICE O/P EST MOD 30 MIN: CPT | Performed by: NURSE PRACTITIONER

## 2024-05-16 PROCEDURE — 83036 HEMOGLOBIN GLYCOSYLATED A1C: CPT | Performed by: NURSE PRACTITIONER

## 2024-05-16 PROCEDURE — 82962 GLUCOSE BLOOD TEST: CPT | Performed by: NURSE PRACTITIONER

## 2024-05-16 RX ORDER — CYCLOBENZAPRINE HCL 10 MG
10 TABLET ORAL NIGHTLY PRN
Qty: 30 TABLET | Refills: 0 | Status: SHIPPED | OUTPATIENT
Start: 2024-05-16 | End: 2024-06-15

## 2024-05-16 ASSESSMENT — COLUMBIA-SUICIDE SEVERITY RATING SCALE - C-SSRS
6. HAVE YOU EVER DONE ANYTHING, STARTED TO DO ANYTHING, OR PREPARED TO DO ANYTHING TO END YOUR LIFE?: NO
1. WITHIN THE PAST MONTH, HAVE YOU WISHED YOU WERE DEAD OR WISHED YOU COULD GO TO SLEEP AND NOT WAKE UP?: NO
2. HAVE YOU ACTUALLY HAD ANY THOUGHTS OF KILLING YOURSELF?: NO

## 2024-05-16 ASSESSMENT — PATIENT HEALTH QUESTIONNAIRE - PHQ9
SUM OF ALL RESPONSES TO PHQ9 QUESTIONS 1 & 2: 2
7. TROUBLE CONCENTRATING ON THINGS, SUCH AS READING THE NEWSPAPER OR WATCHING TELEVISION: SEVERAL DAYS
1. LITTLE INTEREST OR PLEASURE IN DOING THINGS: SEVERAL DAYS
3. TROUBLE FALLING OR STAYING ASLEEP: SEVERAL DAYS
5. POOR APPETITE OR OVEREATING: SEVERAL DAYS
SUM OF ALL RESPONSES TO PHQ QUESTIONS 1-9: 8
SUM OF ALL RESPONSES TO PHQ QUESTIONS 1-9: 7
SUM OF ALL RESPONSES TO PHQ QUESTIONS 1-9: 8
6. FEELING BAD ABOUT YOURSELF - OR THAT YOU ARE A FAILURE OR HAVE LET YOURSELF OR YOUR FAMILY DOWN: SEVERAL DAYS
4. FEELING TIRED OR HAVING LITTLE ENERGY: SEVERAL DAYS
9. THOUGHTS THAT YOU WOULD BE BETTER OFF DEAD, OR OF HURTING YOURSELF: SEVERAL DAYS
8. MOVING OR SPEAKING SO SLOWLY THAT OTHER PEOPLE COULD HAVE NOTICED. OR THE OPPOSITE, BEING SO FIGETY OR RESTLESS THAT YOU HAVE BEEN MOVING AROUND A LOT MORE THAN USUAL: NOT AT ALL
SUM OF ALL RESPONSES TO PHQ QUESTIONS 1-9: 8
10. IF YOU CHECKED OFF ANY PROBLEMS, HOW DIFFICULT HAVE THESE PROBLEMS MADE IT FOR YOU TO DO YOUR WORK, TAKE CARE OF THINGS AT HOME, OR GET ALONG WITH OTHER PEOPLE: SOMEWHAT DIFFICULT
2. FEELING DOWN, DEPRESSED OR HOPELESS: SEVERAL DAYS

## 2024-05-16 NOTE — PROGRESS NOTES
\"Have you been to the ER, urgent care clinic since your last visit?  Hospitalized since your last visit?\"    NO    “Have you seen or consulted any other health care providers outside of [facility] since your last visit?”    NO    Chief Complaint   Patient presents with    Follow-up     6 month pt states pain in neck is at a 10           Click Here for Release of Records Request    
and palpitations.   Gastrointestinal: Negative for nausea, vomiting, abdominal pain, diarrhea or constipation.  Genitourinary:     Negative for dysuria and frequency.   Musculoskeletal: Negative for falls, tenderness and swelling.  Skin:                    Negative for rash, masses or lesions.   Neurological:       Negative for dizzyness, seizure, loss of consciousness, weakness and numbness.     Objective:     Vitals:    05/16/24 1018   BP: (!) 159/92   Site: Left Upper Arm   Position: Sitting   Cuff Size: Thigh   Pulse: 94   Resp: 19   Temp: 98.6 °F (37 °C)   TempSrc: Temporal   SpO2: 100%   Weight: 128.4 kg (283 lb)   Height: 1.549 m (5' 1\")       Results for orders placed or performed in visit on 05/16/24   AMB POC HEMOGLOBIN A1C   Result Value Ref Range    Hemoglobin A1C, POC 5.2 %   AMB POC GLUCOSE BLOOD, BY GLUCOSE MONITORING DEVICE   Result Value Ref Range    Glucose,  MG/DL        Gen: Oriented to person, place and time and well-developed, well-nourished and in no distress.   HEENT:    Head: normocephalic and atraumatic.    Eyes:  EOM are normal. Pupils equal and round.    Neck:  Normal range of motion.  Neck supple.    Cardiovascular: normal rate, regular rhythm and normal heart sounds.   Pulmonary/Chest:  Effort normal and breath sounds normal.  No respiratory distress.  No wheezes, no rales.   Musculoskeletal:  No edema, no tenderness.  No calf tenderness or edema. TTP to cervical paraspinals   Neurological:  Alert, oriented to person, place and time.    Skin: skin is warm and dry.       Assessment/ Plan:       1. Type 2 diabetes mellitus without complication, without long-term current use of insulin ([facility])    - AMB POC HEMOGLOBIN A1C  - AMB POC GLUCOSE BLOOD, BY GLUCOSE MONITORING DEVICE  - Lipid Panel; Future    2. Essential hypertension  Normally better, fu NV BP check, adjust meds INI    3. Iron deficiency anemia, unspecified iron deficiency anemia type    - CBC; Future  - Ferritin;

## 2024-07-23 ENCOUNTER — NURSE ONLY (OUTPATIENT)
Facility: CLINIC | Age: 34
End: 2024-07-23

## 2024-07-23 ENCOUNTER — TELEPHONE (OUTPATIENT)
Facility: CLINIC | Age: 34
End: 2024-07-23

## 2024-07-23 VITALS
RESPIRATION RATE: 18 BRPM | OXYGEN SATURATION: 98 % | SYSTOLIC BLOOD PRESSURE: 169 MMHG | WEIGHT: 275.9 LBS | DIASTOLIC BLOOD PRESSURE: 105 MMHG | HEIGHT: 61 IN | BODY MASS INDEX: 52.09 KG/M2

## 2024-07-23 DIAGNOSIS — I10 ESSENTIAL HYPERTENSION: ICD-10-CM

## 2024-07-23 DIAGNOSIS — L21.0 DANDRUFF: ICD-10-CM

## 2024-07-23 DIAGNOSIS — D50.0 IRON DEFICIENCY ANEMIA DUE TO CHRONIC BLOOD LOSS: ICD-10-CM

## 2024-07-23 RX ORDER — MEDROXYPROGESTERONE ACETATE 5 MG/1
5 TABLET ORAL DAILY
Qty: 7 TABLET | Refills: 0 | Status: CANCELLED | OUTPATIENT
Start: 2024-07-23 | End: 2024-07-30

## 2024-07-23 RX ORDER — KETOCONAZOLE 20 MG/ML
SHAMPOO TOPICAL
Qty: 120 ML | Refills: 0 | Status: SHIPPED | OUTPATIENT
Start: 2024-07-23

## 2024-07-23 RX ORDER — CHLORTHALIDONE 50 MG/1
50 TABLET ORAL DAILY
Qty: 90 TABLET | Refills: 1 | Status: SHIPPED | OUTPATIENT
Start: 2024-07-23

## 2024-07-23 RX ORDER — NIFEDIPINE 60 MG/1
60 TABLET, EXTENDED RELEASE ORAL DAILY
Qty: 90 TABLET | Refills: 0 | Status: CANCELLED | OUTPATIENT
Start: 2024-07-23

## 2024-07-23 RX ORDER — NIFEDIPINE 90 MG/1
90 TABLET, EXTENDED RELEASE ORAL DAILY
Qty: 90 TABLET | Refills: 1 | Status: SHIPPED | OUTPATIENT
Start: 2024-07-23

## 2024-07-23 RX ORDER — LABETALOL 200 MG/1
200 TABLET, FILM COATED ORAL 2 TIMES DAILY
Qty: 180 TABLET | Refills: 1 | Status: SHIPPED | OUTPATIENT
Start: 2024-07-23

## 2024-07-23 RX ORDER — FERROUS SULFATE 325(65) MG
325 TABLET ORAL 2 TIMES DAILY
Qty: 60 TABLET | Refills: 3 | Status: SHIPPED | OUTPATIENT
Start: 2024-07-23

## 2024-07-23 RX ORDER — POTASSIUM CHLORIDE 20 MEQ/1
TABLET, EXTENDED RELEASE ORAL
Qty: 90 TABLET | Refills: 0 | Status: SHIPPED | OUTPATIENT
Start: 2024-07-23

## 2024-07-23 NOTE — PROGRESS NOTES
Maris Mckeon is a 34 y.o. female  \"Have you been to the ER, urgent care clinic since your last visit?  Hospitalized since your last visit?\"    NO    “Have you seen or consulted any other health care providers outside of [facility] since your last visit?”    NO    Click Here for Release of Records Request    Chief Complaint   Patient presents with    Hypertension     Nurse Visit      Pt states went to [facility] before getting bp check pt states it will be high

## 2024-07-23 NOTE — PROGRESS NOTES
BP Readings from Last 3 Encounters:   07/23/24 (!) 169/105   05/16/24 (!) 160/87   04/20/24 125/76

## 2024-09-14 ENCOUNTER — HOSPITAL ENCOUNTER (EMERGENCY)
Facility: HOSPITAL | Age: 34
Discharge: LEFT BEFORE DISCHARGE | End: 2024-09-14

## 2024-09-14 VITALS
HEART RATE: 96 BPM | DIASTOLIC BLOOD PRESSURE: 91 MMHG | BODY MASS INDEX: 52.11 KG/M2 | WEIGHT: 276 LBS | TEMPERATURE: 98.2 F | OXYGEN SATURATION: 97 % | RESPIRATION RATE: 17 BRPM | HEIGHT: 61 IN | SYSTOLIC BLOOD PRESSURE: 150 MMHG

## 2024-09-14 ASSESSMENT — PAIN DESCRIPTION - LOCATION: LOCATION: FOOT

## 2024-09-14 ASSESSMENT — PAIN DESCRIPTION - PAIN TYPE: TYPE: ACUTE PAIN

## 2024-09-14 ASSESSMENT — PAIN DESCRIPTION - DESCRIPTORS: DESCRIPTORS: ACHING;THROBBING

## 2024-09-14 ASSESSMENT — PAIN SCALES - GENERAL: PAINLEVEL_OUTOF10: 10

## 2024-09-14 ASSESSMENT — PAIN - FUNCTIONAL ASSESSMENT: PAIN_FUNCTIONAL_ASSESSMENT: 0-10

## 2024-09-14 ASSESSMENT — PAIN DESCRIPTION - ORIENTATION: ORIENTATION: RIGHT

## 2024-09-15 DIAGNOSIS — L21.0 DANDRUFF: ICD-10-CM

## 2024-09-15 DIAGNOSIS — F33.1 MODERATE EPISODE OF RECURRENT MAJOR DEPRESSIVE DISORDER (HCC): ICD-10-CM

## 2024-09-15 DIAGNOSIS — D50.0 IRON DEFICIENCY ANEMIA DUE TO CHRONIC BLOOD LOSS: ICD-10-CM

## 2024-09-15 DIAGNOSIS — I10 ESSENTIAL HYPERTENSION: ICD-10-CM

## 2024-09-16 RX ORDER — NIFEDIPINE 90 MG/1
90 TABLET, EXTENDED RELEASE ORAL DAILY
Qty: 90 TABLET | Refills: 1 | Status: SHIPPED | OUTPATIENT
Start: 2024-09-16

## 2024-09-16 RX ORDER — FERROUS SULFATE 325(65) MG
325 TABLET ORAL 2 TIMES DAILY
Qty: 60 TABLET | Refills: 3 | Status: SHIPPED | OUTPATIENT
Start: 2024-09-16

## 2024-09-16 RX ORDER — KETOCONAZOLE 20 MG/ML
SHAMPOO TOPICAL
Qty: 120 ML | Refills: 0 | Status: SHIPPED | OUTPATIENT
Start: 2024-09-16 | End: 2024-09-19 | Stop reason: SDUPTHER

## 2024-09-16 RX ORDER — CHLORTHALIDONE 50 MG/1
50 TABLET ORAL DAILY
Qty: 90 TABLET | Refills: 1 | Status: SHIPPED | OUTPATIENT
Start: 2024-09-16

## 2024-09-16 RX ORDER — ESCITALOPRAM OXALATE 20 MG/1
20 TABLET ORAL DAILY
Qty: 90 TABLET | Refills: 1 | Status: SHIPPED | OUTPATIENT
Start: 2024-09-16

## 2024-09-19 ENCOUNTER — OFFICE VISIT (OUTPATIENT)
Facility: CLINIC | Age: 34
End: 2024-09-19
Payer: MEDICAID

## 2024-09-19 VITALS
DIASTOLIC BLOOD PRESSURE: 79 MMHG | BODY MASS INDEX: 53.07 KG/M2 | WEIGHT: 281.1 LBS | SYSTOLIC BLOOD PRESSURE: 130 MMHG | HEART RATE: 92 BPM | RESPIRATION RATE: 18 BRPM | HEIGHT: 61 IN | TEMPERATURE: 98 F | OXYGEN SATURATION: 98 %

## 2024-09-19 DIAGNOSIS — G47.33 OSA (OBSTRUCTIVE SLEEP APNEA): ICD-10-CM

## 2024-09-19 DIAGNOSIS — E11.9 TYPE 2 DIABETES MELLITUS WITHOUT COMPLICATION, WITHOUT LONG-TERM CURRENT USE OF INSULIN (HCC): Primary | ICD-10-CM

## 2024-09-19 DIAGNOSIS — E11.9 TYPE 2 DIABETES MELLITUS WITHOUT COMPLICATION, WITHOUT LONG-TERM CURRENT USE OF INSULIN (HCC): ICD-10-CM

## 2024-09-19 DIAGNOSIS — I10 ESSENTIAL HYPERTENSION: ICD-10-CM

## 2024-09-19 DIAGNOSIS — L21.0 DANDRUFF: ICD-10-CM

## 2024-09-19 PROCEDURE — 99214 OFFICE O/P EST MOD 30 MIN: CPT | Performed by: NURSE PRACTITIONER

## 2024-09-19 PROCEDURE — 3075F SYST BP GE 130 - 139MM HG: CPT | Performed by: NURSE PRACTITIONER

## 2024-09-19 PROCEDURE — 3078F DIAST BP <80 MM HG: CPT | Performed by: NURSE PRACTITIONER

## 2024-09-19 RX ORDER — KETOCONAZOLE 20 MG/ML
SHAMPOO TOPICAL
Qty: 120 ML | Refills: 0 | Status: SHIPPED | OUTPATIENT
Start: 2024-09-19

## 2024-09-26 LAB
CHOLEST SERPL-MCNC: 145 MG/DL (ref 100–199)
ERYTHROCYTE [DISTWIDTH] IN BLOOD BY AUTOMATED COUNT: 22.3 % (ref 11.7–15.4)
FERRITIN SERPL-MCNC: 27 NG/ML (ref 15–150)
HBA1C MFR BLD: 6.1 % (ref 4.8–5.6)
HCT VFR BLD AUTO: 35.9 % (ref 34–46.6)
HDLC SERPL-MCNC: 41 MG/DL
HGB BLD-MCNC: 9.9 G/DL (ref 11.1–15.9)
IMP & REVIEW OF LAB RESULTS: NORMAL
LDLC SERPL CALC-MCNC: 81 MG/DL (ref 0–99)
MCH RBC QN AUTO: 18.6 PG (ref 26.6–33)
MCHC RBC AUTO-ENTMCNC: 27.6 G/DL (ref 31.5–35.7)
MCV RBC AUTO: 67 FL (ref 79–97)
PLATELET # BLD AUTO: 459 X10E3/UL (ref 150–450)
RBC # BLD AUTO: 5.33 X10E6/UL (ref 3.77–5.28)
REPORT: NORMAL
TRIGL SERPL-MCNC: 130 MG/DL (ref 0–149)
VLDLC SERPL CALC-MCNC: 23 MG/DL (ref 5–40)
WBC # BLD AUTO: 11.4 X10E3/UL (ref 3.4–10.8)

## 2024-10-03 DIAGNOSIS — E11.9 TYPE 2 DIABETES MELLITUS WITHOUT COMPLICATION, WITHOUT LONG-TERM CURRENT USE OF INSULIN (HCC): Primary | ICD-10-CM

## 2024-10-03 RX ORDER — SEMAGLUTIDE 0.68 MG/ML
0.25 INJECTION, SOLUTION SUBCUTANEOUS
Qty: 3 ML | Refills: 1 | Status: SHIPPED | OUTPATIENT
Start: 2024-10-03

## 2024-10-07 ENCOUNTER — TELEPHONE (OUTPATIENT)
Facility: CLINIC | Age: 34
End: 2024-10-07

## 2024-10-09 ENCOUNTER — CLINICAL DOCUMENTATION (OUTPATIENT)
Facility: CLINIC | Age: 34
End: 2024-10-09

## 2024-10-09 NOTE — PROGRESS NOTES
Adalberto* reviewed your request for OZEMPIC 0.25-0.5 MG/DOSE PEN submitted for the  [facility] member identified above, and we denied your request for the following  reason:  because we did not see what we need to approve the drug you asked for, (Ozempic). We may be able  to approve this drug in a certain situation (when your glycated hemoglobin [A1c] is greater than or  equal to 6.5 when starting on the requested drug). We do not see that this applies to you. We based  this decision on your health plan’s prior authorization clinical criteria named Non-Preferred Incretin  Mimetics.

## 2024-12-09 ENCOUNTER — OFFICE VISIT (OUTPATIENT)
Age: 34
End: 2024-12-09
Payer: MEDICAID

## 2024-12-09 VITALS
TEMPERATURE: 98.3 F | HEART RATE: 101 BPM | DIASTOLIC BLOOD PRESSURE: 75 MMHG | BODY MASS INDEX: 52.18 KG/M2 | SYSTOLIC BLOOD PRESSURE: 130 MMHG | HEIGHT: 61 IN | OXYGEN SATURATION: 98 % | WEIGHT: 276.4 LBS

## 2024-12-09 DIAGNOSIS — G47.33 OSA (OBSTRUCTIVE SLEEP APNEA): Primary | ICD-10-CM

## 2024-12-09 DIAGNOSIS — E66.01 MORBID OBESITY WITH BMI OF 50.0-59.9, ADULT: ICD-10-CM

## 2024-12-09 PROCEDURE — 3075F SYST BP GE 130 - 139MM HG: CPT | Performed by: SPECIALIST

## 2024-12-09 PROCEDURE — 99213 OFFICE O/P EST LOW 20 MIN: CPT | Performed by: SPECIALIST

## 2024-12-09 PROCEDURE — 3078F DIAST BP <80 MM HG: CPT | Performed by: SPECIALIST

## 2024-12-09 RX ORDER — ACETAMINOPHEN 500 MG
1000 TABLET ORAL
COMMUNITY
Start: 2024-11-27

## 2024-12-09 RX ORDER — OXYCODONE HYDROCHLORIDE 5 MG/1
5 TABLET ORAL
COMMUNITY
Start: 2024-11-27

## 2024-12-09 RX ORDER — CLINDAMYCIN HYDROCHLORIDE 300 MG/1
300 CAPSULE ORAL
COMMUNITY
Start: 2024-09-27

## 2024-12-09 RX ORDER — ONDANSETRON 4 MG/1
4 TABLET, ORALLY DISINTEGRATING ORAL
COMMUNITY
Start: 2024-11-27

## 2024-12-09 RX ORDER — CIPROFLOXACIN AND DEXAMETHASONE 3; 1 MG/ML; MG/ML
4 SUSPENSION/ DROPS AURICULAR (OTIC)
COMMUNITY
Start: 2024-11-27

## 2024-12-09 RX ORDER — CEPHALEXIN 500 MG/1
CAPSULE ORAL
COMMUNITY
Start: 2024-09-27

## 2024-12-09 RX ORDER — OXYCODONE AND ACETAMINOPHEN 5; 325 MG/1; MG/1
1 TABLET ORAL
COMMUNITY
Start: 2024-09-27

## 2024-12-09 ASSESSMENT — SLEEP AND FATIGUE QUESTIONNAIRES
HOW LIKELY ARE YOU TO NOD OFF OR FALL ASLEEP WHILE SITTING AND READING: HIGH CHANCE OF DOZING
HOW LIKELY ARE YOU TO NOD OFF OR FALL ASLEEP WHILE SITTING QUIETLY AFTER LUNCH WITHOUT ALCOHOL: HIGH CHANCE OF DOZING
HOW LIKELY ARE YOU TO NOD OFF OR FALL ASLEEP WHILE LYING DOWN TO REST IN THE AFTERNOON WHEN CIRCUMSTANCES PERMIT: HIGH CHANCE OF DOZING
HOW LIKELY ARE YOU TO NOD OFF OR FALL ASLEEP WHILE WATCHING TV: HIGH CHANCE OF DOZING
HOW LIKELY ARE YOU TO NOD OFF OR FALL ASLEEP WHEN YOU ARE A PASSENGER IN A CAR FOR AN HOUR WITHOUT A BREAK: HIGH CHANCE OF DOZING
ESS TOTAL SCORE: 24
HOW LIKELY ARE YOU TO NOD OFF OR FALL ASLEEP WHILE SITTING INACTIVE IN A PUBLIC PLACE: HIGH CHANCE OF DOZING
HOW LIKELY ARE YOU TO NOD OFF OR FALL ASLEEP IN A CAR, WHILE STOPPED FOR A FEW MINUTES IN TRAFFIC: HIGH CHANCE OF DOZING
HOW LIKELY ARE YOU TO NOD OFF OR FALL ASLEEP WHILE SITTING AND TALKING TO SOMEONE: HIGH CHANCE OF DOZING

## 2024-12-09 NOTE — PROGRESS NOTES
surgical history that includes orthopedic surgery; other surgical history; other surgical history; other surgical history (04/19/2016); other surgical history (08/22/2016); other surgical history (04/17/2018); other surgical history (02/15/2018); other surgical history (12/21/2017); other surgical history (06/14/2018); other surgical history (01/04/2019); Breast reduction surgery (12/2020); and Appendectomy (2017).    She family history includes Hypertension in her maternal grandmother.    She  reports that she quit smoking about 13 years ago. Her smoking use included cigarettes. She has been exposed to tobacco smoke. She has never used smokeless tobacco. She reports that she does not currently use drugs after having used the following drugs: Marijuana (Weed). She reports that she does not drink alcohol.     Review of Systems:  Unchanged per patient      Objective:   /75 (Site: Right Upper Arm, Position: Sitting, Cuff Size: Large Adult)   Pulse (!) 101   Temp 98.3 °F (36.8 °C) (Temporal)   Ht 1.549 m (5' 1\")   Wt 125.4 kg (276 lb 6.4 oz)   SpO2 98%   BMI 52.23 kg/m²   Body mass index is 52.23 kg/m².     General:   Conversant, cooperative       Oropharynx:   Mallampati score III,  tongue scalloped, narrow posterior oral airway                CVS:  Normal rate, regular rhythm        Neuro:  Speech fluent, face symmetrical             Assessment:   1. NORAH (obstructive sleep apnea)  -     DME Order for (Specify) as OP  2. Morbid obesity with BMI of 50.0-59.9, adult  -     DME Order for (Specify) as OP     History of severe sleep disordered breathing responding to CPAP 14 cm.    Current PAP device not working.  Device will be upgraded.  Compliance review will be scheduled.    Patient to benefit from weight reduction.  Advised weight loss measures often more effective when sleep disordered breathing concurrently treated.    Plan:     *CPAP will be continued at the above pressure settings.  The patient is to

## 2024-12-16 SDOH — ECONOMIC STABILITY: FOOD INSECURITY: WITHIN THE PAST 12 MONTHS, THE FOOD YOU BOUGHT JUST DIDN'T LAST AND YOU DIDN'T HAVE MONEY TO GET MORE.: NEVER TRUE

## 2024-12-16 SDOH — ECONOMIC STABILITY: INCOME INSECURITY: HOW HARD IS IT FOR YOU TO PAY FOR THE VERY BASICS LIKE FOOD, HOUSING, MEDICAL CARE, AND HEATING?: NOT VERY HARD

## 2024-12-16 SDOH — ECONOMIC STABILITY: FOOD INSECURITY: WITHIN THE PAST 12 MONTHS, YOU WORRIED THAT YOUR FOOD WOULD RUN OUT BEFORE YOU GOT MONEY TO BUY MORE.: NEVER TRUE

## 2024-12-24 DIAGNOSIS — E11.9 TYPE 2 DIABETES MELLITUS WITHOUT COMPLICATION, WITHOUT LONG-TERM CURRENT USE OF INSULIN (HCC): ICD-10-CM

## 2024-12-24 DIAGNOSIS — F33.1 MODERATE EPISODE OF RECURRENT MAJOR DEPRESSIVE DISORDER (HCC): ICD-10-CM

## 2024-12-24 DIAGNOSIS — L21.0 DANDRUFF: ICD-10-CM

## 2024-12-24 DIAGNOSIS — I10 ESSENTIAL HYPERTENSION: ICD-10-CM

## 2024-12-24 RX ORDER — LABETALOL 200 MG/1
200 TABLET, FILM COATED ORAL 2 TIMES DAILY
Qty: 180 TABLET | Refills: 1 | Status: SHIPPED | OUTPATIENT
Start: 2024-12-24

## 2024-12-24 RX ORDER — CHLORTHALIDONE 50 MG/1
50 TABLET ORAL DAILY
Qty: 90 TABLET | Refills: 1 | Status: SHIPPED | OUTPATIENT
Start: 2024-12-24

## 2024-12-24 RX ORDER — SEMAGLUTIDE 0.68 MG/ML
0.25 INJECTION, SOLUTION SUBCUTANEOUS
Qty: 3 ML | Refills: 1 | Status: SHIPPED | OUTPATIENT
Start: 2024-12-24

## 2024-12-24 RX ORDER — NIFEDIPINE 90 MG/1
90 TABLET, EXTENDED RELEASE ORAL DAILY
Qty: 90 TABLET | Refills: 1 | Status: SHIPPED | OUTPATIENT
Start: 2024-12-24

## 2024-12-24 RX ORDER — ESCITALOPRAM OXALATE 20 MG/1
20 TABLET ORAL DAILY
Qty: 90 TABLET | Refills: 1 | Status: SHIPPED | OUTPATIENT
Start: 2024-12-24

## 2024-12-24 RX ORDER — KETOCONAZOLE 20 MG/ML
SHAMPOO, SUSPENSION TOPICAL
Qty: 120 ML | Refills: 0 | Status: SHIPPED | OUTPATIENT
Start: 2024-12-24

## 2024-12-24 RX ORDER — POTASSIUM CHLORIDE 1500 MG/1
TABLET, EXTENDED RELEASE ORAL
Qty: 90 TABLET | Refills: 0 | Status: SHIPPED | OUTPATIENT
Start: 2024-12-24

## 2025-01-28 ENCOUNTER — TELEPHONE (OUTPATIENT)
Age: 35
End: 2025-01-28

## 2025-01-28 ENCOUNTER — OFFICE VISIT (OUTPATIENT)
Facility: CLINIC | Age: 35
End: 2025-01-28
Payer: MEDICAID

## 2025-01-28 VITALS
WEIGHT: 280.1 LBS | HEIGHT: 61 IN | SYSTOLIC BLOOD PRESSURE: 156 MMHG | BODY MASS INDEX: 52.88 KG/M2 | TEMPERATURE: 97.2 F | DIASTOLIC BLOOD PRESSURE: 93 MMHG | RESPIRATION RATE: 18 BRPM | HEART RATE: 107 BPM | OXYGEN SATURATION: 98 %

## 2025-01-28 DIAGNOSIS — G47.33 OSA (OBSTRUCTIVE SLEEP APNEA): ICD-10-CM

## 2025-01-28 DIAGNOSIS — I10 ESSENTIAL HYPERTENSION: Primary | ICD-10-CM

## 2025-01-28 DIAGNOSIS — E11.9 TYPE 2 DIABETES MELLITUS WITHOUT COMPLICATION, WITHOUT LONG-TERM CURRENT USE OF INSULIN (HCC): ICD-10-CM

## 2025-01-28 DIAGNOSIS — F33.1 MODERATE EPISODE OF RECURRENT MAJOR DEPRESSIVE DISORDER (HCC): ICD-10-CM

## 2025-01-28 PROCEDURE — 3078F DIAST BP <80 MM HG: CPT | Performed by: NURSE PRACTITIONER

## 2025-01-28 PROCEDURE — 3077F SYST BP >= 140 MM HG: CPT | Performed by: NURSE PRACTITIONER

## 2025-01-28 PROCEDURE — 99214 OFFICE O/P EST MOD 30 MIN: CPT | Performed by: NURSE PRACTITIONER

## 2025-01-28 RX ORDER — ESCITALOPRAM OXALATE 20 MG/1
20 TABLET ORAL DAILY
Qty: 90 TABLET | Refills: 1 | Status: CANCELLED | OUTPATIENT
Start: 2025-01-28

## 2025-01-28 RX ORDER — LOSARTAN POTASSIUM 50 MG/1
50 TABLET ORAL DAILY
Qty: 90 TABLET | Refills: 1 | Status: SHIPPED | OUTPATIENT
Start: 2025-01-28

## 2025-01-28 SDOH — ECONOMIC STABILITY: TRANSPORTATION INSECURITY
IN THE PAST 12 MONTHS, HAS THE LACK OF TRANSPORTATION KEPT YOU FROM MEDICAL APPOINTMENTS OR FROM GETTING MEDICATIONS?: NO

## 2025-01-28 SDOH — ECONOMIC STABILITY: INCOME INSECURITY: IN THE LAST 12 MONTHS, WAS THERE A TIME WHEN YOU WERE NOT ABLE TO PAY THE MORTGAGE OR RENT ON TIME?: YES

## 2025-01-28 SDOH — ECONOMIC STABILITY: FOOD INSECURITY: WITHIN THE PAST 12 MONTHS, THE FOOD YOU BOUGHT JUST DIDN'T LAST AND YOU DIDN'T HAVE MONEY TO GET MORE.: NEVER TRUE

## 2025-01-28 SDOH — ECONOMIC STABILITY: FOOD INSECURITY: WITHIN THE PAST 12 MONTHS, YOU WORRIED THAT YOUR FOOD WOULD RUN OUT BEFORE YOU GOT MONEY TO BUY MORE.: SOMETIMES TRUE

## 2025-01-28 SDOH — ECONOMIC STABILITY: FOOD INSECURITY: WITHIN THE PAST 12 MONTHS, THE FOOD YOU BOUGHT JUST DIDN'T LAST AND YOU DIDN'T HAVE MONEY TO GET MORE.: SOMETIMES TRUE

## 2025-01-28 SDOH — ECONOMIC STABILITY: FOOD INSECURITY: WITHIN THE PAST 12 MONTHS, YOU WORRIED THAT YOUR FOOD WOULD RUN OUT BEFORE YOU GOT MONEY TO BUY MORE.: NEVER TRUE

## 2025-01-28 ASSESSMENT — PATIENT HEALTH QUESTIONNAIRE - PHQ9
5. POOR APPETITE OR OVEREATING: NOT AT ALL
2. FEELING DOWN, DEPRESSED OR HOPELESS: MORE THAN HALF THE DAYS
4. FEELING TIRED OR HAVING LITTLE ENERGY: SEVERAL DAYS
SUM OF ALL RESPONSES TO PHQ9 QUESTIONS 1 & 2: 4
SUM OF ALL RESPONSES TO PHQ QUESTIONS 1-9: 11
3. TROUBLE FALLING OR STAYING ASLEEP: NEARLY EVERY DAY
SUM OF ALL RESPONSES TO PHQ QUESTIONS 1-9: 11
7. TROUBLE CONCENTRATING ON THINGS, SUCH AS READING THE NEWSPAPER OR WATCHING TELEVISION: SEVERAL DAYS
10. IF YOU CHECKED OFF ANY PROBLEMS, HOW DIFFICULT HAVE THESE PROBLEMS MADE IT FOR YOU TO DO YOUR WORK, TAKE CARE OF THINGS AT HOME, OR GET ALONG WITH OTHER PEOPLE: SOMEWHAT DIFFICULT
SUM OF ALL RESPONSES TO PHQ QUESTIONS 1-9: 11
9. THOUGHTS THAT YOU WOULD BE BETTER OFF DEAD, OR OF HURTING YOURSELF: NOT AT ALL
8. MOVING OR SPEAKING SO SLOWLY THAT OTHER PEOPLE COULD HAVE NOTICED. OR THE OPPOSITE, BEING SO FIGETY OR RESTLESS THAT YOU HAVE BEEN MOVING AROUND A LOT MORE THAN USUAL: SEVERAL DAYS
1. LITTLE INTEREST OR PLEASURE IN DOING THINGS: MORE THAN HALF THE DAYS
SUM OF ALL RESPONSES TO PHQ QUESTIONS 1-9: 11
6. FEELING BAD ABOUT YOURSELF - OR THAT YOU ARE A FAILURE OR HAVE LET YOURSELF OR YOUR FAMILY DOWN: SEVERAL DAYS

## 2025-01-28 NOTE — TELEPHONE ENCOUNTER
Patient called in to schedule for Bariatric Surgery. Explained to the patient that I did not see history of her completing the required docs. Patient requested to speak to another staff member regarding scheduling

## 2025-01-28 NOTE — PROGRESS NOTES
\"Have you been to the ER, urgent care clinic since your last visit?  Hospitalized since your last visit?\"    NO    “Have you seen or consulted any other health care providers outside our system since your last visit?”    NO     “Have you had a pap smear?”    NO    Date of last Cervical Cancer screen (HPV or PAP): 9/18/2019       “Have you had a diabetic eye exam?”    NO     No diabetic eye exam on file          Chief Complaint   Patient presents with    6 Month Follow-Up     BP (!) 150/75 (Site: Right Upper Arm, Position: Sitting, Cuff Size: Large Adult)   Pulse (!) 107   Temp 97.2 °F (36.2 °C) (Temporal)   Resp 18   Ht 1.549 m (5' 1\")   Wt 127.1 kg (280 lb 1.6 oz)   LMP 12/12/2024 (Exact Date)   SpO2 98%   BMI 52.92 kg/m²

## 2025-01-28 NOTE — PATIENT INSTRUCTIONS
[facility]  Address: [address], [place] [address]  Phone: [phone]    [facility]  Address: [address], [place] [address]  Phone: [phone]

## 2025-01-28 NOTE — PROGRESS NOTES
Subjective: (As above and below)     Chief Complaint   Patient presents with    6 Month Follow-Up     Maris Mckeon is a 34 y.o. year old female who presents for     Hypertension ROS:  taking medications as instructed, no medication side effects noted, no TIAs, no chest pain on exertion, no dyspnea on exertion, no swelling of ankles  Home readings: high  Admits to poor diet    BP Readings from Last 3 Encounters:   01/28/25 (!) 156/93   12/09/24 130/75   09/19/24 130/79       NORAH: c/w cpap    Diabetic Review of Systems - medication compliance: compliant all of the time, diabetic diet compliance: compliant most of the time, home glucose monitoring: is performed sporadically.     Hemoglobin A1C   Date Value Ref Range Status   09/25/2024 6.1 (H) 4.8 - 5.6 % Final     Comment:                 Prediabetes: 5.7 - 6.4           Diabetes: >6.4           Glycemic control for adults with diabetes: <7.0       Hemoglobin A1C, POC   Date Value Ref Range Status   05/16/2024 5.2 % Final         Wt Readings from Last 3 Encounters:   01/28/25 127.1 kg (280 lb 1.6 oz)   12/09/24 125.4 kg (276 lb 6.4 oz)   09/19/24 127.5 kg (281 lb 1.6 oz)       Cervical radiculopathy: follows w neurosx    Depression: she does not feel lexapro is working, feels anxious  Interested in therapy  Denies thoughts of dto/dts  Previously only tried xanax which helped    Reviewed PmHx, RxHx, FmHx, SocHx, AllgHx and updated in chart.  Family History   Problem Relation Age of Onset    Hypertension Maternal Grandmother        Past Medical History:   Diagnosis Date    Asthma     has not had any problems no inhaler    Asthma     Class 3 obesity in adult 11/16/2017    Depressive disorder 12/9/2021    H/O pilonidal cyst     Hidradenitis 2/1/2018    Psychiatric disorder     ANXIETY    Type 2 diabetes mellitus without complication, without long-term current use of insulin ([facility]) 8/15/2023    Vaginal delivery       Social History     Socioeconomic History    Marital

## 2025-01-29 ENCOUNTER — TELEPHONE (OUTPATIENT)
Age: 35
End: 2025-01-29

## 2025-01-29 NOTE — TELEPHONE ENCOUNTER
Returned patients call and made her aware that she hasn't been seen since 2023 and she will have to restart the process for SWL  Emailed patient the seminar information.

## 2025-03-06 DIAGNOSIS — I10 ESSENTIAL HYPERTENSION: ICD-10-CM

## 2025-03-06 DIAGNOSIS — F33.1 MODERATE EPISODE OF RECURRENT MAJOR DEPRESSIVE DISORDER (HCC): ICD-10-CM

## 2025-03-09 RX ORDER — ESCITALOPRAM OXALATE 20 MG/1
20 TABLET ORAL DAILY
Qty: 90 TABLET | Refills: 1 | Status: SHIPPED | OUTPATIENT
Start: 2025-03-09

## 2025-03-09 RX ORDER — CHLORTHALIDONE 50 MG/1
50 TABLET ORAL DAILY
Qty: 90 TABLET | Refills: 1 | Status: SHIPPED | OUTPATIENT
Start: 2025-03-09

## 2025-03-09 RX ORDER — LOSARTAN POTASSIUM 50 MG/1
50 TABLET ORAL DAILY
Qty: 90 TABLET | Refills: 1 | Status: SHIPPED | OUTPATIENT
Start: 2025-03-09

## 2025-03-09 RX ORDER — LABETALOL 200 MG/1
200 TABLET, FILM COATED ORAL 2 TIMES DAILY
Qty: 180 TABLET | Refills: 1 | Status: SHIPPED | OUTPATIENT
Start: 2025-03-09

## 2025-03-09 RX ORDER — NIFEDIPINE 90 MG/1
90 TABLET, EXTENDED RELEASE ORAL DAILY
Qty: 90 TABLET | Refills: 1 | Status: SHIPPED | OUTPATIENT
Start: 2025-03-09

## 2025-03-18 ENCOUNTER — TELEPHONE (OUTPATIENT)
Age: 35
End: 2025-03-18

## 2025-03-25 ENCOUNTER — TELEPHONE (OUTPATIENT)
Age: 35
End: 2025-03-25

## 2025-03-31 ENCOUNTER — TELEPHONE (OUTPATIENT)
Age: 35
End: 2025-03-31

## 2025-03-31 NOTE — TELEPHONE ENCOUNTER
Contacted [facility] concerning patient set up with new device.  Their documentation states:        This pt called regarding her supplies. She said the DME company that she received her device from was [employer] and they are no longer in business and she is needing just supplies. I let her know in order to get the auth we will need a 30 & 90 compliance report. She said there is no compliance because she haven't been able to use it because her water chamber is broke and her hose is not working. SHe said she will purchase the supplies retail on-line and then once she is compliant she will have her doctor sent the reports so we can get the auth and bill her insurance for supplies moving forward.

## 2025-05-05 DIAGNOSIS — F33.1 MODERATE EPISODE OF RECURRENT MAJOR DEPRESSIVE DISORDER (HCC): ICD-10-CM

## 2025-05-05 DIAGNOSIS — L21.0 DANDRUFF: ICD-10-CM

## 2025-05-05 DIAGNOSIS — I10 ESSENTIAL HYPERTENSION: ICD-10-CM

## 2025-05-06 RX ORDER — LABETALOL 200 MG/1
200 TABLET, FILM COATED ORAL 2 TIMES DAILY
Qty: 180 TABLET | Refills: 1 | Status: SHIPPED | OUTPATIENT
Start: 2025-05-06

## 2025-05-06 RX ORDER — KETOCONAZOLE 20 MG/ML
SHAMPOO, SUSPENSION TOPICAL
Qty: 120 ML | Refills: 0 | Status: SHIPPED | OUTPATIENT
Start: 2025-05-06

## 2025-05-06 RX ORDER — CHLORTHALIDONE 50 MG/1
50 TABLET ORAL DAILY
Qty: 90 TABLET | Refills: 1 | Status: SHIPPED | OUTPATIENT
Start: 2025-05-06

## 2025-05-06 RX ORDER — POTASSIUM CHLORIDE 1500 MG/1
TABLET, EXTENDED RELEASE ORAL
Qty: 90 TABLET | Refills: 0 | Status: SHIPPED | OUTPATIENT
Start: 2025-05-06

## 2025-05-06 RX ORDER — ESCITALOPRAM OXALATE 20 MG/1
20 TABLET ORAL DAILY
Qty: 90 TABLET | Refills: 1 | Status: SHIPPED | OUTPATIENT
Start: 2025-05-06

## 2025-05-06 RX ORDER — NIFEDIPINE 90 MG/1
90 TABLET, EXTENDED RELEASE ORAL DAILY
Qty: 90 TABLET | Refills: 1 | Status: SHIPPED | OUTPATIENT
Start: 2025-05-06

## 2025-05-06 RX ORDER — LOSARTAN POTASSIUM 50 MG/1
50 TABLET ORAL DAILY
Qty: 90 TABLET | Refills: 1 | Status: SHIPPED | OUTPATIENT
Start: 2025-05-06

## 2025-07-24 ENCOUNTER — HOSPITAL ENCOUNTER (EMERGENCY)
Facility: HOSPITAL | Age: 35
Discharge: HOME | End: 2025-07-25
Attending: EMERGENCY MEDICINE
Payer: MEDICAID

## 2025-07-24 VITALS
HEART RATE: 102 BPM | TEMPERATURE: 98.2 F | HEIGHT: 61 IN | BODY MASS INDEX: 50.03 KG/M2 | RESPIRATION RATE: 18 BRPM | SYSTOLIC BLOOD PRESSURE: 156 MMHG | OXYGEN SATURATION: 96 % | DIASTOLIC BLOOD PRESSURE: 74 MMHG | WEIGHT: 265 LBS

## 2025-07-24 DIAGNOSIS — N61.1 BREAST ABSCESS: Primary | ICD-10-CM

## 2025-07-24 PROCEDURE — 99283 EMERGENCY DEPT VISIT LOW MDM: CPT

## 2025-07-24 ASSESSMENT — LIFESTYLE VARIABLES
HOW MANY STANDARD DRINKS CONTAINING ALCOHOL DO YOU HAVE ON A TYPICAL DAY: PATIENT DOES NOT DRINK
HOW OFTEN DO YOU HAVE A DRINK CONTAINING ALCOHOL: NEVER

## 2025-07-24 ASSESSMENT — PAIN DESCRIPTION - DESCRIPTORS: DESCRIPTORS: ACHING

## 2025-07-24 ASSESSMENT — PAIN DESCRIPTION - ORIENTATION: ORIENTATION: RIGHT

## 2025-07-24 ASSESSMENT — PAIN DESCRIPTION - LOCATION: LOCATION: CHEST

## 2025-07-24 ASSESSMENT — PAIN - FUNCTIONAL ASSESSMENT: PAIN_FUNCTIONAL_ASSESSMENT: 0-10

## 2025-07-24 ASSESSMENT — PAIN SCALES - GENERAL: PAINLEVEL_OUTOF10: 10

## 2025-07-25 PROCEDURE — 6370000000 HC RX 637 (ALT 250 FOR IP): Performed by: EMERGENCY MEDICINE

## 2025-07-25 RX ORDER — CLINDAMYCIN HYDROCHLORIDE 150 MG/1
300 CAPSULE ORAL ONCE
Status: COMPLETED | OUTPATIENT
Start: 2025-07-25 | End: 2025-07-25

## 2025-07-25 RX ORDER — OXYCODONE HYDROCHLORIDE 5 MG/1
5 TABLET ORAL
Refills: 0 | Status: COMPLETED | OUTPATIENT
Start: 2025-07-25 | End: 2025-07-25

## 2025-07-25 RX ORDER — CLINDAMYCIN HYDROCHLORIDE 300 MG/1
300 CAPSULE ORAL 2 TIMES DAILY
Qty: 14 CAPSULE | Refills: 0 | Status: SHIPPED | OUTPATIENT
Start: 2025-07-25 | End: 2025-08-01

## 2025-07-25 RX ORDER — OXYCODONE HYDROCHLORIDE 5 MG/1
5 TABLET ORAL EVERY 6 HOURS PRN
Qty: 12 TABLET | Refills: 0 | Status: SHIPPED | OUTPATIENT
Start: 2025-07-25 | End: 2025-07-28

## 2025-07-25 RX ADMIN — OXYCODONE 5 MG: 5 TABLET ORAL at 01:03

## 2025-07-25 RX ADMIN — CLINDAMYCIN HYDROCHLORIDE 300 MG: 150 CAPSULE ORAL at 01:03

## 2025-07-25 NOTE — ED PROVIDER NOTES
[facility] EMERGENCY DEPARTMENT  EMERGENCY DEPARTMENT ENCOUNTER       Pt Name: Maris Mckeon  MRN: [id number]  Birthdate 1990  Date of evaluation: 7/24/2025  Provider: Hector Hardin MD   PCP: Kelin Heard APRN - NP  Note Started: 12:17 AM 7/25/25     CHIEF COMPLAINT       Chief Complaint   Patient presents with    Abscess     Patient ambulatory to triage c/o right breast abscess that she noticed Monday after a shower. She states it has a head and she has squeezed it but cannot get anything out due to the pain. Patient states she has not had any drainage from it.         HISTORY OF PRESENT ILLNESS: 1 or more elements      History From: Patient, History limited by: None     Maris Mckeon is a 35 y.o. female with a history of diabetes, hidradenitis who presents with abscess.  She reports an abscess to her right inferior breast for the past few days.  No drainage.  No fever no chills.  Severe pain noted to the abscess.     Nursing Notes were all reviewed and agreed with or any disagreements were addressed in the HPI.     REVIEW OF SYSTEMS        Positives and Pertinent negatives as per HPI.    PAST HISTORY     Past Medical History:  Past Medical History:   Diagnosis Date    Asthma     has not had any problems no inhaler    Asthma     Class 3 obesity in adult 11/16/2017    Depressive disorder 12/9/2021    H/O pilonidal cyst     Hidradenitis 2/1/2018    Psychiatric disorder     ANXIETY    Type 2 diabetes mellitus without complication, without long-term current use of insulin ([facility]) 8/15/2023    Vaginal delivery        Past Surgical History:  Past Surgical History:   Procedure Laterality Date    APPENDECTOMY  2017    BREAST REDUCTION SURGERY  12/2020    ORTHOPEDIC SURGERY      Broke l foot as child    OTHER SURGICAL HISTORY      left foot on third toe     OTHER SURGICAL HISTORY      Laparoscopic cholecystectomy.    OTHER SURGICAL HISTORY  04/19/2016    Incision and drainage of pilonidal abscess;

## 2025-07-26 ENCOUNTER — HOSPITAL ENCOUNTER (EMERGENCY)
Facility: HOSPITAL | Age: 35
Discharge: HOME | End: 2025-07-26
Attending: EMERGENCY MEDICINE
Payer: MEDICAID

## 2025-07-26 VITALS
DIASTOLIC BLOOD PRESSURE: 135 MMHG | OXYGEN SATURATION: 98 % | BODY MASS INDEX: 53.69 KG/M2 | RESPIRATION RATE: 18 BRPM | HEIGHT: 61 IN | WEIGHT: 284.39 LBS | HEART RATE: 82 BPM | SYSTOLIC BLOOD PRESSURE: 204 MMHG | TEMPERATURE: 98.2 F

## 2025-07-26 DIAGNOSIS — N61.1 ABSCESS OF RIGHT BREAST: Primary | ICD-10-CM

## 2025-07-26 DIAGNOSIS — I10 ESSENTIAL HYPERTENSION: ICD-10-CM

## 2025-07-26 PROCEDURE — 10060 I&D ABSCESS SIMPLE/SINGLE: CPT

## 2025-07-26 PROCEDURE — 6360000002 HC RX W HCPCS: Performed by: EMERGENCY MEDICINE

## 2025-07-26 PROCEDURE — 99282 EMERGENCY DEPT VISIT SF MDM: CPT

## 2025-07-26 RX ORDER — LIDOCAINE HYDROCHLORIDE AND EPINEPHRINE 10; 10 MG/ML; UG/ML
10 INJECTION, SOLUTION INFILTRATION; PERINEURAL ONCE
Status: COMPLETED | OUTPATIENT
Start: 2025-07-26 | End: 2025-07-26

## 2025-07-26 RX ADMIN — LIDOCAINE HYDROCHLORIDE AND EPINEPHRINE 10 ML: 10; 10 INJECTION, SOLUTION INFILTRATION; PERINEURAL at 13:11

## 2025-07-26 ASSESSMENT — PAIN DESCRIPTION - ONSET
ONSET: ON-GOING
ONSET: ON-GOING

## 2025-07-26 ASSESSMENT — PAIN SCALES - GENERAL
PAINLEVEL_OUTOF10: 10
PAINLEVEL_OUTOF10: 8

## 2025-07-26 ASSESSMENT — PAIN DESCRIPTION - LOCATION
LOCATION: BREAST
LOCATION: BREAST

## 2025-07-26 ASSESSMENT — PAIN DESCRIPTION - DESCRIPTORS
DESCRIPTORS: ACHING
DESCRIPTORS: ACHING

## 2025-07-26 ASSESSMENT — PAIN DESCRIPTION - FREQUENCY
FREQUENCY: CONTINUOUS
FREQUENCY: CONTINUOUS

## 2025-07-26 ASSESSMENT — PAIN DESCRIPTION - ORIENTATION
ORIENTATION: RIGHT
ORIENTATION: RIGHT

## 2025-07-26 ASSESSMENT — PAIN DESCRIPTION - PAIN TYPE
TYPE: ACUTE PAIN
TYPE: ACUTE PAIN

## 2025-07-26 ASSESSMENT — PAIN - FUNCTIONAL ASSESSMENT
PAIN_FUNCTIONAL_ASSESSMENT: PREVENTS OR INTERFERES SOME ACTIVE ACTIVITIES AND ADLS
PAIN_FUNCTIONAL_ASSESSMENT: PREVENTS OR INTERFERES SOME ACTIVE ACTIVITIES AND ADLS

## 2025-07-26 NOTE — ED TRIAGE NOTES
Pt has abscess under right breast x 6 days. Was seen on 7/24 and given clindamycin and oxy for pain. Pt states pain has worsened and has had no improvement.

## 2025-07-26 NOTE — ED PROVIDER NOTES
[facility] EMERGENCY DEPARTMENT  EMERGENCY DEPARTMENT ENCOUNTER      Pt Name: Maris Mckeon  MRN: [id number]  Birthdate 1990  Date of evaluation: 7/26/2025  Provider: Monique Good MD    CHIEF COMPLAINT       Chief Complaint   Patient presents with    Breast Pain         HISTORY OF PRESENT ILLNESS   (Location/Symptom, Timing/Onset, Context/Setting, Quality, Duration, Modifying Factors, Severity)  Note limiting factors.   Patient is a 35-year-old with a history of hidradenitis who was diagnosed with a right breast abscess and started on clindamycin 2 days ago.  The pain and swelling has been gradually worsening over the last week and she is concerned that the abscess is too large to respond to antibiotics without drainage.    The history is provided by the patient.         Review of External Medical Records:     Nursing Notes were reviewed.    REVIEW OF SYSTEMS    (2-9 systems for level 4, 10 or more for level 5)     Review of Systems    Except as noted above the remainder of the review of systems was reviewed and negative.       PAST MEDICAL HISTORY     Past Medical History:   Diagnosis Date    Asthma     has not had any problems no inhaler    Asthma     Class 3 obesity in adult 11/16/2017    Depressive disorder 12/9/2021    H/O pilonidal cyst     Hidradenitis 2/1/2018    Psychiatric disorder     ANXIETY    Type 2 diabetes mellitus without complication, without long-term current use of insulin ([facility]) 8/15/2023    Vaginal delivery          SURGICAL HISTORY       Past Surgical History:   Procedure Laterality Date    APPENDECTOMY  2017    BREAST REDUCTION SURGERY  12/2020    ORTHOPEDIC SURGERY      Broke l foot as child    OTHER SURGICAL HISTORY      left foot on third toe     OTHER SURGICAL HISTORY      Laparoscopic cholecystectomy.    OTHER SURGICAL HISTORY  04/19/2016    Incision and drainage of pilonidal abscess; Dr. Reed.    OTHER SURGICAL HISTORY  08/22/2016    Incision, drainage, and excision of  antibiotics without drainage and discussed the risks and benefits of incision and drainage with the patient.  The patient chose to proceed with drainage and the abscess was successfully drained in the emergency department.  The patient will continue to take her clindamycin as prescribed and follow-up as an outpatient for additional management.      REASSESSMENT            CONSULTS:  None    PROCEDURES:  Unless otherwise noted below, none     Incision/Drainage    Date/Time: 7/26/2025 1:39 PM    Performed by: Monique Good MD  Authorized by: Monique Good MD    Consent:     Consent obtained:  Verbal    Consent given by:  Patient    Risks, benefits, and alternatives were discussed: yes      Alternatives discussed:  No treatment and delayed treatment  Universal protocol:     Procedure explained and questions answered to patient or proxy's satisfaction: yes      Patient identity confirmed:  Verbally with patient  Location:     Type:  Abscess    Size:  3 cm    Location:  Trunk    Trunk location:  R breast  Pre-procedure details:     Skin preparation:  Povidone-iodine  Anesthesia:     Anesthesia method:  Local infiltration    Local anesthetic:  Procaine 1% WITH epi  Procedure type:     Complexity:  Simple  Procedure details:     Incision types:  Single straight    Wound management:  Probed and deloculated and irrigated with saline    Drainage:  Purulent    Drainage amount:  Copious    Wound treatment:  Wound left open    Packing materials:  None  Post-procedure details:     Procedure completion:  Tolerated well, no immediate complications        FINAL IMPRESSION      1. Abscess of right breast    2. Essential hypertension          DISPOSITION/PLAN   DISPOSITION Decision To Discharge 07/26/2025 01:39:01 PM      PATIENT REFERRED TO:  Kelin Heard, APRN - NP  [address] [address] [phone]    Schedule an appointment as soon as possible for a visit in 3 days  For wound

## 2025-07-28 DIAGNOSIS — F33.1 MODERATE EPISODE OF RECURRENT MAJOR DEPRESSIVE DISORDER (HCC): ICD-10-CM

## 2025-07-28 DIAGNOSIS — D50.0 IRON DEFICIENCY ANEMIA DUE TO CHRONIC BLOOD LOSS: ICD-10-CM

## 2025-07-28 DIAGNOSIS — E11.9 TYPE 2 DIABETES MELLITUS WITHOUT COMPLICATION, WITHOUT LONG-TERM CURRENT USE OF INSULIN (HCC): ICD-10-CM

## 2025-07-28 DIAGNOSIS — L21.0 DANDRUFF: ICD-10-CM

## 2025-07-28 DIAGNOSIS — I10 ESSENTIAL HYPERTENSION: ICD-10-CM

## 2025-08-01 RX ORDER — CHLORTHALIDONE 50 MG/1
50 TABLET ORAL DAILY
Qty: 90 TABLET | Refills: 1 | Status: SHIPPED | OUTPATIENT
Start: 2025-08-01

## 2025-08-01 RX ORDER — NIFEDIPINE 90 MG/1
90 TABLET, EXTENDED RELEASE ORAL DAILY
Qty: 90 TABLET | Refills: 1 | Status: SHIPPED | OUTPATIENT
Start: 2025-08-01

## 2025-08-01 RX ORDER — POTASSIUM CHLORIDE 1500 MG/1
TABLET, EXTENDED RELEASE ORAL
Qty: 90 TABLET | Refills: 0 | Status: SHIPPED | OUTPATIENT
Start: 2025-08-01

## 2025-08-01 RX ORDER — SEMAGLUTIDE 0.68 MG/ML
0.25 INJECTION, SOLUTION SUBCUTANEOUS
Qty: 3 ML | Refills: 1 | Status: SHIPPED | OUTPATIENT
Start: 2025-08-01

## 2025-08-01 RX ORDER — FERROUS SULFATE 325(65) MG
325 TABLET ORAL 2 TIMES DAILY
Qty: 60 TABLET | Refills: 3 | Status: SHIPPED | OUTPATIENT
Start: 2025-08-01

## 2025-08-01 RX ORDER — LABETALOL 200 MG/1
200 TABLET, FILM COATED ORAL 2 TIMES DAILY
Qty: 180 TABLET | Refills: 1 | Status: SHIPPED | OUTPATIENT
Start: 2025-08-01

## 2025-08-01 RX ORDER — LOSARTAN POTASSIUM 50 MG/1
50 TABLET ORAL DAILY
Qty: 90 TABLET | Refills: 1 | Status: SHIPPED | OUTPATIENT
Start: 2025-08-01

## 2025-08-01 RX ORDER — ESCITALOPRAM OXALATE 20 MG/1
20 TABLET ORAL DAILY
Qty: 90 TABLET | Refills: 1 | Status: SHIPPED | OUTPATIENT
Start: 2025-08-01

## 2025-08-01 RX ORDER — KETOCONAZOLE 20 MG/ML
SHAMPOO, SUSPENSION TOPICAL
Qty: 120 ML | Refills: 0 | Status: SHIPPED | OUTPATIENT
Start: 2025-08-01

## (undated) DEVICE — 1200 GUARD II KIT W/5MM TUBE W/O VAC TUBE: Brand: GUARDIAN

## (undated) DEVICE — ROCKER SWITCH PENCIL BLADE ELECTRODE, HOLSTER: Brand: EDGE

## (undated) DEVICE — STRAP,POSITIONING,KNEE/BODY,FOAM,4X60": Brand: MEDLINE

## (undated) DEVICE — SPONGE GZ W4XL4IN COT 12 PLY TYP VII WVN C FLD DSGN

## (undated) DEVICE — ABDOMINAL PAD: Brand: DERMACEA

## (undated) DEVICE — Z INACTIVE NO USAGE TURNOVER KIT RM CLEANOP

## (undated) DEVICE — (D)PREP SKN CHLRAPRP APPL 26ML -- CONVERT TO ITEM 371833

## (undated) DEVICE — TOWEL SURG W17XL27IN STD BLU COT NONFENESTRATED PREWASHED

## (undated) DEVICE — REM POLYHESIVE ADULT PATIENT RETURN ELECTRODE: Brand: VALLEYLAB

## (undated) DEVICE — SYR LR LCK 1ML GRAD NSAF 30ML --

## (undated) DEVICE — DRAPE,LAPAROTOMY,T,PEDI,STERILE: Brand: MEDLINE

## (undated) DEVICE — SYR 10ML LUER LOK 1/5ML GRAD --

## (undated) DEVICE — STERILE POLYISOPRENE POWDER-FREE SURGICAL GLOVES: Brand: PROTEXIS

## (undated) DEVICE — BASIN SET MIN W/O CAUT PNCL --

## (undated) DEVICE — CULTURETTE SGL EVAC TUBE PALL -- 100/CA

## (undated) DEVICE — BASIC PACK: Brand: CONVERTORS

## (undated) DEVICE — INTENDED FOR TISSUE SEPARATION, AND OTHER PROCEDURES THAT REQUIRE A SHARP SURGICAL BLADE TO PUNCTURE OR CUT.: Brand: BARD-PARKER ® CARBON RIB-BACK BLADES

## (undated) DEVICE — INFECTION CONTROL KIT SYS

## (undated) DEVICE — KENDALL SCD EXPRESS SLEEVES, KNEE LENGTH, MEDIUM: Brand: KENDALL SCD

## (undated) DEVICE — KERLIX BANDAGE ROLL: Brand: KERLIX

## (undated) DEVICE — GAUZE SPONGES,12 PLY: Brand: CURITY

## (undated) DEVICE — SUTURE CHROMIC GUT SZ 2-0 L27IN ABSRB BRN L26MM SH 1/2 CIR G123H

## (undated) DEVICE — SOLUTION IV 1000ML 0.9% SOD CHL

## (undated) DEVICE — CURITY PLAIN PACKING STRIP: Brand: CURITY

## (undated) DEVICE — NEEDLE HYPO 25GA L1.5IN BVL ORIENTED ECLIPSE

## (undated) DEVICE — SURGICAL PROCEDURE PACK BASIN MAJ SET CUST NO CAUT

## (undated) DEVICE — SUTURE VCRL SZ 0 L36IN ABSRB VLT L36MM CT-1 1/2 CIR J346H

## (undated) DEVICE — SUTURE VCRL SZ 3-0 L54IN ABSRB VLT LIGAPAK REEL NDL J205G

## (undated) DEVICE — LIGHT HANDLE: Brand: DEVON

## (undated) DEVICE — DEVON™ KNEE AND BODY STRAP 60" X 3" (1.5 M X 7.6 CM): Brand: DEVON

## (undated) DEVICE — SOLUTION SCRB 2OZ 10% POVIDONE IOD ANTIMIC BTL

## (undated) DEVICE — GAUZE,PACKING STRIP,PLAIN,1/2"X5YD,STRL: Brand: CURAD

## (undated) DEVICE — SUT ETHLN 3-0 18IN PS2 BLK --

## (undated) DEVICE — TIP SUCT BLU PLAS BLB W/O CTRL VENT YANK

## (undated) DEVICE — 3M™ DURAPORE™ SURGICAL TAPE 1538-3, 3 INCH X 10 YARD (7,5CM X 9,1M), 4 ROLLS/BOX: Brand: 3M™ DURAPORE™

## (undated) DEVICE — TRAY PREP DRY W/ PREM GLV 2 APPL 6 SPNG 2 UNDPD 1 OVERWRAP

## (undated) DEVICE — MASTISOL ADHESIVE LIQ 2/3ML

## (undated) DEVICE — SWAB CULT LIQ STUART AGR AERB MOD IN BRK SGL RAYON TIP PLAS 220099] BECTON DICKINSON MICRO]

## (undated) DEVICE — SUTURE VCRL SZ 3-0 L27IN ABSRB UD L26MM SH 1/2 CIR J416H

## (undated) DEVICE — TAPE,CLOTH/SILK,CURAD,3"X10YD,LF,40/CS: Brand: CURAD

## (undated) DEVICE — DBD-PACK,LAPAROTOMY,2 REINFORCED GOWNS: Brand: MEDLINE

## (undated) DEVICE — DRAPE,T,LAPARO,TRANS,STERILE: Brand: MEDLINE

## (undated) DEVICE — SUT ETHLN 3-0 18IN PS1 BLK --

## (undated) DEVICE — SYR 10ML CTRL LR LCK NSAF LF --

## (undated) DEVICE — SOLUTION SCRB 4OZ 10% PVP I POVIDONE IOD TOP PAINT EXIDINE

## (undated) DEVICE — SOLUTION IRRIG 1000ML H2O STRL BLT

## (undated) DEVICE — SUTURE MCRYL SZ 4-0 L27IN ABSRB UD L19MM PS-2 1/2 CIR PRIM Y426H

## (undated) DEVICE — GLOVE SURG SZ 75 L1212IN FNGR THK138MIL BRN LTX FREE

## (undated) DEVICE — PACK,BASIC,SIRUS,V: Brand: MEDLINE

## (undated) DEVICE — BANDAGE,GAUZE,BULKEE II,4.5"X4.1YD,STRL: Brand: MEDLINE

## (undated) DEVICE — BANDAGE,GAUZE,CONFORMING,4"X75",STRL,LF: Brand: MEDLINE

## (undated) DEVICE — SUT VCRL 2-0 27IN CTX VIO --

## (undated) DEVICE — DERMABOND SKIN ADH 0.7ML -- DERMABOND ADVANCED 12/BX